# Patient Record
Sex: FEMALE | Race: BLACK OR AFRICAN AMERICAN | NOT HISPANIC OR LATINO | Employment: UNEMPLOYED | URBAN - METROPOLITAN AREA
[De-identification: names, ages, dates, MRNs, and addresses within clinical notes are randomized per-mention and may not be internally consistent; named-entity substitution may affect disease eponyms.]

---
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
## 2018-03-15 ENCOUNTER — HISTORICAL (OUTPATIENT)
Dept: ADMINISTRATIVE | Facility: HOSPITAL | Age: 18
End: 2018-03-15

## 2018-03-15 LAB
AMPHET UR QL SCN: NEGATIVE
BARBITURATE SCN PRESENT UR: NEGATIVE
BENZODIAZ UR QL SCN: NEGATIVE
CANNABINOIDS UR QL SCN: NEGATIVE
COCAINE UR QL SCN: NEGATIVE
OPIATES UR QL SCN: NEGATIVE
PCP UR QL: NEGATIVE
PH UR STRIP.AUTO: 7 [PH] (ref 5–8)
TEMPERATURE, URINE (OHS): 20.1 DEGC (ref 20–25)

## 2018-04-12 LAB — POC BETA-HCG (QUAL): NEGATIVE

## 2018-09-11 ENCOUNTER — HOSPITAL ENCOUNTER (OUTPATIENT)
Dept: SURGERY | Facility: HOSPITAL | Age: 18
End: 2018-09-12
Attending: OBSTETRICS & GYNECOLOGY | Admitting: OBSTETRICS & GYNECOLOGY

## 2018-09-11 LAB
ABS NEUT (OLG): 7.57 X10(3)/MCL (ref 2.1–9.2)
APPEARANCE, UA: CLEAR
B-HCG FREE SERPL-ACNC: 1177 MIU/ML
BACTERIA #/AREA URNS AUTO: ABNORMAL /[HPF]
BASOPHILS # BLD AUTO: 0.08 X10(3)/MCL
BASOPHILS NFR BLD AUTO: 1 %
BILIRUB UR QL STRIP: NEGATIVE
BUN SERPL-MCNC: 9 MG/DL (ref 7–18)
CALCIUM SERPL-MCNC: 8.6 MG/DL (ref 8.5–10.1)
CHLORIDE SERPL-SCNC: 108 MMOL/L (ref 98–107)
CO2 SERPL-SCNC: 25 MMOL/L (ref 21–32)
COLOR UR: NORMAL
CREAT SERPL-MCNC: 0.8 MG/DL (ref 0.6–1.3)
CREAT/UREA NIT SERPL: 11
EOSINOPHIL # BLD AUTO: 0.57 10*3/UL
EOSINOPHIL NFR BLD AUTO: 5 %
ERYTHROCYTE [DISTWIDTH] IN BLOOD BY AUTOMATED COUNT: 15.7 % (ref 11.5–14.5)
GLUCOSE (UA): NORMAL
GLUCOSE SERPL-MCNC: 101 MG/DL (ref 74–106)
HCT VFR BLD AUTO: 32.1 % (ref 35–46)
HGB BLD-MCNC: 9.7 GM/DL (ref 12–16)
HGB UR QL STRIP: NEGATIVE
HYALINE CASTS #/AREA URNS LPF: ABNORMAL /[LPF]
IMM GRANULOCYTES # BLD AUTO: 0.04 10*3/UL
IMM GRANULOCYTES NFR BLD AUTO: 0 %
KETONES UR QL STRIP: NEGATIVE
LEUKOCYTE ESTERASE UR QL STRIP: NEGATIVE
LYMPHOCYTES # BLD AUTO: 2 X10(3)/MCL
LYMPHOCYTES NFR BLD AUTO: 18 % (ref 13–40)
MCH RBC QN AUTO: 24.4 PG (ref 26–34)
MCHC RBC AUTO-ENTMCNC: 30.2 GM/DL (ref 31–37)
MCV RBC AUTO: 80.7 FL (ref 80–100)
MONOCYTES # BLD AUTO: 0.61 X10(3)/MCL
MONOCYTES NFR BLD AUTO: 6 % (ref 4–12)
NEUTROPHILS # BLD AUTO: 7.57 X10(3)/MCL
NEUTROPHILS NFR BLD AUTO: 70 X10(3)/MCL
NITRITE UR QL STRIP: NEGATIVE
PH UR STRIP: 6.5 [PH] (ref 4.5–8)
PLATELET # BLD AUTO: 319 X10(3)/MCL (ref 130–400)
PMV BLD AUTO: 11.1 FL (ref 7.4–10.4)
POTASSIUM SERPL-SCNC: 3.7 MMOL/L (ref 3.5–5.1)
PROT UR QL STRIP: NEGATIVE
RBC # BLD AUTO: 3.98 X10(6)/MCL (ref 4–5.2)
RBC #/AREA URNS AUTO: ABNORMAL /[HPF]
SODIUM SERPL-SCNC: 140 MMOL/L (ref 136–145)
SP GR UR STRIP: 1.02 (ref 1–1.03)
SQUAMOUS #/AREA URNS LPF: ABNORMAL /[LPF]
UROBILINOGEN UR STRIP-ACNC: NORMAL
WBC # SPEC AUTO: 10.9 X10(3)/MCL (ref 4.5–11)
WBC #/AREA URNS AUTO: ABNORMAL /HPF

## 2018-09-12 LAB
ABS NEUT (OLG): 8.1 X10(3)/MCL (ref 2.1–9.2)
BASOPHILS # BLD AUTO: 0.05 X10(3)/MCL
BASOPHILS NFR BLD AUTO: 0 %
CROSSMATCH INTERPRETATION: NORMAL
EOSINOPHIL # BLD AUTO: 0.19 X10(3)/MCL
EOSINOPHIL NFR BLD AUTO: 2 %
ERYTHROCYTE [DISTWIDTH] IN BLOOD BY AUTOMATED COUNT: 15.4 % (ref 11.5–14.5)
HCT VFR BLD AUTO: 31.2 % (ref 35–46)
HGB BLD-MCNC: 9.7 GM/DL (ref 12–16)
HIV 1+2 AB+HIV1 P24 AG SERPL QL IA: NONREACTIVE
IMM GRANULOCYTES # BLD AUTO: 0.02 10*3/UL
IMM GRANULOCYTES NFR BLD AUTO: 0 %
LYMPHOCYTES # BLD AUTO: 1.47 X10(3)/MCL
LYMPHOCYTES NFR BLD AUTO: 14 % (ref 13–40)
MCH RBC QN AUTO: 24.6 PG (ref 26–34)
MCHC RBC AUTO-ENTMCNC: 31.1 GM/DL (ref 31–37)
MCV RBC AUTO: 79.2 FL (ref 80–100)
MONOCYTES # BLD AUTO: 0.55 X10(3)/MCL
MONOCYTES NFR BLD AUTO: 5 % (ref 4–12)
NEUTROPHILS # BLD AUTO: 8.1 X10(3)/MCL
NEUTROPHILS NFR BLD AUTO: 78 X10(3)/MCL
PLATELET # BLD AUTO: 314 X10(3)/MCL (ref 130–400)
PMV BLD AUTO: 11.1 FL (ref 7.4–10.4)
PRODUCT READY: NORMAL
RBC # BLD AUTO: 3.94 X10(6)/MCL (ref 4–5.2)
WBC # SPEC AUTO: 10.4 X10(3)/MCL (ref 4.5–11)

## 2018-09-14 ENCOUNTER — HISTORICAL (OUTPATIENT)
Dept: ADMINISTRATIVE | Facility: HOSPITAL | Age: 18
End: 2018-09-14

## 2018-09-14 LAB — B-HCG FREE SERPL-ACNC: 187 MIU/ML

## 2018-09-28 LAB — POC BETA-HCG (QUAL): NEGATIVE

## 2020-09-24 ENCOUNTER — HISTORICAL (OUTPATIENT)
Dept: ADMINISTRATIVE | Facility: HOSPITAL | Age: 20
End: 2020-09-24

## 2020-09-24 LAB
ABS NEUT (OLG): 4.54 X10(3)/MCL (ref 2.1–9.2)
ALBUMIN SERPL-MCNC: 3.6 GM/DL (ref 3.4–5)
ALBUMIN/GLOB SERPL: 0.8 RATIO (ref 1.1–2)
ALP SERPL-CCNC: 56 UNIT/L (ref 45–117)
ALT SERPL-CCNC: 18 UNIT/L (ref 12–78)
AST SERPL-CCNC: 13 UNIT/L (ref 15–37)
BASOPHILS # BLD AUTO: 0.1 X10(3)/MCL (ref 0–0.2)
BASOPHILS NFR BLD AUTO: 1 %
BILIRUB SERPL-MCNC: 0.3 MG/DL (ref 0.2–1)
BILIRUBIN DIRECT+TOT PNL SERPL-MCNC: <0.1 MG/DL (ref 0–0.2)
BILIRUBIN DIRECT+TOT PNL SERPL-MCNC: ABNORMAL MG/DL
BUN SERPL-MCNC: 10 MG/DL (ref 7–18)
CALCIUM SERPL-MCNC: 9.4 MG/DL (ref 8.5–10.1)
CHLORIDE SERPL-SCNC: 109 MMOL/L (ref 98–107)
CHOLEST SERPL-MCNC: 140 MG/DL
CHOLEST/HDLC SERPL: 4.1 {RATIO} (ref 0–4.4)
CO2 SERPL-SCNC: 25 MMOL/L (ref 21–32)
CREAT SERPL-MCNC: 0.9 MG/DL (ref 0.6–1.3)
DEPRECATED CALCIDIOL+CALCIFEROL SERPL-MC: 20 NG/ML (ref 30–80)
EOSINOPHIL # BLD AUTO: 0.2 X10(3)/MCL (ref 0–0.9)
EOSINOPHIL NFR BLD AUTO: 3 %
ERYTHROCYTE [DISTWIDTH] IN BLOOD BY AUTOMATED COUNT: 15.9 % (ref 11.5–14.5)
EST. AVERAGE GLUCOSE BLD GHB EST-MCNC: 137 MG/DL
GLOBULIN SER-MCNC: 4.3 GM/ML (ref 2.3–3.5)
GLUCOSE SERPL-MCNC: 106 MG/DL (ref 74–106)
HAV IGM SERPL QL IA: NONREACTIVE
HBA1C MFR BLD: 6.4 % (ref 4.2–6.3)
HBV CORE IGM SERPL QL IA: NONREACTIVE
HBV SURFACE AG SERPL QL IA: NONREACTIVE
HCT VFR BLD AUTO: 37.4 % (ref 35–46)
HCV AB SERPL QL IA: NONREACTIVE
HDLC SERPL-MCNC: 34 MG/DL (ref 40–59)
HGB BLD-MCNC: 11.1 GM/DL (ref 12–16)
HIV 1+2 AB+HIV1 P24 AG SERPL QL IA: NONREACTIVE
IMM GRANULOCYTES # BLD AUTO: 0.02 10*3/UL
IMM GRANULOCYTES NFR BLD AUTO: 0 %
LDLC SERPL CALC-MCNC: 87 MG/DL
LYMPHOCYTES # BLD AUTO: 2.1 X10(3)/MCL (ref 0.6–4.6)
LYMPHOCYTES NFR BLD AUTO: 29 %
MCH RBC QN AUTO: 24.9 PG (ref 26–34)
MCHC RBC AUTO-ENTMCNC: 29.7 GM/DL (ref 31–37)
MCV RBC AUTO: 84 FL (ref 80–100)
MONOCYTES # BLD AUTO: 0.4 X10(3)/MCL (ref 0.1–1.3)
MONOCYTES NFR BLD AUTO: 5 %
NEUTROPHILS # BLD AUTO: 4.54 X10(3)/MCL (ref 2.1–9.2)
NEUTROPHILS NFR BLD AUTO: 62 %
PLATELET # BLD AUTO: 323 X10(3)/MCL (ref 130–400)
PMV BLD AUTO: 11.5 FL (ref 7.4–10.4)
POTASSIUM SERPL-SCNC: 4 MMOL/L (ref 3.5–5.1)
PROT SERPL-MCNC: 7.9 GM/DL (ref 6.4–8.2)
RBC # BLD AUTO: 4.45 X10(6)/MCL (ref 4–5.2)
SODIUM SERPL-SCNC: 142 MMOL/L (ref 136–145)
T PALLIDUM AB SER QL: NONREACTIVE
T4 FREE SERPL-MCNC: 0.92 NG/DL (ref 0.76–1.46)
TRIGL SERPL-MCNC: 96 MG/DL
TSH SERPL-ACNC: 1.55 MIU/L (ref 0.36–3.74)
VLDLC SERPL CALC-MCNC: 19 MG/DL
WBC # SPEC AUTO: 7.4 X10(3)/MCL (ref 4.5–11)

## 2021-02-01 ENCOUNTER — HISTORICAL (OUTPATIENT)
Dept: ADMINISTRATIVE | Facility: HOSPITAL | Age: 21
End: 2021-02-01

## 2021-02-01 LAB — SARS-COV-2 RNA RESP QL NAA+PROBE: NEGATIVE

## 2021-02-03 ENCOUNTER — HISTORICAL (OUTPATIENT)
Dept: RADIOLOGY | Facility: HOSPITAL | Age: 21
End: 2021-02-03

## 2021-02-03 LAB — B-HCG SERPL QL: NEGATIVE

## 2021-02-04 LAB — FINAL CULTURE: NORMAL

## 2021-04-29 ENCOUNTER — HISTORICAL (OUTPATIENT)
Dept: ADMINISTRATIVE | Facility: HOSPITAL | Age: 21
End: 2021-04-29

## 2021-04-29 ENCOUNTER — HISTORICAL (OUTPATIENT)
Dept: LAB | Facility: HOSPITAL | Age: 21
End: 2021-04-29

## 2021-04-29 LAB
AMPHET UR QL SCN: NEGATIVE
BARBITURATE SCN PRESENT UR: NEGATIVE
BENZODIAZ UR QL SCN: NEGATIVE
CANNABINOIDS UR QL SCN: NEGATIVE
COCAINE UR QL SCN: NEGATIVE
OPIATES UR QL SCN: NEGATIVE
PCP UR QL: NEGATIVE
PH UR STRIP.AUTO: 5.5 [PH] (ref 3–11)
POC BETA-HCG (QUAL): NEGATIVE

## 2021-05-27 ENCOUNTER — HISTORICAL (OUTPATIENT)
Dept: ADMINISTRATIVE | Facility: HOSPITAL | Age: 21
End: 2021-05-27

## 2021-05-27 ENCOUNTER — HISTORICAL (OUTPATIENT)
Dept: FAMILY MEDICINE | Facility: CLINIC | Age: 21
End: 2021-05-27

## 2021-05-27 LAB
ALBUMIN SERPL-MCNC: 3.7 GM/DL (ref 3.5–5)
ALBUMIN/GLOB SERPL: 1 RATIO (ref 1.1–2)
ALP SERPL-CCNC: 55 UNIT/L (ref 40–150)
ALT SERPL-CCNC: 16 UNIT/L (ref 0–55)
AMPHET UR QL SCN: NEGATIVE
AST SERPL-CCNC: 15 UNIT/L (ref 5–34)
BARBITURATE SCN PRESENT UR: NEGATIVE
BENZODIAZ UR QL SCN: NEGATIVE
BILIRUB SERPL-MCNC: 0.2 MG/DL
BILIRUBIN DIRECT+TOT PNL SERPL-MCNC: 0.1 MG/DL (ref 0–0.5)
BILIRUBIN DIRECT+TOT PNL SERPL-MCNC: 0.1 MG/DL (ref 0–0.8)
BUN SERPL-MCNC: 9.7 MG/DL (ref 7–18.7)
CALCIUM SERPL-MCNC: 9.4 MG/DL (ref 8.4–10.2)
CANNABINOIDS UR QL SCN: NEGATIVE
CHLORIDE SERPL-SCNC: 112 MMOL/L (ref 98–107)
CO2 SERPL-SCNC: 24 MMOL/L (ref 22–29)
COCAINE UR QL SCN: NEGATIVE
CREAT SERPL-MCNC: 0.8 MG/DL (ref 0.55–1.02)
EST. AVERAGE GLUCOSE BLD GHB EST-MCNC: 131.2 MG/DL
GLOBULIN SER-MCNC: 3.6 GM/DL (ref 2.4–3.5)
GLUCOSE SERPL-MCNC: 107 MG/DL (ref 74–100)
HBA1C MFR BLD: 6.2 %
OPIATES UR QL SCN: NEGATIVE
PCP UR QL: NEGATIVE
PH UR STRIP.AUTO: 6 [PH] (ref 3–11)
POTASSIUM SERPL-SCNC: 4.3 MMOL/L (ref 3.5–5.1)
PROT SERPL-MCNC: 7.3 GM/DL (ref 6.4–8.3)
SODIUM SERPL-SCNC: 143 MMOL/L (ref 136–145)
T4 FREE SERPL-MCNC: 0.82 NG/DL (ref 0.7–1.48)
TESTOST SERPL-MCNC: 38.13 NG/DL (ref 13.84–53.35)
TSH SERPL-ACNC: 2.13 UIU/ML (ref 0.35–4.94)

## 2021-06-24 ENCOUNTER — HISTORICAL (OUTPATIENT)
Dept: RADIOLOGY | Facility: HOSPITAL | Age: 21
End: 2021-06-24

## 2021-11-18 ENCOUNTER — HISTORICAL (OUTPATIENT)
Dept: ADMINISTRATIVE | Facility: HOSPITAL | Age: 21
End: 2021-11-18

## 2021-11-18 LAB
HAV IGM SERPL QL IA: NONREACTIVE
HBV CORE IGM SERPL QL IA: NONREACTIVE
HBV SURFACE AG SERPL QL IA: NONREACTIVE
HCV AB SERPL QL IA: NONREACTIVE
HIV 1+2 AB+HIV1 P24 AG SERPL QL IA: NONREACTIVE
T PALLIDUM AB SER QL: NONREACTIVE

## 2022-03-21 ENCOUNTER — HISTORICAL (OUTPATIENT)
Dept: ADMINISTRATIVE | Facility: HOSPITAL | Age: 22
End: 2022-03-21

## 2022-04-10 ENCOUNTER — HISTORICAL (OUTPATIENT)
Dept: ADMINISTRATIVE | Facility: HOSPITAL | Age: 22
End: 2022-04-10
Payer: MEDICAID

## 2022-04-12 ENCOUNTER — HISTORICAL (OUTPATIENT)
Dept: ADMINISTRATIVE | Facility: HOSPITAL | Age: 22
End: 2022-04-12

## 2022-04-12 LAB
HUMAN PAPILLOMAVIRUS (HPV): NORMAL
PAP RECOMMENDATION EXT: NORMAL
PAP SMEAR: NORMAL

## 2022-04-25 VITALS
WEIGHT: 288.13 LBS | DIASTOLIC BLOOD PRESSURE: 74 MMHG | HEIGHT: 71 IN | BODY MASS INDEX: 40.34 KG/M2 | OXYGEN SATURATION: 100 % | SYSTOLIC BLOOD PRESSURE: 110 MMHG

## 2022-04-30 NOTE — CONSULTS
Patient:   Valente Wilson            MRN: [id number]            FIN: [id number]               Age:   18 years     Sex:  Female     :  2000   Associated Diagnoses:   None   Author:   Nair MD, Pallavi      Patient evaluated in the ED    HPI: 17 yo  at 6w2d WGA by LMP 18 presents to the ED with complaint of right sided groin pain and suprapubic pain. She describes the pain as sharp and constant, and she works the night shift so she came in yesterday and again today. She says the pain worsened and so she returned. +UPT at home on Thursday, 18, and this is a desired pregnancy. She has an appointment in clinic on Thursday for a prenatal appointment. She also describes dysuria, and diarrhea. She denies this pain in the past. Denies pain with bowel movements or pain relieved by bowel movements. She does report nausea almost daily since her +UPT at home.     ROS:  Denies fevers/chills/chest pain/shortness of breath/dizziness    PMH: none  PSH: none  Medications: prenatal vitamins  Allergies: percocet (hives)  GYN history: Denies STIs,   OB history:  FTSVD 6/29/15 at 40wga, denies any pregnancy related complications    Ultrasound  1. 4.6 x 1.8 x 3.1 cm complex cystic lesion noted in the right adnexa, which is separately seen from the right ovary. No significant color Doppler flow is seen within this lesion. A moderate amount of free fluid is noted in the posterior cul-de-sac. The possibility of an ectopic gestatoin with a pseudogestational sac in the endometrial cavity cannot be excluded.   2. Recommend short term serial clinical laboratory and ultrasound follow up.       Physical Exam:   Most recent vitals: /72 Pulse 102 RR 12 Tmax 37.1   Gen: AAO x 3, NAD  HEENT: NCAT, EOMI, MMM  CV: RRR; S1/S2  Resp: CTA bilaterally, good air movement, nontender chest  Ext: no edema, DP/Radial 2+  Abd: soft, + BS, tender to deep palpation in right side, no rebound/guarding  : Normal  vulva and vagina on speculum exam, cervix visualized as very posterior with no lesions, os visualized as closed. Bimanual exam with suprapubic tenderness, right groin and right upper abdomen tenderness on deep palpation. No adnexal masses palpated.    UA: wnl  beta hC (18) ---> 1177 (18)  H/H: 10.3/34 (18) --> 9.7/32.1 (18)         A/P: 19 yo  at 6w3d WGA with possible early pregnancy vs. ectopic:   1. Early intrauterine pregnancy vs. ectopic pregnancy: Ultrasound with moderate free fluid in the pelvis, beta hcg trending up. Moderate free fluid raises concern for ectopic. Will admit for observation and follow H/H. NPO for possible surgery. Tylenol for pain. Discussed with patient and her mother risks, benefits of surgery and consented for surgical treatment. Her mother has had an ectopic pregnancy in the past and is aware of it. All questions answered.     Will discuss with Dr. Provost Pallavi Nair, MD  [facility]

## 2022-04-30 NOTE — ED PROVIDER NOTES
Patient:   Valente Wilson            MRN: [id number]            FIN: [id number]               Age:   18 years     Sex:  Female     :  2000   Associated Diagnoses:   RLQ abdominal pain; Currently pregnant   Author:   Willard Nance MD      Basic Information   Time seen: Date & time 2018 23:38:00.   History source: Patient.   Arrival mode: Private vehicle.   History limitation: None.   Additional information: Chief Complaint from Nursing Triage Note : Chief Complaint   2018 23:29 CDT      Chief Complaint           R groin pain.  Seen yesterday for same.  .      History of Present Illness   The patient presents with abdominal pain.  The onset was 4  days ago.  The course/duration of symptoms is fluctuating in intensity.  The character of symptoms is achy.  The degree at onset was moderate.  The Location of pain at onset was Suprapubic and Right Groin.  The degree at present is severe.  The Location of pain at present is Suprapubic and Right Groin.  Radiating pain: right flank. The exacerbating factor is none.  The relieving factor is none.  Risk factors consist of pregnancy.  Associated symptoms: nausea, vomiting, denies chest pain, denies diarrhea, denies shortness of breath, denies fever, denies chills and denies headache.  Additional history: Patient complaining of suprapubic/right groin pain radiating to right flank along with nausea and vomiting. Was evaluated in ER yesterday.        Review of Systems   Constitutional symptoms:  No fever, no chills.    Skin symptoms:  No rash,    Eye symptoms:  Vision unchanged.   ENMT symptoms:  No nasal congestion,    Respiratory symptoms:  No shortness of breath,    Cardiovascular symptoms:  No chest pain,    Gastrointestinal symptoms:  Abdominal pain, nausea, vomiting, no diarrhea, no constipation.    Genitourinary symptoms:  No dysuria, no hematuria, no vaginal bleeding, no vaginal discharge.    Musculoskeletal symptoms:  No back  pain,    Neurologic symptoms:  No headache,    Endocrine symptoms:  No polyuria,    Allergy/immunologic symptoms:  No recurrent infections,       Health Status   Allergies:    Allergic Reactions (Selected)  Severity Not Documented  Percocet 7.5/325- No reactions were documented..   Medications: Per nurse's notes.   Pregnancy history: Currently pregnant,  2, para 1.      Past Medical/ Family/ Social History   Medical history: Reviewed as documented in chart.   Surgical history:    none..   Social history: Reviewed as documented in chart.      Physical Examination               Vital Signs   Vital Signs   2018 23:29 CDT      Temperature Oral          37.1 DegC                             Temperature Oral (calculated)             98.78 DegF                             Peripheral Pulse Rate     99 bpm                             Respiratory Rate          16 br/min                             SpO2                      100 %                             Oxygen Therapy            Room air                             Systolic Blood Pressure   126 mmHg                             Diastolic Blood Pressure  86 mmHg  .   General:  Alert, no acute distress.    Skin:  Warm, dry, intact, no rash.    Head:  Normocephalic, atraumatic.    Neck:  Supple, trachea midline.    Eye:  Extraocular movements are intact.   Ears, nose, mouth and throat:  Oral mucosa moist.   Cardiovascular:  Regular rate and rhythm.   Respiratory:  Lungs are clear to auscultation, respirations are non-labored, breath sounds are equal, Symmetrical chest wall expansion.    Chest wall:  No tenderness.   Back:  No costovertebral angle tenderness,    Musculoskeletal:  Normal ROM, normal strength.    Gastrointestinal:  Soft, Non distended, No organomegaly, Tenderness: Mild, suprapubic, right lower quadrant, Guarding: Negative, Rebound: Negative, Bowel sounds: Normal, Trauma: Negative, Signs: McBurney's negative, Fernandez's negative.    Neurological:  Alert  and oriented to person, place, time, and situation, No focal neurological deficit observed, normal speech observed.    Psychiatric:  Cooperative, appropriate mood & affect.       Medical Decision Making   Documents reviewed:  Emergency department nurses' notes.   Orders  Launch Order Profile (Selected)   Inpatient Orders  Completed  IVF Normal Saline NS Bolus 1000ml: 1,000 mL, 1,000 mL, IV, 1,000 mL/hr, start date 09/11/18 23:49:00 CDT, stop date 09/11/18 23:49:00 CDT, STAT  Tylenol: 1,000 mg, form: Tab, Oral, Once, first dose 09/12/18 2:24:00 CDT, stop date 09/12/18 2:24:00 CDT, STAT.   Results review:  Lab results : Lab View   9/11/2018 23:50 CDT      Sodium Lvl                140 mmol/L                             Potassium Lvl             3.7 mmol/L                             Chloride                  108 mmol/L  HI                             CO2                       25 mmol/L                             Calcium Lvl               8.6 mg/dL                             Glucose Lvl               101 mg/dL                             BUN                       9 mg/dL                             Creatinine                0.80 mg/dL                             BUN/Creat Ratio           11  NA                             eGFR-AA                   >105 mL/min                             eGFR-MARIA G                  97 mL/min                             Beta hCG Qnt              1,177.0 mIU/mL  NA                             WBC                       10.9 x10(3)/mcL                             RBC                       3.98 x10(6)/mcL  LOW                             Hgb                       9.7 gm/dL  LOW                             Hct                       32.1 %  LOW                             Platelet                  319 x10(3)/mcL                             MCV                       80.7 fL                             MCH                       24.4 pg  LOW                             MCHC                       30.2 gm/dL  LOW                             RDW                       15.7 %  HI                             MPV                       11.1 fL  HI                             Abs Neut                  7.57 x10(3)/mcL                             Neutro Auto               70 x10(3)/mcL  NA                             Lymph Auto                18 %                             Mono Auto                 6 %                             Eos Auto                  5  NA                             Abs Eos                   0.57  NA                             Basophil Auto             1  NA                             Abs Neutro                7.57 x10(3)/mcL  NA                             Abs Lymph                 2.00 x10(3)/mcL  NA                             Abs Mono                  0.61 x10(3)/mcL  NA                             Abs Baso                  0.08 x10(3)/mcL  NA                             IG%                       0 %  NA                             IG#                       0.0400  NA    9/11/2018 23:45 CDT      UA Appear                 Clear                             UA Color                  LIGHT YELLOW                             UA Spec Grav              1.024                             UA Bili                   Negative                             UA pH                     6.5                             UA Urobilinogen           Normal                             UA Blood                  Negative                             UA Glucose                Normal                             UA Ketones                Negative                             UA Protein                Negative                             UA Nitrite                Negative                             UA Leuk Est               Negative                             UA WBC Interp             0-2 /HPF                             UA RBC Interp             0-2                             UA Bact Interp            None Seen                              UA Squam Epi Interp       2-20                             UA Hyal Cast Interp       0-2  .     US Transvaginal  Impression:  1. A 4.6 x 1.8 x 3.1 cm complex cystic lesion is noted in the right adnexa, which is separately seen from the right ovary. No significant color Doppler flow signal is seen within this lesion. A moderate amount of free fluid is noted in the posterior cul-de-sac. The possibility of an ectopic gestation with a pseudogestational sac in the endometrial cavity cannot be excluded.  2. Recommend short term serial clinical laboratory and ultrasound followup.    US OB Transabdominal  Impression:  1. There is a 2.7 x 2.1 x 2.5 cm thick-walled complex cystic lesion in the right adnexa, without significant peripheral Doppler flow signal.  2. The possibility of early intrauterine pregnancy versus an occult ectopic gestation in the right adnexa cannot be excluded at this time. Recommend serial interval clinical laboratory and ultrasound follow up.      Reexamination/ Reevaluation   Time: 9/12/2018 02:55:00 .   Vital signs   results included from flowsheet : Vital Signs   9/12/2018 2:32 CDT       Temperature Oral          36.8 DegC                             Heart Rate Monitored      98 bpm                             Respiratory Rate          22 br/min                             SpO2                      100 %                             Oxygen Therapy            Room air                             Systolic Blood Pressure   115 mmHg                             Diastolic Blood Pressure  68 mmHg                             Mean Arterial Pressure, Cuff              84 mmHg     Course: progressing as expected.   Pain status: decreased.   Notes: Patient resting comfortably in bed at this time. Educated of results of US and concern for ectopic pregnancy. Will consult GYN for further evaluation. Patient verbalized understanding.      Impression and Plan   Diagnosis   RLQ abdominal pain  (QJV19-QD R10.31)   Currently pregnant (WLS33-FJ Z34.90)      Calls-Consults   -  9/12/2018 03:06:00 , Anisha SMITH, Pallavi, GYN On-Call, consult, Will come evaluate patient in ER.    Plan   Disposition: Admit to OR.    Counseled: Patient, Regarding diagnosis, Regarding diagnostic results, Regarding treatment plan, Patient indicated understanding of instructions.

## 2022-04-30 NOTE — PROGRESS NOTES
Patient:   Valente Wilson            MRN: [id number]            FIN: [id number]               Age:   18 years     Sex:  Female     :  2000   Associated Diagnoses:   None   Author:   Marleni Augustin MD      Pre-Op Dx:  18 F with probable ectopic pregnancy        Plan:  Dx LSC with possible salpingectomy    Reviewed resident's H&P.  Agree with plan.  Proceed with case

## 2022-05-03 NOTE — HISTORICAL [facility] CERNER
This is a historical note converted from Latisha. Formatting and pictures may have been removed.  Please reference Latisha for original formatting and attached multimedia. Chief Complaint  PO follow up #1  History of Present Illness  17yo  presents 2 days s/p LSC R salpingectomy for ectopic pregnancy. Patient reports umbilical pain at her incision. Shes eating a regular diet and has had two episodes of nausea with small amount of emesis after taking narcotic pain medication. She has not yet had BM since surgery but she is passing flatus. Voiding and ambulating without difficulty. Denies fever or chills. No vaginal bleeding.  Review of Systems  Per HPI  Physical Exam  Vitals & Measurements  T:?36.8? ?C (Oral)? HR:?79(Peripheral)? RR:?28? BP:?107/70? SpO2:?100%?  HT:?171?cm? HT:?171?cm? WT:?133.6?kg? WT:?133.6?kg? BMI:?45.69?  AO x 3, NAD, well appearing  Obese  Abd soft, mildly tender at umbilicus, no rebound or guarding, no signs of acute abdomen, NABS  Port incisions x 4 clean dry and intact with no signs of infection, skin glue present  Assessment/Plan  Constipation  - Post op constipation, recommended reducing narcotics to prn, take scheduled ibuprofen, Miralax until regular BMs again  - CBC and beta hcg today, repeat beta hcg at next post op visit  ?  Ordered:  ondansetron, 4 mg = 1 tab(s), Oral, q8hr, PRN PRN as needed for nausea/vomiting, # 30 tab(s), 0 Refill(s)  Beta hCG Quantitative, Routine collect, 18 10:56:00 CDT, Blood, Stop date 18 10:56:00 CDT, Lab Collect, Postoperative pain  Constipation  Nausea, 18 10:56:00 CDT  CBC w/ Auto Diff, Routine collect, 18 10:34:00 CDT, Blood, Stop date 18 10:35:00 CDT, Lab Collect, Postoperative pain  Constipation  Nausea, 18 10:34:00 CDT  Clinic Follow up, *Est. 18 3:00:00 CDT, Order for future visit, Constipation  Nausea  Postoperative pain, [facility]  ?  Nausea  Ordered:  ondansetron, 4 mg = 1 tab(s), Oral,  q8hr, PRN PRN as needed for nausea/vomiting, # 30 tab(s), 0 Refill(s)  Beta hCG Quantitative, Routine collect, 09/14/18 10:56:00 CDT, Blood, Stop date 09/14/18 10:56:00 CDT, Lab Collect, Postoperative pain  Constipation  Nausea, 09/14/18 10:56:00 CDT  CBC w/ Auto Diff, Routine collect, 09/14/18 10:34:00 CDT, Blood, Stop date 09/14/18 10:35:00 CDT, Lab Collect, Postoperative pain  Constipation  Nausea, 09/14/18 10:34:00 CDT  Clinic Follow up, *Est. 09/28/18 3:00:00 CDT, Order for future visit, Constipation  Nausea  Postoperative pain, [facility]  ?  Postoperative pain  Ordered:  ondansetron, 4 mg = 1 tab(s), Oral, q8hr, PRN PRN as needed for nausea/vomiting, # 30 tab(s), 0 Refill(s)  Beta hCG Quantitative, Routine collect, 09/14/18 10:56:00 CDT, Blood, Stop date 09/14/18 10:56:00 CDT, Lab Collect, Postoperative pain  Constipation  Nausea, 09/14/18 10:56:00 CDT  CBC w/ Auto Diff, Routine collect, 09/14/18 10:34:00 CDT, Blood, Stop date 09/14/18 10:35:00 CDT, Lab Collect, Postoperative pain  Constipation  Nausea, 09/14/18 10:34:00 CDT  Clinic Follow up, *Est. 09/28/18 3:00:00 CDT, Order for future visit, Constipation  Nausea  Postoperative pain, [facility]  ?   Problem List/Past Medical History  Ongoing  Attention deficit disorder  Beat knee  Knee pain  Obesity(  Probable Diagnosis  )  Tobacco user  Trichomonosis  Historical  None  Procedure/Surgical History  Ectopic Preg Laparoscopic/Laparotomy (None) (09/12/2018)  vaginal delivery - 2nd degree periurethral laceration (06/29/2015)  vaginal delivery - 2nd degree periurethral laceration (06/29/2015)  DIRECT ADMISSION OF PATIENT FOR HOSPITAL OBSERVATION CARE (06/26/2015)  HOSPITAL OBSERVATION SERVICE, PER HOUR (06/26/2015)  DIRECT ADMISSION OF PATIENT FOR HOSPITAL OBSERVATION CARE (06/13/2015)  HOSPITAL OBSERVATION SERVICE, PER HOUR (06/13/2015)  none   Medications  Adderall 10 mg oral tablet, 10 mg= 1 tab(s), Oral, Daily,? ?Not  taking  diclofenac sodium 75 mg oral delayed release tablet, 75 mg= 1 tab(s), Oral, BID, PRN,? ?Not taking  ferrous sulfate 325 mg (65 mg elemental iron) oral tablet, 325 mg= 1 tab(s), Oral, TID,? ?Not taking  Flagyl 500 mg oral tablet, 500 mg= 1 tab(s), Oral, BID,? ?Not Taking, Completed Rx  ibuprofen 600 mg oral tablet, 600 mg= 1 tab(s), Oral, q6hr, PRN  Norco 5 mg-325 mg oral tablet, 1 tab(s), Oral, q4hr, PRN  Prenatal AD oral tablet, 1 tab(s), Oral, Daily, 1 refills,? ?Not Taking, Completed Rx  Zofran 4 mg oral tablet, 4 mg= 1 tab(s), Oral, q8hr, PRN  Allergies  Percocet 7.5/325  Social History  Alcohol - Denies Alcohol Use, 11/23/2014  Current, Beer, 1-2 times per year, 04/09/2017  Employment/School - Low Risk, 06/28/2015  Unemployed, 07/21/2017  Exercise  Exercise type: Walking., 07/21/2017  Home/Environment  Lives with Mother., 07/21/2017  Nutrition/Health  Regular, 07/21/2017  Sexual  Sexually active: Yes., 07/21/2017  Substance Abuse - Denies Substance Abuse, 11/23/2014  Never, 04/09/2017  Tobacco - Denies Tobacco Use, 11/23/2014  Current every day smoker Use:. Cigarettes Type:. 4 per day., 09/14/2018  Family History  Hypertension.: Mother.  Immunizations  Vaccine Date Status   hepatitis A pediatric vaccine 07/22/2016 Given   tetanus/diphtheria/pertussis, acel(Tdap) 06/30/2015 Given   Health Maintenance  Health Maintenance  ???Pending?(in the next year)  ??? ??Due?  ??? ? ? ?ADL Screening due??09/14/18??and every 1??year(s)  ??? ? ? ?Alcohol Misuse Screening due??09/14/18??and every 1??year(s)  ??? ? ? ?Diabetes Screening due??09/14/18??and every?  ??? ? ? ?Influenza Vaccine due??09/14/18??and every?  ???Satisfied?(in the past 1 year)  ??? ??Satisfied?  ??? ? ? ?Blood Pressure Screening on??09/14/18.??Satisfied by Enoch Cutler  ??? ? ? ?Body Mass Index Check on??09/14/18.??Satisfied by Enoch Cutler  ??? ? ? ?Depression Screening on??09/14/18.??Satisfied by Enoch Cutler  ??? ? ? ?Diabetes  Screening on??09/11/18.??Satisfied by [place]  ??? ? ? ?Hypertension Management-Blood Pressure on??09/14/18.??Satisfied by Enoch Cutler  ??? ? ? ?Obesity Screening on??09/14/18.??Satisfied by Enoch Cutler  ??? ? ? ?Smoking Cessation on??09/14/18.??Satisfied by Enoch Cutler  ?  ?      Reviewed the patients medical history, residents findings on physical exam, and the diagnosis with treatment plan. Care provided was reasonable and necessary.

## 2022-05-03 NOTE — HISTORICAL [facility] CERNER
This is a historical note converted from [employer]. Formatting and pictures may have been removed.  Please reference [employer] for original formatting and attached multimedia. Chief Complaint  adhd f/u  History of Present Illness  This is a 19 y/o female with a past medical history of ADD, obesity, anxiety, depression, attempted suicide (2017) and last a psychiatrist in 2017.? Has had 1 day of counseling in 2017.?  ?  Acute Illness- ADD  Pt has not fiordaliza on any type of ADD medications since 2018 when she was in high school.?? Reports worsening symptoms.? Requesting the medication b/c she cant focus at work, difficulties concentrating. For ex, I am told to do one thing and get distracted and end up doing another task.? Unable to complete tasks.? Disorganized at work and at home.? Poor time management.? Trouble multitasking.? Feels restless at night. Goes to bed at 3am -4 am and up at 6am.? Has Low frustration tolerance.? Wants to go back to school to obtain her GED.?  Requesting counseling.? Symptoms interfering in relationship with fiance- more arguments occurring.? Gets frustrated with her son at times.? Has been isolating herself? because she gets annoyed and angry and down with crying episodes.?  ?  Depression/anxiety:  Worsening after the ectopic pregnancy 9/2018.? Has crying episodes 3-4 times out of the week.? Mostly by herself. Not any medications.? +SI intermittently?, no plans due to my son , Denies any weapons in the home.? Denies HI.?? Has feelings of guilt, poor appetite -eats once a day, low energy at times.? Has increased in smoking to 1/2 pack a day to decrease anxiety with some relief x 3 yrs (1.5 pack years).  ?  GYN: LMP 4/27/21, irregular, 3-4 tampons per day, 3-5 days, has dysmenorrhea prior to cycle? x 1 week and takes?Tylenol prn pain  G1; VMI (2015), no complications with pregnancy  G2: Ectopic pregnancy (6 weeks)  ?  Review of Systems  Constitutional:?no weight gain,?no weight loss,?no fatigue,?no  fever,?no chills,?no weakness,?no trouble sleeping.  Cardiovascular:?no chest pain or discomfort,?no tightness,?no palpitations,?no SOB with activity,?no difficulty breathing while supine,?no swelling,?no sudden awakening from sleep with SOB.  Respiratory:??no cough,?no sputum,?no coughing up blood,?no SOB,?no wheezing,?no painful breathing.  Gastrointestinal:?no swallowing difficulty,?no heartburn,?no change in appetite,?no nausea,?  Urinary:?no frequency,?no urgency,?no burning or pain,?no blood in urine,?no incontinence,?  Skin:?no rashes,?no lumps,?no itching,?no dryness,?color normal for ethnicity,?no hair or nail changes.  Neurologic:?no dizziness,?no fainting,?no seizures,?no weakness,?no numbness,?no tingling,?no tremors.  Psychiatric:?nervousness,?stress,?at work??depression,?memory loss.  Endocrine:?no heat or cold intolerance,?no sweating,?no frequent urination,?no thirst,?no change in appetite.  Physical Exam  Vitals & Measurements  T:?36.8? ?C (Oral)? HR:?91(Peripheral)? RR:?18? BP:?116/77?  HT:?180.00?cm? WT:?134.700?kg? BMI:?41.57? LMP:?04/27/2021 00:00 CDT?  General: cooperative, no acute respiratory distress,  CV: RRR, no murmurs  Lungs:?CTA b/l, no wheezing  Abdomen: soft, NT, ND, + BS x 4, no organomegaly, no CVA tenderness  Extremities: no cyanosis, clubbing, or edema  SKIN: inspection and palpation of skin and soft tissue normal; no scars noted on upper/lower extremities  PSYCH: alert and oriented x 3?with?appropriate mood and affect  Assessment/Plan  1.?Attention deficit disorder?F90.0  Ordered Adderall 20 mg daily  Control substance?contract signed today  Ordered UPT-negative, UDS, and ordered Depot Provera 150mg now and then?every 3 months  Informed pt to let us know if she decides to get pregnant.? Discussed CI of pregnancy and Adderall.  2.?Depression?F32.9  Referral for counseling- Mrs. Parker  3.?Anxiety?F41.9  If symptoms worsen or not improved will consider adding SSRI  4.?Acanthosis  nigricans?L83  Will also consider possible PCOS due to Obesity, infertility, acanthosis nigricans and possible metabolic syndrome  Ordered A1C, CMP, lipid panel  HM:  Last Pap 11/18/2020 NIL  ?  RTC 1 month   Problem List/Past Medical History  Ongoing  Attention deficit disorder  Beat knee  Knee pain  Obesity(  Probable Diagnosis  )  Tobacco user  Trichomonosis  Procedure/Surgical History  Repair Upper Lip, External Approach (04/26/2020)  Simple repair of superficial wounds of face, ears, eyelids, nose, lips and/or mucous membranes; 2.5 cm or less (04/26/2020)  Ectopic Preg Laparoscopic/Laparotomy (None) (09/12/2018)  Laparoscopic treatment of ectopic pregnancy; with salpingectomy and/or oophorectomy (09/12/2018)  Resection of Products of Conception, Ectopic, Percutaneous Endoscopic Approach (09/12/2018)  Resection of Right Fallopian Tube, Percutaneous Endoscopic Approach (09/12/2018)  vaginal delivery - 2nd degree periurethral laceration (06/29/2015)  vaginal delivery - 2nd degree periurethral laceration (06/29/2015)  DIRECT ADMISSION OF PATIENT FOR HOSPITAL OBSERVATION CARE (06/26/2015)  HOSPITAL OBSERVATION SERVICE, PER HOUR (06/26/2015)  DIRECT ADMISSION OF PATIENT FOR HOSPITAL OBSERVATION CARE (06/13/2015)  HOSPITAL OBSERVATION SERVICE, PER HOUR (06/13/2015)  none   Medications  No active medications  Allergies  Percocet 7.5/325  Social History  Abuse/Neglect  No, No, Yes, 04/29/2021  No, No, Yes, 02/03/2021  Alcohol - Denies Alcohol Use, 11/23/2014  Current, 1-2 times per week, 01/04/2021  Employment/School - Low Risk, 06/28/2015  Unemployed, 01/04/2021  Exercise  Exercise type: Denies Any ., 01/04/2021  Home/Environment  Lives with Children, Siblings. Living situation: Home/Independent., 01/04/2021    Never in , 09/09/2020  Nutrition/Health  Regular, Good, 01/04/2021  Sexual  Sexually active: Yes. Number of current partners 1. Sexual orientation: Bisexual. Gender Identity Identifies as  female., 01/04/2021  Substance Use - Denies Substance Abuse, 11/23/2014  Past, Marijuana, 01/04/2021  Tobacco - Denies Tobacco Use, 11/23/2014  10 or more cigarettes (1/2 pack or more)/day in last 30 days, No, 04/29/2021  5-9 cigarettes (between 1/4 to 1/2 pack)/day in last 30 days, Cigarettes, No, 6 per day., 01/04/2021  Family History  Hypertension.: Mother.  Father: History is negative  Brother: History is negative  Sister: History is negative  Immunizations  Vaccine Date Status   hepatitis A pediatric vaccine 07/22/2016 Given   tetanus/diphtheria/pertussis, acel(Tdap) 06/30/2015 Given       DOS: 4/29/2021  Pt discussed and seen with resident at the time of visit. Long?discussion held regarding plan of care. ?Agree with assessment and plan.   Received? message that the Adderall was causing dry mouth and chest tightness.?Unable to reach the patient. ?Left VM to stop the medication.? I will try to call the patient again 5/15/2021.

## 2022-05-03 NOTE — HISTORICAL [facility] CERNER
This is a historical note converted from [employer]. Formatting and pictures may have been removed.  Please reference [employer] for original formatting and attached multimedia. Chief Complaint  Annual  History of Present Illness  The patient? here for annual exam. Her?LMP was 10/31/21. Period last 3 days and changes tampons every 4-6 hours. Admits to regular monthly cycles, however Sept/Oct cycle was irregular. Denies history of abnormal paps. Pap in ?was NIL and HPV neg, OHR positive.?Denies breast or urinary complaints.?Denies pelvic pain, abnormal bleeding or discharge. Pt reports?trichomonas and chlamydia?in the past and desires testing today.?Pt has hx of ectopic pregnancy with Rt salpingectomy in . Pt was seen by GYN for HSG in 2021, Lt tube was?patent. Still desires pregnancy, has not seen fertility specialist.?Denies fly hx of breast, ovarian, uterine or colon cancer. Request to have vaginal cyst removed, states has increased in size.  Review of Systems  CONSTITUTIONAL:??No weight loss, fever, chills or fatigue.  BREAST: No lumps or masses or pain, no nipple discharge or inversion  GI:??No nausea, vomiting or?abdominal pain.  :??No dysuria, frequency or urgency.?No hematuria.  GYN: No abnormal discharge, itching, bleeding, pelvic pain.  NEUROLOGIC:??Alert and oriented, No confusion.  Physical Exam  Vitals & Measurements  T:?36.9? ?C (Oral)? HR:?79(Peripheral)? RR:?18? BP:?110/74?  HT:?180.00?cm? WT:?130.700?kg? BMI:?40.34? LMP:?10/31/2021 00:00 CDT?  GENERAL: Alert and oriented x3.?No apparent distress.  BREAST: No mass, tenderness or discharge.?  ABDOMEN: Soft, nontender.??  PELVIC:?  Labia: No erythema indurations. Skin tag Lt lower groin, <?0.5 cm appearing inclusion cyst to inner Rt labia.  Vagina: pink vaginal mucosa,?no abnormal discharge.  Cervix: Pink, parous os, no cervical motion tenderness.  Uterus: Non-tender, limited exam d/t body habitus.  Adnexa: Non-tender, limited exam d/t body  habitus.  INTEGUMENTARY:?Warm and dry.  NEUROLOGIC: She is alert and oriented x3.?  PSYCHIATRIC:?Cooperative, appropriate mood and affect.  Assessment/Plan  1.?Encounter for routine pelvic examination?Z01.419  ?Pelvic today. Pap utd per ACOG.  RTC 1 year.  Ordered:  1160F- Medication reconciliation completed during visit, Encounter for routine pelvic examination  Routine screening for STI (sexually transmitted infection)  Vaginal lesion  Obesity(  Probable Diagnosis  ), 11/18/21 14:11:00 CST  Clinic Follow up, *Est. 11/18/22 3:00:00 CST, Order for future visit, Encounter for routine pelvic examination, [facility]  Preventative Health Care Est 18-39 years [address] PC, Encounter for routine pelvic examination, 11/18/21 14:12:00 CST  ?  2.?Routine screening for STI (sexually transmitted infection)?Z11.3  ?wet prep and g/c  hiv, hep and syphilis  Ordered:  1160F- Medication reconciliation completed during visit, Encounter for routine pelvic examination  Routine screening for STI (sexually transmitted infection)  Vaginal lesion  Obesity(  Probable Diagnosis  ), 11/18/21 14:11:00 CST  Chlamydia trach and N. gonorrhea PCR, Routine collect, Cervical, Order for future visit, 11/18/21 14:12:00 CST, Stop date 11/18/21 14:12:00 CST, Nurse collect, Routine screening for STI (sexually transmitted infection)  Hepatitis Panel-, Routine collect, 11/18/21 14:12:00 CST, Blood, Order for future visit, Stop date 11/18/21 14:12:00 CST, Lab Collect, Routine screening for STI (sexually transmitted infection), 11/18/21 14:12:00 CST  HIV 1 and 2, Routine collect, 11/18/21 14:12:00 CST, Blood, Order for future visit, Stop date 11/18/21 14:12:00 CST, Lab Collect, Routine screening for STI (sexually transmitted infection), 11/18/21 14:12:00 CST  Office/Outpatient Visit Level 2 Established [id number] PC, Routine screening for STI (sexually transmitted infection)  Vaginal lesion  Obesity(  Probable Diagnosis  ), 25, 11/18/21 14:12:00  CST  Syphilis Antibody (with Reflex RPR), Routine collect, 11/18/21 14:12:00 CST, Blood, Order for future visit, Stop date 11/18/21 14:12:00 CST, Lab Collect, Routine screening for STI (sexually transmitted infection), 11/18/21 14:12:00 CST  Wet Prep Smear, Routine collect, Vaginal, Order for future visit, 11/18/21 14:12:00 CST, Stop date 11/18/21 14:12:00 CST, Nurse collect, Routine screening for STI (sexually transmitted infection), 11/18/21 14:12:00 CST  ?  3.?Vaginal lesion?N89.8  ?tag Lt lower groin, <?0.5 cm appearing inclusion cyst to inner Rt labia  Pt request removal  Ordered:  1160F- Medication reconciliation completed during visit, Encounter for routine pelvic examination  Routine screening for STI (sexually transmitted infection)  Vaginal lesion  Obesity(  Probable Diagnosis  ), 11/18/21 14:11:00 CST  Office/Outpatient Visit Level 2 Established [id number] , Routine screening for STI (sexually transmitted infection)  Vaginal lesion  Obesity(  Probable Diagnosis  ), 25, 11/18/21 14:12:00 CST  ?  4.?Obesity(  Probable Diagnosis  )?E66.9  ?Discussed with the patient healthy lifestyle choices. Importance of maintaining a healthy BMI. Healthy diet including limiting fast foods, processed foods, foods containing high amounts of saturated fats or high sodium content. Recommend low carb, low fat diet rich in lean meat and fish, non starchy vegetables, fruits and good fat while maintaining portion control. Also discussed limiting sugar intake. Recommended plenty of water, avoiding carbonated beverages and high fructose corn syrup. Also discussed with the patient getting plenty of regular cardiovascular exercise, at least 150 minutes of cardiovascular exercise (at least 30 mins 5x/week).  Ordered:  1160F- Medication reconciliation completed during visit, Encounter for routine pelvic examination  Routine screening for STI (sexually transmitted infection)  Vaginal lesion  Obesity(  Probable Diagnosis  ),  11/18/21 14:11:00 CST  Office/Outpatient Visit Level 2 Established [id number] , Routine screening for STI (sexually transmitted infection)  Vaginal lesion  Obesity(  Probable Diagnosis  ), 25, 11/18/21 14:12:00 CST  ?  Referrals  Clinic Follow up, *Est. 11/18/22 3:00:00 CST, Order for future visit, Encounter for routine pelvic examination, [facility]   Problem List/Past Medical History  Ongoing  2019-nCoV  Attention deficit disorder  Beat knee  Bipolar II disorder, most recent episode major depressive  KRISTINA - Generalized anxiety disorder  Knee pain  Obesity(  Probable Diagnosis  )  PTSD - Post-traumatic stress disorder  Tobacco user  Trichomonosis  Historical  None  Pregnant  Pregnant  Procedure/Surgical History  Repair Upper Lip, External Approach (04/26/2020)  Simple repair of superficial wounds of face, ears, eyelids, nose, lips and/or mucous membranes; 2.5 cm or less (04/26/2020)  Ectopic Preg Laparoscopic/Laparotomy (None) (09/12/2018)  Laparoscopic treatment of ectopic pregnancy; with salpingectomy and/or oophorectomy (09/12/2018)  Resection of Products of Conception, Ectopic, Percutaneous Endoscopic Approach (09/12/2018)  Resection of Right Fallopian Tube, Percutaneous Endoscopic Approach (09/12/2018)  vaginal delivery - 2nd degree periurethral laceration (06/29/2015)  vaginal delivery - 2nd degree periurethral laceration (06/29/2015)  DIRECT ADMISSION OF PATIENT FOR HOSPITAL OBSERVATION CARE (06/26/2015)  HOSPITAL OBSERVATION SERVICE, PER HOUR (06/26/2015)  DIRECT ADMISSION OF PATIENT FOR HOSPITAL OBSERVATION CARE (06/13/2015)  HOSPITAL OBSERVATION SERVICE, PER HOUR (06/13/2015)  none   Medications  Adderall 20 mg oral tablet, 20 mg= 1 tab(s), Oral, qAM  escitalopram 10 mg oral tablet, 10 mg= 1 tab(s), Oral, Daily, 3 refills  metformin 500 mg oral tablet, See Instructions  olanzapine 5 mg oral tablet, 5 mg= 1 tab(s), Oral, Daily, 3 refills  Toradol 10 mg oral tablet, 10 mg= 1 tab(s), Oral,  q6hr  Vitamin D3 5000 intl units (125 mcg) oral tablet, 125 mcg= 1 tab(s), Oral, Daily  Allergies  Percocet 7.5/325  codeine?(Unknown)  Social History  Abuse/Neglect  No, 11/11/2021  No, No, Yes, 11/09/2021  Alcohol - Denies Alcohol Use, 11/23/2014  Current, 1-2 times per week, Alcohol use interferes with work or home: No. Others hurt by drinking: No. Household alcohol concerns: No., 05/27/2021  Employment/School - Low Risk, 06/28/2015  Unemployed, 01/04/2021  Exercise  Exercise duration: 10. Exercise frequency: Daily. Exercise type: Walking., 05/27/2021  Home/Environment  Lives with Children, Siblings. Living situation: Home/Independent., 01/04/2021    Never in , 09/09/2020  Nutrition/Health  Regular, Good, 01/04/2021  Sexual  Sexually active: Yes. Number of current partners 1. Sexual orientation: Bisexual. Gender Identity Identifies as female., 01/04/2021  Spiritual/Cultural  Mu-ism, 05/27/2021  Substance Use - Denies Substance Abuse, 11/23/2014  Past, Marijuana, 01/04/2021  Tobacco - Denies Tobacco Use, 11/23/2014  Never (less than 100 in lifetime), No, 11/11/2021  5-9 cigarettes (between 1/4 to 1/2 pack)/day in last 30 days, No, 11/09/2021  Family History  Hypertension.: Mother.  Father: History is negative  Brother: History is negative  Sister: History is negative  Immunizations  Vaccine Date Status   influenza virus vaccine, inactivated 11/09/2021 Given   hepatitis A pediatric vaccine 07/22/2016 Given   meningococcal conjugate vaccine 10/16/2015 Recorded   influenza virus vaccine, inactivated 10/16/2015 Recorded   human papillomavirus vaccine 10/16/2015 Recorded   tetanus/diphtheria/pertussis, acel(Tdap) 06/30/2015 Given   influenza virus vaccine, live, trivalent 01/27/2014 Recorded   human papillomavirus vaccine 01/27/2014 Recorded   tetanus/diphtheria/pertussis, acel(Tdap) 06/27/2011 Recorded   meningococcal conjugate vaccine 06/27/2011 Recorded   human papillomavirus vaccine 06/27/2011  Recorded   varicella virus vaccine 02/23/2007 Recorded   poliovirus vaccine, inactivated 08/22/2002 Recorded   measles/mumps/rubella virus vaccine 08/22/2002 Recorded   varicella virus vaccine 12/15/1999 Recorded   poliovirus vaccine, live, trivalent 08/05/1999 Recorded   measles/mumps/rubella virus vaccine 08/05/1999 Recorded   hepatitis B pediatric vaccine 07/01/1999 Recorded   poliovirus vaccine, live, trivalent 01/13/1999 Recorded   poliovirus vaccine, live, trivalent 1998 Recorded   hepatitis B pediatric vaccine 1998 Recorded   hepatitis B pediatric vaccine 1998 Recorded   Health Maintenance  Health Maintenance  ???Pending?(in the next year)  ??? ??Due?  ??? ? ? ?Coronary Artery Disease Maintenance-Antiplatelet Agent Prescribed due??11/18/21??Unknown Frequency  ??? ??Refused?  ??? ? ? ?Smoking Cessation due??01/01/21??and every 1??year(s)  ??? ??Due In Future?  ??? ? ? ?Obesity Screening not due until??01/01/22??and every 1??year(s)  ??? ? ? ?Alcohol Misuse Screening not due until??01/02/22??and every 1??year(s)  ??? ? ? ?ADL Screening not due until??01/04/22??and every 1??year(s)  ???Satisfied?(in the past 1 year)  ??? ??Satisfied?  ??? ? ? ?ADL Screening on??01/04/21.??Satisfied by Tereza Ceballos LPN  ??? ? ? ?Alcohol Misuse Screening on??08/16/21.??Satisfied by Ivana Nuñez  ??? ? ? ?Blood Pressure Screening on??11/18/21.??Satisfied by Denise Ureña  ??? ? ? ?Body Mass Index Check on??11/18/21.??Satisfied by Denise Ureña  ??? ? ? ?Depression Screening on??11/18/21.??Satisfied by Denise Ureña  ??? ? ? ?Diabetes Screening on??08/28/21.??Satisfied by Linsey Rodriguez  ??? ? ? ?Hypertension Management-Blood Pressure on??11/18/21.??Satisfied by Denise Ureña  ??? ? ? ?Influenza Vaccine on??11/09/21.??Satisfied by Ivana Nuñez  ??? ? ? ?Obesity Screening on??11/18/21.??Satisfied by Denise Ureña  ??? ??Refused?  ??? ? ? ?Smoking Cessation on??07/01/21.??Recorded by KEV Cox,  Erin??Reason: Patient Refuses  ?

## 2022-05-03 NOTE — HISTORICAL [facility] CERNER
This is a historical note converted from Latisha. Formatting and pictures may have been removed.  Please reference Latisha for original formatting and attached multimedia. Chief Complaint  trouble concentrating; ?need blood work  History of Present Illness  20 Years- old?Female?presents to [facility] for?routine follow up.  ?   Acute Issues:?none  ?  Chronic Issues:  ADHD  -Has not been on medications for approximately 2 to 3 years  -Not currently in school  -Not currently working  -Patient requesting refill?of Adderall because she states that she cannot pay attention  ?   Constipation  -Did not?fill prescription for?the Colace or MiraLAX  -States that has improved  ?  Review of Systems  Constitutional:?no fevers, chills or fatigue  Eye:?no change in vision  ENMT:?no sore throat or sinus problems  Respiratory:?no trouble breathing or shortness of breath  Cardiovascular:?no chest pain or tightness,?no shortness breath on activity,?no ankle swelling  Gastrointestinal:no constipation,?no diarrhea,?no nausea,?no vomiting  Musculoskeletal:?No muscle pain,?no joint pain  Integumentary: no rashes or breakdown  Neurologic: no dizziness, no fainting  Physical Exam  Vitals & Measurements  T:?37? ?C (Oral)? HR:?72(Peripheral)? RR:?18? BP:?111/74? SpO2:?99%?  HT:?180.00?cm? WT:?135.000?kg? BMI:?41.67? LMP:?09/21/2020 00:00 CDT?  General:?Alert and oriented,?no acute distress; talking on phone throughout the visit  Respiratory:?Lungs clear to auscultation bilaterally,?No increased work of breathing  Cardiovascular:?S1-S2, regular rate, regular rhythm,?2+ radial pulses,?No lower extremity edema  Gastrointestinal:?Obese, soft, nontender, nondistended  Musculoskeletal: Appropriate movement of extremities bilaterally  Integumentary: No rashes or breakdown noted  Neurologic: No focal motor defects noted  Psych: Calm, cooperative  Assessment/Plan  1.?Attention deficit disorder?F90.0  ?Defer to PCP for Adderall  No indication with patient not  working or not in school  Patient may start working soon although will need to?have a thorough evaluation by PCP and follow closely  Ordered:  Clinic Follow up, *Est. 10/24/20 3:00:00 CDT, with opal only, Order for future visit, Attention deficit disorder  Health care maintenance  Constipation, [facility]  ?  2.?Health care maintenance?Z00.00  ?Labs as below  Ordered:  CBC w/ Auto Diff, Routine collect, *Est. 09/24/20 3:00:00 CDT, Blood, Order for future visit, *Est. Stop date 09/24/20 3:00:00 CDT, Lab Collect, Health care maintenance, 09/24/20 13:20:00 CDT  Clinic Follow up, *Est. 10/24/20 3:00:00 CDT, with opal only, Order for future visit, Attention deficit disorder  Health care maintenance  Constipation, [facility]  Comprehensive Metabolic Panel, Routine collect, *Est. 09/24/20 3:00:00 CDT, Blood, Order for future visit, *Est. Stop date 09/24/20 3:00:00 CDT, Lab Collect, Health care maintenance, 09/24/20 13:20:00 CDT  Free T4, Routine collect, *Est. 09/24/20 3:00:00 CDT, Blood, Order for future visit, *Est. Stop date 09/24/20 3:00:00 CDT, Lab Collect, Health care maintenance, 09/24/20 13:20:00 CDT  Hemoglobin A1C [facility], Routine collect, *Est. 09/24/20 3:00:00 CDT, Blood, Order for future visit, *Est. Stop date 09/24/20 3:00:00 CDT, Lab Collect, Health care maintenance, 09/24/20 13:20:00 CDT  Hepatitis Panel [facility], Routine collect, *Est. 09/24/20 3:00:00 CDT, Blood, Order for future visit, *Est. Stop date 09/24/20 3:00:00 CDT, Lab Collect, Health care maintenance, 09/24/20 13:20:00 CDT  HIV 1 and 2, Routine collect, *Est. 09/24/20 3:00:00 CDT, Blood, Order for future visit, *Est. Stop date 09/24/20 3:00:00 CDT, Lab Collect, Health care maintenance, 09/24/20 13:20:00 CDT  Lipid Panel, Routine collect, *Est. 09/24/20 3:00:00 CDT, Blood, Order for future visit, *Est. Stop date 09/24/20 3:00:00 CDT, Lab Collect, Health care maintenance, 09/24/20 13:20:00 CDT  Syphilis Antibody  (with Reflex RPR), Routine collect, *Est. 09/24/20 3:00:00 CDT, Blood, Order for future visit, *Est. Stop date 09/24/20 3:00:00 CDT, Lab Collect, Health care maintenance, 09/24/20 13:20:00 CDT  Thyroid Stimulating Hormone, Routine collect, *Est. 09/24/20 3:00:00 CDT, Blood, Order for future visit, *Est. Stop date 09/24/20 3:00:00 CDT, Lab Collect, Health care maintenance, 09/24/20 13:20:00 CDT  Vitamin D, 25-Hydroxy Level, Routine collect, *Est. 09/24/20 3:00:00 CDT, Blood, Order for future visit, *Est. Stop date 09/24/20 3:00:00 CDT, Lab Collect, Health care maintenance, 09/24/20 13:20:00 CDT  ?  3.?Constipation?K59.00  ?MiraLAX as needed  Ordered:  Clinic Follow up, *Est. 10/24/20 3:00:00 CDT, with opal only, Order for future visit, Attention deficit disorder  Health care maintenance  Constipation, [facility]  ?  Referrals  Clinic Follow up, *Est. 10/24/20 3:00:00 CDT, with opal only, Order for future visit, Attention deficit disorder  Health care maintenance  Constipation, [facility]   Problem List/Past Medical History  Ongoing  Attention deficit disorder  Beat knee  Knee pain  Obesity(  Probable Diagnosis  )  Tobacco user  Trichomonosis  Historical  None  Pregnant  Pregnant  Procedure/Surgical History  Repair Upper Lip, External Approach (04/26/2020)  Simple repair of superficial wounds of face, ears, eyelids, nose, lips and/or mucous membranes; 2.5 cm or less (04/26/2020)  Ectopic Preg Laparoscopic/Laparotomy (None) (09/12/2018)  Laparoscopic treatment of ectopic pregnancy; with salpingectomy and/or oophorectomy (09/12/2018)  Resection of Products of Conception, Ectopic, Percutaneous Endoscopic Approach (09/12/2018)  Resection of Right Fallopian Tube, Percutaneous Endoscopic Approach (09/12/2018)  vaginal delivery - 2nd degree periurethral laceration (06/29/2015)  vaginal delivery - 2nd degree periurethral laceration (06/29/2015)  DIRECT ADMISSION OF PATIENT FOR HOSPITAL  OBSERVATION CARE (06/26/2015)  HOSPITAL OBSERVATION SERVICE, PER HOUR (06/26/2015)  DIRECT ADMISSION OF PATIENT FOR HOSPITAL OBSERVATION CARE (06/13/2015)  HOSPITAL OBSERVATION SERVICE, PER HOUR (06/13/2015)  none   Medications  Colace 100 mg oral capsule, 100 mg= 1 cap(s), Oral, Daily, 1 refills,? ?Unable to obtain  Allergies  Percocet 7.5/325  Social History  Abuse/Neglect  No, 09/24/2020  Alcohol - Denies Alcohol Use, 11/23/2014  Current, Beer, 1-2 times per year, 04/09/2017  Employment/School - Low Risk, 06/28/2015  Unemployed, 07/21/2017  Exercise  Exercise type: Walking., 07/21/2017  Home/Environment  Lives with Mother., 07/21/2017    Never in , 09/09/2020  Nutrition/Health  Regular, 07/21/2017  Sexual  Sexually active: Yes., 07/21/2017  Substance Use - Denies Substance Abuse, 11/23/2014  Never, 04/09/2017  Tobacco - Denies Tobacco Use, 11/23/2014  10 or more cigarettes (1/2 pack or more)/day in last 30 days, N/A, 09/24/2020  Family History  Hypertension.: Mother.  Father: History is negative  Brother: History is negative  Sister: History is negative  Immunizations  Vaccine Date Status   hepatitis A pediatric vaccine 07/22/2016 Given   tetanus/diphtheria/pertussis, acel(Tdap) 06/30/2015 Given   Health Maintenance  Health Maintenance  ???Pending?(in the next year)  ??? ??OverDue  ??? ? ? ?Cervical Cancer Screening due??01/12/18??and every 3??year(s)  ??? ??Due?  ??? ? ? ?Influenza Vaccine due??09/24/20??and every?  ??? ??Postponed?  ??? ? ? ?Smoking Cessation due??03/08/21??and every 1??year(s)  ??? ? ? ?Alcohol Misuse Screening due??09/04/21??and every 1??year(s)  ??? ??Due In Future?  ??? ? ? ?Obesity Screening not due until??01/01/21??and every 1??year(s)  ??? ? ? ?Depression Screening not due until??09/09/21??and every 1??year(s)  ??? ? ? ?ADL Screening not due until??09/09/21??and every 1??year(s)  ???Satisfied?(in the past 1 year)  ??? ??Satisfied?  ??? ? ? ?ADL Screening  on??09/09/20.??Satisfied by Makenna Abebe  ??? ? ? ?Alcohol Misuse Screening on??09/09/20.??Satisfied by Makenna Abebe  ??? ? ? ?Blood Pressure Screening on??09/24/20.??Satisfied by Makenna Abebe  ??? ? ? ?Body Mass Index Check on??09/24/20.??Satisfied by Makenna Abebe  ??? ? ? ?Depression Screening on??09/09/20.??Satisfied by Makenna Abebe  ??? ? ? ?Diabetes Screening on??11/28/19.??Satisfied by James Ford  ??? ? ? ?Hypertension Management-Blood Pressure on??09/24/20.??Satisfied by Makenna Abebe  ??? ? ? ?Obesity Screening on??09/24/20.??Satisfied by Makenna Abebe  ??? ??Postponed?  ??? ? ? ?Alcohol Misuse Screening on??09/09/20.??Recorded by Makenna Abebe  ??? ? ? ?Smoking Cessation on??09/09/20.??Recorded by Makenna Abebe  ?      The chart was reviewed. I agree with the assessment and plan. Care provided was reasonable and necessary.

## 2022-05-03 NOTE — HISTORICAL [facility] CERNER
This is a historical note converted from [employer]brandon. Formatting and pictures may have been removed.  Please reference Cerbrandon for original formatting and attached multimedia. Chief Complaint  R groin pain. Seen yesterday for same.  History of Present Illness  Patient is a 18  @ 6w2d by LMP who presents with 2 day h/o right sided abdominal and pelvic pain. US in ED with findings concerning for hemoperitoneum, right adnexal ectopic pregnancy and small pseudogestational sac. Inappropriate rise of beta HCG over 2 days. Reports nausea/vomiting since becoming pregnant and loose stools over the past week or so. No vaginal bleeding.  Review of Systems  General ROS:?no chills, fatigue, fever, dizziness, weakness, fatigue, weight loss  Psychological ROS: no anxiety, behavioral disorder, concentration difficulties, or depression  Endocrine ROS: no breast changes, hot flashes or?mood swings  Breast ROS: no nipple changes or?nipple discharge  Respiratory ROS: no shortness of breath, cough, hemoptysis or?orthopnea  Cardiovascular ROS: no edema, palpitations, chest pain, irregular heartbeat or?murmur  Gastrointestinal ROS: + abdominal pain, loose stools, nausea, vomiting; no?appetite loss, blood in stools, constipation  Genito-Urinary ROS: no?vaginal?discharge, genital ulcers,?pelvic pressure, abnormal uterine bleeding, dysmenorrhea, dyspareunia,?hematuria, frequency, urgency or incontinence  Musculoskeletal ROS: no muscular aches or?gait disturbance  Neurological ROS: no confusion or dizziness  Dermatological ROS: no acne, dry skin, eczema, changes in moles, lumps, bumps or rashes  Physical Exam  Vitals & Measurements  T:?36.7? ?C (Oral)? TMIN:?36.7? ?C (Oral)? TMAX:?37.1? ?C (Oral)? HR:?87(Monitored)? RR:?20? BP:?117/60? SpO2:?100%? WT:?129?kg? WT:?129?kg?  AO x 3, NAD  NCAT  Obese  RRR  CTAB  Abd soft, non-distended, non-acute, no r/g, + ttp in RUQ and RLQ and in midline, NABS  Ext genitalia normal in appearance, no lesions  masses, normal urethra and external anus  Spec: thick yellow white discharge, normal vaginal mucosa and cervix, no lesions or masses, no blood  Bimanual: 10 cm anteverted uterus, bladder tenderness, tenderness over right adnexa with no palpable masses  ?  UA: wnl  beta hC (9/10/18) ---> 1177 (18)  H/H: 10.3/34 (9/10/18) --> 9.7/32.1 (18)  ? [1]  Assessment/Plan  17yo  @ 6w2d with likely right adnexal ectopic pregnancy and hemoperitoneum  - Patient consented for diagnostic laparoscopy +/- right salpingectomy vs removal of ectopic pregnancy and evacuation of hemoperitoneum, all questions answered, likely d/c home after OR  - Repeat CBC pending  - To OR with Dr. Augustin   Problem List/Past Medical History  Ongoing  Attention deficit disorder  Beat knee  Knee pain  Obesity(  Probable Diagnosis  )  Tobacco user  Trichomonosis  Historical  None  Procedure/Surgical History  vaginal delivery - 2nd degree periurethral laceration (2015)  vaginal delivery - 2nd degree periurethral laceration (2015)  DIRECT ADMISSION OF PATIENT FOR HOSPITAL OBSERVATION CARE (2015)  HOSPITAL OBSERVATION SERVICE, PER HOUR (2015)  DIRECT ADMISSION OF PATIENT FOR HOSPITAL OBSERVATION CARE (2015)  HOSPITAL OBSERVATION SERVICE, PER HOUR (2015)  none   Medications  Inpatient  IVF Lactated Ringers LR Infusion 1,000 mL, 1000 mL, IV  Tylenol, 1000 mg= 2 tab(s), Oral, q6hr, PRN  Home  Adderall 10 mg oral tablet, 10 mg= 1 tab(s), Oral, Daily,? ?Not taking  diclofenac sodium 75 mg oral delayed release tablet, 75 mg= 1 tab(s), Oral, BID, PRN,? ?Not taking  ferrous sulfate 325 mg (65 mg elemental iron) oral tablet, 325 mg= 1 tab(s), Oral, TID,? ?Not taking  Prenatal AD oral tablet, 1 tab(s), Oral, Daily, 1 refills,? ?Not Taking, Completed Rx  Allergies  Percocet 7.5/325  Social History  Alcohol - Denies Alcohol Use, 2014  Current, Beer, 1-2 times per year, 2017  Employment/School - Low  Risk, 06/28/2015  Unemployed, 07/21/2017  Exercise  Exercise type: Walking., 07/21/2017  Home/Environment  Lives with Mother., 07/21/2017  Nutrition/Health  Regular, 07/21/2017  Sexual  Sexually active: Yes., 07/21/2017  Substance Abuse - Denies Substance Abuse, 11/23/2014  Never, 04/09/2017  Tobacco - Denies Tobacco Use, 11/23/2014  Current every day smoker Use:. Cigarettes Type:., 05/27/2018  Current some day smoker, Cigars, 04/09/2017  Family History  Hypertension.: Mother.  Immunizations  Vaccine Date Status   hepatitis A pediatric vaccine 07/22/2016 Given   tetanus/diphtheria/pertussis, acel(Tdap) 06/30/2015 Given      [1]?[facility] GYN Consult Note; Anisha SMITH, Pallavi 09/12/2018 04:01 CDT   18 F P1 with suspected right ectopic pregnancy, ruptured  I have met this patient and agree with the above history and physical.  She understands the procedure as described above with associated risks which includes possible loss of fallopian tube/  All questions have been answered and consents have been signed.  Procedure:? LSC excision of ectopic with possible salpingectomy

## 2022-05-03 NOTE — HISTORICAL [facility] CERNER
This is a historical note converted from [employer]ner. Formatting and pictures may have been removed.  Please reference Cerner for original formatting and attached multimedia. Operative Report - Laparoscopic right salpingectomy and evacuation of hemoperitoneum  ?   Indication for Surgery  17yo  presented to the ED complaining of 2 day history of abdominal pain associated with nausea, vomiting and loose stools. UPT positve. Beta HCG 1100. No IUP noted on ultrasound. Presence of pseudogestational sac noted in uterine fundus. Right adnexa with complex cystic structure suspicious for ectopic pregnancy detected on US. Moderate amount of free fluid as well as organizing hemoperitoneum also noted on US. H/H showed anemia concerning for acute blood loss. Patient seen and evaluated by gyn tea. After clinical assessment and discussion with patient, recommendation made to proceed for emergent diagnostic laparoscopy with possible right salpingectomy. Patient informed of risks, benefits and alternatives to procedure. Patient signed the consents. All of her questions were answered to her satisfaction and she was taken emergently to the OR.  ?   Preoperative Diagnosis  1. Abdominal pain  2. Anemia  3. Positive pregnancy test  4. Hemoperitoneum  5. Suspected ruptured ectopic pregnancy  ?   Postoperative Diagnosis  1. Abdominal pain  2. Anemia  3. Positive pregnancy test  4. Hemoperitoneum  5. Right tubal ectopic pregnancy  ?  Operation  Laparoscopic right salpingectomy and evacuation of hematoma?  ?  Surgeon(s)  1. Diego Oliver MD, HO4, Primary  2. Marleni Augustin MD, Attending  ?  Anesthesia  General endotracheal  ?  Estimated Blood Loss  5 mL intraoperative blood loss  200 mL hemoperitoneum evacuated intraoperatively  ?  Urine Output  620 mL  ?  IV Fluids  1000 mL  ?  Findings  EUA: Normal external female genitalia without lesions or masses. Normal vaginal mucosa and cervix without lesions or masses. No blood in the vault.  Vagina with minimal capacity, 9 cm anteverted uterus, no adnexal masses or fullness appreciates.  Intraoperative: 200 mL of hemoperitoneum within the pelvis and abdomen. Normal appearing uterus. Normal appearing left fallopian tube, fimbria and ovary. Right fallopian tube with 4 cm ectopic pregnancy extruding from fimbriated portion of tube. Right ovary normal in appearance.  ?  Specimen(s)  Right fallopian tube  ?  Complications  None  ?  Technique  Patient was taken to the operating suite with IV fluids running. She was placed on the OR table in the dorsal supine position. Induction of general anesthesia and endotracheal intubation occurred without difficulty. SCDs were placed to her bilateral lower extremities for DVT prophylaxis. She was then placed in the dorsal lithotomy position in Felton type stirrups and her abdomen and perineum were prepped and draped in the normal sterile fashion. A parsons catheter was inserted into the bladder for drainage of urine. A spongestick was placed into the vagina.  ?  Attention was then turned to the abdomen. Entry point at the left upper quadrant was identified 5-6 cm lateral to the umbilicus and skin was infiltrated with .25% Marcaine. A 5 mm incision was then made with the scalpel. The laparoscope was inserted into the abdomen using a 5 mm Visiport trocar. After confirming intraperitoneal placement, pneumoperitoneum was established with CO2 gas to a maximum pressure of 15 mmHg. Survey revealed the above findings. The patient was placed in Trendelenburg. 5 mm right lower and mid abdominal ports were placed using the same technique. A 12 mm incision was then made in the umbilicus. The 5 mm scope was exchanged for the 10 mm laparoscope and it was inserted through this incision with the appropriate trocar. The hemoperitoneum was evacuated with the suction . The right fimbria was grasped with the atraumatic graspers and lifted away from the right pelvic side wall. The  Ligasure device was then used to coagulate and transect the mesosalpinx, freeing the tube from the right ovary. The right fallopian tube was then entirely transected 1-2 cm distal from its attachment at the right uterine cornua. The scopes were once again exchanged and a 10 mm Endocatch bag was inserted into the umbilical port. Under visualization of the 5 mm scope, the Endocatch bag was opened, the specimen was placed into the bag, the closure device was deployed and the bag was removed via the umbilical incision completely intact. The pelvis and abdomen were then copiously irrigated and suctioned out free of fluid and blood. The patient was then taken out of Trendelenburg. The site of surgical dissection was once again inspected and excellent hemostasis was noted. All instruments and trocars were then removed from the abdomen, the spongestick was removed from the vagina, and the parsons catheter was removed from the bladder. The 4 port sites were closed with a single subcutaneous stitch of 4-0 vicryl suture and the skin was reapproximated with skin glue.  ?  All instrument and sponge counts were correct x 2. The patient tolerated the procedure well. She was awakened from anesthesia and extubated without difficulty and transferred to the recovery room in stable condition. Dr. Marleni Augustin was present and scrubbed in for the entire procedure.   I was present with the resident during all critical and key portions of the procedure and agree with the findings documented in the residents note.

## 2022-05-03 NOTE — HISTORICAL [facility] CERNER
This is a historical note converted from [place]. Formatting and pictures may have been removed.  Please reference Latisha for original formatting and attached multimedia. Chief Complaint  Follow up for med refills, c/o swelling in ankles x 3 weeks  History of Present Illness  This is a 21 y/o female with a past medical history of ADD, obesity, anxiety, depression, attempted suicide (2017) and last a psychiatrist in 2017.? Has had 1 day of counseling in 2017.?  ?   ADHD:  Pt telephoned reporting having chest tightness after 40 mg of Adderall was taken x 2weeks.? She stopped the Adderall and was taking the 20 mg once ?a day since 5/14/21.? ?She was dispensed 20 pills. ?She has been taking it only once a day? and ran out of her medication.? She did not take it? on the weekend nor everyday ,depending on her work schedule.? She is currently working 6 days a week.? She feels that she is better with multi-tasking and able to finish all of her tasks.? Still gets frustrated intermittently.? Fiance is present and discussed not trying to get pregnant at this time.? Pt states that she did her labs today.  ?  Pre Diabetes:  States has not been exercising and has been eating fried meals at [employer] where she works.  + fequency of urination  ?Reports difficutly loosing weight?  ?  Metabolic Syndrome  Positive Central obesity and acanthosis,and prediabetic, Hirusitism but denies acne on face or back, no imaging done to r/o PCOS  Physical Exam  Vitals & Measurements  T:?37.2? ?C (Oral)? HR:?78(Peripheral)? RR:?18? BP:?116/75? SpO2:?98%?  HT:?180.00?cm? WT:?133.400?kg? BMI:?41.17? LMP:?04/28/2021 00:00 CDT?  General: cooperative, no acute respiratory distress  HEENT: ATNC, PERRL, EOMI, conjunctiva clear, EAC clear, TM translucent, no external lesions of nares,??nasal mucosa non-inflamed, no lesions, oral mucous membranes moist and pink??without exudates or hypertrophy, neck supple non tender?without lesionsor thyromegaly  CV: RRR, no  murmurs  Lungs:?CTA b/l, no wheezing  Abdomen: soft, NT, ND, + BS x 4, no organomegaly, no CVA tenderness  Extremities: no cyanosis, clubbing, or edema  SKIN: Acanthosis to posterior neck noted. Centralized obesity noted  Assessment/Plan  1.?Attention deficit disorder?F90.0  Refilled Adderall 20 mg daily #30 NR due to varying work day schedule.? Pt?with improvement with ability to focus and able to multi-task.? Still has some difficulties with?anger but feels better on the medications. No SI/HI.?  UDS ordered today  Discussed not getting pregnant while on the Adderall and pt is on birth control currently.  Ordered:  Clinic Follow up, *Est. 06/24/21 3:00:00 CDT, Order for future visit, Attention deficit disorder  Prediabetes  Metabolic syndrome, [facility]  Drug Screen Urine [facility], Routine collect, Urine, 05/27/21 12:24:00 CDT, Stop date 05/27/21 12:24:00 CDT, Nurse collect, Attention deficit disorder, 05/27/21 12:24:00 CDT  ?  2.?Prediabetes?R73.03  ?Diet and exercise modifications discussed.? Encouraged exercising?5-7 days a week for a minimum of 30 minutes per day. current weight is 294.09 lb.? Goal weight loss is 30 lb in 3 months.? ? Discussed plate theory and no meals after 8 pm.?  ?Started on metformin 500 mg BID  Ordered:  Clinic Follow up, *Est. 06/24/21 3:00:00 CDT, Order for future visit, Attention deficit disorder  Prediabetes  Metabolic syndrome, [facility]  US Transvaginal Non-OB, Routine, *Est. 05/27/21 3:00:00 CDT, Other (please specify), r/o PCOS, None, Ambulatory, Rad Type, Order for future visit, Prediabetes  Metabolic syndrome, Schedule this test, [facility], *Est. 05/27/21 3:00:00 CDT  ?  3.?Metabolic syndrome?E88.81  Pt with centralized obesity, acanthosis, and prediabetes, hx of?infertility and hirsutism ?  Ordered metformin 500 mg BID (see above0  Ordered testosterone level, androstenedione, SHBG, and DHEA level  Suspect PCOS, ordered  TVUS  Ordered:  Clinic Follow up, *Est. 06/24/21 3:00:00 CDT, Order for future visit, Attention deficit disorder  Prediabetes  Metabolic syndrome, [facility]  US Transvaginal Non-OB, Routine, *Est. 05/27/21 3:00:00 CDT, Other (please specify), r/o PCOS, None, Ambulatory, Rad Type, Order for future visit, Prediabetes  Metabolic syndrome, Schedule this test, [facility], *Est. 05/27/21 3:00:00 CDT  ?  Orders:  amphetamine-dextroamphetamine, 20 mg = 1 tab(s), Oral, qAM, # 30 tab(s), 0 Refill(s), Pharmacy: [facility] #[id number], 180, cm, Height/Length Dosing, 05/27/21 11:02:00 CDT, 133.4, kg, Weight Dosing, 05/27/21 11:02:00 CDT  metFORMIN, 500 mg = 1 tab(s), Oral, BID, # 60 tab(s), 2 Refill(s), Pharmacy: [facility] #[id number], 180, cm, Height/Length Dosing, 05/27/21 11:02:00 CDT, 133.4, kg, Weight Dosing, 05/27/21 11:02:00 CDT  RTC 1 month  Referrals  Clinic Follow up, *Est. 06/24/21 3:00:00 CDT, Order for future visit, Attention deficit disorder  Prediabetes  Metabolic syndrome, [facility]   Problem List/Past Medical History  Ongoing  Attention deficit disorder  Beat knee  Knee pain  Obesity(  Probable Diagnosis  )  Tobacco user  Trichomonosis  Procedure/Surgical History  Repair Upper Lip, External Approach (04/26/2020)  Simple repair of superficial wounds of face, ears, eyelids, nose, lips and/or mucous membranes; 2.5 cm or less (04/26/2020)  Ectopic Preg Laparoscopic/Laparotomy (None) (09/12/2018)  Laparoscopic treatment of ectopic pregnancy; with salpingectomy and/or oophorectomy (09/12/2018)  Resection of Products of Conception, Ectopic, Percutaneous Endoscopic Approach (09/12/2018)  Resection of Right Fallopian Tube, Percutaneous Endoscopic Approach (09/12/2018)  vaginal delivery - 2nd degree periurethral laceration (06/29/2015)  vaginal delivery - 2nd degree periurethral laceration (06/29/2015)  DIRECT ADMISSION OF PATIENT FOR HOSPITAL  OBSERVATION CARE (06/26/2015)  HOSPITAL OBSERVATION SERVICE, PER HOUR (06/26/2015)  DIRECT ADMISSION OF PATIENT FOR HOSPITAL OBSERVATION CARE (06/13/2015)  HOSPITAL OBSERVATION SERVICE, PER HOUR (06/13/2015)  none   Medications  Adderall 20 mg oral tablet, 20 mg= 1 tab(s), Oral, qAM  metformin 500 mg oral tablet, 500 mg= 1 tab(s), Oral, BID, 2 refills  Allergies  Percocet 7.5/325  Social History  Abuse/Neglect  No, No, Yes, 05/27/2021  Alcohol - Denies Alcohol Use, 11/23/2014  Current, 1-2 times per week, Alcohol use interferes with work or home: No. Others hurt by drinking: No. Household alcohol concerns: No., 05/27/2021  Employment/School - Low Risk, 06/28/2015  Unemployed, 01/04/2021  Exercise  Exercise duration: 10. Exercise frequency: Daily. Exercise type: Walking., 05/27/2021  Home/Environment  Lives with Children, Siblings. Living situation: Home/Independent., 01/04/2021    Never in , 09/09/2020  Nutrition/Health  Regular, Good, 01/04/2021  Sexual  Sexually active: Yes. Number of current partners 1. Sexual orientation: Bisexual. Gender Identity Identifies as female., 01/04/2021  Spiritual/Cultural  Gnosticism, 05/27/2021  Substance Use - Denies Substance Abuse, 11/23/2014  Past, Marijuana, 01/04/2021  Tobacco - Denies Tobacco Use, 11/23/2014  5-9 cigarettes (between 1/4 to 1/2 pack)/day in last 30 days, No, 05/27/2021  Family History  Hypertension.: Mother.  Father: History is negative  Brother: History is negative  Sister: History is negative  Immunizations  Vaccine Date Status   hepatitis A pediatric vaccine 07/22/2016 Given   tetanus/diphtheria/pertussis, acel(Tdap) 06/30/2015 Given       [ x ] I discussed the case with the resident and agree with the findings and plan as documented in the residents note.   Ultrasound negative for PCOS. 7/2/21

## 2022-05-03 NOTE — HISTORICAL [facility] CERNER
This is a historical note converted from Latisha. Formatting and pictures may have been removed.  Please reference [place]isha for original formatting and attached multimedia. Gynecology Procedure Note  ?   Procedure:?Hysterosalpingogram  ?   Performed by:??  Marleni Augustin MD -Staff  Torsten Dixon MD HO-III  ?   Complications: ?None  ?  Findings:?The endometrial cavity is normal in contour without any filling defects. Contrast?was seen?spilling from the left fallopian tube. Contrast was not seen?spilling from the right fallopian tube.??  ?  Procedure:?  Informed consent was obtained. Consent forms were signed and witnessed. ?  The patient was placed in modified dorsal lithotomy position. ?A speculum was placed in the vagina and good visualization of the cervix was achieved.  The cervix was swabbed with Betadine x 3.??A catheter was placed at the os and inserted through which contrast was slowly injected?with simultaneous fluoroscopy. Findings as noted above.  Patient tolerated the procedure well. No EBL noted.  ?  The patient was advised to call for any fever or for prolonged or severe pain or bleeding. She was advised to use OTC NSAIDs as needed for mild to moderate cramping  ?  Dr. Augustin was present for the entire procedure.  ?  Torsten Dixon MD PGY3  Obstetrics & Gynecology?   I was present with the resident during all critical and key portions of the procedure and agree with the findings documented in the residents note.

## 2022-05-11 DIAGNOSIS — Z36.89 ENCOUNTER FOR FETAL ANATOMIC SURVEY: Primary | ICD-10-CM

## 2022-05-12 ENCOUNTER — OFFICE VISIT (OUTPATIENT)
Dept: FAMILY MEDICINE | Facility: CLINIC | Age: 22
End: 2022-05-12
Payer: MEDICAID

## 2022-05-12 VITALS
TEMPERATURE: 98 F | OXYGEN SATURATION: 99 % | BODY MASS INDEX: 41.44 KG/M2 | SYSTOLIC BLOOD PRESSURE: 105 MMHG | DIASTOLIC BLOOD PRESSURE: 71 MMHG | HEART RATE: 95 BPM | RESPIRATION RATE: 18 BRPM | HEIGHT: 70 IN | WEIGHT: 289.44 LBS

## 2022-05-12 DIAGNOSIS — Z3A.15 15 WEEKS GESTATION OF PREGNANCY: Primary | ICD-10-CM

## 2022-05-12 DIAGNOSIS — K21.9 GASTROESOPHAGEAL REFLUX DISEASE WITHOUT ESOPHAGITIS: ICD-10-CM

## 2022-05-12 DIAGNOSIS — F31.81 BIPOLAR II DISORDER, MOST RECENT EPISODE MAJOR DEPRESSIVE: ICD-10-CM

## 2022-05-12 DIAGNOSIS — Z72.0 TOBACCO USE: ICD-10-CM

## 2022-05-12 DIAGNOSIS — E11.9 TYPE 2 DIABETES MELLITUS WITHOUT COMPLICATION, WITHOUT LONG-TERM CURRENT USE OF INSULIN: ICD-10-CM

## 2022-05-12 DIAGNOSIS — F41.1 GENERALIZED ANXIETY DISORDER: ICD-10-CM

## 2022-05-12 PROBLEM — F90.9 ATTENTION DEFICIT HYPERACTIVITY DISORDER (ADHD): Status: ACTIVE | Noted: 2022-05-12

## 2022-05-12 PROBLEM — E66.9 OBESITY: Status: ACTIVE | Noted: 2022-05-12

## 2022-05-12 PROBLEM — Z91.89 AT RISK FOR KNOWLEDGE DEFICIT: Status: ACTIVE | Noted: 2022-05-12

## 2022-05-12 LAB
BILIRUB SERPL-MCNC: NEGATIVE MG/DL
BLOOD URINE, POC: NORMAL
CLARITY, POC UA: CLEAR
COLOR, POC UA: YELLOW
EST. AVERAGE GLUCOSE BLD GHB EST-MCNC: 111.2 MG/DL
GLUCOSE UR QL STRIP: NEGATIVE
HBA1C MFR BLD: 5.5 %
KETONES UR QL STRIP: NEGATIVE
LEUKOCYTE ESTERASE URINE, POC: NEGATIVE
NITRITE, POC UA: NEGATIVE
PH, POC UA: 6
PROTEIN, POC: NEGATIVE
SPECIFIC GRAVITY, POC UA: >1.03
UROBILINOGEN, POC UA: 0.2

## 2022-05-12 PROCEDURE — 36415 COLL VENOUS BLD VENIPUNCTURE: CPT

## 2022-05-12 PROCEDURE — 83036 HEMOGLOBIN GLYCOSYLATED A1C: CPT

## 2022-05-12 PROCEDURE — 99214 OFFICE O/P EST MOD 30 MIN: CPT | Mod: PBBFAC

## 2022-05-12 PROCEDURE — 81002 URINALYSIS NONAUTO W/O SCOPE: CPT | Mod: PBBFAC

## 2022-05-12 RX ORDER — FAMOTIDINE 20 MG/1
20 TABLET, FILM COATED ORAL
Status: DISCONTINUED | OUTPATIENT
Start: 2022-05-12 | End: 2022-05-12

## 2022-05-12 RX ORDER — LANSOPRAZOLE 15 MG/1
15 TABLET, ORALLY DISINTEGRATING, DELAYED RELEASE ORAL DAILY
Qty: 30 TABLET | Refills: 11 | OUTPATIENT
Start: 2022-05-12 | End: 2022-08-08

## 2022-05-12 RX ORDER — BLOOD SUGAR DIAGNOSTIC
3 STRIP MISCELLANEOUS DAILY
Status: ON HOLD | COMMUNITY
Start: 2022-04-12 | End: 2022-10-20 | Stop reason: HOSPADM

## 2022-05-12 RX ORDER — B-COMPLEX WITH VITAMIN C
1 TABLET ORAL DAILY
Qty: 30 TABLET | Refills: 6 | Status: SHIPPED | OUTPATIENT
Start: 2022-05-12 | End: 2023-02-13

## 2022-05-12 RX ORDER — METFORMIN HYDROCHLORIDE 500 MG/1
500 TABLET ORAL 2 TIMES DAILY
COMMUNITY
Start: 2022-02-04 | End: 2022-05-12 | Stop reason: SDUPTHER

## 2022-05-12 RX ORDER — FAMOTIDINE 20 MG/1
20 TABLET, FILM COATED ORAL 2 TIMES DAILY
Qty: 60 TABLET | Refills: 11 | Status: SHIPPED | OUTPATIENT
Start: 2022-05-12 | End: 2022-05-12 | Stop reason: SDUPTHER

## 2022-05-12 RX ORDER — NAPROXEN SODIUM 220 MG/1
81 TABLET, FILM COATED ORAL DAILY
Qty: 30 TABLET | Refills: 5 | Status: ON HOLD | OUTPATIENT
Start: 2022-05-12 | End: 2022-10-20 | Stop reason: HOSPADM

## 2022-05-12 RX ORDER — B-COMPLEX WITH VITAMIN C
1 TABLET ORAL DAILY
COMMUNITY
Start: 2022-03-23 | End: 2022-05-12 | Stop reason: SDUPTHER

## 2022-05-12 RX ORDER — METFORMIN HYDROCHLORIDE 500 MG/1
500 TABLET ORAL 2 TIMES DAILY
Qty: 60 TABLET | Refills: 3 | Status: SHIPPED | OUTPATIENT
Start: 2022-05-12 | End: 2022-08-11 | Stop reason: SDUPTHER

## 2022-05-12 RX ORDER — LANCETS 30 GAUGE
1 EACH MISCELLANEOUS DAILY
Status: ON HOLD | COMMUNITY
Start: 2022-04-27 | End: 2022-10-20 | Stop reason: HOSPADM

## 2022-05-12 RX ORDER — LURASIDONE HYDROCHLORIDE 20 MG/1
20 TABLET, FILM COATED ORAL DAILY
COMMUNITY
Start: 2022-04-27 | End: 2022-06-23 | Stop reason: DRUGHIGH

## 2022-05-12 NOTE — PROGRESS NOTES
"Subjective:       Patient ID: Valente Wilson is a 21 y.o. female.    Chief Complaint: Routine Prenatal Visit (HROB. 15wk1d. C/O: migraines, nausea/vomiting, light-headed/dizziness.)     at 15^1 WGA; JULIA 22 by LMP 22    Acute issues:   Patient continues to feel depressed. Denies hallucination, and SI. Pt reports past occasional HI when she was working. She is no longer having these thoughts since quitting her job.   Pt is taking metformin BID. Pt reports taking Latuda 3-4 times per week. She missed her appt with Ms. Parker, has an appt scheduled. Pt has been without PNV for last few weeks.   Headaches reported as migraines that are similar to headaches prior to pregnancy. Not taking Tylenol or any medications.   Pt also reports occasional Nausea   Chronic Issues:   Diabetes Mellitus   Bipolar II with depressive episodes  Migraine headache reported by pt       Gestational history:   G1: full term, vaginal   G2: SAB 1st trimester   G3: current    OB Review of Systems:  Fetal Movements: Yes  Vaginal bleeding: None  Leakage of Fluid: None   Vaginal Discharge: None  Headaches: None  Lower Extremity Edema: None  Vision Changes: None    Review of Systems   Constitutional: Negative for appetite change, fatigue and fever.   Respiratory: Negative for chest tightness and shortness of breath.    Cardiovascular: Negative for chest pain and palpitations.   Gastrointestinal: Negative for abdominal pain, nausea and vomiting.   Genitourinary: Negative for dysuria and frequency.   Neurological: Negative for dizziness and headaches.   Psychiatric/Behavioral: Negative for dysphoric mood, sleep disturbance and suicidal ideas.           Objective:     Blood pressure 105/71, pulse 95, temperature 97.9 °F (36.6 °C), temperature source Oral, resp. rate 18, height 5' 10" (1.778 m), weight 131.3 kg (289 lb 7.4 oz), last menstrual period 2022, SpO2 99 %.    Physical Exam  Constitutional:       Appearance: Normal " appearance. She is not ill-appearing.   Cardiovascular:      Rate and Rhythm: Normal rate and regular rhythm.      Heart sounds: No murmur heard.  Pulmonary:      Effort: Pulmonary effort is normal.      Breath sounds: Normal breath sounds. No wheezing.   Abdominal:      Comments: Gravid uterus  FHT: 160 bpm   Neurological:      Mental Status: She is alert.   Psychiatric:         Mood and Affect: Mood normal.         Behavior: Behavior normal.         Thought Content: Thought content normal.             Initial OB Labs: ordered 04/12/22  - Blood Type and Rh: A POS  - Antibody Screen: Negative  - CBC H/H: 11/35.8  - HIV: Negative  - RPR: Negative  - GC: neg  - CT: Neg  - HBsAg: Nonreactive  - HCVAb: nonreactive  - Rubella: Equivocal  - Varicella: non-immune  - Sickle Cell Screen: Negative  - PAP: NIL HPV Negative  - Influenza vaccine date: 11/9/21    15-20 Weeks Lab: ordered 5/12/22  - Quad Screen: _    28 Week Lab  - 1H GTT: _  - Rhogam: _  - Date of Tdap: _  - CBC H/H: _  - RPR: _    36 Week Lab  - CBC H/H: _  - RPR: _  - GBS Culture: _  - HIV: _  - Urine: GC: _    Ultrasound  -Initial US: single IUP with FHT AUA 11 weeks 0 days JULIA 11/1/22  -20 wk anatomy US: scheduled 5/26/22    Assessment:       Problem List Items Addressed This Visit        Psychiatric    Bipolar II disorder, most recent episode major depressive    Generalized anxiety disorder       Endocrine    Type 2 diabetes mellitus without complication, without long-term current use of insulin    Relevant Medications    metFORMIN (GLUCOPHAGE) 500 MG tablet    Other Relevant Orders    Hemoglobin A1C (Completed)       GI    Gastroesophageal reflux disease without esophagitis       Obstetric    15 weeks gestation of pregnancy - Primary    Relevant Orders    POCT URINE DIPSTICK WITHOUT MICROSCOPE (Completed)    Quad Screen Maternal, Serum       Other    Tobacco use          Plan:       Pregnant    - Urine dip reviewed  - continue prenatal vitamins  - Quad  screen ordered  - Contraception: discussed different contraceptive options. Undecided  - Desires breast feeding and bottle feeding  - Labor precautions discussed    Bipolar disorder, current episode depressed, mild or moderate severity, unspecified F31.30   - Latuda 20 mg daily  - Encouraged to keep behavioral health appointments    Diabetes mellitus   - Continue Metformin 500 mg BID  - Keep glucose log    Tobacco Use  - Continues to decrease cigarette use  - Encouraged further cessation and discussed risks of smoking in pregnancy    GERD  - Prevacid Daily      RTC in 4 weeks      Dorie Lees M.D.  [facility]

## 2022-05-16 ENCOUNTER — TELEPHONE (OUTPATIENT)
Dept: FAMILY MEDICINE | Facility: CLINIC | Age: 22
End: 2022-05-16
Payer: MEDICAID

## 2022-05-26 ENCOUNTER — OFFICE VISIT (OUTPATIENT)
Dept: MATERNAL FETAL MEDICINE | Facility: CLINIC | Age: 22
End: 2022-05-26
Payer: MEDICAID

## 2022-05-26 ENCOUNTER — PROCEDURE VISIT (OUTPATIENT)
Dept: MATERNAL FETAL MEDICINE | Facility: CLINIC | Age: 22
End: 2022-05-26
Payer: MEDICAID

## 2022-05-26 VITALS
DIASTOLIC BLOOD PRESSURE: 58 MMHG | SYSTOLIC BLOOD PRESSURE: 126 MMHG | HEART RATE: 86 BPM | BODY MASS INDEX: 41.87 KG/M2 | HEIGHT: 70 IN | WEIGHT: 292.44 LBS

## 2022-05-26 DIAGNOSIS — O99.342 MENTAL DISORDER AFFECTING PREGNANCY IN SECOND TRIMESTER: ICD-10-CM

## 2022-05-26 DIAGNOSIS — O24.112 PRE-EXISTING TYPE 2 DIABETES MELLITUS DURING PREGNANCY IN SECOND TRIMESTER: Primary | ICD-10-CM

## 2022-05-26 DIAGNOSIS — O99.332 TOBACCO SMOKING AFFECTING PREGNANCY IN SECOND TRIMESTER: ICD-10-CM

## 2022-05-26 DIAGNOSIS — Z36.89 ENCOUNTER FOR FETAL ANATOMIC SURVEY: ICD-10-CM

## 2022-05-26 PROCEDURE — 76816 US MFM PROCEDURE (VIEWPOINT): ICD-10-PCS | Mod: S$GLB,,, | Performed by: OBSTETRICS & GYNECOLOGY

## 2022-05-26 PROCEDURE — 1159F MED LIST DOCD IN RCRD: CPT | Mod: CPTII,S$GLB,, | Performed by: OBSTETRICS & GYNECOLOGY

## 2022-05-26 PROCEDURE — 99204 OFFICE O/P NEW MOD 45 MIN: CPT | Mod: 25,TH,S$GLB, | Performed by: OBSTETRICS & GYNECOLOGY

## 2022-05-26 PROCEDURE — 3074F SYST BP LT 130 MM HG: CPT | Mod: CPTII,S$GLB,, | Performed by: OBSTETRICS & GYNECOLOGY

## 2022-05-26 PROCEDURE — 99204 PR OFFICE/OUTPT VISIT, NEW, LEVL IV, 45-59 MIN: ICD-10-PCS | Mod: 25,TH,S$GLB, | Performed by: OBSTETRICS & GYNECOLOGY

## 2022-05-26 PROCEDURE — 3008F BODY MASS INDEX DOCD: CPT | Mod: CPTII,S$GLB,, | Performed by: OBSTETRICS & GYNECOLOGY

## 2022-05-26 PROCEDURE — 3078F PR MOST RECENT DIASTOLIC BLOOD PRESSURE < 80 MM HG: ICD-10-PCS | Mod: CPTII,S$GLB,, | Performed by: OBSTETRICS & GYNECOLOGY

## 2022-05-26 PROCEDURE — 3078F DIAST BP <80 MM HG: CPT | Mod: CPTII,S$GLB,, | Performed by: OBSTETRICS & GYNECOLOGY

## 2022-05-26 PROCEDURE — 1159F PR MEDICATION LIST DOCUMENTED IN MEDICAL RECORD: ICD-10-PCS | Mod: CPTII,S$GLB,, | Performed by: OBSTETRICS & GYNECOLOGY

## 2022-05-26 PROCEDURE — 3008F PR BODY MASS INDEX (BMI) DOCUMENTED: ICD-10-PCS | Mod: CPTII,S$GLB,, | Performed by: OBSTETRICS & GYNECOLOGY

## 2022-05-26 PROCEDURE — 76816 OB US FOLLOW-UP PER FETUS: CPT | Mod: S$GLB,,, | Performed by: OBSTETRICS & GYNECOLOGY

## 2022-05-26 PROCEDURE — 3074F PR MOST RECENT SYSTOLIC BLOOD PRESSURE < 130 MM HG: ICD-10-PCS | Mod: CPTII,S$GLB,, | Performed by: OBSTETRICS & GYNECOLOGY

## 2022-05-26 RX ORDER — FAMOTIDINE 20 MG/1
TABLET, FILM COATED ORAL
Status: ON HOLD | COMMUNITY
Start: 2022-05-12 | End: 2022-10-20 | Stop reason: HOSPADM

## 2022-05-26 NOTE — ASSESSMENT & PLAN NOTE
Pregestational Diabetes:  The patient was counseled regarding the risks of diabetes in pregnancy. These risks include but are not limited to an increased risk of , preeclampsia/gestational hypertension, fetal structural anomalies, macrosomia, prematurity, shoulder dystocia/birth injury,  hyperbilirubinemia and electrolyte issues, pulmonary immaturity and sudden stillbirth especially in those patients with poor glucose control. I also discussed with the patient the need for increased fetal surveillance with serial fetal growth assessments and third trimester fetal testing. I also reviewed the possibility of need for early delivery in those with poor glucose control or evidence of fetal growth abnormalities.    She reports checking her sugars three daily but did not have a log with her at the time of the appointment. We have encouraged her to check her sugars four times daily and submit a log to our office weekly via portal for management. She is currently taking Metformin 500mg BID.       Recommendations (Please refer to [facility] guidelines):  Baseline evaluation (to be ordered by primary OB provider):   24 hour urine protein or urine P/C ratio, CBC, CMP (serum cr 0.77) We have taken the liberty of providing her with an order for a PCR and CMP.   Maternal EKG; echocardiogram if BMI > 30 or EKG is abnormal   Maternal ophthalmic exam (if hasn't been performed in last year) and podiatry exam   Hemoglobin A1C every trimester (1st trimester: 5.5)   Diabetic education referral and counseling re: goal blood sugars (< 95 mg/dl for fasting, < 120 mg/dl for 2 hour postprandial)  Medications:    Start low dose aspirin 81 mg daily at 12-16 weeks for preeclampsia risk reduction   1 mg folic acid daily   Metformin 500mg BID  Ultrasounds with [facility]:   Targeted anatomy survey at 20 weeks (scheduled)   Serial growth ultrasounds q 4-6 weeks at 26-28 weeks (scheduled)    A fetal echocardiogram is recommended  at 22-24 weeks with pediatric cardiology (We will schedule)    Initiate  surveillance at 32 weeks:    Weekly BPP or NST + AFV if good control.    If control is poor, twice weekly  fetal surveillance is recommended with BPP alternating with NST.   Delivery timing:   38 0/7 to 38 and 6/7 weeks if under good control without comorbidities   37 0/7 to 37 and 67 weeks if longstanding diabetes or poorly controlled, polyhydramnios, EFW>90th percentile, or BMI >= 40   36 0/7 to 37 and 6/7 weeks if vascular complications, prior stillbirth or other complicating conditions.  Recommend consideration of earlier delivery if IUGR, HTN, or other complications  Recommend offering  for delivery is EFW is 4500g or more near the time of delivery    Insulin management during labor and delivery  -Give usual dose of intermediate acting (NPH) or long-acting insulin at bedtime  -Hold morning dose of insulin or reduce to ½ dose   -Patients who require insulin should be scheduled as the first available (7 am or 7:30 am)  given the need for NPO status  -Check glucose every 2-4 hours in latent labor; hourly in active labor  -If blood glucose is less than 70 mg/dl, change IVF to D5 ½ NS at rate of 100-150 cc/hr and monitor blood glucose hourly   -IV infusion of regular insulin is recommended if glucose levels exceed 120 mg/dl  -Type 1 diabetics require an infusion of glucose (D5 ½ NS) and insulin to avoid ketosis and hypoglycemia; the insulin infusion can be temporarily paused if hypoglycemia is noted and additional dextrose (D10 or ½ amp of D50) can be administered  -Patients who are well-controlled with an insulin pump can continue during labor and delivery. Recommend primary OB ensure pump site is out of operative field and confirm pump is compatible with and able to be left on during surgery.    Postpartum management  -Insulin should be reduced by 1/2 of the pre-delivery dose within the first 6-24 hours  Cervical strain following delivery.  -Patients who were well-controlled on oral regimens can often return to these following delivery.  -Patients who are breastfeeding should be advised to have small snacks before breastfeeding to reduce the risk of hypoglycemia.  -Patients should be referred to primary MD or Endocrinology for postpartum management.      The patient was advised to call for blood sugars less than 70 or greater than 200 prior to her next appointment. She was also advised that if she notes nausea/vomiting, inability to tolerate PO, or concerns about being able to take her insulin that she should contact her provider or present to the OB ED.

## 2022-05-26 NOTE — PROGRESS NOTES
MATERNAL-FETAL MEDICINE   CONSULT NOTE    Provider requesting consultation: Dr Boyer    SUBJECTIVE:     Ms. Valente Wilson is a 21 y.o.  female with IUP at 17w1d who is seen in consultation by MFM for evaluation and management of:  Problem   Pre-Existing Type 2 Diabetes Mellitus During Pregnancy in Second Trimester            Medication List with Changes/Refills   Current Medications    ASPIRIN 81 MG CHEW    Take 1 tablet (81 mg total) by mouth once daily.    FAMOTIDINE (PEPCID) 20 MG TABLET        IPRATROPIUM-ALBUTEROL (COMBIVENT)  MCG/ACTUATION INHALER    Inhale 1 puff into the lungs daily as needed.    LANSOPRAZOLE (PREVACID SOLUTAB) 15 MG DISINTEGRATING TABLET    Take 1 tablet (15 mg total) by mouth once daily.    LATUDA 20 MG TAB TABLET    Take 20 mg by mouth once daily.    METFORMIN (GLUCOPHAGE) 500 MG TABLET    Take 1 tablet (500 mg total) by mouth 2 (two) times daily.    ONETOUCH ULTRA TEST STRP    Place 3 each onto the skin once daily.    ONETOUCH ULTRA2 METER MISC    Place 1 each onto the skin once daily.    PRENATAL VITAMIN 27 MG IRON- 0.8 MG TAB    Take 1 tablet by mouth once daily.       Review of patient's allergies indicates:   Allergen Reactions    Codeine     Oxycodone-acetaminophen      pt broke out in rash         PMH:  Past Medical History:   Diagnosis Date    ADD (attention deficit disorder)     Anemia     Bipolar disorder, unspecified     PCOS (polycystic ovarian syndrome)     PTSD (post-traumatic stress disorder)     Tobacco use 2022    Type 2 diabetes mellitus without complications        PObHx:  OB History    Para Term  AB Living   3 1 1 0 1 1   SAB IAB Ectopic Multiple Live Births   0 0 1 0 1      # Outcome Date GA Lbr Maykel/2nd Weight Sex Delivery Anes PTL Lv   3 Current            2 Ectopic 18 7w0d    ECTOPIC   FD   1 Term 06/29/15 40w0d  4.111 kg (9 lb 1 oz) M Vag-Spont EPI N FLACO       PSH:  Past Surgical History:   Procedure Laterality  "Date    SALPINGECTOMY         Family history:family history includes Bipolar disorder in her father; Diabetes in her maternal grandmother; Hypertension in her mother; Mental illness in her father.    Social history: reports that she has quit smoking. She has never used smokeless tobacco. She reports previous alcohol use. She reports that she does not use drugs.    Genetic history: The patient denies any inherited genetic diseases or birth defects in herself or her partner's personal history or family.    Objective:   BP (!) 126/58 (BP Location: Left arm)   Pulse 86   Ht 5' 10" (1.778 m)   Wt 132.6 kg (292 lb 7 oz)   LMP 2022   BMI 41.96 kg/m²     Ultrasound performed. See viewpoint for full ultrasound report.  A oliva living IUP is identified.   JULIA is assigned based on the stated JULIA.    Limited fetal anatomic views appear normal.  The cervical length is normal on TA ultrasound.   A placenta previa is noted.   The maternal adnexae are normal in appearance.     ASSESSMENT/PLAN:     21 y.o.  female with IUP at 17w1d     Mental disorder affecting pregnancy in second trimester  She reports a history of PTSD, anxiety, bipolar, and ADD. She was taking Olanzepine, Adderall, and Lexapro prior to pregnancy. She discontinued at +UPT. She is now taking Latuda daily. She is being managed by Ms. Bustos, mental health provider.     Vigilance for peripartum/postpartum mood disorders.     Pre-existing type 2 diabetes mellitus during pregnancy in second trimester  Pregestational Diabetes:  The patient was counseled regarding the risks of diabetes in pregnancy. These risks include but are not limited to an increased risk of , preeclampsia/gestational hypertension, fetal structural anomalies, macrosomia, prematurity, shoulder dystocia/birth injury,  hyperbilirubinemia and electrolyte issues, pulmonary immaturity and sudden stillbirth especially in those patients with poor glucose control. I also " discussed with the patient the need for increased fetal surveillance with serial fetal growth assessments and third trimester fetal testing. I also reviewed the possibility of need for early delivery in those with poor glucose control or evidence of fetal growth abnormalities.    She reports checking her sugars three daily but did not have a log with her at the time of the appointment. We have encouraged her to check her sugars four times daily and submit a log to our office weekly via portal for management. She is currently taking Metformin 500mg BID.       Recommendations (Please refer to [facility] guidelines):  Baseline evaluation (to be ordered by primary OB provider):   24 hour urine protein or urine P/C ratio, CBC, CMP (serum cr 0.77) We have taken the liberty of providing her with an order for a PCR and CMP.   Maternal EKG; echocardiogram if BMI > 30 or EKG is abnormal   Maternal ophthalmic exam (if hasn't been performed in last year) and podiatry exam   Hemoglobin A1C every trimester (1st trimester: 5.5)   Diabetic education referral and counseling re: goal blood sugars (< 95 mg/dl for fasting, < 120 mg/dl for 2 hour postprandial)  Medications:    Start low dose aspirin 81 mg daily at 12-16 weeks for preeclampsia risk reduction   1 mg folic acid daily   Metformin 500mg BID  Ultrasounds with [facility]:   Targeted anatomy survey at 20 weeks (scheduled)   Serial growth ultrasounds q 4-6 weeks at 26-28 weeks (scheduled)    A fetal echocardiogram is recommended at 22-24 weeks with pediatric cardiology (We will schedule)    Initiate  surveillance at 32 weeks:    Weekly BPP or NST + AFV if good control.    If control is poor, twice weekly  fetal surveillance is recommended with BPP alternating with NST.   Delivery timing:   38 0/7 to 38 and 6/7 weeks if under good control without comorbidities   37 0/7 to 37 and 67 weeks if longstanding diabetes or poorly controlled, polyhydramnios,  EFW>90th percentile, or BMI >= 40   36 0/7 to 37 and 6/7 weeks if vascular complications, prior stillbirth or other complicating conditions.  Recommend consideration of earlier delivery if IUGR, HTN, or other complications  Recommend offering  for delivery is EFW is 4500g or more near the time of delivery    Insulin management during labor and delivery  -Give usual dose of intermediate acting (NPH) or long-acting insulin at bedtime  -Hold morning dose of insulin or reduce to ½ dose   -Patients who require insulin should be scheduled as the first available (7 am or 7:30 am)  given the need for NPO status  -Check glucose every 2-4 hours in latent labor; hourly in active labor  -If blood glucose is less than 70 mg/dl, change IVF to D5 ½ NS at rate of 100-150 cc/hr and monitor blood glucose hourly   -IV infusion of regular insulin is recommended if glucose levels exceed 120 mg/dl  -Type 1 diabetics require an infusion of glucose (D5 ½ NS) and insulin to avoid ketosis and hypoglycemia; the insulin infusion can be temporarily paused if hypoglycemia is noted and additional dextrose (D10 or ½ amp of D50) can be administered  -Patients who are well-controlled with an insulin pump can continue during labor and delivery. Recommend primary OB ensure pump site is out of operative field and confirm pump is compatible with and able to be left on during surgery.    Postpartum management  -Insulin should be reduced by 1/2 of the pre-delivery dose within the first 6-24 hours following delivery.  -Patients who were well-controlled on oral regimens can often return to these following delivery.  -Patients who are breastfeeding should be advised to have small snacks before breastfeeding to reduce the risk of hypoglycemia.  -Patients should be referred to primary MD or Endocrinology for postpartum management.      The patient was advised to call for blood sugars less than 70 or greater than 200 prior to her next  appointment. She was also advised that if she notes nausea/vomiting, inability to tolerate PO, or concerns about being able to take her insulin that she should contact her provider or present to the OB ED.      Tobacco smoking affecting pregnancy in second trimester  Tobacco abuse  Patient counseled on cessation of tobacco use and avoidance of second hand smoke inhalation for duration of the pregnancy and postpartum period secondary to the associated fetal/ risks that include the following: spontaneous , placental abruption, low birth weight, oligohydramnios, non-reassuring fetal status, and sudden infant death syndrome (SIDS).          FOLLOW UP:   F/u in 4 weeks for US/MFM visit  Reassess placental location at future visits.  Patient to report any bleeding.        45 minutes of total time spent on the encounter, which includes face to face time and non-face to face time preparing to see the patient (eg, review of tests), obtaining and/or reviewing separately obtained history, documenting clinical information in the electronic or other health record, independently interpreting results (not separately reported) and communicating results to the patient/family/caregiver, or care coordination (not separately reported).      Lizbeth Bell  Maternal-Fetal Medicine    Electronically Signed by Lizbeth Bell May 26, 2022

## 2022-05-26 NOTE — ASSESSMENT & PLAN NOTE
Tobacco abuse  Patient counseled on cessation of tobacco use and avoidance of second hand smoke inhalation for duration of the pregnancy and postpartum period secondary to the associated fetal/ risks that include the following: spontaneous , placental abruption, low birth weight, oligohydramnios, non-reassuring fetal status, and sudden infant death syndrome (SIDS).

## 2022-05-26 NOTE — ASSESSMENT & PLAN NOTE
She reports a history of PTSD, anxiety, bipolar, and ADD. She was taking Olanzepine, Adderall, and Lexapro prior to pregnancy. She discontinued at +UPT. She is now taking Latuda daily. She is being managed by Ms. Bustos, mental health provider.     Vigilance for peripartum/postpartum mood disorders.

## 2022-05-31 PROBLEM — O44.00 PLACENTA PREVIA ANTEPARTUM: Status: ACTIVE | Noted: 2022-05-31

## 2022-06-01 ENCOUNTER — OFFICE VISIT (OUTPATIENT)
Dept: FAMILY MEDICINE | Facility: CLINIC | Age: 22
End: 2022-06-01
Payer: MEDICAID

## 2022-06-01 DIAGNOSIS — F41.1 GENERALIZED ANXIETY DISORDER: ICD-10-CM

## 2022-06-01 DIAGNOSIS — F31.81 BIPOLAR II DISORDER, MOST RECENT EPISODE MAJOR DEPRESSIVE: Primary | ICD-10-CM

## 2022-06-01 PROCEDURE — 99417 PR PROLONGED SVC, OUTPT, W/WO DIRECT PT CONTACT,  EA ADDTL 15 MIN: ICD-10-PCS | Mod: 95,,, | Performed by: NURSE PRACTITIONER

## 2022-06-01 PROCEDURE — 1159F MED LIST DOCD IN RCRD: CPT | Mod: CPTII,95,, | Performed by: NURSE PRACTITIONER

## 2022-06-01 PROCEDURE — 99417 PROLNG OP E/M EACH 15 MIN: CPT | Mod: 95,,, | Performed by: NURSE PRACTITIONER

## 2022-06-01 PROCEDURE — 1159F PR MEDICATION LIST DOCUMENTED IN MEDICAL RECORD: ICD-10-PCS | Mod: CPTII,95,, | Performed by: NURSE PRACTITIONER

## 2022-06-01 PROCEDURE — 99215 PR OFFICE/OUTPT VISIT, EST, LEVL V, 40-54 MIN: ICD-10-PCS | Mod: 95,,, | Performed by: NURSE PRACTITIONER

## 2022-06-01 PROCEDURE — 99215 OFFICE O/P EST HI 40 MIN: CPT | Mod: 95,,, | Performed by: NURSE PRACTITIONER
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
## 2022-06-02 NOTE — PATIENT INSTRUCTIONS
Meds as directed  Keep all healthcare appointments  Follow up with [facility] to enroll self and son  Utilize self-interventions from patient education materials  Nearest ER if overwhelmed  No further  Clinic appointments

## 2022-06-07 DIAGNOSIS — O99.332 TOBACCO SMOKING AFFECTING PREGNANCY IN SECOND TRIMESTER: ICD-10-CM

## 2022-06-07 DIAGNOSIS — O24.112 PRE-EXISTING TYPE 2 DIABETES MELLITUS DURING PREGNANCY IN SECOND TRIMESTER: Primary | ICD-10-CM

## 2022-06-08 ENCOUNTER — TELEPHONE (OUTPATIENT)
Dept: FAMILY MEDICINE | Facility: CLINIC | Age: 22
End: 2022-06-08
Payer: MEDICAID

## 2022-06-08 NOTE — TELEPHONE ENCOUNTER
Pt has upcoming appt 6/9/22 in OB clinic. Spoke with nurse for OB clinic; [facility] will be available in clinic to help facilitate further care at time of in person appt.    Dorie Lees M.D.    ----- Message from RESHMA Burnett sent at 6/2/2022  2:21 PM CDT -----  Regarding: Follow up  :    I made two unsuccessful attempts to call this patient today. Left a message that we had spoken, and you had some ideas of possible other options for her.  When I checked with [facility] to see if she had called to begin enrollment process for her and her 5 y/o son, they said no.  Her next [facility] appointment is at 9:15 on Monday, June 6.  Can't tell who provider is.    Dr. Harris is copied on this message since I discovered hers is the name seen most frequently in Ms. Wilson's EMR.  At this time, I have not indicated any further appointments with me since we're looking at [facility] as well as the [facility] ([facility] program for possible services for this pregnant woman who has BH needs.    I will be in [facility] tomorrow (6/3/2022), and again next Tuesday and Wednesday.  Please call me with any questions.  Thank you.    Moriah Abbasi  [phone]

## 2022-06-17 NOTE — PROGRESS NOTES
"Maternal Fetal Medicine follow up consult    SUBJECTIVE:     Valente Wilson is a 21 y.o.  female with IUP at 20w1d who is seen in follow up consultation by MFM.  Pregnancy complications include:   Problem   Pre-Existing Type 2 Diabetes Mellitus During Pregnancy in Second Trimester   Placenta Previa Antepartum (Resolved)       Previous notes reviewed.   No changes to medical, surgical, family, social, or obstetric history.    Interval history since last MFM visit: no changes    Medications:  Current Outpatient Medications   Medication Instructions    aspirin 81 mg, Oral, Daily    famotidine (PEPCID) 20 MG tablet No dose, route, or frequency recorded.    ipratropium-albuteroL (COMBIVENT)  mcg/actuation inhaler 1 puff, Inhalation, Daily PRN, Currently not taking    lansoprazole (PREVACID SOLUTAB) 15 mg, Oral, Daily    LATUDA 20 mg, Oral, Daily    metFORMIN (GLUCOPHAGE) 500 mg, Oral, 2 times daily    ONETOUCH ULTRA TEST Strp 3 each, Transdermal, Daily    ONETOUCH ULTRA2 METER Misc 1 each, Transdermal, Daily    PRENATAL VITAMIN 27 mg iron- 0.8 mg Tab 1 tablet, Oral, Daily       Care team members:  Dr Boyer - Primary OB       OBJECTIVE:   BP (!) 124/59 (BP Location: Left arm)   Pulse 80   Ht 5' 10" (1.778 m)   Wt 134.7 kg (296 lb 15.4 oz)   LMP 2022   BMI 42.61 kg/m²     Ultrasound performed. See viewpoint for full ultrasound report.  1. A detailed fetal anatomic ultrasound examination was performed for the following high risk indication: pre-gestational diabetes   2. No fetal structural malformations are identified; however, fetal imaging is incomplete today with limited spine, lower extremities and genitals. A follow-up study will be scheduled to complete the fetal anatomic survey.   3. Fetal size today is consistent with established gestational age.  4. Cervical length is normal.  5. Placental location is anterior without evidence of previa.  6. Amniotic fluid volume appears " normal.     ASSESSMENT/PLAN:     21 y.o.  female with IUP at 20w1d    Pre-existing type 2 diabetes mellitus during pregnancy in second trimester  Pregestational Diabetes:  We have reviewed the risks of diabetes in pregnancy. These risks include but are not limited to an increased risk of , preeclampsia/gestational hypertension, fetal structural anomalies, macrosomia, prematurity, shoulder dystocia/birth injury,  hyperbilirubinemia and electrolyte issues, pulmonary immaturity and sudden stillbirth especially in those patients with poor glucose control.     She is non-compliant. She cannot recall the last time she's checked her sugar value. She does not keep a sugar log. She is currently taking Metformin. The prescription was for 500mg BID, however, she states she takes both dosages in the AM. When she takes 1000mg in the AM, she experiences G.I. side effects which contribute to her skipping dosages occasionally. We have instructed her on the correct regimen. She has not submitted a sugar log to our office since establishing care here. We have reiterated the importance of glycemic control and the correlation to pregnancy outcome. We have encouraged her to check sugars four times daily and submit a log to our office weekly via portal for review and management.     Recommendations (Please refer to [facility] guidelines):  Baseline evaluation (to be ordered by primary OB provider):   24 hour urine protein or urine P/C ratio, CBC, CMP (serum cr 0.77) We previously provided an order for a PCR and CMP, however she did not complete.   Maternal EKG; echocardiogram if BMI > 30 or EKG is abnormal   Maternal ophthalmic exam (if hasn't been performed in last year) and podiatry exam   Hemoglobin A1C every trimester (1st trimester: 5.5)   Diabetic education referral and counseling re: goal blood sugars (< 95 mg/dl for fasting, < 120 mg/dl for 2 hour postprandial)  Medications:    Start low dose aspirin  81 mg daily at 12-16 weeks for preeclampsia risk reduction   1 mg folic acid daily   Metformin 500mg BID  Ultrasounds with MFM:   Targeted anatomy survey at 20 weeks (completed today, some views limited-will obtain at next visit)   Serial growth ultrasounds q 4-6 weeks at 26-28 weeks (scheduled)    A fetal echocardiogram is recommended at 22-24 weeks with pediatric cardiology (Scheduled 22)    Initiate  surveillance at 32 weeks:    Weekly BPP or NST + AFV if good control.    If control is poor, twice weekly  fetal surveillance is recommended with BPP alternating with NST.   Delivery timing:   38 0/7 to 38 and 6/7 weeks if under good control without comorbidities   37 0/7 to 37 and 67 weeks if longstanding diabetes or poorly controlled, polyhydramnios, EFW>90th percentile, or BMI >= 40   36 0/7 to 37 and 6/7 weeks if vascular complications, prior stillbirth or other complicating conditions.  Recommend consideration of earlier delivery if IUGR, HTN, or other complications  Recommend offering  for delivery is EFW is 4500g or more near the time of delivery    Insulin management during labor and delivery  -Give usual dose of intermediate acting (NPH) or long-acting insulin at bedtime  -Hold morning dose of insulin or reduce to ½ dose   -Patients who require insulin should be scheduled as the first available (7 am or 7:30 am)  given the need for NPO status  -Check glucose every 2-4 hours in latent labor; hourly in active labor  -If blood glucose is less than 70 mg/dl, change IVF to D5 ½ NS at rate of 100-150 cc/hr and monitor blood glucose hourly   -IV infusion of regular insulin is recommended if glucose levels exceed 120 mg/dl    Postpartum management  -Insulin should be reduced by 1/2 of the pre-delivery dose within the first 6-24 hours following delivery.  -Patients who were well-controlled on oral regimens can often return to these following delivery.  -Patients who  are breastfeeding should be advised to have small snacks before breastfeeding to reduce the risk of hypoglycemia.  -Patients should be referred to primary MD or Endocrinology for postpartum management.    The patient was advised to call for blood sugars less than 70 or greater than 200 prior to her next appointment. She was also advised that if she notes nausea/vomiting, inability to tolerate PO, or concerns about being able to take her insulin that she should contact her provider or present to the OB ED.    Tobacco smoking affecting pregnancy in second trimester  Tobacco abuse  Patient counseled on cessation of tobacco use and avoidance of second hand smoke inhalation for duration of the pregnancy and postpartum period secondary to the associated fetal/ risks that include the following: spontaneous , placental abruption, low birth weight, oligohydramnios, non-reassuring fetal status, and sudden infant death syndrome (SIDS).      Mental disorder affecting pregnancy in second trimester  She reports a history of PTSD, anxiety, bipolar, and ADD. She was taking Olanzepine, Adderall, and Lexapro prior to pregnancy. She discontinued at +UPT. She is now taking Latuda daily. She is being managed by Ms. Bustos, mental health provider. Ms Bustos recently referred her to [facility] for evaluation. Ms Bustos recommends either inpatient admission for medication management or intensive outpatient therapy three times/week. Valente states she is calling [facility] today to set up the evaluation/treatment next week. She states her son is currently in summer school and she does not have  arrangements for him this week. However, next week his summer school program will be complete and he will be staying with his grandmother for the rest of the summer. She denies SI/HI and states she feels her mood is stable currently and is comfortable with waiting until next week. Strict precautions  provided. We have emphasized the importance of receiving appropriate mental health services.    Vigilance for peripartum/postpartum mood disorders.       F/u in 4 weeks for MFM visit/ultrasound      TYESHA Valadez MD/MPH  Maternal Fetal Medicine

## 2022-06-17 NOTE — ASSESSMENT & PLAN NOTE
Pregestational Diabetes:  We have reviewed the risks of diabetes in pregnancy. These risks include but are not limited to an increased risk of , preeclampsia/gestational hypertension, fetal structural anomalies, macrosomia, prematurity, shoulder dystocia/birth injury,  hyperbilirubinemia and electrolyte issues, pulmonary immaturity and sudden stillbirth especially in those patients with poor glucose control.     She is non-compliant. She cannot recall the last time she's checked her sugar value. She does not keep a sugar log. She is currently taking Metformin. The prescription was for 500mg BID, however, she states she takes both dosages in the AM. When she takes 1000mg in the AM, she experiences G.I. side effects which contribute to her skipping dosages occasionally. We have instructed her on the correct regimen. She has not submitted a sugar log to our office since establishing care here. We have reiterated the importance of glycemic control and the correlation to pregnancy outcome. We have encouraged her to check sugars four times daily and submit a log to our office weekly via portal for review and management.     Recommendations (Please refer to [facility] guidelines):  Baseline evaluation (to be ordered by primary OB provider):   24 hour urine protein or urine P/C ratio, CBC, CMP (serum cr 0.77) We previously provided an order for a PCR and CMP, however she did not complete.   Maternal EKG; echocardiogram if BMI > 30 or EKG is abnormal   Maternal ophthalmic exam (if hasn't been performed in last year) and podiatry exam   Hemoglobin A1C every trimester (1st trimester: 5.5)   Diabetic education referral and counseling re: goal blood sugars (< 95 mg/dl for fasting, < 120 mg/dl for 2 hour postprandial)  Medications:    Start low dose aspirin 81 mg daily at 12-16 weeks for preeclampsia risk reduction   1 mg folic acid daily   Metformin 500mg BID  Ultrasounds with [facility]:   Targeted anatomy  survey at 20 weeks (completed today, some views limited-will obtain at next visit)   Serial growth ultrasounds q 4-6 weeks at 26-28 weeks (scheduled)    A fetal echocardiogram is recommended at 22-24 weeks with pediatric cardiology (Scheduled 22)    Initiate  surveillance at 32 weeks:    Weekly BPP or NST + AFV if good control.    If control is poor, twice weekly  fetal surveillance is recommended with BPP alternating with NST.   Delivery timing:   38 0/7 to 38 and 6/7 weeks if under good control without comorbidities   37 0/7 to 37 and 67 weeks if longstanding diabetes or poorly controlled, polyhydramnios, EFW>90th percentile, or BMI >= 40   36 0/7 to 37 and 6/7 weeks if vascular complications, prior stillbirth or other complicating conditions.  Recommend consideration of earlier delivery if IUGR, HTN, or other complications  Recommend offering  for delivery is EFW is 4500g or more near the time of delivery    Insulin management during labor and delivery  -Give usual dose of intermediate acting (NPH) or long-acting insulin at bedtime  -Hold morning dose of insulin or reduce to ½ dose   -Patients who require insulin should be scheduled as the first available (7 am or 7:30 am)  given the need for NPO status  -Check glucose every 2-4 hours in latent labor; hourly in active labor  -If blood glucose is less than 70 mg/dl, change IVF to D5 ½ NS at rate of 100-150 cc/hr and monitor blood glucose hourly   -IV infusion of regular insulin is recommended if glucose levels exceed 120 mg/dl    Postpartum management  -Insulin should be reduced by 1/2 of the pre-delivery dose within the first 6-24 hours following delivery.  -Patients who were well-controlled on oral regimens can often return to these following delivery.  -Patients who are breastfeeding should be advised to have small snacks before breastfeeding to reduce the risk of hypoglycemia.  -Patients should be referred to  primary MD or Endocrinology for postpartum management.    The patient was advised to call for blood sugars less than 70 or greater than 200 prior to her next appointment. She was also advised that if she notes nausea/vomiting, inability to tolerate PO, or concerns about being able to take her insulin that she should contact her provider or present to the OB ED.

## 2022-06-17 NOTE — ASSESSMENT & PLAN NOTE
She reports a history of PTSD, anxiety, bipolar, and ADD. She was taking Olanzepine, Adderall, and Lexapro prior to pregnancy. She discontinued at +UPT. She is now taking Latuda daily. She is being managed by Ms. Bustos, mental health provider. Ms Bustos recently referred her to [facility] for evaluation. Ms Bustos recommends either inpatient admission for medication management or intensive outpatient therapy three times/week. Izzyjamesonwinston states she is calling [facility] today to set up the evaluation/treatment next week. She states her son is currently in summer school and she does not have  arrangements for him this week. However, next week his summer school program will be complete and he will be staying with his grandmother for the rest of the summer. She denies SI/HI and states she feels her mood is stable currently and is comfortable with waiting until next week. Strict precautions provided. We have emphasized the importance of receiving appropriate mental health services.    Vigilance for peripartum/postpartum mood disorders.

## 2022-06-20 ENCOUNTER — OFFICE VISIT (OUTPATIENT)
Dept: MATERNAL FETAL MEDICINE | Facility: CLINIC | Age: 22
End: 2022-06-20
Payer: MEDICAID

## 2022-06-20 ENCOUNTER — PROCEDURE VISIT (OUTPATIENT)
Dept: MATERNAL FETAL MEDICINE | Facility: CLINIC | Age: 22
End: 2022-06-20
Payer: MEDICAID

## 2022-06-20 VITALS
SYSTOLIC BLOOD PRESSURE: 124 MMHG | HEART RATE: 80 BPM | BODY MASS INDEX: 41.95 KG/M2 | HEIGHT: 70 IN | DIASTOLIC BLOOD PRESSURE: 59 MMHG | WEIGHT: 293 LBS

## 2022-06-20 DIAGNOSIS — O24.112 PRE-EXISTING TYPE 2 DIABETES MELLITUS DURING PREGNANCY IN SECOND TRIMESTER: ICD-10-CM

## 2022-06-20 DIAGNOSIS — O99.332 TOBACCO SMOKING AFFECTING PREGNANCY IN SECOND TRIMESTER: ICD-10-CM

## 2022-06-20 DIAGNOSIS — O99.342 MENTAL DISORDER AFFECTING PREGNANCY IN SECOND TRIMESTER: ICD-10-CM

## 2022-06-20 PROBLEM — O44.00 PLACENTA PREVIA ANTEPARTUM: Status: RESOLVED | Noted: 2022-05-31 | Resolved: 2022-06-20

## 2022-06-20 PROCEDURE — 3074F SYST BP LT 130 MM HG: CPT | Mod: CPTII,S$GLB,, | Performed by: OBSTETRICS & GYNECOLOGY

## 2022-06-20 PROCEDURE — 76811 US MFM PROCEDURE (VIEWPOINT): ICD-10-PCS | Mod: S$GLB,,, | Performed by: OBSTETRICS & GYNECOLOGY

## 2022-06-20 PROCEDURE — 99214 PR OFFICE/OUTPT VISIT, EST, LEVL IV, 30-39 MIN: ICD-10-PCS | Mod: 25,TH,S$GLB, | Performed by: OBSTETRICS & GYNECOLOGY

## 2022-06-20 PROCEDURE — 3074F PR MOST RECENT SYSTOLIC BLOOD PRESSURE < 130 MM HG: ICD-10-PCS | Mod: CPTII,S$GLB,, | Performed by: OBSTETRICS & GYNECOLOGY

## 2022-06-20 PROCEDURE — 76811 OB US DETAILED SNGL FETUS: CPT | Mod: S$GLB,,, | Performed by: OBSTETRICS & GYNECOLOGY

## 2022-06-20 PROCEDURE — 1159F MED LIST DOCD IN RCRD: CPT | Mod: CPTII,S$GLB,, | Performed by: OBSTETRICS & GYNECOLOGY

## 2022-06-20 PROCEDURE — 1159F PR MEDICATION LIST DOCUMENTED IN MEDICAL RECORD: ICD-10-PCS | Mod: CPTII,S$GLB,, | Performed by: OBSTETRICS & GYNECOLOGY

## 2022-06-20 PROCEDURE — 99214 OFFICE O/P EST MOD 30 MIN: CPT | Mod: 25,TH,S$GLB, | Performed by: OBSTETRICS & GYNECOLOGY

## 2022-06-20 PROCEDURE — 3008F PR BODY MASS INDEX (BMI) DOCUMENTED: ICD-10-PCS | Mod: CPTII,S$GLB,, | Performed by: OBSTETRICS & GYNECOLOGY

## 2022-06-20 PROCEDURE — 3078F DIAST BP <80 MM HG: CPT | Mod: CPTII,S$GLB,, | Performed by: OBSTETRICS & GYNECOLOGY

## 2022-06-20 PROCEDURE — 3008F BODY MASS INDEX DOCD: CPT | Mod: CPTII,S$GLB,, | Performed by: OBSTETRICS & GYNECOLOGY

## 2022-06-20 PROCEDURE — 3078F PR MOST RECENT DIASTOLIC BLOOD PRESSURE < 80 MM HG: ICD-10-PCS | Mod: CPTII,S$GLB,, | Performed by: OBSTETRICS & GYNECOLOGY

## 2022-06-23 ENCOUNTER — OFFICE VISIT (OUTPATIENT)
Dept: FAMILY MEDICINE | Facility: CLINIC | Age: 22
End: 2022-06-23
Payer: MEDICAID

## 2022-06-23 VITALS
DIASTOLIC BLOOD PRESSURE: 77 MMHG | WEIGHT: 293 LBS | TEMPERATURE: 98 F | HEART RATE: 97 BPM | HEIGHT: 70 IN | BODY MASS INDEX: 41.95 KG/M2 | RESPIRATION RATE: 19 BRPM | SYSTOLIC BLOOD PRESSURE: 119 MMHG | OXYGEN SATURATION: 98 %

## 2022-06-23 DIAGNOSIS — E11.9 TYPE 2 DIABETES MELLITUS WITHOUT COMPLICATION, WITHOUT LONG-TERM CURRENT USE OF INSULIN: ICD-10-CM

## 2022-06-23 DIAGNOSIS — N64.52 BREAST DISCHARGE: ICD-10-CM

## 2022-06-23 DIAGNOSIS — F31.81 BIPOLAR II DISORDER, MOST RECENT EPISODE MAJOR DEPRESSIVE: ICD-10-CM

## 2022-06-23 DIAGNOSIS — Z3A.21 21 WEEKS GESTATION OF PREGNANCY: Primary | ICD-10-CM

## 2022-06-23 LAB
ALBUMIN SERPL-MCNC: 3 GM/DL (ref 3.5–5)
ALBUMIN/GLOB SERPL: 0.8 RATIO (ref 1.1–2)
ALP SERPL-CCNC: 48 UNIT/L (ref 40–150)
ALT SERPL-CCNC: 8 UNIT/L (ref 0–55)
AST SERPL-CCNC: 12 UNIT/L (ref 5–34)
BILIRUB SERPL-MCNC: NORMAL MG/DL
BILIRUBIN DIRECT+TOT PNL SERPL-MCNC: 0.1 MG/DL
BLOOD URINE, POC: NORMAL
BUN SERPL-MCNC: 3.5 MG/DL (ref 7–18.7)
CALCIUM SERPL-MCNC: 9.5 MG/DL (ref 8.4–10.2)
CHLORIDE SERPL-SCNC: 107 MMOL/L (ref 98–107)
CLARITY, POC UA: NORMAL
CO2 SERPL-SCNC: 25 MMOL/L (ref 22–29)
COLOR, POC UA: YELLOW
CREAT SERPL-MCNC: 0.61 MG/DL (ref 0.55–1.02)
GLOBULIN SER-MCNC: 4 GM/DL (ref 2.4–3.5)
GLUCOSE SERPL-MCNC: 77 MG/DL (ref 74–100)
GLUCOSE UR QL STRIP: NORMAL
KETONES UR QL STRIP: NORMAL
LEUKOCYTE ESTERASE URINE, POC: NORMAL
NITRITE, POC UA: NORMAL
PH, POC UA: 6
POTASSIUM SERPL-SCNC: 3.9 MMOL/L (ref 3.5–5.1)
PROT SERPL-MCNC: 7 GM/DL (ref 6.4–8.3)
PROTEIN, POC: NORMAL
SODIUM SERPL-SCNC: 138 MMOL/L (ref 136–145)
SPECIFIC GRAVITY, POC UA: 1.02
UROBILINOGEN, POC UA: 0.2

## 2022-06-23 PROCEDURE — 81002 URINALYSIS NONAUTO W/O SCOPE: CPT | Mod: PBBFAC

## 2022-06-23 PROCEDURE — 80053 COMPREHEN METABOLIC PANEL: CPT

## 2022-06-23 PROCEDURE — 36415 COLL VENOUS BLD VENIPUNCTURE: CPT

## 2022-06-23 PROCEDURE — 99214 OFFICE O/P EST MOD 30 MIN: CPT | Mod: PBBFAC

## 2022-06-23 PROCEDURE — 82608 B-12 BINDING CAPACITY: CPT

## 2022-06-23 PROCEDURE — 30000890 CELL-FREE DNA PRENATAL SCREEN

## 2022-06-23 RX ORDER — LURASIDONE HYDROCHLORIDE 40 MG/1
40 TABLET, FILM COATED ORAL DAILY
Qty: 30 TABLET | Refills: 2 | Status: SHIPPED | OUTPATIENT
Start: 2022-06-23 | End: 2022-09-14

## 2022-06-23 NOTE — PROGRESS NOTES
Subjective:       Patient ID: Valente Wilson is a 21 y.o. female.    Chief Complaint: High Risk OB visit 21 wks 1 day    HPI   22 yo  Female with IUP at 21.1 WGA [facility],    Interval history: MFM visit on 22, however patient has not submitted a sugar log. She is currently taking Metformin 500 mg BID.  They recommended to continue ASA 81 mg daily and 1 mg folic acid daily    No recent manic episode. But she feels likes Latuda 40 mg is not enough to help with her depressive symptoms.    PMH: PTSD, anxiety, bipolar, and ADD     Gestational history:   G1: Full term, vaginal   G2: SAB 1st trimester   G3: current     Antepartum specific   - Fetal movements: Yes  - Vaginal bleeding: no  - Vaginal discharge: no  - Loss of fluid: no  - Contractions: no  - Headaches: no  - Vision changes: no  - Edema: no    Review of Systems    See above  Objective:       Vitals:    22 0750   BP: 119/77   Pulse: 97   Resp: 19   Temp: 98 °F (36.7 °C)     Physical Exam    Gen appearance NAD  CV: S1/S2, no murmurs, rubs or gallops  Resp: Clear breath sounds bilaterally  Abdomen: soft non-tender  FHR: 140 bpm  Fundal height 21 cm  Ext no edema    Initial OB Labs: ordered 22  - Blood Type and Rh: A POS  - Antibody Screen: Negative  - CBC H/H: 35.8  - HIV: Negative  - RPR: Negative  - GC: Neg  - CT: Neg  - HBsAg: Nonreactive  - HCVAb: nonreactive  - Rubella: Equivocal  - Varicella: non-immune  - Sickle Cell Screen: Negative  - PAP: NIL HPV Negative  - Influenza vaccine date: 21    15-20 Weeks Lab: ordered 22  - Quad Screen: Missed  - Ordered Cell free DNA testing on 22    28 Week Lab  - 1H GTT: _  - Rhogam: _  - Date of Tdap: _  - CBC H/H: _  - RPR: _    36 Week Lab  - CBC H/H: _  - RPR: _  - GBS Culture: _  - HIV: _  - Urine: GC: _     Ultrasound  -Initial US: single IUP with FHT AUA 11 weeks 0 days JULIA 22  -20 wk anatomy US: scheduled 22    Lab Results    Component Value Date    COLORU Yellow 06/23/2022    SPECGRAV 1.020 06/23/2022    PHUR 6.0 06/23/2022    WBCUR trace 06/23/2022    NITRITE neg 06/23/2022    PROTEINPOC neg 06/23/2022    GLUCOSEUR neg 06/23/2022    KETONESU neg 06/23/2022    UROBILINOGEN 0.2 06/23/2022    BILIRUBINPOC neg 06/23/2022    RBCUR neg 06/23/2022     Assessment and Plan:       1. 21 weeks gestation of pregnancy  - Continue routine OB care  - Urine dip reviewed  - Continue ASA 81 mg daily and prenatal vitamins  - Labor precautions provided     2. Type 2 diabetes mellitus without complication, without long-term current use of insulin  - Continue Metformin 500 mg BID.  - Advised patient to bring blood glucose log in 2 weeks. Record sugars 4x daily (including fasting, and 2 hours post-prandial). Advised low carb diet.       3. Bipolar II disorder, most recent episode major depressive  - Increasing Latuda to 40 mg daily   - Referred to [facility]. Patient also sees Ms. Becker.      4. Breast discharge  - Likely secondary to pregnancy hormones or Latuda  - Stop hand expressing breasts    - Continue to monitor signs and symptoms            RTC 2 weeks check glucose log

## 2022-07-01 ENCOUNTER — HOSPITAL ENCOUNTER (EMERGENCY)
Facility: HOSPITAL | Age: 22
Discharge: HOME | End: 2022-07-01
Attending: OBSTETRICS & GYNECOLOGY
Payer: MEDICAID

## 2022-07-01 VITALS
OXYGEN SATURATION: 100 % | SYSTOLIC BLOOD PRESSURE: 94 MMHG | WEIGHT: 293 LBS | TEMPERATURE: 98 F | BODY MASS INDEX: 41.02 KG/M2 | HEIGHT: 71 IN | RESPIRATION RATE: 20 BRPM | HEART RATE: 78 BPM | DIASTOLIC BLOOD PRESSURE: 55 MMHG

## 2022-07-01 PROBLEM — A08.4 VIRAL GASTROENTERITIS: Status: ACTIVE | Noted: 2022-07-01

## 2022-07-01 PROCEDURE — 63600175 PHARM REV CODE 636 W HCPCS: Performed by: OBSTETRICS & GYNECOLOGY

## 2022-07-01 PROCEDURE — 96374 THER/PROPH/DIAG INJ IV PUSH: CPT

## 2022-07-01 PROCEDURE — 99284 EMERGENCY DEPT VISIT MOD MDM: CPT | Mod: 25

## 2022-07-01 RX ORDER — ONDANSETRON 2 MG/ML
4 INJECTION INTRAMUSCULAR; INTRAVENOUS ONCE
Status: COMPLETED | OUTPATIENT
Start: 2022-07-01 | End: 2022-07-01

## 2022-07-01 RX ADMIN — SODIUM CHLORIDE, POTASSIUM CHLORIDE, SODIUM LACTATE AND CALCIUM CHLORIDE 1000 ML: 600; 310; 30; 20 INJECTION, SOLUTION INTRAVENOUS at 02:07

## 2022-07-01 RX ADMIN — ONDANSETRON 4 MG: 2 INJECTION INTRAMUSCULAR; INTRAVENOUS at 02:07

## 2022-07-01 NOTE — ED NOTES
RN at bedside providing oral and written discharge instructions. Reviewed reasons to call HCP or return to hospital. [place] diet instructions provided. Patient verbalized full understanding of all. Off unit ambulatory

## 2022-07-01 NOTE — ED PROVIDER NOTES
"       [facility] NOTE  [facility]     Admit Date: 2022  [facility] Physician: Lolita Weaver  Primary OBGYN:  [facility]    Admit Diagnosis/Chief Complaint: Nausea and Vomiting  Discharge Diagnosis: viral gastroenteritis    Chief Complaint   Patient presents with    Nausea    Vomiting       Hospital Course:  Valente Wilson is a 21 y.o.  at 22w2d presents complaining of acid reflux and vomiting after eating fried chicken. Arrived to [facility] via EMS and says her only current complaint is some nausea.  This IUP is complicated by obesity, bipolar d/o, ADHD, anxiety, tobacco use, GERD.    Patient denies vaginal bleeding, leakage of fluid, contractions, headache, vision changes, RUQ pain, dysuria and fever.  Fetal Movement: N/A    BP (!) 94/55 (BP Location: Left arm, Patient Position: Lying)   Pulse 93   Resp 20   Ht 5' 11" (1.803 m)   Wt 136.1 kg (300 lb)   LMP 2022   SpO2 100%   BMI 41.84 kg/m²   Pulse:  [93] 93  Resp:  [20] 20  SpO2:  [100 %] 100 %  BP: (94)/(55) 94/55    General: in no apparent distress well developed and well nourished non-toxic in no respiratory distress and acyanotic alert oriented times 3  Cardiovascular: regular rate and rhythm no murmurs  Respiratory: unlabored  Abdominal: soft, nontender, nondistended, no abnormal masses, no epigastric pain obese FHT present  Back: lumbar tenderness absent CVA tenderness none suprapubic tenderness absent  Extremeties no redness or tenderness in the calves or thighs no edema              EFM: 150s  TOCO: none      Medical Decision Making:      LABS:   No results found for this or any previous visit (from the past 24 hour(s)).    Imaging Results    None          ASSESMENT: Valente Wilson is a 21 y.o.   at 22w2d with     Discharge Diagnosis:   Patient Active Problem List   Diagnosis    At risk for knowledge deficit    Attention deficit hyperactivity disorder (ADHD)    Bipolar II " disorder, most recent episode major depressive    Obesity    Generalized anxiety disorder    Tobacco use    15 weeks gestation of pregnancy    Gastroesophageal reflux disease without esophagitis    Type 2 diabetes mellitus without complication, without long-term current use of insulin    Mental disorder affecting pregnancy in second trimester    Pre-existing type 2 diabetes mellitus during pregnancy in second trimester    Tobacco smoking affecting pregnancy in second trimester    Viral gastroenteritis       Status:Improved    Disposition:  discharged to home    Medications:   IV fluids, IV zofran    Patient Instructions:   - Pt was given routine pregnancy instructions including to return to triage if she had any vaginal bleeding (other than spotting for the next 48hrs), any loss of fluid like her water broke, decreased fetal movement, or contractions Q 5min lasting for 2 or more hours. Pt was also instructed to drink copious water. Patient voiced understanding of all these instructions and was subsequently discharged home. Tylenol use and maternity belt use discussed. All questions answered. Pt left [facility] with good understanding of plan.   Preeclampsia/ROM/labor/fever/decreased FM with [facility] precautions discussed, voiced understanding   - Discussed BRAT diet and immodium use and importance of hydration with GI virus  She will follow up with her primary OB as scheduled      Lolita Weaver MD  OB-GYN Hospitalist

## 2022-07-03 ENCOUNTER — TELEPHONE (OUTPATIENT)
Dept: FAMILY MEDICINE | Facility: CLINIC | Age: 22
End: 2022-07-03
Payer: MEDICAID

## 2022-07-03 NOTE — TELEPHONE ENCOUNTER
----- Message from RESHMA Burnett sent at 6/2/2022  2:21 PM CDT -----  Regarding: Follow up  :    I made two unsuccessful attempts to call this patient today. Left a message that we had spoken, and you had some ideas of possible other options for her.  When I checked with [facility] to see if she had called to begin enrollment process for her and her 7 y/o son, they said no.  Her next [facility] appointment is at 9:15 on Monday, June 6.  Can't tell who provider is.    Dr. Harris is copied on this message since I discovered hers is the name seen most frequently in Ms. Wilson's EMR.  At this time, I have not indicated any further appointments with me since we're looking at [facility] as well as the [facility]AMP (sp?) program for possible services for this pregnant woman who has BH needs.    I will be in [facility] tomorrow (6/3/2022), and again next Tuesday and Wednesday.  Please call me with any questions.  Thank you.    Moriah Abbasi  [phone]      ----------------  Msg forwarded to PCP Dr. Corey

## 2022-07-06 DIAGNOSIS — O99.342 MENTAL DISORDER AFFECTING PREGNANCY IN SECOND TRIMESTER: ICD-10-CM

## 2022-07-06 DIAGNOSIS — O24.112 PRE-EXISTING TYPE 2 DIABETES MELLITUS DURING PREGNANCY IN SECOND TRIMESTER: Primary | ICD-10-CM

## 2022-07-06 DIAGNOSIS — O99.332 TOBACCO SMOKING AFFECTING PREGNANCY IN SECOND TRIMESTER: ICD-10-CM

## 2022-07-12 ENCOUNTER — CLINICAL SUPPORT (OUTPATIENT)
Dept: PEDIATRIC CARDIOLOGY | Facility: CLINIC | Age: 22
End: 2022-07-12
Payer: MEDICAID

## 2022-07-12 ENCOUNTER — OFFICE VISIT (OUTPATIENT)
Dept: PEDIATRIC CARDIOLOGY | Facility: CLINIC | Age: 22
End: 2022-07-12
Payer: MEDICAID

## 2022-07-12 VITALS — BODY MASS INDEX: 41.02 KG/M2 | HEART RATE: 91 BPM | HEIGHT: 71 IN | WEIGHT: 293 LBS

## 2022-07-12 DIAGNOSIS — O24.112 PRE-EXISTING TYPE 2 DIABETES MELLITUS DURING PREGNANCY IN SECOND TRIMESTER: Primary | ICD-10-CM

## 2022-07-12 DIAGNOSIS — O24.112 PRE-EXISTING TYPE 2 DIABETES MELLITUS DURING PREGNANCY IN SECOND TRIMESTER: ICD-10-CM

## 2022-07-12 PROCEDURE — 1159F PR MEDICATION LIST DOCUMENTED IN MEDICAL RECORD: ICD-10-PCS | Mod: CPTII,S$GLB,, | Performed by: PEDIATRICS

## 2022-07-12 PROCEDURE — 76827 PR  SO2 FETAL HEART DOPPLER: ICD-10-PCS | Mod: S$GLB,,, | Performed by: PEDIATRICS

## 2022-07-12 PROCEDURE — 93325 DOPPLER ECHO COLOR FLOW MAPG: CPT | Mod: S$GLB,,, | Performed by: PEDIATRICS

## 2022-07-12 PROCEDURE — 76825 ECHO EXAM OF FETAL HEART: CPT | Mod: S$GLB,,, | Performed by: PEDIATRICS

## 2022-07-12 PROCEDURE — 99203 OFFICE O/P NEW LOW 30 MIN: CPT | Mod: 25,S$GLB,, | Performed by: PEDIATRICS

## 2022-07-12 PROCEDURE — 1160F RVW MEDS BY RX/DR IN RCRD: CPT | Mod: CPTII,S$GLB,, | Performed by: PEDIATRICS

## 2022-07-12 PROCEDURE — 1160F PR REVIEW ALL MEDS BY PRESCRIBER/CLIN PHARMACIST DOCUMENTED: ICD-10-PCS | Mod: CPTII,S$GLB,, | Performed by: PEDIATRICS

## 2022-07-12 PROCEDURE — 76825 PR  SO2 FETAL HEART: ICD-10-PCS | Mod: S$GLB,,, | Performed by: PEDIATRICS

## 2022-07-12 PROCEDURE — 76827 ECHO EXAM OF FETAL HEART: CPT | Mod: S$GLB,,, | Performed by: PEDIATRICS

## 2022-07-12 PROCEDURE — 3008F BODY MASS INDEX DOCD: CPT | Mod: CPTII,S$GLB,, | Performed by: PEDIATRICS

## 2022-07-12 PROCEDURE — 93325 PR DOPPLER COLOR FLOW VELOCITY MAP: ICD-10-PCS | Mod: S$GLB,,, | Performed by: PEDIATRICS

## 2022-07-12 PROCEDURE — 3008F PR BODY MASS INDEX (BMI) DOCUMENTED: ICD-10-PCS | Mod: CPTII,S$GLB,, | Performed by: PEDIATRICS

## 2022-07-12 PROCEDURE — 99203 PR OFFICE/OUTPT VISIT, NEW, LEVL III, 30-44 MIN: ICD-10-PCS | Mod: 25,S$GLB,, | Performed by: PEDIATRICS

## 2022-07-12 PROCEDURE — 1159F MED LIST DOCD IN RCRD: CPT | Mod: CPTII,S$GLB,, | Performed by: PEDIATRICS

## 2022-07-12 NOTE — PROGRESS NOTES
[facility] - Pediatric Cardiology Fetal Cardiology [facility]    Today, I had the pleasure of evaluating Valente Wilson who is now 21 y.o. and carrying her third pregnancy at 23 6/7 weeks gestation with an JULIA of 22. She was referred for evaluation of the fetal heart due maternal pre-existing diabetes mellitus and poor views of the fetal heart on OB ultrasound.      She is carrying a female fetus, to be named Yesica.  Pregnancy thus far has been otherwise complicated by maternal history of bipolar disorder and anxiety for which she in on Latuda.     Obstetric History:    .  Her OB history is otherwise unremarkable.     Past Medical History:   Diagnosis Date    ADD (attention deficit disorder)     Anemia     Bipolar disorder, unspecified     PCOS (polycystic ovarian syndrome)     PTSD (post-traumatic stress disorder)     Tobacco use 2022    Type 2 diabetes mellitus without complications          Current Outpatient Medications:     aspirin 81 MG Chew, Take 1 tablet (81 mg total) by mouth once daily., Disp: 30 tablet, Rfl: 5    famotidine (PEPCID) 20 MG tablet, , Disp: , Rfl:     ipratropium-albuteroL (COMBIVENT)  mcg/actuation inhaler, Inhale 1 puff into the lungs daily as needed. Currently not taking, Disp: , Rfl:     lansoprazole (PREVACID SOLUTAB) 15 MG disintegrating tablet, Take 1 tablet (15 mg total) by mouth once daily., Disp: 30 tablet, Rfl: 11    lurasidone (LATUDA) 40 mg Tab tablet, Take 1 tablet (40 mg total) by mouth once daily., Disp: 30 tablet, Rfl: 2    metFORMIN (GLUCOPHAGE) 500 MG tablet, Take 1 tablet (500 mg total) by mouth 2 (two) times daily., Disp: 60 tablet, Rfl: 3    ONETOUCH ULTRA TEST Strp, Place 3 each onto the skin once daily., Disp: , Rfl:     ONETOUCH ULTRA2 METER Misc, Place 1 each onto the skin once daily., Disp: , Rfl:     PRENATAL VITAMIN 27 mg iron- 0.8 mg Tab, Take 1 tablet by mouth once daily., Disp: 30 tablet, Rfl: 6     Review of patient's  allergies indicates:   Allergen Reactions    Codeine     Oxycodone-acetaminophen      pt broke out in rash       Family History: Negative for congenital heart disease, early coronary artery disease, sudden unexplained death, connective tissues disorders, genetic syndromes, multiple miscarriages or other congenital anomalies.    Social History: Ms. Valente Wilson is in a relationship with the father of the baby.  The father of the baby is involved.     FETAL ECHOCARDIOGRAM (summary):  Fetal echocardiogram at 23 6/7wga. JULIA 11/2/22   Technicaly difficult study with no fetal heart disease identified.  The fetal pulmonary and systemic veins were not well seen.   Normal four chamber view Normal biventricular size and systolic function.   No VSD demonstrated.   No significant valvar dysfunction.   Normal proximal outflow tracts.   Normal fetal patent ductus arteriosus In images supplemented with color Doppler, the aortic arch appears patent.   There is no pericardial effusion.  (Full report in electronic medical record)    Impression:  Single active female fetus at 23 6/7 wga. Technically difficult echo with no cardiac disease identified.     I discussed with her that fetal echocardiography is insufficiently sensitive to rule out all septal defects, anomalies of pulmonary and systemic veins, arch anomalies, and some valvar abnormalities, nor can it ensure that the ductus arteriosus and foramen ovale will spontaneously close.     Recommendations:  I discussed the continued importance of tight control of glucose levels throughout the remainder of the pregnancy given the concern of developing ventricular septal hypertrophy with poor glucose control, particularly in the third trimester.    Location, timing, and mode of delivery will be determined by the obstetrical team.  She does not require further follow-up in the fetal echocardiography clinic, but I would be happy to see her again if additional questions or  concerns arise.    Should there be any concerns about the baby's heart after birth, a post- echocardiogram and cardiology consultation are recommended. Low threshold for post-dione echo given difficulty of fetal images.       Parul Nathan MD, MSCI  Pediatric Cardiology  Pediatric Echocardiography, Fetal Echocardiography, Cardiac MRI  [facility] [address] [place]  [address]  Phone [phone], Fax [phone]      The above information was discussed in detail including the use of diagrams, with 30 minutes of total face to face time, with greater than 50% with counseling and coordination of care.  The discussion of the diagnosis and treatment options is as described above.

## 2022-07-13 LAB — BEAKER SEE SCANNED REPORT: NORMAL

## 2022-07-21 ENCOUNTER — OFFICE VISIT (OUTPATIENT)
Dept: MATERNAL FETAL MEDICINE | Facility: CLINIC | Age: 22
End: 2022-07-21
Payer: MEDICAID

## 2022-07-21 ENCOUNTER — PROCEDURE VISIT (OUTPATIENT)
Dept: MATERNAL FETAL MEDICINE | Facility: CLINIC | Age: 22
End: 2022-07-21
Payer: MEDICAID

## 2022-07-21 VITALS
BODY MASS INDEX: 41.02 KG/M2 | DIASTOLIC BLOOD PRESSURE: 62 MMHG | HEIGHT: 71 IN | WEIGHT: 293 LBS | HEART RATE: 85 BPM | SYSTOLIC BLOOD PRESSURE: 126 MMHG

## 2022-07-21 DIAGNOSIS — O24.112 PRE-EXISTING TYPE 2 DIABETES MELLITUS DURING PREGNANCY IN SECOND TRIMESTER: ICD-10-CM

## 2022-07-21 DIAGNOSIS — O99.342 MENTAL DISORDER AFFECTING PREGNANCY IN SECOND TRIMESTER: ICD-10-CM

## 2022-07-21 DIAGNOSIS — O99.332 TOBACCO SMOKING AFFECTING PREGNANCY IN SECOND TRIMESTER: ICD-10-CM

## 2022-07-21 PROCEDURE — 3078F DIAST BP <80 MM HG: CPT | Mod: CPTII,S$GLB,, | Performed by: OBSTETRICS & GYNECOLOGY

## 2022-07-21 PROCEDURE — 3078F PR MOST RECENT DIASTOLIC BLOOD PRESSURE < 80 MM HG: ICD-10-PCS | Mod: CPTII,S$GLB,, | Performed by: OBSTETRICS & GYNECOLOGY

## 2022-07-21 PROCEDURE — 3074F SYST BP LT 130 MM HG: CPT | Mod: CPTII,S$GLB,, | Performed by: OBSTETRICS & GYNECOLOGY

## 2022-07-21 PROCEDURE — 76816 US MFM PROCEDURE (VIEWPOINT): ICD-10-PCS | Mod: S$GLB,,, | Performed by: OBSTETRICS & GYNECOLOGY

## 2022-07-21 PROCEDURE — 76816 OB US FOLLOW-UP PER FETUS: CPT | Mod: S$GLB,,, | Performed by: OBSTETRICS & GYNECOLOGY

## 2022-07-21 PROCEDURE — 3008F PR BODY MASS INDEX (BMI) DOCUMENTED: ICD-10-PCS | Mod: CPTII,S$GLB,, | Performed by: OBSTETRICS & GYNECOLOGY

## 2022-07-21 PROCEDURE — 1159F MED LIST DOCD IN RCRD: CPT | Mod: CPTII,S$GLB,, | Performed by: OBSTETRICS & GYNECOLOGY

## 2022-07-21 PROCEDURE — 99214 PR OFFICE/OUTPT VISIT, EST, LEVL IV, 30-39 MIN: ICD-10-PCS | Mod: 25,TH,S$GLB, | Performed by: OBSTETRICS & GYNECOLOGY

## 2022-07-21 PROCEDURE — 1159F PR MEDICATION LIST DOCUMENTED IN MEDICAL RECORD: ICD-10-PCS | Mod: CPTII,S$GLB,, | Performed by: OBSTETRICS & GYNECOLOGY

## 2022-07-21 PROCEDURE — 99214 OFFICE O/P EST MOD 30 MIN: CPT | Mod: 25,TH,S$GLB, | Performed by: OBSTETRICS & GYNECOLOGY

## 2022-07-21 PROCEDURE — 3008F BODY MASS INDEX DOCD: CPT | Mod: CPTII,S$GLB,, | Performed by: OBSTETRICS & GYNECOLOGY

## 2022-07-21 PROCEDURE — 3074F PR MOST RECENT SYSTOLIC BLOOD PRESSURE < 130 MM HG: ICD-10-PCS | Mod: CPTII,S$GLB,, | Performed by: OBSTETRICS & GYNECOLOGY

## 2022-07-21 NOTE — ASSESSMENT & PLAN NOTE
Pregestational Diabetes:  We have reviewed the risks of diabetes in pregnancy. These risks include but are not limited to an increased risk of , preeclampsia/gestational hypertension, fetal structural anomalies, macrosomia, prematurity, shoulder dystocia/birth injury,  hyperbilirubinemia and electrolyte issues, pulmonary immaturity and sudden stillbirth especially in those patients with poor glucose control.     She is non-compliant. She inconsistently checks her sugar values. She does not keep a sugar log. She is NOT currently taking prescribed Metformin. She has not submitted a sugar log to our office since establishing care here. We have emphasized the importance of glycemic control and the correlation to pregnancy outcome. We have encouraged her to check sugars four times daily and submit a log to our office weekly via portal for review and management.     Recommendations (Please refer to [facility] guidelines):  Baseline evaluation (to be ordered by primary OB provider):   24 hour urine protein or urine P/C ratio, CBC, CMP (serum cr 0.77) We previously provided an order for a PCR and CMP, however she did not complete.   Maternal EKG; echocardiogram if BMI > 30 or EKG is abnormal   Maternal ophthalmic exam (if hasn't been performed in last year) and podiatry exam   Hemoglobin A1C every trimester (1st trimester: 5.5)   Diabetic education referral and counseling re: goal blood sugars (< 95 mg/dl for fasting, < 120 mg/dl for 2 hour postprandial)  Medications:    Start low dose aspirin 81 mg daily at 12-16 weeks for preeclampsia risk reduction   1 mg folic acid daily   Metformin 500mg BID  Ultrasounds with [facility]:   Targeted anatomy survey at 20 weeks (completed today)   Serial growth ultrasounds q 4-6 weeks at 26-28 weeks (scheduled)    A fetal echocardiogram is recommended at 22-24 weeks with pediatric cardiology (completed and normal)    Initiate  surveillance at 32 weeks:     Weekly BPP or NST + AFV if good control.    If control is poor, twice weekly  fetal surveillance is recommended with BPP alternating with NST.   Delivery timing:   38 0/7 to 38 and 6/7 weeks if under good control without comorbidities   37 0/7 to 37 and 67 weeks if longstanding diabetes or poorly controlled, polyhydramnios, EFW>90th percentile, or BMI >= 40   36 0/7 to 37 and 6/7 weeks if vascular complications, prior stillbirth or other complicating conditions.  Recommend consideration of earlier delivery if IUGR, HTN, or other complications  Recommend offering  for delivery is EFW is 4500g or more near the time of delivery    Insulin management during labor and delivery  -Give usual dose of intermediate acting (NPH) or long-acting insulin at bedtime  -Hold morning dose of insulin or reduce to ½ dose   -Patients who require insulin should be scheduled as the first available (7 am or 7:30 am)  given the need for NPO status  -Check glucose every 2-4 hours in latent labor; hourly in active labor  -If blood glucose is less than 70 mg/dl, change IVF to D5 ½ NS at rate of 100-150 cc/hr and monitor blood glucose hourly   -IV infusion of regular insulin is recommended if glucose levels exceed 120 mg/dl    Postpartum management  -Insulin should be reduced by 1/2 of the pre-delivery dose within the first 6-24 hours following delivery.  -Patients who were well-controlled on oral regimens can often return to these following delivery.  -Patients who are breastfeeding should be advised to have small snacks before breastfeeding to reduce the risk of hypoglycemia.  -Patients should be referred to primary MD or Endocrinology for postpartum management.    The patient was advised to call for blood sugars less than 70 or greater than 200 prior to her next appointment. She was also advised that if she notes nausea/vomiting, inability to tolerate PO, or concerns about being able to take her insulin that  she should contact her provider or present to the OB ED.

## 2022-07-21 NOTE — ASSESSMENT & PLAN NOTE
Hx PTSD, anxiety, bipolar, and ADD  Prepregnancy regimen:  - Olanzepine  - Adderall  - Lexapro   Current regimen:  - Latuda    Pt reports she recently stopped the Latuda because according to the bottle she cannot take it in the third trimester. Latuda is safe and appropriate in all trimesters of pregnancy and in the postpartum period for patients who need it, regardless of what a bottle might say. Abrupt cessation can lead to manic episodes, and pregnancy is a risk factor for suicide. Valente states she has an appt at [facility] on August 1st. She denies SI/HI and states she feels her mood is stable currently and is comfortable with waiting until next week. Strict precautions provided. We have emphasized the importance of receiving appropriate mental health services.    Recommendations:  1. Care with [facility]  2. Please continue regimen as written. With pregnancy safety concerns, please contact [facility].  3. Vigilance for peripartum/postpartum mood disorders.

## 2022-07-21 NOTE — PROGRESS NOTES
"Maternal Fetal Medicine follow up consult    SUBJECTIVE:     Valente Wilson is a 21 y.o.  female with IUP at 25w1d who is seen in follow up consultation by MFM.  Pregnancy complications include:   Problem   Mental Disorder Affecting Pregnancy in Second Trimester   Pre-Existing Type 2 Diabetes Mellitus During Pregnancy in Second Trimester   Tobacco Smoking Affecting Pregnancy in Second Trimester       Previous notes reviewed.   No changes to medical, surgical, family, social, or obstetric history.    Interval history since last MFM visit: no changes    Medications:  Current Outpatient Medications   Medication Instructions    aspirin 81 mg, Oral, Daily    famotidine (PEPCID) 20 MG tablet No dose, route, or frequency recorded.    ipratropium-albuteroL (COMBIVENT)  mcg/actuation inhaler 1 puff, Inhalation, Daily PRN, Currently not taking    lansoprazole (PREVACID SOLUTAB) 15 mg, Oral, Daily    lurasidone (LATUDA) 40 mg, Oral, Daily    metFORMIN (GLUCOPHAGE) 500 mg, Oral, 2 times daily    ONETOUCH ULTRA TEST Strp 3 each, Transdermal, Daily    ONETOUCH ULTRA2 METER Misc 1 each, Transdermal, Daily    PRENATAL VITAMIN 27 mg iron- 0.8 mg Tab 1 tablet, Oral, Daily       Care team members:  Dr Boyer- Primary OB       OBJECTIVE:   /62 (BP Location: Left arm)   Pulse 85   Ht 5' 11" (1.803 m)   Wt (!) 137.5 kg (303 lb 2.8 oz)   LMP 2022   BMI 42.28 kg/m²     Ultrasound performed. See viewpoint for full ultrasound report.  1. The fetal anatomic survey was completed today, and no fetal structural abnormalities were identified of the structures imaged today.   2. Fetal size is AGA with the EFW at the 30%.  3. AFV is normal.   4. Placental location is anterior.     Results: Normal fetal echocardiogram    ASSESSMENT/PLAN:     21 y.o.  female with IUP at 25w1d    Pre-existing type 2 diabetes mellitus during pregnancy in second trimester  Pregestational Diabetes:  We have reviewed the " risks of diabetes in pregnancy. These risks include but are not limited to an increased risk of , preeclampsia/gestational hypertension, fetal structural anomalies, macrosomia, prematurity, shoulder dystocia/birth injury,  hyperbilirubinemia and electrolyte issues, pulmonary immaturity and sudden stillbirth especially in those patients with poor glucose control.     She is non-compliant. She inconsistently checks her sugar values. She does not keep a sugar log. She is NOT currently taking prescribed Metformin. She has not submitted a sugar log to our office since establishing care here. We have emphasized the importance of glycemic control and the correlation to pregnancy outcome. We have encouraged her to check sugars four times daily and submit a log to our office weekly via portal for review and management.     Recommendations (Please refer to [facility] guidelines):  Baseline evaluation (to be ordered by primary OB provider):   24 hour urine protein or urine P/C ratio, CBC, CMP (serum cr 0.77) We previously provided an order for a PCR and CMP, however she did not complete.   Maternal EKG; echocardiogram if BMI > 30 or EKG is abnormal   Maternal ophthalmic exam (if hasn't been performed in last year) and podiatry exam   Hemoglobin A1C every trimester (1st trimester: 5.5)   Diabetic education referral and counseling re: goal blood sugars (< 95 mg/dl for fasting, < 120 mg/dl for 2 hour postprandial)  Medications:    Start low dose aspirin 81 mg daily at 12-16 weeks for preeclampsia risk reduction   1 mg folic acid daily   Metformin 500mg BID  Ultrasounds with [facility]:   Targeted anatomy survey at 20 weeks (completed today)   Serial growth ultrasounds q 4-6 weeks at 26-28 weeks (scheduled)    A fetal echocardiogram is recommended at 22-24 weeks with pediatric cardiology (completed)    Initiate  surveillance at 32 weeks:    Weekly BPP or NST + AFV if good control.    If control is  poor, twice weekly  fetal surveillance is recommended with BPP alternating with NST.   Delivery timing:   38 0/7 to 38 and 6/7 weeks if under good control without comorbidities   37 0/7 to 37 and 67 weeks if longstanding diabetes or poorly controlled, polyhydramnios, EFW>90th percentile, or BMI >= 40   36 0/7 to 37 and 6/7 weeks if vascular complications, prior stillbirth or other complicating conditions.  Recommend consideration of earlier delivery if IUGR, HTN, or other complications  Recommend offering  for delivery is EFW is 4500g or more near the time of delivery    Insulin management during labor and delivery  -Give usual dose of intermediate acting (NPH) or long-acting insulin at bedtime  -Hold morning dose of insulin or reduce to ½ dose   -Patients who require insulin should be scheduled as the first available (7 am or 7:30 am)  given the need for NPO status  -Check glucose every 2-4 hours in latent labor; hourly in active labor  -If blood glucose is less than 70 mg/dl, change IVF to D5 ½ NS at rate of 100-150 cc/hr and monitor blood glucose hourly   -IV infusion of regular insulin is recommended if glucose levels exceed 120 mg/dl    Postpartum management  -Insulin should be reduced by 1/2 of the pre-delivery dose within the first 6-24 hours following delivery.  -Patients who were well-controlled on oral regimens can often return to these following delivery.  -Patients who are breastfeeding should be advised to have small snacks before breastfeeding to reduce the risk of hypoglycemia.  -Patients should be referred to primary MD or Endocrinology for postpartum management.    The patient was advised to call for blood sugars less than 70 or greater than 200 prior to her next appointment. She was also advised that if she notes nausea/vomiting, inability to tolerate PO, or concerns about being able to take her insulin that she should contact her provider or present to the OB  ED.    Tobacco smoking affecting pregnancy in second trimester  Tobacco abuse  Patient counseled on cessation of tobacco use and avoidance of second hand smoke inhalation for duration of the pregnancy and postpartum period secondary to the associated fetal/ risks that include the following: spontaneous , placental abruption, low birth weight, oligohydramnios, non-reassuring fetal status, and sudden infant death syndrome (SIDS).      Mental disorder affecting pregnancy in second trimester  Hx PTSD, anxiety, bipolar, and ADD  Prepregnancy regimen:  - Olanzepine  - Adderall  - Lexapro   Current regimen:  - Latuda    Pt reports she recently stopped the Latuda because according to the bottle she cannot take it in the third trimester. Latuda is safe and appropriate in all trimesters of pregnancy and in the postpartum period for patients who need it, regardless of what a bottle might say. Abrupt cessation can lead to manic episodes, and pregnancy is a risk factor for suicide. Valente states she has an appt at [facility] on . She denies SI/HI and states she feels her mood is stable currently and is comfortable with waiting until next week. Strict precautions provided. We have emphasized the importance of receiving appropriate mental health services.    Recommendations:  1. Care with [facility]  2. Please continue regimen as written. With pregnancy safety concerns, please contact [facility].  3. Vigilance for peripartum/postpartum mood disorders.       F/u in 4 weeks for [facility] visit/ultrasound      TYESHA Valadez MD/MPH  Maternal Fetal Medicine

## 2022-07-26 DIAGNOSIS — O99.342 MENTAL DISORDER AFFECTING PREGNANCY IN SECOND TRIMESTER: ICD-10-CM

## 2022-07-26 DIAGNOSIS — O99.332 TOBACCO SMOKING AFFECTING PREGNANCY IN SECOND TRIMESTER: ICD-10-CM

## 2022-07-26 DIAGNOSIS — O24.112 PRE-EXISTING TYPE 2 DIABETES MELLITUS DURING PREGNANCY IN SECOND TRIMESTER: Primary | ICD-10-CM

## 2022-08-08 ENCOUNTER — HOSPITAL ENCOUNTER (EMERGENCY)
Facility: HOSPITAL | Age: 22
Discharge: HOME | End: 2022-08-08
Payer: MEDICAID

## 2022-08-08 VITALS
SYSTOLIC BLOOD PRESSURE: 129 MMHG | HEIGHT: 71 IN | WEIGHT: 293 LBS | BODY MASS INDEX: 41.02 KG/M2 | TEMPERATURE: 98 F | OXYGEN SATURATION: 100 % | HEART RATE: 87 BPM | RESPIRATION RATE: 19 BRPM | DIASTOLIC BLOOD PRESSURE: 60 MMHG

## 2022-08-08 PROCEDURE — 25000003 PHARM REV CODE 250: Performed by: OBSTETRICS & GYNECOLOGY

## 2022-08-08 PROCEDURE — 99284 EMERGENCY DEPT VISIT MOD MDM: CPT

## 2022-08-08 RX ORDER — PANTOPRAZOLE SODIUM 40 MG/1
40 TABLET, DELAYED RELEASE ORAL
Status: COMPLETED | OUTPATIENT
Start: 2022-08-08 | End: 2022-08-08

## 2022-08-08 RX ORDER — CALCIUM CARBONATE 200(500)MG
1000 TABLET,CHEWABLE ORAL
Status: COMPLETED | OUTPATIENT
Start: 2022-08-08 | End: 2022-08-08

## 2022-08-08 RX ORDER — ONDANSETRON 4 MG/1
4 TABLET, ORALLY DISINTEGRATING ORAL
Status: COMPLETED | OUTPATIENT
Start: 2022-08-08 | End: 2022-08-08

## 2022-08-08 RX ORDER — LANSOPRAZOLE 30 MG/1
30 TABLET, ORALLY DISINTEGRATING, DELAYED RELEASE ORAL DAILY
Qty: 30 TABLET | Refills: 11 | Status: SHIPPED | OUTPATIENT
Start: 2022-08-08 | End: 2022-09-08

## 2022-08-08 RX ADMIN — CALCIUM CARBONATE (ANTACID) CHEW TAB 500 MG 1000 MG: 500 CHEW TAB at 07:08

## 2022-08-08 RX ADMIN — PANTOPRAZOLE SODIUM 40 MG: 40 TABLET, DELAYED RELEASE ORAL at 07:08

## 2022-08-08 RX ADMIN — ONDANSETRON 4 MG: 4 TABLET, ORALLY DISINTEGRATING ORAL at 08:08

## 2022-08-08 NOTE — DISCHARGE INSTRUCTIONS
Come back to labor and delivery if you having decreased fetal movement, contractions, leaking fluid or vaginal bleeding.

## 2022-08-08 NOTE — ED TRIAGE NOTES
Patient is 27.3 arrived via ambulance complaining of back pain and upper abd pain that is cramping and going to her side - Patient states it is constant and it started around 0530 and it woke her up from her sleep - Patient states she is having +FM - Denies vaginal bleeding, leaking fluid - Unsure if she is having contractions

## 2022-08-08 NOTE — Clinical Note
"Valente Michelle" Katie was seen and treated in our emergency department on 8/8/2022.  She may return to work on 08/09/2022.       If you have any questions or concerns, please don't hesitate to call.       RN    "

## 2022-08-08 NOTE — ED PROVIDER NOTES
"ALEJANDRO NOTE     Admit Date: 2022  ALEJANDRO Physician: Jan Boyer  Primary OBGYN: [facility]    Admit Diagnosis/Chief Complaint: Abdominal Pain      Chief Complaint   Patient presents with    Back Pain    Abdominal Pain       Hospital Course:  Valente Wilson is a 22 y.o.  at 27w5d presents complaining of epigastric tenderness and back pain  This IUP is complicated by type 2 diabetes and reflux currently on Pepcid inconsistently.    Patient denies vaginal bleeding, leakage of fluid and contractions.  Fetal Movement: normal.    BP (!) 142/66 (BP Location: Left arm, Patient Position: Sitting)   Pulse 97   Temp 98.1 °F (36.7 °C) (Oral)   Resp 19   Ht 5' 11" (1.803 m)   Wt 135.2 kg (298 lb)   LMP 2022   SpO2 100%   Breastfeeding No   BMI 41.56 kg/m²   Temp:  [98.1 °F (36.7 °C)] 98.1 °F (36.7 °C)  Pulse:  [97] 97  Resp:  [19] 19  SpO2:  [100 %] 100 %  BP: (142)/(66) 142/66    General: in no apparent distress  Abdominal: soft, nontender, nondistended, no abnormal masses, no epigastric pain FHT present  Back: lumbar tenderness absent   CVA tenderness none  Extremeties no redness or tenderness in the calves or thighs no edema    SSE:   SVE:  deferred no contractions    FHT:  Appropriate for gestational age  TOCO: Contractions none      LABS:   No results found for this or any previous visit (from the past 24 hour(s)).  [unfilled]     Imaging Results    None          ASSESMENT: Valente Wilson is a 22 y.o.   at 27w5d with worsening GERD  Observation in ALEJANDRO  Prevacid prescribed  P.o. Protonix and Tums administered  Labor precautions reviewed with patient  ER precautions reviewed with patient  Patient was given an opportunity to ask questions  Patient is to follow-up with her primary care physician  Patient currently stable      Discharge Diagnosis:  GERD in pregnancy    Status:Stable    Patient Instructions:       - Pt was given routine pregnancy instructions including to return to " triage if she had any vaginal bleeding (other than spotting for the next 48hrs), any loss of fluid like her water broke, decreased fetal movement, or contractions Q 5min lasting for 2 or more hours. Pt was also instructed to drink copious water. Patient voiced understanding of all these instructions and was subsequently discharged home.    She will follow up with her primary OB.      This note was created with the assistance of [employer] voice recognition software. There may be transcription errors as a result of using this technology however minimal. Effort has been made to assure accuracy of transcription but any obvious errors or omissions should be clarified with the author of the document.

## 2022-08-11 ENCOUNTER — OFFICE VISIT (OUTPATIENT)
Dept: FAMILY MEDICINE | Facility: CLINIC | Age: 22
End: 2022-08-11
Payer: MEDICAID

## 2022-08-11 VITALS
SYSTOLIC BLOOD PRESSURE: 135 MMHG | WEIGHT: 293 LBS | RESPIRATION RATE: 18 BRPM | DIASTOLIC BLOOD PRESSURE: 77 MMHG | BODY MASS INDEX: 41.82 KG/M2 | HEART RATE: 91 BPM | OXYGEN SATURATION: 98 %

## 2022-08-11 DIAGNOSIS — F31.81 BIPOLAR II DISORDER, MOST RECENT EPISODE MAJOR DEPRESSIVE: ICD-10-CM

## 2022-08-11 DIAGNOSIS — R73.01 IMPAIRED FASTING GLUCOSE: ICD-10-CM

## 2022-08-11 DIAGNOSIS — Z3A.28 28 WEEKS GESTATION OF PREGNANCY: Primary | ICD-10-CM

## 2022-08-11 DIAGNOSIS — G43.109 MIGRAINE WITH AURA AND WITHOUT STATUS MIGRAINOSUS, NOT INTRACTABLE: ICD-10-CM

## 2022-08-11 LAB
BASOPHILS # BLD AUTO: 0.03 X10(3)/MCL (ref 0–0.2)
BASOPHILS NFR BLD AUTO: 0.3 %
BILIRUB SERPL-MCNC: NEGATIVE MG/DL
BLOOD URINE, POC: NEGATIVE
CLARITY, POC UA: CLEAR
COLOR, POC UA: NORMAL
EOSINOPHIL # BLD AUTO: 0.23 X10(3)/MCL (ref 0–0.9)
EOSINOPHIL NFR BLD AUTO: 2.4 %
ERYTHROCYTE [DISTWIDTH] IN BLOOD BY AUTOMATED COUNT: 14 % (ref 11.5–17)
GLUCOSE UR QL STRIP: NEGATIVE
HCT VFR BLD AUTO: 32.4 % (ref 37–47)
HGB BLD-MCNC: 9.9 GM/DL (ref 12–16)
IMM GRANULOCYTES # BLD AUTO: 0.16 X10(3)/MCL (ref 0–0.04)
IMM GRANULOCYTES NFR BLD AUTO: 1.7 %
KETONES UR QL STRIP: NEGATIVE
LEUKOCYTE ESTERASE URINE, POC: 6
LYMPHOCYTES # BLD AUTO: 1.73 X10(3)/MCL (ref 0.6–4.6)
LYMPHOCYTES NFR BLD AUTO: 18.2 %
MCH RBC QN AUTO: 25.6 PG (ref 27–31)
MCHC RBC AUTO-ENTMCNC: 30.6 MG/DL (ref 33–36)
MCV RBC AUTO: 83.9 FL (ref 80–94)
MONOCYTES # BLD AUTO: 0.59 X10(3)/MCL (ref 0.1–1.3)
MONOCYTES NFR BLD AUTO: 6.2 %
NEUTROPHILS # BLD AUTO: 6.8 X10(3)/MCL (ref 2.1–9.2)
NEUTROPHILS NFR BLD AUTO: 71.2 %
NITRITE, POC UA: NEGATIVE
NRBC BLD AUTO-RTO: 0 %
PH, POC UA: NORMAL
PLATELET # BLD AUTO: 281 X10(3)/MCL (ref 130–400)
PMV BLD AUTO: 12 FL (ref 7.4–10.4)
PROTEIN, POC: NEGATIVE
RBC # BLD AUTO: 3.86 X10(6)/MCL (ref 4.2–5.4)
SPECIFIC GRAVITY, POC UA: 1.02
T PALLIDUM AB SER QL: NONREACTIVE
UROBILINOGEN, POC UA: NORMAL
WBC # SPEC AUTO: 9.5 X10(3)/MCL (ref 4.5–11.5)

## 2022-08-11 PROCEDURE — 86780 TREPONEMA PALLIDUM: CPT

## 2022-08-11 PROCEDURE — 99213 OFFICE O/P EST LOW 20 MIN: CPT | Mod: PBBFAC

## 2022-08-11 PROCEDURE — 85025 COMPLETE CBC W/AUTO DIFF WBC: CPT

## 2022-08-11 PROCEDURE — 81002 URINALYSIS NONAUTO W/O SCOPE: CPT | Mod: PBBFAC

## 2022-08-11 PROCEDURE — 36415 COLL VENOUS BLD VENIPUNCTURE: CPT

## 2022-08-11 RX ORDER — METFORMIN HYDROCHLORIDE 500 MG/1
500 TABLET ORAL SEE ADMIN INSTRUCTIONS
Qty: 90 TABLET | Refills: 3 | Status: SHIPPED | OUTPATIENT
Start: 2022-08-11 | End: 2023-02-13 | Stop reason: SDUPTHER

## 2022-08-11 NOTE — PROGRESS NOTES
Subjective:       Patient ID: Valente Wilson is a 22 y.o. female.    Chief Complaint: Routine Prenatal Visit (28w 1d)    HPI   20 yo  Female with IUP at 28.1 WGA presents to [facility] for routine visit,    Interval history:   Seen in OB ED 2022 for abdominal pain, found to have worsening GERD given po Protonix and Tums, discharged with Rx for prevacid provided. Stable since then  Migraines daily, with auras resolved with sleep/dark room, no N/V, hesitant to take tylenol  Covid 19 exposure end of July, mild symptoms didn't require inpatient management    Compliant with ASA 81mg, Latuda 40mg, folic acid, Metformin 500mg bid.  BG logs not up to date last logs in 2022. Fasting within goal, 2hr postprandials 120-180s  No hypoglycemic episodes. Denies polyuria, polydipsia  Not compliant with an ADA diet  No manic episodes, feels Latuda 40mg is working well for her. Denies SI/HI  [facility]. Has scheduled appt    PMH: Impaired fasting glucose, PTSD, anxiety, bipolar, and ADD     Gestational history:   G1: Full term, vaginal   G2: SAB 1st trimester   G3: current     Antepartum specific   - Fetal movements: Yes  - Vaginal bleeding: no  - Vaginal discharge: no  - Loss of fluid: no  - Contractions: no  - Headaches: no  - Vision changes: no  - Edema: no    Review of Systems    See above  Objective:       Vitals:    22 1109   BP: 135/77   Pulse: 91   Resp: 18     Physical Exam    Gen appearance NAD  Skin: acanthosis nigricans on posterior neck  CV: S1/S2, no murmurs, rubs or gallops  Resp: Clear breath sounds bilaterally  Abdomen: soft non-tender  FHR: 147-152 bpm  Fundal height 28 cm  Ext no edema    Initial OB Labs: ordered 22  - Blood Type and Rh: A POS  - Antibody Screen: Negative  - CBC H/H: 35.8  - HIV: Negative  - RPR: Negative  - GC: Neg  - CT: Neg  - HBsAg: Nonreactive  - HCVAb: nonreactive  - Rubella: Equivocal  - Varicella:  non-immune  - Sickle Cell Screen: Negative  - PAP: NIL HPV Negative  - Influenza vaccine date: 11/9/21    15-20 Weeks Lab: ordered 5/12/22  - Quad Screen: Missed  - Ordered Cell free DNA testing on 06/23/22 low risk    28 Week Lab Collected 8/11/2022  - Rhogam: N/A  - Date of Tdap: unable to be administered, batch compromise  - CBC H/H: _  - RPR: _    36 Week Lab  - CBC H/H: _  - RPR: _  - GBS Culture: _  - HIV: _  - Urine: GC: _     Ultrasound  -Initial US: single IUP with FHT AUA 11 weeks 0 days JULIA 11/1/22  -20 wk anatomy US: scheduled 5/26/22 A oliva living IUP is identified.   JULIA is assigned based on the stated JULIA.   Limited fetal anatomic views appear normal.   The cervical length is normal on TA ultrasound.   A placenta previa is noted.   The maternal adnexae are normal in appearance.     6/20/2022   1. A detailed fetal anatomic ultrasound examination was performed for the following high risk indication: pre-gestational diabetes   2. No fetal structural malformations are identified; however, fetal imaging is incomplete today with limited spine, lower extremities and genitals. A follow-up study will be   scheduled to complete the fetal anatomic survey.   3. Fetal size today is consistent with established gestational age.   4. Cervical length is normal.   5. Placental location is anterior without evidence of previa.   6. Amniotic fluid volume appears normal.     7/21/2022  1. The fetal anatomic survey was completed today, and no fetal structural abnormalities were identified of the structures imaged today.   2. Fetal size is AGA with the EFW at the 30%.   3. AFV is normal.   4. Placental location is anterior.       Lab Results   Component Value Date    COLORU Dark Yellow 08/11/2022    SPECGRAV 1.025 08/11/2022    PHUR 6.0 06/23/2022    WBCUR 6.0 08/11/2022    NITRITE Negative 08/11/2022    PROTEINPOC Negative 08/11/2022    GLUCOSEUR Negative 08/11/2022    KETONESU Negative 08/11/2022    UROBILINOGEN 0.2  E.U./dL 08/11/2022    BILIRUBINPOC Negative 08/11/2022    RBCUR Negative 08/11/2022     Assessment and Plan:         28 weeks gestation of pregnancy    -OB Protocol  -PNVs, ASA 81mg  -Urine dip reviewed  -Routine labs reviewed. 28 week labs ordered  -Postpartum contraception: Undecided  -Labor and ED precautions discussed in depth. Strict ED Precautions: Fever, vaginal bleeding or leaking fluid, belly cramping or pain, shortness of breath-chest pain, swelling of the face-hands, ankles and feet or legs. Decreased fetal movement. Changes in vision. Severe headache that do not resolve with rest or Tylenol    -     CBC Auto Differential; Future; Expected date: 08/11/2022  -     SYPHILIS ANTIBODY (WITH REFLEX RPR); Future; Expected date: 08/11/2022  -     POCT urine dipstick without microscope    Migraine with aura and without status migrainosus, not intractable  OTC tylenol up to 650mg q6h prn  Precautions provided    Bipolar II disorder, most recent episode major depressive  Continue Latuda 40mg daily  [facility]    Impaired fasting glucose  A1c 6.4-->6.2-->5.5 5/12/2022   Increasing Metformin to 1000mg qam, continue 500 qpm  Advised patient to bring blood glucose log in 2 weeks. Record sugars 4x daily (including fasting, and 2 hours post-prandial). Advised ADA diet    -     metFORMIN (GLUCOPHAGE) 500 MG tablet; Take 1 tablet (500 mg total) by mouth As instructed (prediabetes). Take 2 tablets (1000mg) in the morning and 1 tablet (500mg) at night  Dispense: 90 tablet; Refill: 3               RTC 4 weeks, check glucose log        Ligia Hou MD  [employer] [facility] PGY-2

## 2022-08-16 ENCOUNTER — PROCEDURE VISIT (OUTPATIENT)
Dept: MATERNAL FETAL MEDICINE | Facility: CLINIC | Age: 22
End: 2022-08-16
Payer: MEDICAID

## 2022-08-16 ENCOUNTER — OFFICE VISIT (OUTPATIENT)
Dept: MATERNAL FETAL MEDICINE | Facility: CLINIC | Age: 22
End: 2022-08-16
Payer: MEDICAID

## 2022-08-16 VITALS
BODY MASS INDEX: 41.02 KG/M2 | WEIGHT: 293 LBS | DIASTOLIC BLOOD PRESSURE: 70 MMHG | SYSTOLIC BLOOD PRESSURE: 132 MMHG | HEART RATE: 104 BPM | HEIGHT: 71 IN

## 2022-08-16 DIAGNOSIS — O99.342 MENTAL DISORDER AFFECTING PREGNANCY IN SECOND TRIMESTER: ICD-10-CM

## 2022-08-16 DIAGNOSIS — O99.332 TOBACCO SMOKING AFFECTING PREGNANCY IN SECOND TRIMESTER: ICD-10-CM

## 2022-08-16 DIAGNOSIS — O99.343 MENTAL DISORDER AFFECTING PREGNANCY IN THIRD TRIMESTER: ICD-10-CM

## 2022-08-16 DIAGNOSIS — O99.333 TOBACCO SMOKING AFFECTING PREGNANCY IN THIRD TRIMESTER: ICD-10-CM

## 2022-08-16 DIAGNOSIS — O24.112 PRE-EXISTING TYPE 2 DIABETES MELLITUS DURING PREGNANCY IN SECOND TRIMESTER: ICD-10-CM

## 2022-08-16 DIAGNOSIS — O24.113 PRE-EXISTING TYPE 2 DIABETES MELLITUS DURING PREGNANCY IN THIRD TRIMESTER: ICD-10-CM

## 2022-08-16 PROCEDURE — 3075F SYST BP GE 130 - 139MM HG: CPT | Mod: CPTII,S$GLB,, | Performed by: OBSTETRICS & GYNECOLOGY

## 2022-08-16 PROCEDURE — 76816 OB US FOLLOW-UP PER FETUS: CPT | Mod: S$GLB,,, | Performed by: OBSTETRICS & GYNECOLOGY

## 2022-08-16 PROCEDURE — 3078F PR MOST RECENT DIASTOLIC BLOOD PRESSURE < 80 MM HG: ICD-10-PCS | Mod: CPTII,S$GLB,, | Performed by: OBSTETRICS & GYNECOLOGY

## 2022-08-16 PROCEDURE — 1159F MED LIST DOCD IN RCRD: CPT | Mod: CPTII,S$GLB,, | Performed by: OBSTETRICS & GYNECOLOGY

## 2022-08-16 PROCEDURE — 3008F PR BODY MASS INDEX (BMI) DOCUMENTED: ICD-10-PCS | Mod: CPTII,S$GLB,, | Performed by: OBSTETRICS & GYNECOLOGY

## 2022-08-16 PROCEDURE — 99214 PR OFFICE/OUTPT VISIT, EST, LEVL IV, 30-39 MIN: ICD-10-PCS | Mod: 25,TH,S$GLB, | Performed by: OBSTETRICS & GYNECOLOGY

## 2022-08-16 PROCEDURE — 76816 PR  US,PREGNANT UTERUS,F/U,TRANSABD APP: ICD-10-PCS | Mod: S$GLB,,, | Performed by: OBSTETRICS & GYNECOLOGY

## 2022-08-16 PROCEDURE — 99214 OFFICE O/P EST MOD 30 MIN: CPT | Mod: 25,TH,S$GLB, | Performed by: OBSTETRICS & GYNECOLOGY

## 2022-08-16 PROCEDURE — 3078F DIAST BP <80 MM HG: CPT | Mod: CPTII,S$GLB,, | Performed by: OBSTETRICS & GYNECOLOGY

## 2022-08-16 PROCEDURE — 1159F PR MEDICATION LIST DOCUMENTED IN MEDICAL RECORD: ICD-10-PCS | Mod: CPTII,S$GLB,, | Performed by: OBSTETRICS & GYNECOLOGY

## 2022-08-16 PROCEDURE — 3008F BODY MASS INDEX DOCD: CPT | Mod: CPTII,S$GLB,, | Performed by: OBSTETRICS & GYNECOLOGY

## 2022-08-16 PROCEDURE — 3075F PR MOST RECENT SYSTOLIC BLOOD PRESS GE 130-139MM HG: ICD-10-PCS | Mod: CPTII,S$GLB,, | Performed by: OBSTETRICS & GYNECOLOGY

## 2022-08-16 NOTE — ASSESSMENT & PLAN NOTE
"We have reviewed the risks of diabetes in pregnancy previously and discussed again today.      She is non-compliant with sending her logs. She reports taking Metformin 1000mg QAM and Metformin 500mg QHS. She inconsistently checks her sugar values. She does NOT follow a diabetic diet and reports eating "sweets" at night. She has not submitted a sugar log to our office since establishing care here. We have emphasized the importance of glycemic control and the correlation to pregnancy outcome. We have encouraged her to check sugars four times daily and submit a log to our office weekly via portal for review and management.     Recommendations :  Baseline evaluation (to be ordered by primary OB provider):   24 hour urine protein or urine P/C ratio, CBC, CMP (serum cr 0.77) We previously provided an order for a PCR and CMP, however she did not complete.   Maternal EKG; echocardiogram if BMI > 30 or EKG is abnormal   Maternal ophthalmic exam (if hasn't been performed in last year) and podiatry exam   Hemoglobin A1C every trimester (1st trimester: 5.5)--Given her noncompliance recommend checking a hemoglobin A1C at her next visit at [facility]   Diabetic education referral and counseling re: goal blood sugars (< 95 mg/dl for fasting, < 120 mg/dl for 2 hour postprandial)  Medications:    Start low dose aspirin 81 mg daily at 12-16 weeks for preeclampsia risk reduction   1 mg folic acid daily   Metformin 1000mg QAM and 500mg QHS  Ultrasounds with M:   Targeted anatomy survey at 20 weeks (completed)   Serial growth ultrasounds q 4-6 weeks at 26-28 weeks (scheduled)    A fetal echocardiogram is recommended at 22-24 weeks with pediatric cardiology (completed and normal)    Initiate  surveillance at 32 weeks:    Weekly BPP or NST + AFV if good control.    If control is poor, twice weekly  fetal surveillance is recommended with BPP alternating with NST starting at 32weeks.   Delivery timing:   38 0/ to " 38 and 6/7 weeks if under good control without comorbidities   37 0/7 to 37 and 67 weeks if longstanding diabetes or poorly controlled, polyhydramnios, EFW>90th percentile, or BMI >= 40   36 0/7 to 37 and 6/7 weeks if vascular complications, prior stillbirth or other complicating conditions.  Recommend consideration of earlier delivery if IUGR, HTN, or other complications  Recommend offering  for delivery is EFW is 4500g or more near the time of delivery    The patient was advised to call for blood sugars less than 70 or greater than 200 prior to her next appointment. She was also advised that if she notes nausea/vomiting, inability to tolerate PO, or concerns about being able to take her insulin that she should contact her provider or present to the OB ED.

## 2022-08-16 NOTE — PROGRESS NOTES
"Maternal Fetal Medicine follow up consult    SUBJECTIVE:     Valente Wilson is a 22 y.o.  female with IUP at 28w6d who is seen in follow up consultation by MFM.  Pregnancy complications include:   Problem   Mental Disorder Affecting Pregnancy in Third Trimester   Pre-Existing Type 2 Diabetes Mellitus During Pregnancy in Third Trimester   Tobacco Smoking Affecting Pregnancy in Third Trimester       Previous notes reviewed.   No changes to medical, surgical, family, social, or obstetric history.    Interval history since last MFM visit: no changes. Reports good FM.    Medications:  Current Outpatient Medications   Medication Instructions    aspirin 81 mg, Oral, Daily    famotidine (PEPCID) 20 MG tablet No dose, route, or frequency recorded.    ipratropium-albuteroL (COMBIVENT)  mcg/actuation inhaler 1 puff, Inhalation, Daily PRN, Currently not taking    lansoprazole (PREVACID SOLUTAB) 30 mg, Oral, Daily    lurasidone (LATUDA) 40 mg, Oral, Daily    metFORMIN (GLUCOPHAGE) 500 mg, Oral, See admin instructions, Take 2 tablets (1000mg) in the morning and 1 tablet (500mg) at night    ONETOUCH ULTRA TEST Strp 3 each, Transdermal, Daily    ONETOUCH ULTRA2 METER Misc 1 each, Transdermal, Daily    PRENATAL VITAMIN 27 mg iron- 0.8 mg Tab 1 tablet, Oral, Daily       Care team members:  Dr Boyer- Primary OB       OBJECTIVE:   /70 (BP Location: Left arm)   Pulse 104   Ht 5' 11" (1.803 m)   Wt (!) 136.1 kg (300 lb 2.5 oz)   LMP 2022   BMI 41.86 kg/m²     Ultrasound performed. See viewpoint for full ultrasound report.      ASSESSMENT/PLAN:     22 y.o.  female with IUP at 28w6d    Pre-existing type 2 diabetes mellitus during pregnancy in third trimester  We have reviewed the risks of diabetes in pregnancy previously and discussed again today.      She is non-compliant with sending her logs. She reports taking Metformin 1000mg QAM and Metformin 500mg QHS. She inconsistently checks " "her sugar values. She does NOT follow a diabetic diet and reports eating "sweets" at night. She has not submitted a sugar log to our office since establishing care here. We have emphasized the importance of glycemic control and the correlation to pregnancy outcome. We have encouraged her to check sugars four times daily and submit a log to our office weekly via portal for review and management.     Recommendations :  Baseline evaluation (to be ordered by primary OB provider):   24 hour urine protein or urine P/C ratio, CBC, CMP (serum cr 0.77) We previously provided an order for a PCR and CMP, however she did not complete.   Maternal EKG; echocardiogram if BMI > 30 or EKG is abnormal   Maternal ophthalmic exam (if hasn't been performed in last year) and podiatry exam   Hemoglobin A1C every trimester (1st trimester: 5.5)--Given her noncompliance recommend checking a hemoglobin A1C at [facility] next visit at [facility]   Diabetic education referral and counseling re: goal blood sugars (< 95 mg/dl for fasting, < 120 mg/dl for 2 hour postprandial)  Medications:    Start low dose aspirin 81 mg daily at 12-16 weeks for preeclampsia risk reduction   1 mg folic acid daily   Metformin 1000mg QAM and 500mg QHS  Ultrasounds with MFM:   Targeted anatomy survey at 20 weeks (completed)   Serial growth ultrasounds q 4-6 weeks at 26-28 weeks (scheduled)    A fetal echocardiogram is recommended at 22-24 weeks with pediatric cardiology (completed and normal)    Initiate  surveillance at 32 weeks:    Weekly BPP or NST + AFV if good control.    If control is poor, twice weekly  fetal surveillance is recommended with BPP alternating with NST starting at 32weeks.   Delivery timing:   38 0/7 to 38 and 6/7 weeks if under good control without comorbidities   37 0/7 to 37 and 67 weeks if longstanding diabetes or poorly controlled, polyhydramnios, EFW>90th percentile, or BMI >= 40   36 0/7 to 37 and 6/7 weeks if vascular " complications, prior stillbirth or other complicating conditions.  Recommend consideration of earlier delivery if IUGR, HTN, or other complications  Recommend offering  for delivery is EFW is 4500g or more near the time of delivery    The patient was advised to call for blood sugars less than 70 or greater than 200 prior to her next appointment. She was also advised that if she notes nausea/vomiting, inability to tolerate PO, or concerns about being able to take her insulin that she should contact her provider or present to the OB ED.    Tobacco smoking affecting pregnancy in third trimester  Patient counseled on cessation of tobacco use and avoidance of second hand smoke inhalation for duration of the pregnancy and postpartum period secondary to the associated fetal/ risks that include the following: spontaneous , placental abruption, low birth weight, oligohydramnios, non-reassuring fetal status, and sudden infant death syndrome (SIDS).      Mental disorder affecting pregnancy in third trimester  Hx PTSD, anxiety, bipolar, and ADD  Prepregnancy regimen:  - Olanzepine  - Adderall  - Lexapro   Current regimen:  - Latuda    Latuda is safe and appropriate in all trimesters of pregnancy and in the postpartum period for patients who need it. Abrupt cessation can lead to manic episodes, and pregnancy is a risk factor for suicide. Valente states she follows with [facility]. She denies SI/HI and states she feels her mood is stable currently. Strict precautions provided. We have emphasized the importance of receiving appropriate mental health services.    Recommendations:  1. Care with [facility]  2. Please continue regimen as written. With pregnancy safety concerns, please contact [facility].  3. Vigilance for peripartum/postpartum mood disorders.       F/u in 4 weeks for [facility] visit/ultrasound    Today I have spent 25 minutes in the care of the patient. This includes face to face time and  non-face to face time preparing to see the patient (eg, review of tests), obtaining and/or reviewing separately obtained history, documenting clinical information in the electronic or other health record, independently interpreting results and communicating results to the patient/family/caregiver, or care coordination.

## 2022-08-16 NOTE — ASSESSMENT & PLAN NOTE
Patient counseled on cessation of tobacco use and avoidance of second hand smoke inhalation for duration of the pregnancy and postpartum period secondary to the associated fetal/ risks that include the following: spontaneous , placental abruption, low birth weight, oligohydramnios, non-reassuring fetal status, and sudden infant death syndrome (SIDS).

## 2022-08-16 NOTE — ASSESSMENT & PLAN NOTE
Hx PTSD, anxiety, bipolar, and ADD  Prepregnancy regimen:  - Olanzepine  - Adderall  - Lexapro   Current regimen:  - Latuda    Latuda is safe and appropriate in all trimesters of pregnancy and in the postpartum period for patients who need it. Abrupt cessation can lead to manic episodes, and pregnancy is a risk factor for suicide. Valente states she follows with [facility]. She denies SI/HI and states she feels her mood is stable currently. Strict precautions provided. We have emphasized the importance of receiving appropriate mental health services.    Recommendations:  1. Care with [facility]  2. Please continue regimen as written. With pregnancy safety concerns, please contact [facility].  3. Vigilance for peripartum/postpartum mood disorders.

## 2022-08-22 ENCOUNTER — HOSPITAL ENCOUNTER (EMERGENCY)
Facility: HOSPITAL | Age: 22
Discharge: HOME | End: 2022-08-22
Attending: OBSTETRICS & GYNECOLOGY
Payer: MEDICAID

## 2022-08-22 VITALS
BODY MASS INDEX: 41.02 KG/M2 | HEART RATE: 87 BPM | DIASTOLIC BLOOD PRESSURE: 64 MMHG | SYSTOLIC BLOOD PRESSURE: 126 MMHG | RESPIRATION RATE: 18 BRPM | TEMPERATURE: 99 F | WEIGHT: 293 LBS | OXYGEN SATURATION: 98 % | HEIGHT: 71 IN

## 2022-08-22 PROCEDURE — 99284 EMERGENCY DEPT VISIT MOD MDM: CPT | Mod: 25

## 2022-08-22 NOTE — ED PROVIDER NOTES
"       ALEJANDRO NOTE  [facility]     Admit Date: 2022  ALEJANDRO Physician: Antolin Sandy  Primary OBGYN:     Admit Diagnosis/Chief Complaint: Vaginal Bleeding  Discharge Diagnosis: Cervicitis    Chief Complaint   Patient presents with    Vaginal Bleeding     C/o vaginal bleeding after intercourse around 0000. Pt reports positive fetal movement and denies leaking of fluid.        Hospital Course:  Valente Wilson is a 22 y.o.  at 29w5d presents complaining of postcoital vaginal bleeding.  She was sexually active earlier tonight and is having no pain at all but noticed some bright red blood.  The bleeding decreased but and got darker but she has noticed a few little small clots of blood in got scared so came in to be checked.  She denies any rupture membranes, contractions, gushes of fluid, fever chills nausea vomiting or any sign or symptom of infection.  She is having good fetal movement.  Her pregnancy has been complicated by class B 1 diabetes for which she has been under good control on metformin and followed by Dr. BG Paniagua of Maternal-Fetal Medicine.  She also has a bipolar disorder on Latuda.  Her 1st pregnancy was a vaginal delivery of a boy and this is a girl.  Earlier in the pregnancy ultrasound revealed up placenta previa but ultrasound at 28 weeks names now reveals that his moved up in its anterior and no longer a previa.  She has not had any bleeding at all in the earlier part of the pregnancy.  No vaginal discharge itching burning or any sinus symptoms of infection.  This IUP is complicated by vaginal bleeding.    Patient denies leakage of fluid, contractions, headache, vision changes, RUQ pain, dysuria, fever and nausea/vomiting.  Fetal Movement: normal.    /64 (BP Location: Left arm, Patient Position: Lying)   Pulse 87   Temp 98.5 °F (36.9 °C) (Oral)   Resp 18   Ht 5' 11" (1.803 m)   Wt 135.6 kg (299 lb)   LMP 2022   SpO2 98%   " Breastfeeding No   BMI 41.70 kg/m²   Temp:  [98.5 °F (36.9 °C)] 98.5 °F (36.9 °C)  Pulse:  [87] 87  Resp:  [18] 18  SpO2:  [98 %] 98 %  BP: (126)/(64) 126/64    General: in no apparent distress well developed and well nourished non-toxic in no respiratory distress and acyanotic alert oriented times 3 afebrile normal vitals playful cooperative  Cardiovascular: regular rate and rhythm no murmurs  Respiratory: clear to auscultation, no wheezes, rales or rhonchi, symmetric air entry  Abdominal: FHT present, her uterus is consistent with 30 weeks and nontender  Back: lumbar tenderness absent CVA tenderness none  Extremeties no redness or tenderness in the calves or thighs no edema    SVE (PeriWATCH)  Dilation (cm): 0  Effacement (%): 50  Station: -5  Examined by:: Sebastián  Simplified Clemens Score: 1   Very minimal blood noted on exam and just a smear on her pad    FHT:  140s with good long-term variability, positive accelerations, no decelerations, overall reassuring.  Category 1  TOCO:  No contractions    Medical Decision Making:  Vaginal bleeding    LABS:   No results found for this or any previous visit (from the past 24 hour(s)).    Imaging Results    None          ASSESMENT: Valente Wilson is a 22 y.o.   at 29w5d with vaginal bleeding    Discharge Diagnosis:   1. IUP at 29 and 5 7th week  2. Postcoital vaginal bleeding likely secondary to cervicitis-bleeding has essentially stopped now.    3. Class B diabetes on metformin under good control per patient   4. Bipolar disorder on Latuda     Status:Stable    Disposition:  discharged to home    Medications:   No new medications    Patient Instructions:   - Pt was given routine pregnancy instructions including to return to triage if she had any vaginal bleeding (other than spotting for the next 48hrs), any loss of fluid like her water broke, decreased fetal movement, or contractions Q 5min lasting for 2 or more hours. Pt was also instructed to drink  copious water. Patient voiced understanding of all these instructions and was subsequently discharged home. Tylenol use and maternity belt use discussed. All questions answered. Pt left ALEJANDRO with good understanding of plan.     She will follow up with her primary OB as scheduled      Gamaliel Sandy M.D.  OB-GYN Hospitalist

## 2022-08-22 NOTE — DISCHARGE INSTRUCTIONS
Keep scheduled appointment with primary OB/GYN. Report to OB ED if you experience any gush of fluid, blood, decreased fetal movement, or contractions less than 5 minutes apart for great than 1 hour lasting longer than 1 minute (5-1-1).

## 2022-08-24 PROBLEM — Z3A.30 30 WEEKS GESTATION OF PREGNANCY: Status: ACTIVE | Noted: 2022-08-24

## 2022-08-31 DIAGNOSIS — O99.333 TOBACCO SMOKING AFFECTING PREGNANCY IN THIRD TRIMESTER: ICD-10-CM

## 2022-08-31 DIAGNOSIS — O24.113 PRE-EXISTING TYPE 2 DIABETES MELLITUS DURING PREGNANCY IN THIRD TRIMESTER: Primary | ICD-10-CM

## 2022-08-31 DIAGNOSIS — O99.343 MENTAL DISORDER AFFECTING PREGNANCY IN THIRD TRIMESTER: ICD-10-CM

## 2022-09-08 ENCOUNTER — OFFICE VISIT (OUTPATIENT)
Dept: FAMILY MEDICINE | Facility: CLINIC | Age: 22
End: 2022-09-08
Payer: MEDICAID

## 2022-09-08 VITALS
HEIGHT: 71 IN | TEMPERATURE: 98 F | SYSTOLIC BLOOD PRESSURE: 133 MMHG | WEIGHT: 293 LBS | BODY MASS INDEX: 41.02 KG/M2 | DIASTOLIC BLOOD PRESSURE: 77 MMHG | OXYGEN SATURATION: 98 % | HEART RATE: 89 BPM | RESPIRATION RATE: 20 BRPM

## 2022-09-08 DIAGNOSIS — Z3A.32 32 WEEKS GESTATION OF PREGNANCY: Primary | ICD-10-CM

## 2022-09-08 DIAGNOSIS — K21.9 GASTROESOPHAGEAL REFLUX DISEASE, UNSPECIFIED WHETHER ESOPHAGITIS PRESENT: ICD-10-CM

## 2022-09-08 DIAGNOSIS — O24.113 PRE-EXISTING TYPE 2 DIABETES MELLITUS DURING PREGNANCY IN THIRD TRIMESTER: ICD-10-CM

## 2022-09-08 LAB
BILIRUB SERPL-MCNC: NEGATIVE MG/DL
BLOOD URINE, POC: NORMAL
CLARITY, POC UA: CLEAR
COLOR, POC UA: YELLOW
GLUCOSE UR QL STRIP: NEGATIVE
KETONES UR QL STRIP: NEGATIVE
LEUKOCYTE ESTERASE URINE, POC: NEGATIVE
NITRITE, POC UA: NEGATIVE
PH, POC UA: 6
PROTEIN, POC: NORMAL
SPECIFIC GRAVITY, POC UA: >=1.03
UROBILINOGEN, POC UA: NORMAL

## 2022-09-08 PROCEDURE — 99213 OFFICE O/P EST LOW 20 MIN: CPT | Mod: PBBFAC

## 2022-09-08 PROCEDURE — 81002 URINALYSIS NONAUTO W/O SCOPE: CPT | Mod: PBBFAC

## 2022-09-08 RX ORDER — LANSOPRAZOLE 15 MG/1
30 TABLET, ORALLY DISINTEGRATING, DELAYED RELEASE ORAL DAILY
Qty: 60 TABLET | Refills: 11 | Status: ON HOLD | OUTPATIENT
Start: 2022-09-08 | End: 2022-10-20 | Stop reason: HOSPADM

## 2022-09-08 NOTE — PROGRESS NOTES
Subjective:       Patient ID: Valente Wilson is a 22 y.o. female.     Chief Complaint: Routine Prenatal Visit (32w 1d)     HPI   20 yo  Female with IUP at 32^1 WGA by LMP consistent with 1st trimester U/S presents to [facility] for [facility] visit.    Acute Issues:   She is complaining of heartburn. Reports that she had an episode of vomiting 2 days ago due to GERD. She was seen in [facility] on 22 and prescribed Prevacid for this issue, in addition to her usual Protonix medication. Says that there was an issue with the pharmacy and has not been able to fill this prescription. She is also inquiring about exercises she can do while pregnant.     Chronic Issues:  Migraines, but currently stable  T2DM: Impaired fasting glucose. Not compliant with an ADA diet. Not logging her BG. Last logs were in 2022. Fasting within goal, 2hr postprandials 120-180s. When asked, she says she does not want to stick herself so often. Taking Metformin 1000mg qam, and 500 qpm. Denies polyuria, polydipsia, hypoglycemic episodes.  Psych issues (PTSD, anxiety, bipolar, and ADD): No manic episodes recently, feels Latuda 40mg is working well for her. Denies SI/HI      Gestational history:   G1: Full term, vaginal   G2: SAB 1st trimester   G3: current     Antepartum specific   - Fetal movements: Yes  - Vaginal bleeding: no  - Vaginal discharge: no  - Loss of fluid: no  - Contractions: no  - Headaches: no  - Vision changes: no  - Edema: no     Review of Systems    See above    Objective:   Physical Exam    Vitals:    22 0954   BP: 133/77   Pulse: 89   Resp: 20   Temp: 98.1 °F (36.7 °C)   Gen: Alert, cooperative, in NAD  Skin: Warm and dry  CV: RRR, S1/S2, no murmurs, rubs or gallops  Resp: Clear breath sounds bilaterally  Abdomen: soft, non-tender, gravid  FHR: 140 bpm by NST  Fundal height 32 cm  Ext: Symmetrical, no edema     Initial OB Labs: ordered 22  - Blood Type and Rh: A POS  - Antibody Screen: Negative  - CBC  H/H: 11/35.8  - HIV: Negative  - RPR: Negative  - GC: Neg  - CT: Neg  - HBsAg: Nonreactive  - HCVAb: nonreactive  - Rubella: Equivocal  - Varicella: non-immune  - Sickle Cell Screen: Negative  - PAP: NIL HPV Negative  - Influenza vaccine date: 11/9/21    15-20 Weeks Lab: ordered 5/12/22  - Quad Screen: Missed  - Ordered Cell free DNA testing on 06/23/22 low risk    28 Week Lab Collected 8/11/2022  - Rhogam: N/A  - Date of Tdap: unable to be administered, batch compromise  - CBC H/H: 9.9/32.4  - RPR: Nonreactive    36 Week Lab  - CBC H/H: _  - RPR: _  - GBS Culture: _  - HIV: _  - Urine: GC: _     Ultrasound  -Initial US: single IUP with FHT AUA 11 weeks 0 days JULIA 11/1/22  -20 wk anatomy US: scheduled 5/26/22 A oliva living IUP is identified.   JULIA is assigned based on the stated JULIA.   Limited fetal anatomic views appear normal.   The cervical length is normal on TA ultrasound.   A placenta previa is noted.   The maternal adnexae are normal in appearance.      6/20/2022   1. A detailed fetal anatomic ultrasound examination was performed for the following high risk indication: pre-gestational diabetes   2. No fetal structural malformations are identified; however, fetal imaging is incomplete today with limited spine, lower extremities and genitals. A follow-up study will be   scheduled to complete the fetal anatomic survey.   3. Fetal size today is consistent with established gestational age.   4. Cervical length is normal.   5. Placental location is anterior without evidence of previa.   6. Amniotic fluid volume appears normal.      7/21/2022  1. The fetal anatomic survey was completed today, and no fetal structural abnormalities were identified of the structures imaged today.   2. Fetal size is AGA with the EFW at the 30%.   3. AFV is normal.   4. Placental location is anterior.     8/16/2022  Cardiac activity present.  bpm. Fetal movements: visualized. Presentation: cephalic   Fetal size is AGA with  the EFW at the 51% and the AC at the 69%.   Placental site: anterior   A limited repeat fetal anatomic survey shows no abnormalities of the structures that were adequately imaged. One prominent loop of bowel is noted but the caliber is within   normal limits for GA.   AFV is normal.       Urine:  Component 10:00    Color, UA Yellow    pH, UA 6.0    WBC, UA Negative    Nitrite, UA Negative    Protein, POC 30 mg/dL    Glucose, UA Negative    Ketones, UA Negative    Urobilinogen, UA 1.0 E.U./dL    Bilirubin, POC Negative    Blood, UA Trace-intact    Clarity, UA Clear    Spec Grav UA >=1.030         Assessment and Plan:       32 weeks gestation of pregnancy  -OB Protocol  -PNVs, ASA 81mg  -Urine dip reviewed  -Routine labs reviewed  -Postpartum contraception: Thinking about the Patch  -Labor and ED precautions discussed in depth. Strict ED Precautions: Fever, vaginal bleeding or leaking fluid, belly cramping or pain, shortness of breath-chest pain, swelling of the face-hands, ankles and feet or legs. Decreased fetal movement. Changes in vision. Severe headache that do not resolve with rest or Tylenol   -Education packet given for exercises during pregnancy  -Twice weekly  fetal surveillance    Pre-existing type 2 diabetes mellitus during pregnancy in third trimester   A1c 6.4-->6.2-->5.5 2022  -CBG log. Record sugars 4x daily (including fasting, and 2 hours post-prandial). Advised ADA diet   -Metformin 1000mg qam, 500 qpm    GERD  -Represcribed Prevacid with correct diagnosis code for pharmacy. Also advised patient that Prevacid can be obtained OTC        Bipolar II disorder, most recent episode major depressive  -Continue Latuda 40mg daily  -Care with [facility]       RTC 2 weeks + NST      Monisha Howard MD  [facility] HO-1

## 2022-09-08 NOTE — PROGRESS NOTES
FETAL ASSESSMENT REPORT    RE: Valente Wilson  MRN:  [id number]  :  2000  AGE:  22 y.o.    Date:  2022    REFERRAL PHYSICIAN: [facility]    Allergies: Oxycodone-acetaminophen    Valente is a 22 y.o.  at 32w1d gestational age here today for a NST.    2022, by Last Menstrual Period    MEDICATIONS AT TIME OF TEST:    Current Outpatient Medications   Medication Sig Dispense Refill    aspirin 81 MG Chew Take 1 tablet (81 mg total) by mouth once daily. 30 tablet 5    famotidine (PEPCID) 20 MG tablet       ipratropium-albuteroL (COMBIVENT)  mcg/actuation inhaler Inhale 1 puff into the lungs daily as needed. Currently not taking      lansoprazole (PREVACID SOLUTAB) 15 MG disintegrating tablet Take 2 tablets (30 mg total) by mouth once daily. 60 tablet 11    lurasidone (LATUDA) 40 mg Tab tablet Take 1 tablet (40 mg total) by mouth once daily. 30 tablet 2    metFORMIN (GLUCOPHAGE) 500 MG tablet Take 1 tablet (500 mg total) by mouth As instructed (prediabetes). Take 2 tablets (1000mg) in the morning and 1 tablet (500mg) at night 90 tablet 3    ONETOUCH ULTRA TEST Strp Place 3 each onto the skin once daily.      ONETOUCH ULTRA2 METER Misc Place 1 each onto the skin once daily.      PRENATAL VITAMIN 27 mg iron- 0.8 mg Tab Take 1 tablet by mouth once daily. 30 tablet 6     No current facility-administered medications for this visit.       Indication: diabetes mellitus    Interpretation:  140 BPM baseline    Variability:  Good {> 6 bpm)    Accelerations:  Reactive    Decelerations:  none    Assessment: reactive    Plan:  NST scheduled, NST reactive      Jan Boyer MD  2022; 10:32 AM

## 2022-09-11 ENCOUNTER — HOSPITAL ENCOUNTER (EMERGENCY)
Facility: HOSPITAL | Age: 22
Discharge: HOME | End: 2022-09-11
Attending: OBSTETRICS & GYNECOLOGY
Payer: MEDICAID

## 2022-09-11 VITALS
RESPIRATION RATE: 16 BRPM | TEMPERATURE: 99 F | OXYGEN SATURATION: 100 % | HEART RATE: 86 BPM | SYSTOLIC BLOOD PRESSURE: 122 MMHG | DIASTOLIC BLOOD PRESSURE: 70 MMHG

## 2022-09-11 DIAGNOSIS — R10.9 ABDOMINAL PAIN, UNSPECIFIED ABDOMINAL LOCATION: Primary | ICD-10-CM

## 2022-09-11 PROCEDURE — 99283 EMERGENCY DEPT VISIT LOW MDM: CPT | Mod: 25

## 2022-09-11 NOTE — ED PROVIDER NOTES
Encounter Date: 2022       History     Chief Complaint   Patient presents with    Abdominal Pain     IUP 32.4w c/o ctx 15-20 min apart since 11:00     HPI  Review of patient's allergies indicates:   Allergen Reactions    Oxycodone-acetaminophen Hives     pt broke out in rash  Other reaction(s): RASH     Past Medical History:   Diagnosis Date    ADD (attention deficit disorder)     Anemia     Bipolar disorder, unspecified     PCOS (polycystic ovarian syndrome)     PTSD (post-traumatic stress disorder)     Tobacco use 2022    Type 2 diabetes mellitus without complications      Past Surgical History:   Procedure Laterality Date    SALPINGECTOMY       Family History   Problem Relation Age of Onset    Hypertension Mother     Diabetes Maternal Grandmother     Bipolar disorder Father     Mental illness Father      Social History     Tobacco Use    Smoking status: Light Smoker     Types: Cigarettes     Passive exposure: Never    Smokeless tobacco: Never    Tobacco comments:     3 cigarettes per day   Substance Use Topics    Alcohol use: Not Currently    Drug use: Never     Review of Systems    Physical Exam     Initial Vitals [22 1538]   BP Pulse Resp Temp SpO2   122/70 86 16 99.1 °F (37.3 °C) 100 %      MAP       --         Physical Exam    ED Course   Procedures  Labs Reviewed - No data to display       Imaging Results    None          Medications - No data to display                       Clinical Impression:   Final diagnoses:  [R10.9] Abdominal pain, unspecified abdominal location (Primary)  This  @ 32 4/7 weeks gestation presents with complaints of pressure. She states that the pressure and pain has subsided since she arrived and currently rates her pain 0/10. Pregnancy is complicated by GDMA2, on metformin    Tracing: reactive  VE: NA    A/P: Labor and contractions ruled out  -discharge home  -pain, bleeding, movement precautions given  -follow up at [facility]             Ottoniel Haynes,  MD  09/11/22 9423

## 2022-09-11 NOTE — DISCHARGE INSTRUCTIONS
Keep regularly scheduled appointments. Return to hospital for vaginal bleeding, leaking fluid, regular contraction, decreased fetal movement.

## 2022-09-14 ENCOUNTER — PROCEDURE VISIT (OUTPATIENT)
Dept: MATERNAL FETAL MEDICINE | Facility: CLINIC | Age: 22
End: 2022-09-14
Payer: MEDICAID

## 2022-09-14 ENCOUNTER — OFFICE VISIT (OUTPATIENT)
Dept: MATERNAL FETAL MEDICINE | Facility: CLINIC | Age: 22
End: 2022-09-14
Payer: MEDICAID

## 2022-09-14 VITALS
BODY MASS INDEX: 41.02 KG/M2 | DIASTOLIC BLOOD PRESSURE: 63 MMHG | WEIGHT: 293 LBS | SYSTOLIC BLOOD PRESSURE: 128 MMHG | HEIGHT: 71 IN | HEART RATE: 97 BPM

## 2022-09-14 DIAGNOSIS — O99.333 TOBACCO SMOKING AFFECTING PREGNANCY IN THIRD TRIMESTER: ICD-10-CM

## 2022-09-14 DIAGNOSIS — O24.113 PRE-EXISTING TYPE 2 DIABETES MELLITUS DURING PREGNANCY IN THIRD TRIMESTER: ICD-10-CM

## 2022-09-14 DIAGNOSIS — O99.343 MENTAL DISORDER AFFECTING PREGNANCY IN THIRD TRIMESTER: ICD-10-CM

## 2022-09-14 PROCEDURE — 3078F PR MOST RECENT DIASTOLIC BLOOD PRESSURE < 80 MM HG: ICD-10-PCS | Mod: CPTII,S$GLB,, | Performed by: OBSTETRICS & GYNECOLOGY

## 2022-09-14 PROCEDURE — 1160F PR REVIEW ALL MEDS BY PRESCRIBER/CLIN PHARMACIST DOCUMENTED: ICD-10-PCS | Mod: CPTII,S$GLB,, | Performed by: OBSTETRICS & GYNECOLOGY

## 2022-09-14 PROCEDURE — 76819 US MFM PROCEDURE (VIEWPOINT): ICD-10-PCS | Mod: S$GLB,,, | Performed by: OBSTETRICS & GYNECOLOGY

## 2022-09-14 PROCEDURE — 3008F BODY MASS INDEX DOCD: CPT | Mod: CPTII,S$GLB,, | Performed by: OBSTETRICS & GYNECOLOGY

## 2022-09-14 PROCEDURE — 76816 US MFM PROCEDURE (VIEWPOINT): ICD-10-PCS | Mod: S$GLB,,, | Performed by: OBSTETRICS & GYNECOLOGY

## 2022-09-14 PROCEDURE — 99214 OFFICE O/P EST MOD 30 MIN: CPT | Mod: 25,TH,S$GLB, | Performed by: OBSTETRICS & GYNECOLOGY

## 2022-09-14 PROCEDURE — 1159F MED LIST DOCD IN RCRD: CPT | Mod: CPTII,S$GLB,, | Performed by: OBSTETRICS & GYNECOLOGY

## 2022-09-14 PROCEDURE — 3074F PR MOST RECENT SYSTOLIC BLOOD PRESSURE < 130 MM HG: ICD-10-PCS | Mod: CPTII,S$GLB,, | Performed by: OBSTETRICS & GYNECOLOGY

## 2022-09-14 PROCEDURE — 76819 FETAL BIOPHYS PROFIL W/O NST: CPT | Mod: S$GLB,,, | Performed by: OBSTETRICS & GYNECOLOGY

## 2022-09-14 PROCEDURE — 99214 PR OFFICE/OUTPT VISIT, EST, LEVL IV, 30-39 MIN: ICD-10-PCS | Mod: 25,TH,S$GLB, | Performed by: OBSTETRICS & GYNECOLOGY

## 2022-09-14 PROCEDURE — 1159F PR MEDICATION LIST DOCUMENTED IN MEDICAL RECORD: ICD-10-PCS | Mod: CPTII,S$GLB,, | Performed by: OBSTETRICS & GYNECOLOGY

## 2022-09-14 PROCEDURE — 3008F PR BODY MASS INDEX (BMI) DOCUMENTED: ICD-10-PCS | Mod: CPTII,S$GLB,, | Performed by: OBSTETRICS & GYNECOLOGY

## 2022-09-14 PROCEDURE — 76816 OB US FOLLOW-UP PER FETUS: CPT | Mod: S$GLB,,, | Performed by: OBSTETRICS & GYNECOLOGY

## 2022-09-14 PROCEDURE — 3078F DIAST BP <80 MM HG: CPT | Mod: CPTII,S$GLB,, | Performed by: OBSTETRICS & GYNECOLOGY

## 2022-09-14 PROCEDURE — 1160F RVW MEDS BY RX/DR IN RCRD: CPT | Mod: CPTII,S$GLB,, | Performed by: OBSTETRICS & GYNECOLOGY

## 2022-09-14 PROCEDURE — 3074F SYST BP LT 130 MM HG: CPT | Mod: CPTII,S$GLB,, | Performed by: OBSTETRICS & GYNECOLOGY

## 2022-09-14 NOTE — ASSESSMENT & PLAN NOTE
Hx PTSD, anxiety, bipolar, and ADD  Prepregnancy regimen:  - Olanzepine  - Adderall  - Lexapro   Current regimen:  - She has discontinued Latuda        Recommendations:  1. Care with [facility]  2. Please continue regimen as written. With pregnancy safety concerns, please contact [facility].  3. Vigilance for peripartum/postpartum mood disorders.

## 2022-09-14 NOTE — ASSESSMENT & PLAN NOTE
"We have reviewed the risks of diabetes in pregnancy previously and discussed again today.      She is non-compliant with sending her logs. She reports taking Metformin 1000mg QAM and Metformin 500mg QHS. She inconsistently checks her sugar values. She does not follow a diabetic diet and reports eating "sweets" at night. We have emphasized the importance of glycemic control and the correlation to pregnancy outcome. We have encouraged her to check sugars four times daily and submit a log to our office weekly via portal for review and management.     She presents with a sugar log which is the first time she's submitted a log since establishing care at our office. She has some fasting values out of range. She has not checked any postprandial breakfast values. She inconsistently checks her values after lunch.     Recommendations :  Baseline evaluation (to be ordered by primary OB provider):   24 hour urine protein or urine P/C ratio, CBC, CMP (serum cr 0.77) We previously provided an order for a PCR and CMP, however she did not complete.   Maternal EKG; echocardiogram if BMI > 30 or EKG is abnormal   Maternal ophthalmic exam (if hasn't been performed in last year) and podiatry exam   Hemoglobin A1C every trimester (May 2022: 5.5)--Given her noncompliance recommend checking a hemoglobin A1C at her next visit at [facility].    Diabetic education referral and counseling re: goal blood sugars (< 95 mg/dl for fasting, < 120 mg/dl for 2 hour postprandial)  Medications:    Start low dose aspirin 81 mg daily at 12-16 weeks for preeclampsia risk reduction   1 mg folic acid daily   Metformin 1000mg QAM and 500mg QHS  Ul[facility] with [facility]:   Targeted anatomy survey at 20 weeks (completed)   Serial growth ultrasounds q 4-6 weeks at 26-28 weeks (scheduled)    A fetal echocardiogram is recommended at 22-24 weeks with pediatric cardiology (completed and normal)    Initiate  surveillance at 32 weeks:    Weekly BPP or NST + " AFV if good control.    If control is poor, twice weekly  fetal surveillance is recommended with BPP alternating with NST starting at 32weeks.   Delivery timing:   38 0/7 to 38 and 6/7 weeks if under good control without comorbidities   37 0/7 to 37 and 67 weeks if longstanding diabetes or poorly controlled, polyhydramnios, EFW>90th percentile, or BMI >= 40   36 0/7 to 37 and 6/7 weeks if vascular complications, prior stillbirth or other complicating conditions.  Recommend consideration of earlier delivery if IUGR, HTN, or other complications  Recommend offering  for delivery is EFW is 4500g or more near the time of delivery    The patient was advised to call for blood sugars less than 70 or greater than 200 prior to her next appointment. She was also advised that if she notes nausea/vomiting, inability to tolerate PO, or concerns about being able to take her insulin that she should contact her provider or present to the OB ED.

## 2022-09-14 NOTE — PROGRESS NOTES
"Maternal Fetal Medicine follow up consult    SUBJECTIVE:     Valente Wilson is a 22 y.o.  female with IUP at 33w0d who is seen in follow up consultation by MFM.  Pregnancy complications include:   Problem   Mental Disorder Affecting Pregnancy in Third Trimester   Pre-Existing Type 2 Diabetes Mellitus During Pregnancy in Third Trimester   Tobacco Smoking Affecting Pregnancy in Third Trimester     She is feeling well and has no current complaints.  No LOF, VB, ctx. +FM.    Previous notes reviewed.   No changes to medical, surgical, family, social, or obstetric history.    Interval history since last MFM visit: no changes    Medications:  Current Outpatient Medications   Medication Instructions    aspirin 81 mg, Oral, Daily    famotidine (PEPCID) 20 MG tablet No dose, route, or frequency recorded.    lansoprazole (PREVACID SOLUTAB) 30 mg, Oral, Daily    lurasidone (LATUDA) 40 mg, Oral, Daily    metFORMIN (GLUCOPHAGE) 500 mg, Oral, See admin instructions, Take 2 tablets (1000mg) in the morning and 1 tablet (500mg) at night    ONETOUCH ULTRA TEST Strp 3 each, Transdermal, Daily    ONETOUCH ULTRA2 METER Misc 1 each, Transdermal, Daily    PRENATAL VITAMIN 27 mg iron- 0.8 mg Tab 1 tablet, Oral, Daily       Care team members:  Dr Boyer- Primary OB       OBJECTIVE:   /63 (BP Location: Left arm)   Pulse 97   Ht 5' 11" (1.803 m)   Wt (!) 136.5 kg (301 lb 0.6 oz)   LMP 2022   BMI 41.99 kg/m²     Ultrasound performed. See viewpoint for full ultrasound report.  A viable oliva pregnancy is visualized in the cephalic presentation.  Estimated fetal weight is at the 22nd percentile consistent with gestational age.  No fetal abnormalities are noted today.  Amniotic fluid volume is normal.   The placenta is anterior.  Biophysical profile is 8/8.      ASSESSMENT/PLAN:     22 y.o.  female with IUP at 33w0d    Pre-existing type 2 diabetes mellitus during pregnancy in third trimester  We have " "reviewed the risks of diabetes in pregnancy previously and discussed again today.      She is non-compliant with sending her logs. She reports taking Metformin 1000mg QAM and Metformin 500mg QHS. She inconsistently checks her sugar values. She does not follow a diabetic diet and reports eating "sweets" at night. We have emphasized the importance of glycemic control and the correlation to pregnancy outcome. We have encouraged her to check sugars four times daily and submit a log to our office weekly via portal for review and management.     She presents with a sugar log which is the first time she's submitted a log since establishing care at our office. She has some fasting values out of range. She has not checked any postprandial breakfast values. She inconsistently checks her values after lunch.     Recommendations :  Baseline evaluation (to be ordered by primary OB provider):  24 hour urine protein or urine P/C ratio, CBC, CMP (serum cr 0.77) We previously provided an order for a PCR and CMP, however she did not complete.  Maternal EKG; echocardiogram if BMI > 30 or EKG is abnormal  Maternal ophthalmic exam (if hasn't been performed in last year) and podiatry exam  Hemoglobin A1C every trimester (May 2022: 5.5)--Given her noncompliance recommend checking a hemoglobin A1C at her next visit at [facility].   Diabetic education referral and counseling re: goal blood sugars (< 95 mg/dl for fasting, < 120 mg/dl for 2 hour postprandial)  Medications:   Start low dose aspirin 81 mg daily at 12-16 weeks for preeclampsia risk reduction  1 mg folic acid daily  Metformin 1000mg QAM and 500mg QHS  Ultrasounds with M:  Targeted anatomy survey at 20 weeks (completed)  Serial growth ultrasounds q 4-6 weeks at 26-28 weeks (scheduled)    A fetal echocardiogram is recommended at 22-24 weeks with pediatric cardiology (completed and normal)    Initiate  surveillance at 32 weeks:   Weekly BPP or NST + AFV if good control.   If " control is poor, twice weekly  fetal surveillance is recommended with BPP alternating with NST starting at 32weeks.   Delivery timin 0/7 to 38 and 6/7 weeks if under good control without comorbidities  37 0/7 to 37 and 67 weeks if longstanding diabetes or poorly controlled, polyhydramnios, EFW>90th percentile, or BMI >= 40  36 0/7 to 37 and 6/7 weeks if vascular complications, prior stillbirth or other complicating conditions.  Recommend consideration of earlier delivery if IUGR, HTN, or other complications  Recommend offering  for delivery is EFW is 4500g or more near the time of delivery    The patient was advised to call for blood sugars less than 70 or greater than 200 prior to her next appointment. She was also advised that if she notes nausea/vomiting, inability to tolerate PO, or concerns about being able to take her insulin that she should contact her provider or present to the OB ED.    Mental disorder affecting pregnancy in third trimester  Hx PTSD, anxiety, bipolar, and ADD  Prepregnancy regimen:  - Olanzepine  - Adderall  - Lexapro   Current regimen:  - She has discontinued Latuda        Recommendations:  1. Care with [facility]  2. Please continue regimen as written. With pregnancy safety concerns, please contact M.  3. Vigilance for peripartum/postpartum mood disorders.     Tobacco smoking affecting pregnancy in third trimester  Patient counseled on cessation of tobacco use and avoidance of second hand smoke inhalation for duration of the pregnancy and postpartum period secondary to the associated fetal/ risks that include the following: spontaneous , placental abruption, low birth weight, oligohydramnios, non-reassuring fetal status, and sudden infant death syndrome (SIDS).        F/u every Monday for BPP until delivery at ~37 weeks  F/U in 3 weeks for growth ultrasound/MFM visit    Juanis Kimbrough MD

## 2022-09-15 ENCOUNTER — CLINICAL SUPPORT (OUTPATIENT)
Dept: FAMILY MEDICINE | Facility: CLINIC | Age: 22
End: 2022-09-15
Payer: MEDICAID

## 2022-09-15 VITALS
HEIGHT: 71 IN | DIASTOLIC BLOOD PRESSURE: 76 MMHG | TEMPERATURE: 99 F | OXYGEN SATURATION: 99 % | SYSTOLIC BLOOD PRESSURE: 116 MMHG | WEIGHT: 293 LBS | HEART RATE: 76 BPM | RESPIRATION RATE: 19 BRPM | BODY MASS INDEX: 41.02 KG/M2

## 2022-09-15 DIAGNOSIS — O24.113 PRE-EXISTING TYPE 2 DIABETES MELLITUS DURING PREGNANCY IN THIRD TRIMESTER: Primary | ICD-10-CM

## 2022-09-15 DIAGNOSIS — Z34.90 PREGNANCY, UNSPECIFIED GESTATIONAL AGE: Primary | ICD-10-CM

## 2022-09-15 DIAGNOSIS — E28.2 PCOS (POLYCYSTIC OVARIAN SYNDROME): ICD-10-CM

## 2022-09-15 PROCEDURE — 99213 OFFICE O/P EST LOW 20 MIN: CPT | Mod: PBBFAC

## 2022-09-15 NOTE — PROGRESS NOTES
FETAL ASSESSMENT REPORT    RE: Valente Wilson  MRN:  [id number]  :  2000  AGE:  22 y.o.    Date:  9/15/2022    REFERRAL PHYSICIAN: [facility]    Allergies: Oxycodone-acetaminophen    Valente is a 22 y.o.  at 33w1d gestational age here today for a NST.    2022, by Last Menstrual Period    MEDICATIONS AT TIME OF TEST:    Current Outpatient Medications   Medication Sig Dispense Refill    aspirin 81 MG Chew Take 1 tablet (81 mg total) by mouth once daily. 30 tablet 5    famotidine (PEPCID) 20 MG tablet       lansoprazole (PREVACID SOLUTAB) 15 MG disintegrating tablet Take 2 tablets (30 mg total) by mouth once daily. 60 tablet 11    metFORMIN (GLUCOPHAGE) 500 MG tablet Take 1 tablet (500 mg total) by mouth As instructed (prediabetes). Take 2 tablets (1000mg) in the morning and 1 tablet (500mg) at night 90 tablet 3    ONETOUCH ULTRA TEST Strp Place 3 each onto the skin once daily.      ONETOUCH ULTRA2 METER Misc Place 1 each onto the skin once daily.      PRENATAL VITAMIN 27 mg iron- 0.8 mg Tab Take 1 tablet by mouth once daily. (Patient not taking: Reported on 2022) 30 tablet 6     No current facility-administered medications for this visit.       Indication: diabetes mellitus    Interpretation:  110 BPM baseline    Variability:  Good {> 6 bpm)    Accelerations:  Reactive    Decelerations:  none    Assessment: reactive    Plan:  NST scheduled, NST reactive      Jan Boyer MD  9/15/2022; 11:13 AM

## 2022-09-19 ENCOUNTER — PROCEDURE VISIT (OUTPATIENT)
Dept: MATERNAL FETAL MEDICINE | Facility: CLINIC | Age: 22
End: 2022-09-19
Payer: MEDICAID

## 2022-09-19 ENCOUNTER — HISTORICAL (OUTPATIENT)
Dept: ADMINISTRATIVE | Facility: HOSPITAL | Age: 22
End: 2022-09-19
Payer: MEDICAID

## 2022-09-19 ENCOUNTER — HOSPITAL ENCOUNTER (EMERGENCY)
Facility: HOSPITAL | Age: 22
Discharge: HOME | End: 2022-09-19
Payer: MEDICAID

## 2022-09-19 VITALS — DIASTOLIC BLOOD PRESSURE: 65 MMHG | HEART RATE: 97 BPM | SYSTOLIC BLOOD PRESSURE: 125 MMHG

## 2022-09-19 VITALS — TEMPERATURE: 96 F

## 2022-09-19 DIAGNOSIS — O24.113 PRE-EXISTING TYPE 2 DIABETES MELLITUS DURING PREGNANCY IN THIRD TRIMESTER: Primary | ICD-10-CM

## 2022-09-19 DIAGNOSIS — O24.113 PRE-EXISTING TYPE 2 DIABETES MELLITUS DURING PREGNANCY IN THIRD TRIMESTER: ICD-10-CM

## 2022-09-19 PROBLEM — N93.0 POSTCOITAL BLEEDING: Status: ACTIVE | Noted: 2022-09-19

## 2022-09-19 PROCEDURE — 76819 FETAL BIOPHYS PROFIL W/O NST: CPT | Mod: 26,S$GLB,, | Performed by: OBSTETRICS & GYNECOLOGY

## 2022-09-19 PROCEDURE — 76819 US MFM PROCEDURE (VIEWPOINT): ICD-10-PCS | Mod: 26,S$GLB,, | Performed by: OBSTETRICS & GYNECOLOGY

## 2022-09-19 PROCEDURE — 99284 EMERGENCY DEPT VISIT MOD MDM: CPT | Mod: 25

## 2022-09-19 NOTE — ED PROVIDER NOTES
ALEJANDRO NOTE  [facility]     Admit Date: 2022  ALEJANDRO Physician: Lolita Weaver  Primary OBGYN:  [facility]    Admit Diagnosis/Chief Complaint: Vaginal Bleeding  Discharge Diagnosis:  postcoital bleeding    Chief Complaint   Patient presents with    Vaginal Bleeding     33.5 week iup  with c/o vaginal bleeding after intercourse this afternoon       Hospital Course:  Valente Wilson is a 22 y.o.  at 33w5d presents complaining of postcoital bleeding, second episode this pregnancy, significant amount of blood after intercourse this am.   This IUP is complicated by resolved placenta previa-- now anterior placenta, BMI 41, pregestational type 2 DM on metformin, tobacco use, hx PTSD, anxiety/bipolar.   Patient denies leakage of fluid, contractions, headache, vision changes, RUQ pain, dysuria, fever, and nausea/vomiting.  Fetal Movement: normal.    Temp 96.3 °F (35.7 °C)   LMP 2022   Breastfeeding No   Temp:  [96.3 °F (35.7 °C)] 96.3 °F (35.7 °C)  Pulse:  [97] 97  BP: (125)/(65) 125/65    General: in no apparent distress well developed and well nourished non-toxic in no respiratory distress and acyanotic alert oriented times 3 afebrile  Cardiovascular: regular rate and rhythm no murmurs  Respiratory: clear to auscultation, no wheezes, rales or rhonchi, symmetric air entry unlabored  Abdominal: soft, nontender, nondistended, no abnormal masses, no epigastric pain obese FHT present  Back: lumbar tenderness absent CVA tenderness none suprapubic tenderness absent  Extremeties no redness or tenderness in the calves or thighs no edema      SSE old blood in vagina 10cc clot, no active bleeding, os visually closed and thick        EFM: Cat 1, 145 modBTV, +accel, no decel (reassuring, reactive)  TOCO: none      Medical Decision Making:      LABS:   No results found for this or any previous visit (from the past 24 hour(s)).    Imaging Results              US OB  14+ Weeks TransAbd, w/Biophysical Profile, w/o NST, Single Gestation (xpd) (In process)                      ASSESMENT: Valente Wilson is a 22 y.o.   at 33w5d with resolved postcoital bleeding    Discharge Diagnosis:   Patient Active Problem List   Diagnosis    At risk for knowledge deficit    Attention deficit hyperactivity disorder (ADHD)    Bipolar II disorder, most recent episode major depressive    Obesity    Generalized anxiety disorder    Tobacco use    15 weeks gestation of pregnancy    Gastroesophageal reflux disease without esophagitis    Type 2 diabetes mellitus without complication, without long-term current use of insulin    Mental disorder affecting pregnancy in third trimester    Pre-existing type 2 diabetes mellitus during pregnancy in third trimester    Tobacco smoking affecting pregnancy in third trimester    Viral gastroenteritis    Migraine with aura    Impaired fasting glucose    30 weeks gestation of pregnancy    Postcoital bleeding       Status:Improved    Disposition:  discharged to home    BPP 8/10 (-2 breathing, however tech was only in room for total 20mins per RN)  Overall reassuring fetal status and pt is known diabetic  Recommend good glucose control (drinking apple juice in ALEJANDRO) rec water  Has appt this week for NST  Precautions for FKC and continued bleeding  Recommend pelvic rest    Patient Instructions:   - Pt was given routine pregnancy instructions including to return to triage if she had any vaginal bleeding (other than spotting for the next 48hrs), any loss of fluid like her water broke, decreased fetal movement, or contractions Q 5min lasting for 2 or more hours. Pt was also instructed to drink copious water. Patient voiced understanding of all these instructions and was subsequently discharged home. Tylenol use and maternity belt use discussed. All questions answered. Pt left ALEJANDRO with good understanding of plan.   Preeclampsia/ROM/labor/fever/decreased FM with  FKC precautions discussed, voiced understanding     She will follow up with her primary OB as scheduled this Thursday has NST appt    Lolita Weaver MD  OB/GYN Hospitalist  5:30 PM 09/19/2022

## 2022-09-20 DIAGNOSIS — O99.333 TOBACCO SMOKING AFFECTING PREGNANCY IN THIRD TRIMESTER: ICD-10-CM

## 2022-09-20 DIAGNOSIS — O99.343 MENTAL DISORDER AFFECTING PREGNANCY IN THIRD TRIMESTER: ICD-10-CM

## 2022-09-20 DIAGNOSIS — O24.113 PRE-EXISTING TYPE 2 DIABETES MELLITUS DURING PREGNANCY IN THIRD TRIMESTER: Primary | ICD-10-CM

## 2022-09-20 DIAGNOSIS — E28.2 PCOS (POLYCYSTIC OVARIAN SYNDROME): ICD-10-CM

## 2022-09-21 ENCOUNTER — HISTORICAL (OUTPATIENT)
Dept: ADMINISTRATIVE | Facility: HOSPITAL | Age: 22
End: 2022-09-21
Payer: MEDICAID

## 2022-09-22 ENCOUNTER — CLINICAL SUPPORT (OUTPATIENT)
Dept: FAMILY MEDICINE | Facility: CLINIC | Age: 22
End: 2022-09-22
Payer: MEDICAID

## 2022-09-22 VITALS
HEART RATE: 108 BPM | TEMPERATURE: 98 F | RESPIRATION RATE: 20 BRPM | SYSTOLIC BLOOD PRESSURE: 117 MMHG | BODY MASS INDEX: 41.98 KG/M2 | HEIGHT: 71 IN | DIASTOLIC BLOOD PRESSURE: 73 MMHG | OXYGEN SATURATION: 99 %

## 2022-09-22 DIAGNOSIS — Z34.90 PREGNANCY, UNSPECIFIED GESTATIONAL AGE: ICD-10-CM

## 2022-09-22 PROCEDURE — 99213 OFFICE O/P EST LOW 20 MIN: CPT | Mod: PBBFAC

## 2022-09-26 ENCOUNTER — PROCEDURE VISIT (OUTPATIENT)
Dept: MATERNAL FETAL MEDICINE | Facility: CLINIC | Age: 22
End: 2022-09-26
Payer: MEDICAID

## 2022-09-26 VITALS — HEART RATE: 91 BPM | DIASTOLIC BLOOD PRESSURE: 65 MMHG | SYSTOLIC BLOOD PRESSURE: 123 MMHG

## 2022-09-26 DIAGNOSIS — E28.2 PCOS (POLYCYSTIC OVARIAN SYNDROME): ICD-10-CM

## 2022-09-26 DIAGNOSIS — O24.113 PRE-EXISTING TYPE 2 DIABETES MELLITUS DURING PREGNANCY IN THIRD TRIMESTER: ICD-10-CM

## 2022-09-26 PROCEDURE — 76819 US MFM PROCEDURE (VIEWPOINT): ICD-10-PCS | Mod: S$GLB,,, | Performed by: OBSTETRICS & GYNECOLOGY

## 2022-09-26 PROCEDURE — 76819 FETAL BIOPHYS PROFIL W/O NST: CPT | Mod: S$GLB,,, | Performed by: OBSTETRICS & GYNECOLOGY

## 2022-09-27 NOTE — PROGRESS NOTES
FETAL ASSESSMENT REPORT    RE: Valente Wilson  MRN:  [id number]  :  2000  AGE:  22 y.o.    Date:  2022    REFERRAL PHYSICIAN: [facility]    Allergies: Oxycodone-acetaminophen    Valente is a 22 y.o.  at 34w6d gestational age here today for a NST.    2022, by Last Menstrual Period    MEDICATIONS AT TIME OF TEST:    Current Outpatient Medications   Medication Sig Dispense Refill    aspirin 81 MG Chew Take 1 tablet (81 mg total) by mouth once daily. 30 tablet 5    famotidine (PEPCID) 20 MG tablet       lansoprazole (PREVACID SOLUTAB) 15 MG disintegrating tablet Take 2 tablets (30 mg total) by mouth once daily. 60 tablet 11    metFORMIN (GLUCOPHAGE) 500 MG tablet Take 1 tablet (500 mg total) by mouth As instructed (prediabetes). Take 2 tablets (1000mg) in the morning and 1 tablet (500mg) at night 90 tablet 3    ONETOUCH ULTRA TEST Strp Place 3 each onto the skin once daily.      ONETOUCH ULTRA2 METER Misc Place 1 each onto the skin once daily.      PRENATAL VITAMIN 27 mg iron- 0.8 mg Tab Take 1 tablet by mouth once daily. (Patient not taking: Reported on 2022) 30 tablet 6     No current facility-administered medications for this visit.       Indication: diabetes mellitus    Interpretation:  135 BPM baseline    Variability:  Good {> 6 bpm)    Accelerations:  Reactive    Decelerations:  none    Assessment: reactive    Plan:  NST scheduled, NST reactive      Jan Boyer MD  2022; 10:38 AM     Late note entry, I was available and involved at the time of encounter.

## 2022-09-29 ENCOUNTER — OFFICE VISIT (OUTPATIENT)
Dept: FAMILY MEDICINE | Facility: CLINIC | Age: 22
End: 2022-09-29
Payer: MEDICAID

## 2022-09-29 VITALS
RESPIRATION RATE: 19 BRPM | SYSTOLIC BLOOD PRESSURE: 123 MMHG | BODY MASS INDEX: 41.02 KG/M2 | WEIGHT: 293 LBS | DIASTOLIC BLOOD PRESSURE: 80 MMHG | TEMPERATURE: 99 F | OXYGEN SATURATION: 98 % | HEART RATE: 107 BPM | HEIGHT: 71 IN

## 2022-09-29 DIAGNOSIS — Z3A.35 35 WEEKS GESTATION OF PREGNANCY: Primary | ICD-10-CM

## 2022-09-29 DIAGNOSIS — K21.9 GASTROESOPHAGEAL REFLUX DISEASE, UNSPECIFIED WHETHER ESOPHAGITIS PRESENT: ICD-10-CM

## 2022-09-29 DIAGNOSIS — E11.9 TYPE 2 DIABETES MELLITUS WITHOUT COMPLICATION, WITHOUT LONG-TERM CURRENT USE OF INSULIN: ICD-10-CM

## 2022-09-29 DIAGNOSIS — Z34.90 PREGNANCY, UNSPECIFIED GESTATIONAL AGE: ICD-10-CM

## 2022-09-29 DIAGNOSIS — F31.81 BIPOLAR II DISORDER, MOST RECENT EPISODE MAJOR DEPRESSIVE: ICD-10-CM

## 2022-09-29 LAB
BASOPHILS # BLD AUTO: 0.03 X10(3)/MCL (ref 0–0.2)
BASOPHILS NFR BLD AUTO: 0.3 %
BILIRUB SERPL-MCNC: NEGATIVE MG/DL
BLOOD URINE, POC: NORMAL
C TRACH DNA SPEC QL NAA+PROBE: NOT DETECTED
C TRACH RRNA SPEC QL PROBE: NEGATIVE
CLARITY, POC UA: CLEAR
COLOR, POC UA: YELLOW
EOSINOPHIL # BLD AUTO: 0.21 X10(3)/MCL (ref 0–0.9)
EOSINOPHIL NFR BLD AUTO: 2.3 %
ERYTHROCYTE [DISTWIDTH] IN BLOOD BY AUTOMATED COUNT: 14.2 % (ref 11.5–17)
EST. AVERAGE GLUCOSE BLD GHB EST-MCNC: 114 MG/DL
GLUCOSE UR QL STRIP: NEGATIVE
HBA1C MFR BLD: 5.6 %
HBV SURFACE AG SERPL QL IA: NEGATIVE
HBV SURFACE AG SERPL QL IA: NEGATIVE
HCT VFR BLD AUTO: 33.2 % (ref 37–47)
HCV AB SERPL QL IA: NEGATIVE
HGB BLD-MCNC: 10.1 GM/DL (ref 12–16)
HIV 1+2 AB+HIV1 P24 AG SERPL QL IA: NEGATIVE
HIV 1+2 AB+HIV1 P24 AG SERPL QL IA: NONREACTIVE
IMM GRANULOCYTES # BLD AUTO: 0.12 X10(3)/MCL (ref 0–0.04)
IMM GRANULOCYTES NFR BLD AUTO: 1.3 %
KETONES UR QL STRIP: NEGATIVE
LEUKOCYTE ESTERASE URINE, POC: NORMAL
LYMPHOCYTES # BLD AUTO: 1.75 X10(3)/MCL (ref 0.6–4.6)
LYMPHOCYTES NFR BLD AUTO: 19 %
MCH RBC QN AUTO: 24.7 PG (ref 27–31)
MCHC RBC AUTO-ENTMCNC: 30.4 MG/DL (ref 33–36)
MCV RBC AUTO: 81.2 FL (ref 80–94)
MONOCYTES # BLD AUTO: 0.61 X10(3)/MCL (ref 0.1–1.3)
MONOCYTES NFR BLD AUTO: 6.6 %
N GONORRHOEA DNA SPEC QL NAA+PROBE: NOT DETECTED
N GONORRHOEAE, AMPLIFIED DNA: NEGATIVE
NEUTROPHILS # BLD AUTO: 6.5 X10(3)/MCL (ref 2.1–9.2)
NEUTROPHILS NFR BLD AUTO: 70.5 %
NITRITE, POC UA: NEGATIVE
NRBC BLD AUTO-RTO: 0 %
PH, POC UA: 6.5
PLATELET # BLD AUTO: 287 X10(3)/MCL (ref 130–400)
PMV BLD AUTO: 12 FL (ref 7.4–10.4)
PRENATAL STREP B CULTURE: NEGATIVE
PROTEIN, POC: NEGATIVE
RBC # BLD AUTO: 4.09 X10(6)/MCL (ref 4.2–5.4)
SPECIFIC GRAVITY, POC UA: 1.02
STREP B PCR (OHS): NEGATIVE
T PALLIDUM AB SER QL: NONREACTIVE
T PALLIDUM AB SER QL: NONREACTIVE
UROBILINOGEN, POC UA: NORMAL
WBC # SPEC AUTO: 9.2 X10(3)/MCL (ref 4.5–11.5)

## 2022-09-29 PROCEDURE — 83036 HEMOGLOBIN GLYCOSYLATED A1C: CPT

## 2022-09-29 PROCEDURE — 85025 COMPLETE CBC W/AUTO DIFF WBC: CPT

## 2022-09-29 PROCEDURE — 86780 TREPONEMA PALLIDUM: CPT

## 2022-09-29 PROCEDURE — 87491 CHLMYD TRACH DNA AMP PROBE: CPT

## 2022-09-29 PROCEDURE — 36415 COLL VENOUS BLD VENIPUNCTURE: CPT

## 2022-09-29 PROCEDURE — 87389 HIV-1 AG W/HIV-1&-2 AB AG IA: CPT

## 2022-09-29 PROCEDURE — 99214 OFFICE O/P EST MOD 30 MIN: CPT | Mod: PBBFAC

## 2022-09-29 PROCEDURE — 81002 URINALYSIS NONAUTO W/O SCOPE: CPT | Mod: PBBFAC

## 2022-09-29 PROCEDURE — 87591 N.GONORRHOEAE DNA AMP PROB: CPT

## 2022-09-29 PROCEDURE — 87081 CULTURE SCREEN ONLY: CPT

## 2022-09-29 NOTE — PROGRESS NOTES
[facility]    Subjective:       Patient ID: Valente Wilson is a 22 y.o. female.    Chief Complaint: High Risk OB visit 35 wks 1 day    20 yo  Female with IUP at 35^1 WGA by LMP consistent with 1st trimester U/S presents to [facility] for routine visit.     Acute Issues:   Pt taking metformin and ASA. Not currently taking Latuda or PNV. No glucose log today in clinic. Last checking glucose earlier this month.      Chronic Issues:  Migraines: stable. No medication.  T2DM: Taking Metformin 1000mg qam, and 500 qpm. No recent logs.  Psych issues (PTSD, anxiety, bipolar, and ADD): No manic episodes. Latuda 40 mg. Denies SI/HI      Gestational history:   G1: Full term, vaginal   G2: SAB 1st trimester   G3: current    OB Review of Systems:  Fetal Movements: yes  Vaginal bleeding: None  Leakage of Fluid: None   Vaginal Discharge: None  Headaches: None  Lower Extremity Edema: None  Vision Changes: None  Contractions: None    Constitutional: no fever, no chills, no weight loss  CV: no swelling, no edema, no chest pain  : denies dysuria  GI:  no constipation, no diarrhea no vomiting  RESP: no SOB, no wheezing, no difficulty breathing  Psych: no depression, no anxiety        Objective:      Vitals:    22 1003   BP: 123/80   Pulse: 107   Resp: 19   Temp: 98.9 °F (37.2 °C)     General: in no acute distress  RESP: clear to auscultation bilaterally, non labored  CV: regular rate and rhythm, no murmurs, no edema  ABD: gravid, nontender, BS+  FHTs: 145 bpm by monitor  Fundal height: 36 cm      Initial OB Labs: ordered 22  - Blood Type and Rh: A POS  - Antibody Screen: Negative  - CBC H/H: .8  - HIV: Negative  - RPR: Negative  - GC: Neg  - CT: Neg  - HBsAg: Nonreactive  - HCVAb: nonreactive  - Rubella: Equivocal  - Varicella: non-immune  - Sickle Cell Screen: Negative  - PAP: NIL HPV Negative  - Influenza vaccine date: 21    15-20 Weeks Lab: ordered 22  - Quad Screen: Missed  -  Ordered Cell free DNA testing on 06/23/22 low risk    28 Week Lab Collected 8/11/2022  - Rhogam: N/A  - Date of Tdap: unable to be administered, batch compromise  - CBC H/H: 9.9/32.4  - RPR: Nonreactive    36 Week Lab: ordered 9/29/22  - CBC H/H: _  - RPR: _  - GBS Culture: _  - HIV: _  - Urine: GC: _     Ultrasound  -Initial US: IMPRESSION: Single, live intrauterine gestation measuring 11 weeks 1 day for an estimated due date of 11/1/2022 6/20/2022 : No fetal structural malformations are identified. Fetal size today is consistent with established gestational age. Cervical length is normal. Placental location is anterior without evidence of previa. Amniotic fluid volume appears normal.      7/21/2022  1. The fetal anatomic survey was completed today, and no fetal structural abnormalities were identified of the structures imaged today.   2. Fetal size is AGA with the EFW at the 30%.   3. AFV is normal.   4. Placental location is anterior.         Lab Results   Component Value Date    COLORU Yellow 09/29/2022    SPECGRAV 1.020 09/29/2022    PHUR 6.5 09/29/2022    WBCUR Trace 09/29/2022    NITRITE Negative 09/29/2022    PROTEINPOC Negative 09/29/2022    GLUCOSEUR Negative 09/29/2022    KETONESU Negative 09/29/2022    UROBILINOGEN 0.2 E.U./dL 09/29/2022    BILIRUBINPOC Negative 09/29/2022    RBCUR Trace-intact 09/29/2022       Assessment:       Problem List Items Addressed This Visit    None  Visit Diagnoses       35 weeks gestation of pregnancy    -  Primary    Relevant Orders    POCT urine dipstick without microscope (Completed)    Hemoglobin A1C    CBC Auto Differential    Chlamydia/GC, PCR    Strep Only Culture    Fetal non-stress test    HIV 1/2 Ag/Ab (4th Gen)    SYPHILIS ANTIBODY (WITH REFLEX RPR)    Pregnancy, unspecified gestational age        Relevant Orders    Fetal non-stress test              Plan:       35 weeks gestation of pregnancy  -OB Protocol  -PNVs, ASA 81mg  -Urine dip reviewed  -Routine labs  ordered  -Postpartum contraception: Considering Patch  -Labor and ED precautions discussed in depth. Strict ED Precautions: Fever, vaginal bleeding or leaking fluid, belly cramping or pain, shortness of breath-chest pain, swelling of the face-hands, ankles and feet or legs. Decreased fetal movement. Changes in vision. Severe headache that do not resolve with rest or Tylenol   -Education packet given for exercises during pregnancy  --------Twice weekly  fetal surveillance until delivery at 37 weeks per M     Pre-existing type 2 diabetes mellitus during pregnancy in third trimester   - Encouraged to take fasting and post-prandial glucose and bring log to clinic  - A1c ordered today  -Metformin 1000mg qam, 500 qhs     GERD  -Continue prevacid     Bipolar II disorder, most recent episode major depressive  -Continue Latuda 40mg daily  -Encouraged to schedule with [facility]          RTC in 1 week for f/u and NST

## 2022-09-29 NOTE — PROGRESS NOTES
FETAL ASSESSMENT REPORT    RE: Valente Wilson  MRN:  [id number]  :  2000  AGE:  22 y.o.    Date:  2022    REFERRAL PHYSICIAN: [facility]    Allergies: Oxycodone-acetaminophen    Valente is a 22 y.o.  at 35w1d gestational age here today for a NST.    2022, by Last Menstrual Period    MEDICATIONS AT TIME OF TEST:    Current Outpatient Medications   Medication Sig Dispense Refill    aspirin 81 MG Chew Take 1 tablet (81 mg total) by mouth once daily. 30 tablet 5    famotidine (PEPCID) 20 MG tablet       lansoprazole (PREVACID SOLUTAB) 15 MG disintegrating tablet Take 2 tablets (30 mg total) by mouth once daily. 60 tablet 11    metFORMIN (GLUCOPHAGE) 500 MG tablet Take 1 tablet (500 mg total) by mouth As instructed (prediabetes). Take 2 tablets (1000mg) in the morning and 1 tablet (500mg) at night 90 tablet 3    ONETOUCH ULTRA TEST Strp Place 3 each onto the skin once daily.      ONETOUCH ULTRA2 METER Misc Place 1 each onto the skin once daily.      PRENATAL VITAMIN 27 mg iron- 0.8 mg Tab Take 1 tablet by mouth once daily. 30 tablet 6     No current facility-administered medications for this visit.       Indication: diabetes mellitus    Interpretation:  140 BPM baseline    Variability:  Good {> 6 bpm)    Accelerations:  Reactive    Decelerations:  none    Assessment: reactive    Plan:  NST scheduled, NST reactive      Jan Boyer MD  2022; 10:24 AM

## 2022-10-01 LAB
BACTERIA SPEC CULT: NORMAL
HIV 1+2 AB+HIV1 P24 AG SERPL QL IA: NON REACTIVE

## 2022-10-01 NOTE — PROGRESS NOTES
Discussed continuing Latuda     I have personally reviewed the review of systems (ROS) and past, family and social histories (PFSH) documented above by the resident.  I have reviewed the care furnished by the resident during the encounter, including a review of the patient's medical history, the resident's findings on physical examination, diagnosis, and the treatment plan.  I participated in the management of the patient and was immediately available throughout the encounter.   I was physically present during all key portions of the service(s) provided with the resident.  Services were furnished in a primary care center located in the outpatient department of a [facility].

## 2022-10-03 ENCOUNTER — PROCEDURE VISIT (OUTPATIENT)
Dept: MATERNAL FETAL MEDICINE | Facility: CLINIC | Age: 22
End: 2022-10-03
Payer: MEDICAID

## 2022-10-03 VITALS
HEART RATE: 95 BPM | WEIGHT: 293 LBS | HEIGHT: 71 IN | DIASTOLIC BLOOD PRESSURE: 18 MMHG | OXYGEN SATURATION: 99 % | SYSTOLIC BLOOD PRESSURE: 158 MMHG | BODY MASS INDEX: 41.02 KG/M2

## 2022-10-03 DIAGNOSIS — O24.113 PRE-EXISTING TYPE 2 DIABETES MELLITUS DURING PREGNANCY IN THIRD TRIMESTER: ICD-10-CM

## 2022-10-03 PROCEDURE — 76819 FETAL BIOPHYS PROFIL W/O NST: CPT | Mod: S$GLB,,, | Performed by: OBSTETRICS & GYNECOLOGY

## 2022-10-03 PROCEDURE — 76819 US MFM PROCEDURE (VIEWPOINT): ICD-10-PCS | Mod: S$GLB,,, | Performed by: OBSTETRICS & GYNECOLOGY

## 2022-10-06 ENCOUNTER — HOSPITAL ENCOUNTER (EMERGENCY)
Facility: HOSPITAL | Age: 22
Discharge: HOME | End: 2022-10-06
Attending: SPECIALIST
Payer: MEDICAID

## 2022-10-06 VITALS
HEART RATE: 77 BPM | SYSTOLIC BLOOD PRESSURE: 124 MMHG | TEMPERATURE: 97 F | HEIGHT: 71 IN | BODY MASS INDEX: 42.62 KG/M2 | RESPIRATION RATE: 18 BRPM | DIASTOLIC BLOOD PRESSURE: 68 MMHG | OXYGEN SATURATION: 98 %

## 2022-10-06 PROBLEM — O03.9 SPONTANEOUS ABORTION IN SECOND TRIMESTER: Status: ACTIVE | Noted: 2022-04-12

## 2022-10-06 PROBLEM — D64.9 ANEMIA: Status: ACTIVE | Noted: 2022-04-12

## 2022-10-06 PROCEDURE — 99284 EMERGENCY DEPT VISIT MOD MDM: CPT | Mod: 25

## 2022-10-06 NOTE — ED PROVIDER NOTES
ALEJANDRO NOTE     Admit Date: 10/6/2022  ALEJANDRO Physician: Juan Mosley  Primary OBGYN: /[facility]    Admit Diagnosis/Chief Complaint: Vaginal Bleeding    No chief complaint on file.      Hospital Course:  Valente Wilson is a 22 y.o.  at 36w1d presents complaining of spotting and trace of blood when she wiped herself this morning.  Patient went to her regular [facility] and was transferred here for evaluation of spotting.  She denies any active bleeding or previous problems from.  She denies sexual intercourse or pelvic exam within the last several days.    This IUP is complicated by type 2 diabetes..    Patient denies leakage of fluid, contractions, headache, vision changes, RUQ pain, dysuria, fever, and nausea/vomiting.  Fetal Movement: normal.      LMP 2022        General: in no apparent distress alert oriented times 3  Cardiovascular: regular rate and rhythm no murmurs  Respiratory: clear to auscultation, no wheezes, rales or rhonchi, symmetric air entry  Abdominal: fundus soft, nontender 36 weeks size FHT present  Back: lumbar tenderness absent CVA tenderness none  Extremeties no redness or tenderness in the calves or thighs no edema    SSE:   SVE:  Cervix is long thick and closed.  Presenting part is ballotable.  A trace of dark blood is noted on examination.  No evidence of active bleeding.      FHT: Category 1  TOCO:  No uterine contractions are observed    Medical Decision Making:  Patient will be placed on observation in the OB ED and evaluated for any signs of contractions or active vaginal bleeding.    LABS:   No results found for this or any previous visit (from the past 24 hour(s)).  [unfilled]     Imaging Results    None          ASSESMENT: Valente Wilson is a 22 y.o.   at 36w1d with spotting during pregnancy.  Type 2 diabetes.  No evidence of any additional bleeding during her observation.  No contractions.  Fetal heart rate  is reactive and reassuring.  Patient was given precautions as to signs and symptoms of labor or bleeding of significance.  Pelvic rest is encouraged.    Discharge Diagnosis:  Pregnancy 36 weeks.  Type 2 diabetes with medication control.  Spotting during pregnancy.    Status:Improved/stable    Disposition:  discharged to home    Medications:   Continue present medications.    Patient Instructions:   Current Discharge Medication List        CONTINUE these medications which have NOT CHANGED    Details   aspirin 81 MG Chew Take 1 tablet (81 mg total) by mouth once daily.  Qty: 30 tablet, Refills: 5      blood-glucose meter kit   Accu check, See Instructions, Glucometer machine use with test strips DM II ICD 11.9, # 1 units, 0 Refill(s), Pharmacy: [id number] [id number], 180, cm, Height/Length Dosing, 04/12/22 10:48:00 CDT, 132.4, kg, Weight Dosing, 04/12/22 10:48:00 CDT      famotidine (PEPCID) 20 MG tablet       lansoprazole (PREVACID SOLUTAB) 15 MG disintegrating tablet Take 2 tablets (30 mg total) by mouth once daily.  Qty: 60 tablet, Refills: 11    Associated Diagnoses: Gastroesophageal reflux disease, unspecified whether esophagitis present      metFORMIN (GLUCOPHAGE) 500 MG tablet Take 1 tablet (500 mg total) by mouth As instructed (prediabetes). Take 2 tablets (1000mg) in the morning and 1 tablet (500mg) at night  Qty: 90 tablet, Refills: 3    Associated Diagnoses: Impaired fasting glucose      ONETOUCH ULTRA TEST Strp Place 3 each onto the skin once daily.      ONETOUCH ULTRA2 METER Misc Place 1 each onto the skin once daily.      PRENATAL VITAMIN 27 mg iron- 0.8 mg Tab Take 1 tablet by mouth once daily.  Qty: 30 tablet, Refills: 6             - Pt was given routine pregnancy instructions including to return to triage if she had any vaginal bleeding (other than spotting for the next 48hrs), any loss of fluid like her water broke, decreased fetal movement, or contractions Q 5min lasting for 2 or more hours. Pt was  also instructed to drink copious water. Patient voiced understanding of all these instructions and was subsequently discharged home.    She will follow up with her primary OB.    No discharge procedures on file.    Juan Mosley MD  OB-GYN Hospitalist       Juan Mosley MD  10/06/22 101

## 2022-10-07 ENCOUNTER — TELEPHONE (OUTPATIENT)
Dept: OBGYN | Facility: CLINIC | Age: 22
End: 2022-10-07
Payer: MEDICAID

## 2022-10-10 ENCOUNTER — PROCEDURE VISIT (OUTPATIENT)
Dept: MATERNAL FETAL MEDICINE | Facility: CLINIC | Age: 22
End: 2022-10-10
Payer: MEDICAID

## 2022-10-10 ENCOUNTER — OFFICE VISIT (OUTPATIENT)
Dept: MATERNAL FETAL MEDICINE | Facility: CLINIC | Age: 22
End: 2022-10-10
Payer: MEDICAID

## 2022-10-10 VITALS
OXYGEN SATURATION: 98 % | WEIGHT: 293 LBS | BODY MASS INDEX: 41.02 KG/M2 | HEART RATE: 95 BPM | SYSTOLIC BLOOD PRESSURE: 130 MMHG | HEIGHT: 71 IN | DIASTOLIC BLOOD PRESSURE: 59 MMHG

## 2022-10-10 DIAGNOSIS — O99.343 MENTAL DISORDER AFFECTING PREGNANCY IN THIRD TRIMESTER: ICD-10-CM

## 2022-10-10 DIAGNOSIS — O99.333 TOBACCO SMOKING AFFECTING PREGNANCY IN THIRD TRIMESTER: ICD-10-CM

## 2022-10-10 DIAGNOSIS — E28.2 PCOS (POLYCYSTIC OVARIAN SYNDROME): ICD-10-CM

## 2022-10-10 DIAGNOSIS — O24.113 PRE-EXISTING TYPE 2 DIABETES MELLITUS DURING PREGNANCY IN THIRD TRIMESTER: ICD-10-CM

## 2022-10-10 PROCEDURE — 1160F PR REVIEW ALL MEDS BY PRESCRIBER/CLIN PHARMACIST DOCUMENTED: ICD-10-PCS | Mod: CPTII,S$GLB,, | Performed by: OBSTETRICS & GYNECOLOGY

## 2022-10-10 PROCEDURE — 1160F RVW MEDS BY RX/DR IN RCRD: CPT | Mod: CPTII,S$GLB,, | Performed by: OBSTETRICS & GYNECOLOGY

## 2022-10-10 PROCEDURE — 1159F PR MEDICATION LIST DOCUMENTED IN MEDICAL RECORD: ICD-10-PCS | Mod: CPTII,S$GLB,, | Performed by: OBSTETRICS & GYNECOLOGY

## 2022-10-10 PROCEDURE — 3008F PR BODY MASS INDEX (BMI) DOCUMENTED: ICD-10-PCS | Mod: CPTII,S$GLB,, | Performed by: OBSTETRICS & GYNECOLOGY

## 2022-10-10 PROCEDURE — 99213 OFFICE O/P EST LOW 20 MIN: CPT | Mod: 25,TH,S$GLB, | Performed by: OBSTETRICS & GYNECOLOGY

## 2022-10-10 PROCEDURE — 3008F BODY MASS INDEX DOCD: CPT | Mod: CPTII,S$GLB,, | Performed by: OBSTETRICS & GYNECOLOGY

## 2022-10-10 PROCEDURE — 3078F PR MOST RECENT DIASTOLIC BLOOD PRESSURE < 80 MM HG: ICD-10-PCS | Mod: CPTII,S$GLB,, | Performed by: OBSTETRICS & GYNECOLOGY

## 2022-10-10 PROCEDURE — 76816 US MFM PROCEDURE (VIEWPOINT): ICD-10-PCS | Mod: S$GLB,,, | Performed by: OBSTETRICS & GYNECOLOGY

## 2022-10-10 PROCEDURE — 3078F DIAST BP <80 MM HG: CPT | Mod: CPTII,S$GLB,, | Performed by: OBSTETRICS & GYNECOLOGY

## 2022-10-10 PROCEDURE — 76819 FETAL BIOPHYS PROFIL W/O NST: CPT | Mod: S$GLB,,, | Performed by: OBSTETRICS & GYNECOLOGY

## 2022-10-10 PROCEDURE — 76819 US MFM PROCEDURE (VIEWPOINT): ICD-10-PCS | Mod: S$GLB,,, | Performed by: OBSTETRICS & GYNECOLOGY

## 2022-10-10 PROCEDURE — 99213 PR OFFICE/OUTPT VISIT, EST, LEVL III, 20-29 MIN: ICD-10-PCS | Mod: 25,TH,S$GLB, | Performed by: OBSTETRICS & GYNECOLOGY

## 2022-10-10 PROCEDURE — 3075F SYST BP GE 130 - 139MM HG: CPT | Mod: CPTII,S$GLB,, | Performed by: OBSTETRICS & GYNECOLOGY

## 2022-10-10 PROCEDURE — 3075F PR MOST RECENT SYSTOLIC BLOOD PRESS GE 130-139MM HG: ICD-10-PCS | Mod: CPTII,S$GLB,, | Performed by: OBSTETRICS & GYNECOLOGY

## 2022-10-10 PROCEDURE — 1159F MED LIST DOCD IN RCRD: CPT | Mod: CPTII,S$GLB,, | Performed by: OBSTETRICS & GYNECOLOGY

## 2022-10-10 PROCEDURE — 76816 OB US FOLLOW-UP PER FETUS: CPT | Mod: S$GLB,,, | Performed by: OBSTETRICS & GYNECOLOGY

## 2022-10-10 NOTE — ASSESSMENT & PLAN NOTE
"We have reviewed the risks of diabetes in pregnancy previously and discussed again today.      She is non-compliant with sending her logs. She reports taking Metformin 1000mg QAM and Metformin 500mg QHS. She inconsistently checks her sugar values. She does not follow a diabetic diet and reports eating "sweets" at night. We have emphasized the importance of glycemic control and the correlation to pregnancy outcome. We have encouraged her to check sugars four times daily and submit a log to our office weekly via portal for review and management.     Recommendations :  Baseline evaluation (to be ordered by primary OB provider):   24 hour urine protein or urine P/C ratio, CBC, CMP (serum cr 0.77) We previously provided an order for a PCR and CMP, however she did not complete.   Maternal EKG; echocardiogram if BMI > 30 or EKG is abnormal   Maternal ophthalmic exam (if hasn't been performed in last year) and podiatry exam   Hemoglobin A1C every trimester (May 2022: 5.5)-   Diabetic education referral and counseling re: goal blood sugars (< 95 mg/dl for fasting, < 120 mg/dl for 2 hour postprandial)  Medications:    Start low dose aspirin 81 mg daily at 12-16 weeks for preeclampsia risk reduction   1 mg folic acid daily   Metformin 1000mg QAM and 500mg QHS  Ultrasounds with M:   Targeted anatomy survey at 20 weeks (completed)   Serial growth ultrasounds q 4-6 weeks at 26-28 weeks (scheduled)    A fetal echocardiogram is recommended at 22-24 weeks with pediatric cardiology (completed and normal)    Initiate  surveillance at 32 weeks:    Weekly BPP or NST + AFV if good control.    If control is poor, twice weekly  fetal surveillance is recommended with BPP alternating with NST starting at 32weeks.   Delivery timing:   38 0/7 to 38 and 6/7 weeks if under good control without comorbidities   37 0/7 to 37 and 67 weeks if longstanding diabetes or poorly controlled, polyhydramnios, EFW>90th " percentile, or BMI >= 40   36 0/7 to 37 and 6/7 weeks if vascular complications, prior stillbirth or other complicating conditions.  Recommend consideration of earlier delivery if IUGR, HTN, or other complications  Recommend offering  for delivery is EFW is 4500g or more near the time of delivery    The patient was advised to call for blood sugars less than 70 or greater than 200 prior to her next appointment. She was also advised that if she notes nausea/vomiting, inability to tolerate PO, or concerns about being able to take her insulin that she should contact her provider or present to the OB ED.

## 2022-10-10 NOTE — PROGRESS NOTES
"Maternal Fetal Medicine follow up consult    SUBJECTIVE:     Valente Wilson is a 22 y.o.  female with IUP at 36w5d who is seen in follow up consultation by MFM.  Pregnancy complications include:   Problem   Mental Disorder Affecting Pregnancy in Third Trimester   Pre-Existing Type 2 Diabetes Mellitus During Pregnancy in Third Trimester   Tobacco Smoking Affecting Pregnancy in Third Trimester     She is feeling well and has no current complaints.No LOF, VB, ctx. +FM. She is scheduled for IOL on Friday.     Previous notes reviewed.   No changes to medical, surgical, family, social, or obstetric history.    Interval history since last MFM visit: No changes    Medications:  Current Outpatient Medications   Medication Instructions    aspirin 81 mg, Oral, Daily    blood-glucose meter kit   Accu check, See Instructions, Glucometer machine use with test strips DM II ICD 11.9, # 1 units, 0 Refill(s), Pharmacy: [facility] #[id number], 180, cm, Height/Length Dosing, 22 10:48:00 CDT, 132.4, kg, Weight Dosing, 22 10:48:00 CDT    famotidine (PEPCID) 20 MG tablet No dose, route, or frequency recorded.    lansoprazole (PREVACID SOLUTAB) 30 mg, Oral, Daily    metFORMIN (GLUCOPHAGE) 500 mg, Oral, See admin instructions, Take 2 tablets (1000mg) in the morning and 1 tablet (500mg) at night    ONETOUCH ULTRA TEST Strp 3 each, Transdermal, Daily    ONETOUCH ULTRA2 METER Misc 1 each, Transdermal, Daily    PRENATAL VITAMIN 27 mg iron- 0.8 mg Tab 1 tablet, Oral, Daily       Care team members:  Dr Boyer- Primary OB       OBJECTIVE:   BP (!) 130/59 (BP Location: Right arm)   Pulse 95   Ht 5' 11" (1.803 m)   Wt (!) 141.7 kg (312 lb 6.3 oz)   LMP 2022   SpO2 98%   BMI 43.57 kg/m²     Ultrasound performed. See viewpoint for full ultrasound report.    A viable oliva pregnancy is visualized in cephalic presentation.  Estimated fetal weight is at the 49th percentile with an abdominal circumference at the " 98th percentile.  Head circumference is small, however non-microcephalic (not >3 SD below the mean).  No fetal abnormalities are noted and previous anatomic survey was normal. Amniotic fluid volume is normal.  Placenta is anterior.  Biophysical profile is 8/8.    ASSESSMENT/PLAN:     22 y.o.  female with IUP at 36w5d    Pre-existing type 2 diabetes mellitus during pregnancy in third trimester  We have reviewed the risks of diabetes in pregnancy previously and discussed again today.      She is non-compliant with sending her logs. She reports taking Metformin 1000mg QAM and Metformin 500mg QHS. She inconsistently checks her sugar values. She does not follow a diabetic diet and reports eating high sugar foods at night. We have emphasized the importance of glycemic control and the correlation to pregnancy outcome. We have encouraged her to check sugars four times daily and submit a log to our office weekly via portal for review and management.     Recommendations :  Baseline evaluation (to be ordered by primary OB provider):  24 hour urine protein or urine P/C ratio, CBC, CMP (serum cr 0.77) We previously provided an order for a PCR and CMP, however she did not complete.  Maternal EKG; echocardiogram if BMI > 30 or EKG is abnormal  Maternal ophthalmic exam (if hasn't been performed in last year) and podiatry exam  Hemoglobin A1C every trimester (May 2022: 5.5)-  Diabetic education referral and counseling re: goal blood sugars (< 95 mg/dl for fasting, < 120 mg/dl for 2 hour postprandial)  Medications:   Start low dose aspirin 81 mg daily at 12-16 weeks for preeclampsia risk reduction  1 mg folic acid daily  Metformin 1000mg QAM and 500mg QHS  Ultrasounds with M:  Targeted anatomy survey at 20 weeks (completed)  Serial growth ultrasounds q 4-6 weeks at 26-28 weeks (scheduled)    A fetal echocardiogram is recommended at 22-24 weeks with pediatric cardiology (completed and normal)    Initiate   surveillance at 32 weeks:   Weekly BPP or NST + AFV if good control.   If control is poor, twice weekly  fetal surveillance is recommended with BPP alternating with NST starting at 32weeks.   Delivery timin 0/7 to 38 and 6/7 weeks if under good control without comorbidities  37 0/7 to 37 and 67 weeks if longstanding diabetes or poorly controlled, polyhydramnios, EFW>90th percentile, or BMI >= 40  36 0/7 to 37 and 6/7 weeks if vascular complications, prior stillbirth or other complicating conditions.  Recommend consideration of earlier delivery if IUGR, HTN, or other complications  Recommend offering  for delivery is EFW is 4500g or more near the time of delivery    Mental disorder affecting pregnancy in third trimester  Hx PTSD, anxiety, bipolar, and ADD  Prepregnancy regimen:  - Olanzepine  - Adderall  - Lexapro   Current regimen:  - She has discontinued Latuda    Recommendations:  1. Care with [facility]  2. Please continue regimen as written. With pregnancy safety concerns, please contact [facility].  3. Vigilance for peripartum/postpartum mood disorders.     Tobacco smoking affecting pregnancy in third trimester  Patient counseled on cessation of tobacco use and avoidance of second hand smoke inhalation for duration of the pregnancy and postpartum period secondary to the associated fetal/ risks that include the following: spontaneous , placental abruption, low birth weight, oligohydramnios, non-reassuring fetal status, and sudden infant death syndrome (SIDS).        With her upcoming delivery later this week, no further appointments have been made.     Juanis Kimbrough MD

## 2022-10-10 NOTE — ASSESSMENT & PLAN NOTE
Hx PTSD, anxiety, bipolar, and ADD  Prepregnancy regimen:  - Olanzepine  - Adderall  - Lexapro   Current regimen:  - She has discontinued Latuda        Recommendations:  1. Care with [facility]  2. Please continue regimen as written. With pregnancy safety concerns, please contact [facility].  3. Vigilance for peripartum/postpartum mood disorders.    None

## 2022-10-13 ENCOUNTER — OFFICE VISIT (OUTPATIENT)
Dept: FAMILY MEDICINE | Facility: CLINIC | Age: 22
End: 2022-10-13
Payer: MEDICAID

## 2022-10-13 VITALS
OXYGEN SATURATION: 96 % | HEART RATE: 93 BPM | DIASTOLIC BLOOD PRESSURE: 63 MMHG | HEIGHT: 71 IN | WEIGHT: 293 LBS | SYSTOLIC BLOOD PRESSURE: 120 MMHG | BODY MASS INDEX: 41.02 KG/M2 | TEMPERATURE: 99 F | RESPIRATION RATE: 19 BRPM

## 2022-10-13 DIAGNOSIS — F31.81 BIPOLAR II DISORDER, MOST RECENT EPISODE MAJOR DEPRESSIVE: ICD-10-CM

## 2022-10-13 DIAGNOSIS — Z3A.37 37 WEEKS GESTATION OF PREGNANCY: Primary | ICD-10-CM

## 2022-10-13 DIAGNOSIS — K21.9 GASTROESOPHAGEAL REFLUX DISEASE WITHOUT ESOPHAGITIS: ICD-10-CM

## 2022-10-13 DIAGNOSIS — O24.113 PRE-EXISTING TYPE 2 DIABETES MELLITUS DURING PREGNANCY IN THIRD TRIMESTER: ICD-10-CM

## 2022-10-13 DIAGNOSIS — O99.333 TOBACCO SMOKING AFFECTING PREGNANCY IN THIRD TRIMESTER: ICD-10-CM

## 2022-10-13 LAB
BILIRUB SERPL-MCNC: NEGATIVE MG/DL
BLOOD URINE, POC: NORMAL
CLARITY, POC UA: CLEAR
COLOR, POC UA: YELLOW
GLUCOSE UR QL STRIP: NEGATIVE
KETONES UR QL STRIP: NEGATIVE
LEUKOCYTE ESTERASE URINE, POC: NORMAL
NITRITE, POC UA: NEGATIVE
PH, POC UA: 6.5
PROTEIN, POC: NEGATIVE
SPECIFIC GRAVITY, POC UA: 1.01
UROBILINOGEN, POC UA: 0.2

## 2022-10-13 PROCEDURE — 99214 OFFICE O/P EST MOD 30 MIN: CPT | Mod: PBBFAC

## 2022-10-13 PROCEDURE — 81002 URINALYSIS NONAUTO W/O SCOPE: CPT | Mod: PBBFAC

## 2022-10-13 NOTE — PROGRESS NOTES
FETAL ASSESSMENT REPORT    RE: Valente Wilson  MRN:  [id number]  :  2000  AGE:  22 y.o.    Date:  10/13/2022    REFERRAL PHYSICIAN: [facility]    Allergies: Oxycodone-acetaminophen    Valente is a 22 y.o.  at 37w1d gestational age here today for a NST.    2022, by Last Menstrual Period    MEDICATIONS AT TIME OF TEST:    Current Outpatient Medications   Medication Sig Dispense Refill    blood-glucose meter kit   Accu check, See Instructions, Glucometer machine use with test strips DM II ICD 11.9, # 1 units, 0 Refill(s), Pharmacy: [facility] #[id number], 180, cm, Height/Length Dosing, 22 10:48:00 CDT, 132.4, kg, Weight Dosing, 22 10:48:00 CDT      metFORMIN (GLUCOPHAGE) 500 MG tablet Take 1 tablet (500 mg total) by mouth As instructed (prediabetes). Take 2 tablets (1000mg) in the morning and 1 tablet (500mg) at night 90 tablet 3    ONETOUCH ULTRA TEST Strp Place 3 each onto the skin once daily.      ONETOUCH ULTRA2 METER Misc Place 1 each onto the skin once daily.      aspirin 81 MG Chew Take 1 tablet (81 mg total) by mouth once daily. (Patient not taking: Reported on 10/13/2022) 30 tablet 5    famotidine (PEPCID) 20 MG tablet       lansoprazole (PREVACID SOLUTAB) 15 MG disintegrating tablet Take 2 tablets (30 mg total) by mouth once daily. (Patient not taking: Reported on 10/13/2022) 60 tablet 11    PRENATAL VITAMIN 27 mg iron- 0.8 mg Tab Take 1 tablet by mouth once daily. (Patient not taking: Reported on 10/13/2022) 30 tablet 6     No current facility-administered medications for this visit.       Indication: diabetes mellitus    Interpretation:  130 BPM baseline    Variability:  Good {> 6 bpm)    Accelerations:  Reactive    Decelerations:  none    Assessment: reactive    Plan:  NST reactive      Jan Boyer MD  10/13/2022; 10:45 AM

## 2022-10-13 NOTE — PROGRESS NOTES
[facility]    Subjective:       Patient ID: Valente Wilson is a 22 y.o. female.    Chief Complaint: High Risk OB visit 37 wks 1 day  and c/o pelvic pressure, lower back pain    22 yo  Female with IUP at 37^1 WGA by LMP consistent with 1st trimester U/S presents to [facility] for routine visit.     Acute Issues:   Pt taking metformin. Not currently taking Latuda, PNV, or ASA. No glucose log today in clinic. Reports checking AM glucose with readings mid 90's     Chronic Issues:  Migraines: stable. No medication.  T2DM: Taking Metformin 1000mg qam, and 500 qpm. No recent logs.  Psych issues (PTSD, anxiety, bipolar, and ADD): No manic episodes. Latuda 40 mg. Denies SI/HI      Gestational history:   G1: Full term, vaginal   G2: SAB 1st trimester   G3: current     OB Review of Systems:  Fetal Movements: yes  Vaginal bleeding: None  Leakage of Fluid: None   Vaginal Discharge: None  Headaches: None  Lower Extremity Edema: None  Vision Changes: None  Contractions: None    Constitutional: no fever, no chills, no weight loss  CV: no swelling, no edema, no chest pain  : denies dysuria  GI:  no constipation, no diarrhea no vomiting, +nausea  RESP: no SOB, no wheezing, no difficulty breathing  Psych: no depression, no anxiety         Objective:      Vitals:    10/13/22 1001   BP: 120/63   Pulse: 93   Resp: 19   Temp: 98.7 °F (37.1 °C)     Physical Exam:  General: in no acute distress  RESP: clear to auscultation bilaterally, non labored  CV: regular rate and rhythm, no murmurs, no edema  ABD: gravid, nontender, BS+  FHTs: 150 bpm by monitor  Fundal height: 37 cm  Cervix: soft/high/1.5 cm        Initial OB Labs: ordered 22  - Blood Type and Rh: A POS  - Antibody Screen: Negative  - CBC H/H: 35.8  - HIV: Negative  - RPR: Negative  - GC: Neg  - CT: Neg  - HBsAg: Nonreactive  - HCVAb: nonreactive  - Rubella: Equivocal  - Varicella: non-immune  - Sickle Cell Screen: Negative  - PAP: NIL HPV  Negative  - Influenza vaccine date: 11/9/21    15-20 Weeks Lab: ordered 5/12/22  - Quad Screen: Missed  - Ordered Cell free DNA testing on 06/23/22 low risk    28 Week Lab Collected 8/11/2022  - Rhogam: N/A  - Date of Tdap: unable to be administered, batch compromise  - CBC H/H: 9.9/32.4  - RPR: Nonreactive    36 Week Lab: ordered 9/29/22  - CBC H/H: 10.1/33.2  - RPR: NR  - GBS Culture: No growth  - HIV: NR  - GC: Neg     Ultrasound  -Initial US: IMPRESSION: Single, live intrauterine gestation measuring 11 weeks 1 day for an estimated due date of 11/1/2022 6/20/2022 : No fetal structural malformations are identified. Fetal size today is consistent with established gestational age. Cervical length is normal. Placental location is anterior without evidence of previa. Amniotic fluid volume appears normal.      7/21/2022  1. The fetal anatomic survey was completed today, and no fetal structural abnormalities were identified of the structures imaged today.   2. Fetal size is AGA with the EFW at the 30%.   3. AFV is normal.   4. Placental location is anterior.      Assessment:       Problem List Items Addressed This Visit          Psychiatric    Bipolar II disorder, most recent episode major depressive       Endocrine    Pre-existing type 2 diabetes mellitus during pregnancy in third trimester       GI    Gastroesophageal reflux disease without esophagitis       Other    Tobacco smoking affecting pregnancy in third trimester     Other Visit Diagnoses       37 weeks gestation of pregnancy    -  Primary    Relevant Orders    POCT urine dipstick without microscope            Plan:       37 weeks gestation of pregnancy  -OB Protocol  -PNVs, ASA 81mg  -Urine dip reviewed  -Routine labs ordered  -Postpartum contraception: Considering Patch  -Labor and ED precautions discussed in depth. Strict ED Precautions: Fever, vaginal bleeding or leaking fluid, belly cramping or pain, shortness of breath-chest pain, swelling of the  face-hands, ankles and feet or legs. Decreased fetal movement. Changes in vision. Severe headache that do not resolve with rest or Tylenol   -Education packet given for exercises during pregnancy       Pre-existing type 2 diabetes mellitus during pregnancy in third trimester   - Encouraged to take fasting and post-prandial glucose.  - Metformin 1000 mg qam, 500 qhs  - NST in office today     GERD  -Continue prevacid. Pt not currently taking     Bipolar II disorder, most recent episode major depressive  -Continue Latuda 40mg daily. Pt d/c'd  -Encouraged to schedule with [facility]           Previously scheduled for induction tomorrow @ 5 AM. Re-scheduled for 10/17/22 @ 8 PM      Dorie Lees M.D.  \[facility] [place] [facility]\"" Family Medicine HO-2

## 2022-10-17 ENCOUNTER — HOSPITAL ENCOUNTER (INPATIENT)
Facility: HOSPITAL | Age: 22
LOS: 3 days | Discharge: HOME | End: 2022-10-20
Attending: OBSTETRICS & GYNECOLOGY | Admitting: OBSTETRICS & GYNECOLOGY
Payer: MEDICAID

## 2022-10-17 DIAGNOSIS — Z34.90 ENCOUNTER FOR ELECTIVE INDUCTION OF LABOR: ICD-10-CM

## 2022-10-17 PROBLEM — Z3A.15 15 WEEKS GESTATION OF PREGNANCY: Status: RESOLVED | Noted: 2022-05-12 | Resolved: 2022-10-17

## 2022-10-17 PROBLEM — O03.9 SPONTANEOUS ABORTION IN SECOND TRIMESTER: Status: RESOLVED | Noted: 2022-04-12 | Resolved: 2022-10-17

## 2022-10-17 PROBLEM — Z3A.30 30 WEEKS GESTATION OF PREGNANCY: Status: RESOLVED | Noted: 2022-08-24 | Resolved: 2022-10-17

## 2022-10-17 PROBLEM — N93.0 POSTCOITAL BLEEDING: Status: RESOLVED | Noted: 2022-09-19 | Resolved: 2022-10-17

## 2022-10-17 LAB
ALBUMIN SERPL-MCNC: 3 GM/DL (ref 3.5–5)
ALBUMIN/GLOB SERPL: 0.9 RATIO (ref 1.1–2)
ALP SERPL-CCNC: 123 UNIT/L (ref 40–150)
ALT SERPL-CCNC: 18 UNIT/L (ref 0–55)
AMPHET UR QL SCN: NEGATIVE
APPEARANCE UR: CLEAR
AST SERPL-CCNC: 27 UNIT/L (ref 5–34)
BACTERIA #/AREA URNS AUTO: NORMAL /HPF
BARBITURATE SCN PRESENT UR: NEGATIVE
BASOPHILS # BLD AUTO: 0.04 X10(3)/MCL (ref 0–0.2)
BASOPHILS NFR BLD AUTO: 0.4 %
BENZODIAZ UR QL SCN: NEGATIVE
BILIRUB UR QL STRIP.AUTO: NEGATIVE MG/DL
BILIRUBIN DIRECT+TOT PNL SERPL-MCNC: 0.2 MG/DL
BUN SERPL-MCNC: 6.9 MG/DL (ref 7–18.7)
CALCIUM SERPL-MCNC: 9.2 MG/DL (ref 8.4–10.2)
CANNABINOIDS UR QL SCN: NEGATIVE
CHLORIDE SERPL-SCNC: 109 MMOL/L (ref 98–107)
CO2 SERPL-SCNC: 19 MMOL/L (ref 22–29)
COCAINE UR QL SCN: NEGATIVE
COLOR UR AUTO: YELLOW
CREAT SERPL-MCNC: 0.67 MG/DL (ref 0.55–1.02)
EOSINOPHIL # BLD AUTO: 0.14 X10(3)/MCL (ref 0–0.9)
EOSINOPHIL NFR BLD AUTO: 1.3 %
ERYTHROCYTE [DISTWIDTH] IN BLOOD BY AUTOMATED COUNT: 14.9 % (ref 11.5–17)
FENTANYL UR QL SCN: NEGATIVE
GFR SERPLBLD CREATININE-BSD FMLA CKD-EPI: >60 MLS/MIN/1.73/M2
GLOBULIN SER-MCNC: 3.4 GM/DL (ref 2.4–3.5)
GLUCOSE SERPL-MCNC: 100 MG/DL (ref 74–100)
GLUCOSE UR QL STRIP.AUTO: NEGATIVE MG/DL
GROUP & RH: NORMAL
HCT VFR BLD AUTO: 33.3 % (ref 37–47)
HGB BLD-MCNC: 10.2 GM/DL (ref 12–16)
HIV 1+2 AB+HIV1 P24 AG SERPL QL IA: NONREACTIVE
IMM GRANULOCYTES # BLD AUTO: 0.12 X10(3)/MCL (ref 0–0.04)
IMM GRANULOCYTES NFR BLD AUTO: 1.1 %
INDIRECT COOMBS GEL: NORMAL
KETONES UR QL STRIP.AUTO: NEGATIVE MG/DL
LEUKOCYTE ESTERASE UR QL STRIP.AUTO: ABNORMAL UNIT/L
LYMPHOCYTES # BLD AUTO: 1.71 X10(3)/MCL (ref 0.6–4.6)
LYMPHOCYTES NFR BLD AUTO: 15.7 %
MCH RBC QN AUTO: 24.8 PG (ref 27–31)
MCHC RBC AUTO-ENTMCNC: 30.6 MG/DL (ref 33–36)
MCV RBC AUTO: 81 FL (ref 80–94)
MDMA UR QL SCN: NEGATIVE
MONOCYTES # BLD AUTO: 0.65 X10(3)/MCL (ref 0.1–1.3)
MONOCYTES NFR BLD AUTO: 6 %
NEUTROPHILS # BLD AUTO: 8.3 X10(3)/MCL (ref 2.1–9.2)
NEUTROPHILS NFR BLD AUTO: 75.5 %
NITRITE UR QL STRIP.AUTO: NEGATIVE
NRBC BLD AUTO-RTO: 0 %
OPIATES UR QL SCN: NEGATIVE
PCP UR QL: NEGATIVE
PH UR STRIP.AUTO: 6.5 [PH]
PH UR: 6.5 [PH] (ref 3–11)
PLATELET # BLD AUTO: 330 X10(3)/MCL (ref 130–400)
PMV BLD AUTO: 11.7 FL (ref 7.4–10.4)
POCT GLUCOSE: 87 MG/DL (ref 70–110)
POTASSIUM SERPL-SCNC: 3.8 MMOL/L (ref 3.5–5.1)
PROT SERPL-MCNC: 6.4 GM/DL (ref 6.4–8.3)
PROT UR QL STRIP.AUTO: NEGATIVE MG/DL
RBC # BLD AUTO: 4.11 X10(6)/MCL (ref 4.2–5.4)
RBC #/AREA URNS AUTO: <5 /HPF
RBC UR QL AUTO: NEGATIVE UNIT/L
RPR: NON REACTIVE
SODIUM SERPL-SCNC: 137 MMOL/L (ref 136–145)
SP GR UR STRIP.AUTO: 1.02 (ref 1–1.03)
SPECIFIC GRAVITY, URINE AUTO (.000) (OHS): 1.02 (ref 1–1.03)
SQUAMOUS #/AREA URNS AUTO: <5 /HPF
T PALLIDUM AB SER QL: NONREACTIVE
UROBILINOGEN UR STRIP-ACNC: 0.2 MG/DL
WBC # SPEC AUTO: 10.9 X10(3)/MCL (ref 4.5–11.5)
WBC #/AREA URNS AUTO: <5 /HPF

## 2022-10-17 PROCEDURE — 85025 COMPLETE CBC W/AUTO DIFF WBC: CPT | Performed by: OBSTETRICS & GYNECOLOGY

## 2022-10-17 PROCEDURE — 86762 RUBELLA ANTIBODY: CPT | Performed by: OBSTETRICS & GYNECOLOGY

## 2022-10-17 PROCEDURE — 81001 URINALYSIS AUTO W/SCOPE: CPT | Performed by: OBSTETRICS & GYNECOLOGY

## 2022-10-17 PROCEDURE — 63600175 PHARM REV CODE 636 W HCPCS: Performed by: OBSTETRICS & GYNECOLOGY

## 2022-10-17 PROCEDURE — 86901 BLOOD TYPING SEROLOGIC RH(D): CPT | Performed by: OBSTETRICS & GYNECOLOGY

## 2022-10-17 PROCEDURE — 11000001 HC ACUTE MED/SURG PRIVATE ROOM

## 2022-10-17 PROCEDURE — 80053 COMPREHEN METABOLIC PANEL: CPT | Performed by: OBSTETRICS & GYNECOLOGY

## 2022-10-17 PROCEDURE — 87389 HIV-1 AG W/HIV-1&-2 AB AG IA: CPT | Performed by: OBSTETRICS & GYNECOLOGY

## 2022-10-17 PROCEDURE — 86780 TREPONEMA PALLIDUM: CPT | Performed by: OBSTETRICS & GYNECOLOGY

## 2022-10-17 PROCEDURE — 80307 DRUG TEST PRSMV CHEM ANLYZR: CPT | Performed by: OBSTETRICS & GYNECOLOGY

## 2022-10-17 PROCEDURE — 86923 COMPATIBILITY TEST ELECTRIC: CPT

## 2022-10-17 PROCEDURE — 36415 COLL VENOUS BLD VENIPUNCTURE: CPT | Performed by: OBSTETRICS & GYNECOLOGY

## 2022-10-17 PROCEDURE — 87340 HEPATITIS B SURFACE AG IA: CPT | Performed by: OBSTETRICS & GYNECOLOGY

## 2022-10-17 RX ORDER — CARBOPROST TROMETHAMINE 250 UG/ML
250 INJECTION, SOLUTION INTRAMUSCULAR
Status: CANCELLED | OUTPATIENT
Start: 2022-10-17

## 2022-10-17 RX ORDER — ONDANSETRON 4 MG/1
8 TABLET, ORALLY DISINTEGRATING ORAL EVERY 8 HOURS PRN
Status: DISCONTINUED | OUTPATIENT
Start: 2022-10-17 | End: 2022-10-20 | Stop reason: HOSPADM

## 2022-10-17 RX ORDER — PROCHLORPERAZINE EDISYLATE 5 MG/ML
5 INJECTION INTRAMUSCULAR; INTRAVENOUS EVERY 6 HOURS PRN
Status: DISCONTINUED | OUTPATIENT
Start: 2022-10-17 | End: 2022-10-18

## 2022-10-17 RX ORDER — ONDANSETRON 4 MG/1
8 TABLET, ORALLY DISINTEGRATING ORAL EVERY 8 HOURS PRN
Status: CANCELLED | OUTPATIENT
Start: 2022-10-17

## 2022-10-17 RX ORDER — PROCHLORPERAZINE EDISYLATE 5 MG/ML
5 INJECTION INTRAMUSCULAR; INTRAVENOUS EVERY 6 HOURS PRN
Status: CANCELLED | OUTPATIENT
Start: 2022-10-17

## 2022-10-17 RX ORDER — OXYTOCIN/RINGER'S LACTATE 30/500 ML
95 PLASTIC BAG, INJECTION (ML) INTRAVENOUS ONCE
Status: CANCELLED | OUTPATIENT
Start: 2022-10-17 | End: 2022-10-17

## 2022-10-17 RX ORDER — MUPIROCIN 20 MG/G
OINTMENT TOPICAL
Status: CANCELLED | OUTPATIENT
Start: 2022-10-17

## 2022-10-17 RX ORDER — LIDOCAINE HYDROCHLORIDE 10 MG/ML
10 INJECTION INFILTRATION; PERINEURAL ONCE AS NEEDED
Status: DISCONTINUED | OUTPATIENT
Start: 2022-10-17 | End: 2022-10-18

## 2022-10-17 RX ORDER — MISOPROSTOL 100 UG/1
800 TABLET ORAL
Status: CANCELLED | OUTPATIENT
Start: 2022-10-17

## 2022-10-17 RX ORDER — DIPHENOXYLATE HYDROCHLORIDE AND ATROPINE SULFATE 2.5; .025 MG/1; MG/1
1 TABLET ORAL 4 TIMES DAILY PRN
Status: CANCELLED | OUTPATIENT
Start: 2022-10-17

## 2022-10-17 RX ORDER — OXYTOCIN/RINGER'S LACTATE 30/500 ML
95 PLASTIC BAG, INJECTION (ML) INTRAVENOUS ONCE
Status: COMPLETED | OUTPATIENT
Start: 2022-10-17 | End: 2022-10-18

## 2022-10-17 RX ORDER — CALCIUM CARBONATE 200(500)MG
500 TABLET,CHEWABLE ORAL 3 TIMES DAILY PRN
Status: CANCELLED | OUTPATIENT
Start: 2022-10-17

## 2022-10-17 RX ORDER — SODIUM CHLORIDE, SODIUM LACTATE, POTASSIUM CHLORIDE, CALCIUM CHLORIDE 600; 310; 30; 20 MG/100ML; MG/100ML; MG/100ML; MG/100ML
INJECTION, SOLUTION INTRAVENOUS CONTINUOUS
Status: DISCONTINUED | OUTPATIENT
Start: 2022-10-17 | End: 2022-10-18

## 2022-10-17 RX ORDER — CALCIUM CARBONATE 200(500)MG
500 TABLET,CHEWABLE ORAL 3 TIMES DAILY PRN
Status: DISCONTINUED | OUTPATIENT
Start: 2022-10-17 | End: 2022-10-18

## 2022-10-17 RX ORDER — OXYTOCIN/RINGER'S LACTATE 30/500 ML
334 PLASTIC BAG, INJECTION (ML) INTRAVENOUS ONCE
Status: CANCELLED | OUTPATIENT
Start: 2022-10-17 | End: 2022-10-17

## 2022-10-17 RX ORDER — SIMETHICONE 80 MG
1 TABLET,CHEWABLE ORAL 4 TIMES DAILY PRN
Status: CANCELLED | OUTPATIENT
Start: 2022-10-17

## 2022-10-17 RX ORDER — LIDOCAINE HYDROCHLORIDE 10 MG/ML
10 INJECTION INFILTRATION; PERINEURAL ONCE AS NEEDED
Status: CANCELLED | OUTPATIENT
Start: 2022-10-17 | End: 2034-03-15

## 2022-10-17 RX ORDER — METHYLERGONOVINE MALEATE 0.2 MG/ML
200 INJECTION INTRAVENOUS
Status: CANCELLED | OUTPATIENT
Start: 2022-10-17

## 2022-10-17 RX ORDER — SODIUM CHLORIDE, SODIUM LACTATE, POTASSIUM CHLORIDE, CALCIUM CHLORIDE 600; 310; 30; 20 MG/100ML; MG/100ML; MG/100ML; MG/100ML
INJECTION, SOLUTION INTRAVENOUS CONTINUOUS
Status: CANCELLED | OUTPATIENT
Start: 2022-10-17

## 2022-10-17 RX ORDER — SIMETHICONE 80 MG
1 TABLET,CHEWABLE ORAL 4 TIMES DAILY PRN
Status: DISCONTINUED | OUTPATIENT
Start: 2022-10-17 | End: 2022-10-18

## 2022-10-17 RX ORDER — OXYTOCIN/RINGER'S LACTATE 30/500 ML
334 PLASTIC BAG, INJECTION (ML) INTRAVENOUS ONCE
Status: DISCONTINUED | OUTPATIENT
Start: 2022-10-17 | End: 2022-10-18

## 2022-10-17 RX ORDER — OXYTOCIN/RINGER'S LACTATE 30/500 ML
0-30 PLASTIC BAG, INJECTION (ML) INTRAVENOUS CONTINUOUS
Status: DISCONTINUED | OUTPATIENT
Start: 2022-10-17 | End: 2022-10-18

## 2022-10-17 RX ADMIN — Medication 2 MILLI-UNITS/MIN: at 10:10

## 2022-10-18 ENCOUNTER — ANESTHESIA (OUTPATIENT)
Dept: OBSTETRICS AND GYNECOLOGY | Facility: HOSPITAL | Age: 22
End: 2022-10-18
Payer: MEDICAID

## 2022-10-18 ENCOUNTER — ANESTHESIA EVENT (OUTPATIENT)
Dept: OBSTETRICS AND GYNECOLOGY | Facility: HOSPITAL | Age: 22
End: 2022-10-18
Payer: MEDICAID

## 2022-10-18 VITALS — RESPIRATION RATE: 16 BRPM

## 2022-10-18 PROBLEM — Z91.89 AT RISK FOR KNOWLEDGE DEFICIT: Status: RESOLVED | Noted: 2022-05-12 | Resolved: 2022-10-18

## 2022-10-18 PROBLEM — A08.4 VIRAL GASTROENTERITIS: Status: RESOLVED | Noted: 2022-07-01 | Resolved: 2022-10-18

## 2022-10-18 LAB
BASOPHILS # BLD AUTO: 0.04 X10(3)/MCL (ref 0–0.2)
BASOPHILS NFR BLD AUTO: 0.3 %
EOSINOPHIL # BLD AUTO: 0.11 X10(3)/MCL (ref 0–0.9)
EOSINOPHIL NFR BLD AUTO: 0.7 %
ERYTHROCYTE [DISTWIDTH] IN BLOOD BY AUTOMATED COUNT: 14.7 % (ref 11.5–17)
HBV SURFACE AG SERPL QL IA: NONREACTIVE
HCT VFR BLD AUTO: 28.7 % (ref 37–47)
HGB BLD-MCNC: 8.9 GM/DL (ref 12–16)
IMM GRANULOCYTES # BLD AUTO: 0.14 X10(3)/MCL (ref 0–0.04)
IMM GRANULOCYTES NFR BLD AUTO: 0.9 %
LYMPHOCYTES # BLD AUTO: 1.31 X10(3)/MCL (ref 0.6–4.6)
LYMPHOCYTES NFR BLD AUTO: 8.8 %
MCH RBC QN AUTO: 24.9 PG (ref 27–31)
MCHC RBC AUTO-ENTMCNC: 31 MG/DL (ref 33–36)
MCV RBC AUTO: 80.4 FL (ref 80–94)
MONOCYTES # BLD AUTO: 0.82 X10(3)/MCL (ref 0.1–1.3)
MONOCYTES NFR BLD AUTO: 5.5 %
NEUTROPHILS # BLD AUTO: 12.4 X10(3)/MCL (ref 2.1–9.2)
NEUTROPHILS NFR BLD AUTO: 83.8 %
NRBC BLD AUTO-RTO: 0 %
PLATELET # BLD AUTO: 261 X10(3)/MCL (ref 130–400)
PMV BLD AUTO: 10.8 FL (ref 7.4–10.4)
POCT GLUCOSE: 129 MG/DL (ref 70–110)
POCT GLUCOSE: 75 MG/DL (ref 70–110)
POCT GLUCOSE: 81 MG/DL (ref 70–110)
POCT GLUCOSE: 83 MG/DL (ref 70–110)
RBC # BLD AUTO: 3.57 X10(6)/MCL (ref 4.2–5.4)
WBC # SPEC AUTO: 14.9 X10(3)/MCL (ref 4.5–11.5)

## 2022-10-18 PROCEDURE — 37000008 HC ANESTHESIA 1ST 15 MINUTES

## 2022-10-18 PROCEDURE — 72100002 HC LABOR CARE, 1ST 8 HOURS

## 2022-10-18 PROCEDURE — 63600175 PHARM REV CODE 636 W HCPCS: Performed by: ANESTHESIOLOGY

## 2022-10-18 PROCEDURE — 25000003 PHARM REV CODE 250: Performed by: ANESTHESIOLOGY

## 2022-10-18 PROCEDURE — 72200006 HC VAGINAL DELIVERY LEVEL III

## 2022-10-18 PROCEDURE — 72100003 HC LABOR CARE, EA. ADDL. 8 HRS

## 2022-10-18 PROCEDURE — 85025 COMPLETE CBC W/AUTO DIFF WBC: CPT | Performed by: STUDENT IN AN ORGANIZED HEALTH CARE EDUCATION/TRAINING PROGRAM

## 2022-10-18 PROCEDURE — 25000003 PHARM REV CODE 250: Performed by: STUDENT IN AN ORGANIZED HEALTH CARE EDUCATION/TRAINING PROGRAM

## 2022-10-18 PROCEDURE — 51702 INSERT TEMP BLADDER CATH: CPT

## 2022-10-18 PROCEDURE — 63600175 PHARM REV CODE 636 W HCPCS: Performed by: OBSTETRICS & GYNECOLOGY

## 2022-10-18 PROCEDURE — 37000009 HC ANESTHESIA EA ADD 15 MINS

## 2022-10-18 PROCEDURE — 36415 COLL VENOUS BLD VENIPUNCTURE: CPT | Performed by: STUDENT IN AN ORGANIZED HEALTH CARE EDUCATION/TRAINING PROGRAM

## 2022-10-18 PROCEDURE — 11000001 HC ACUTE MED/SURG PRIVATE ROOM

## 2022-10-18 PROCEDURE — 62326 NJX INTERLAMINAR LMBR/SAC: CPT | Performed by: REGISTERED NURSE

## 2022-10-18 PROCEDURE — 25000003 PHARM REV CODE 250: Performed by: OBSTETRICS & GYNECOLOGY

## 2022-10-18 RX ORDER — HYDROCODONE BITARTRATE AND ACETAMINOPHEN 5; 325 MG/1; MG/1
1 TABLET ORAL EVERY 4 HOURS PRN
Status: DISCONTINUED | OUTPATIENT
Start: 2022-10-18 | End: 2022-10-20 | Stop reason: HOSPADM

## 2022-10-18 RX ORDER — FENTANYL/BUPIVACAINE/NS/PF 2-1250MCG
PLASTIC BAG, INJECTION (ML) INJECTION CONTINUOUS
Status: DISCONTINUED | OUTPATIENT
Start: 2022-10-18 | End: 2022-10-18

## 2022-10-18 RX ORDER — PRENATAL WITH FERROUS FUM AND FOLIC ACID 3080; 920; 120; 400; 22; 1.84; 3; 20; 10; 1; 12; 200; 27; 25; 2 [IU]/1; [IU]/1; MG/1; [IU]/1; MG/1; MG/1; MG/1; MG/1; MG/1; MG/1; UG/1; MG/1; MG/1; MG/1; MG/1
1 TABLET ORAL DAILY
Status: DISCONTINUED | OUTPATIENT
Start: 2022-10-18 | End: 2022-10-18

## 2022-10-18 RX ORDER — IBUPROFEN 600 MG/1
600 TABLET ORAL EVERY 6 HOURS
Status: DISCONTINUED | OUTPATIENT
Start: 2022-10-18 | End: 2022-10-20 | Stop reason: HOSPADM

## 2022-10-18 RX ORDER — MISOPROSTOL 100 UG/1
800 TABLET ORAL
Status: DISCONTINUED | OUTPATIENT
Start: 2022-10-18 | End: 2022-10-18

## 2022-10-18 RX ORDER — SODIUM CHLORIDE 0.9 % (FLUSH) 0.9 %
10 SYRINGE (ML) INJECTION
Status: DISCONTINUED | OUTPATIENT
Start: 2022-10-18 | End: 2022-10-20 | Stop reason: HOSPADM

## 2022-10-18 RX ORDER — PRENATAL WITH FERROUS FUM AND FOLIC ACID 3080; 920; 120; 400; 22; 1.84; 3; 20; 10; 1; 12; 200; 27; 25; 2 [IU]/1; [IU]/1; MG/1; [IU]/1; MG/1; MG/1; MG/1; MG/1; MG/1; MG/1; UG/1; MG/1; MG/1; MG/1; MG/1
1 TABLET ORAL DAILY
Status: DISCONTINUED | OUTPATIENT
Start: 2022-10-19 | End: 2022-10-20 | Stop reason: HOSPADM

## 2022-10-18 RX ORDER — HYDROCODONE BITARTRATE AND ACETAMINOPHEN 7.5; 325 MG/1; MG/1
1 TABLET ORAL EVERY 4 HOURS PRN
Status: DISCONTINUED | OUTPATIENT
Start: 2022-10-18 | End: 2022-10-20 | Stop reason: HOSPADM

## 2022-10-18 RX ORDER — DIPHENOXYLATE HYDROCHLORIDE AND ATROPINE SULFATE 2.5; .025 MG/1; MG/1
1 TABLET ORAL 4 TIMES DAILY PRN
Status: DISCONTINUED | OUTPATIENT
Start: 2022-10-18 | End: 2022-10-18

## 2022-10-18 RX ORDER — ACETAMINOPHEN 325 MG/1
650 TABLET ORAL EVERY 6 HOURS PRN
Status: DISCONTINUED | OUTPATIENT
Start: 2022-10-18 | End: 2022-10-20 | Stop reason: HOSPADM

## 2022-10-18 RX ORDER — CARBOPROST TROMETHAMINE 250 UG/ML
INJECTION, SOLUTION INTRAMUSCULAR
Status: DISCONTINUED
Start: 2022-10-18 | End: 2022-10-18 | Stop reason: WASHOUT

## 2022-10-18 RX ORDER — BUPIVACAINE HYDROCHLORIDE 2.5 MG/ML
INJECTION, SOLUTION INFILTRATION; PERINEURAL
Status: DISCONTINUED | OUTPATIENT
Start: 2022-10-18 | End: 2022-10-18

## 2022-10-18 RX ORDER — DIPHENHYDRAMINE HYDROCHLORIDE 50 MG/ML
12.5 INJECTION INTRAMUSCULAR; INTRAVENOUS EVERY 4 HOURS PRN
Status: DISCONTINUED | OUTPATIENT
Start: 2022-10-18 | End: 2022-10-18

## 2022-10-18 RX ORDER — CARBOPROST TROMETHAMINE 250 UG/ML
250 INJECTION, SOLUTION INTRAMUSCULAR
Status: DISCONTINUED | OUTPATIENT
Start: 2022-10-18 | End: 2022-10-18

## 2022-10-18 RX ORDER — EPHEDRINE SULFATE 50 MG/ML
10 INJECTION, SOLUTION INTRAVENOUS EVERY 10 MIN PRN
Status: DISCONTINUED | OUTPATIENT
Start: 2022-10-18 | End: 2022-10-18

## 2022-10-18 RX ORDER — HYDROCORTISONE 25 MG/G
CREAM TOPICAL 3 TIMES DAILY PRN
Status: DISCONTINUED | OUTPATIENT
Start: 2022-10-18 | End: 2022-10-20 | Stop reason: HOSPADM

## 2022-10-18 RX ORDER — DIPHENHYDRAMINE HCL 25 MG
25 CAPSULE ORAL EVERY 4 HOURS PRN
Status: DISCONTINUED | OUTPATIENT
Start: 2022-10-18 | End: 2022-10-20 | Stop reason: HOSPADM

## 2022-10-18 RX ORDER — OXYTOCIN/RINGER'S LACTATE 30/500 ML
95 PLASTIC BAG, INJECTION (ML) INTRAVENOUS ONCE
Status: DISCONTINUED | OUTPATIENT
Start: 2022-10-18 | End: 2022-10-20 | Stop reason: HOSPADM

## 2022-10-18 RX ORDER — ONDANSETRON 4 MG/1
8 TABLET, ORALLY DISINTEGRATING ORAL EVERY 8 HOURS PRN
Status: DISCONTINUED | OUTPATIENT
Start: 2022-10-18 | End: 2022-10-20 | Stop reason: HOSPADM

## 2022-10-18 RX ORDER — SODIUM CITRATE AND CITRIC ACID MONOHYDRATE 334; 500 MG/5ML; MG/5ML
30 SOLUTION ORAL ONCE
Status: DISCONTINUED | OUTPATIENT
Start: 2022-10-18 | End: 2022-10-18

## 2022-10-18 RX ORDER — METHYLERGONOVINE MALEATE 0.2 MG/ML
200 INJECTION INTRAVENOUS
Status: DISCONTINUED | OUTPATIENT
Start: 2022-10-18 | End: 2022-10-18

## 2022-10-18 RX ORDER — DOCUSATE SODIUM 100 MG/1
200 CAPSULE, LIQUID FILLED ORAL 2 TIMES DAILY PRN
Status: DISCONTINUED | OUTPATIENT
Start: 2022-10-18 | End: 2022-10-20 | Stop reason: HOSPADM

## 2022-10-18 RX ORDER — SIMETHICONE 80 MG
1 TABLET,CHEWABLE ORAL EVERY 6 HOURS PRN
Status: DISCONTINUED | OUTPATIENT
Start: 2022-10-18 | End: 2022-10-20 | Stop reason: HOSPADM

## 2022-10-18 RX ORDER — METHYLERGONOVINE MALEATE 0.2 MG/ML
INJECTION INTRAVENOUS
Status: DISPENSED
Start: 2022-10-18 | End: 2022-10-18

## 2022-10-18 RX ADMIN — Medication 12 ML/HR: at 11:10

## 2022-10-18 RX ADMIN — IBUPROFEN 600 MG: 600 TABLET, FILM COATED ORAL at 11:10

## 2022-10-18 RX ADMIN — Medication 12 ML/HR: at 04:10

## 2022-10-18 RX ADMIN — METHYLERGONOVINE MALEATE 200 MCG: 0.2 INJECTION, SOLUTION INTRAMUSCULAR; INTRAVENOUS at 02:10

## 2022-10-18 RX ADMIN — DIPHENHYDRAMINE HYDROCHLORIDE 25 MG: 25 CAPSULE ORAL at 11:10

## 2022-10-18 RX ADMIN — BUPIVACAINE HYDROCHLORIDE 10 ML: 2.5 INJECTION, SOLUTION INFILTRATION; PERINEURAL at 11:10

## 2022-10-18 RX ADMIN — ONDANSETRON 8 MG: 4 TABLET, ORALLY DISINTEGRATING ORAL at 03:10

## 2022-10-18 RX ADMIN — DIPHENHYDRAMINE HYDROCHLORIDE 12.5 MG: 50 INJECTION INTRAMUSCULAR; INTRAVENOUS at 07:10

## 2022-10-18 RX ADMIN — Medication 95 MILLI-UNITS/MIN: at 02:10

## 2022-10-18 RX ADMIN — DIPHENHYDRAMINE HYDROCHLORIDE 25 MG: 25 CAPSULE ORAL at 03:10

## 2022-10-18 RX ADMIN — MISOPROSTOL 400 MCG: 100 TABLET ORAL at 02:10

## 2022-10-18 RX ADMIN — BENZOCAINE AND LEVOMENTHOL: 200; 5 SPRAY TOPICAL at 03:10

## 2022-10-18 RX ADMIN — IBUPROFEN 600 MG: 600 TABLET, FILM COATED ORAL at 05:10

## 2022-10-18 NOTE — ANESTHESIA PREPROCEDURE EVALUATION
10/18/2022  Valente Wilson is a 22 y.o., female.      Pre-op Assessment    I have reviewed the Patient Summary Reports.     I have reviewed the Nursing Notes. I have reviewed the NPO Status.   I have reviewed the Medications.     Review of Systems  Anesthesia Hx:  No problems with previous Anesthesia    Hematology/Oncology:  Hematology Normal   Oncology Normal     EENT/Dental:EENT/Dental Normal   Cardiovascular:  Cardiovascular Normal Exercise tolerance: good     Pulmonary:  Pulmonary Normal    Renal/:  Renal/ Normal     Hepatic/GI:   GERD    Musculoskeletal:  Musculoskeletal Normal    Neurological:   Headaches    Endocrine:   Diabetes, poorly controlled    Dermatological:  Skin Normal    Psych:   Psychiatric History          Physical Exam  General: Well nourished, Cooperative, Alert and Oriented    Airway:  Mallampati: II   Mouth Opening: Normal  TM Distance: Normal  Tongue: Normal  Neck ROM: Normal ROM    Dental:  Intact    Chest/Lungs:  Clear to auscultation, Normal Respiratory Rate    Heart:  Rate: Normal  Rhythm: Regular Rhythm  Sounds: Normal    Abdomen:  Normal, Soft, Nontender        Anesthesia Plan  Type of Anesthesia, risks & benefits discussed:    Anesthesia Type: Epidural  Intra-op Monitoring Plan: Standard ASA Monitors  Post Op Pain Control Plan: epidural analgesia  Informed Consent: Informed consent signed with the Patient and all parties understand the risks and agree with anesthesia plan.  All questions answered. Patient consented to blood products? Yes  ASA Score: 2  Day of Surgery Review of History & Physical: H&P Update referred to the surgeon/provider.I have interviewed and examined the patient. I have reviewed the patient's H&P dated: There are no significant changes.     Ready For Surgery From Anesthesia Perspective.     .

## 2022-10-18 NOTE — ANESTHESIA PROCEDURE NOTES
Epidural    Patient location during procedure: OB   Reason for block: primary anesthetic   Reason for block: labor analgesia requested by patient and obstetrician  Diagnosis: Labor Pain   Start time: 10/18/2022 4:15 AM  Timeout: 10/18/2022 4:15 AM  End time: 10/18/2022 4:30 AM  Surgery related to: Active Labor    Staffing  Performing Provider: Baron Lauren CRNA  Authorizing Provider: Baron Lauren CRNA        Preanesthetic Checklist  Completed: patient identified, IV checked, site marked, risks and benefits discussed, surgical consent, monitors and equipment checked, pre-op evaluation, timeout performed, anesthesia consent given, hand hygiene performed and patient being monitored  Preparation  Patient position: sitting  Prep: ChloraPrep  Patient monitoring: Pulse Ox and Blood Pressure  Reason for block: primary anesthetic   Epidural  Skin Anesthetic: lidocaine 1%  Skin Wheal: 3 mL  Administration type: continuous  Approach: midline  Interspace: L3-4    Injection technique: JOSE ANTONIO saline  Needle and Epidural Catheter  Needle type: Tuohy   Needle gauge: 17  Needle length: 7.0 inches  Needle insertion depth: 5 cm  Catheter type: multi-orifice  Catheter size: 18 G  Catheter at skin depth: 12 cm  Insertion Attempts: 1  Test dose: 3 mL of lidocaine 1.5% with Epi 1-to-200,000  Additional Documentation: incremental injection, no paresthesia on injection, no significant pain on injection, negative aspiration for heme and CSF and no significant complaints from patient  Needle localization: anatomical landmarks  Assessment  Upper dermatomal levels - Left: T10  Right: T10   Dermatomal levels determined by alcohol wipe  Ease of block: easy  Patient's tolerance of the procedure: comfortable throughout block and no complaints No inadvertent dural puncture with Tuohy.  Dural puncture not performed with spinal needle

## 2022-10-18 NOTE — PROGRESS NOTES
Labor Progress Note    Subjective:  Patient currently doing well. Received epidural at 0430. Complains of itching to back after epidural somewhat relieved by Benadryl.     Objective:     Temp:  [98.1 °F (36.7 °C)-98.4 °F (36.9 °C)] 98.1 °F (36.7 °C)  Pulse:  [] 87  SpO2:  [99 %-100 %] 100 %  BP: (104-146)/(55-80) 135/79  There is no height or weight on file to calculate BMI.     General: no acute distress  Electronic Fetal Monitoring:  FHT: Baseline 120 bpm, moderate variability, accelerations present, decelerations absent   Category: 1                 TOCO: Contractions: irregular, every 2-5 minutes   Cervix: 4/50%/-3 cm     Assessment:     1. 23 yo  at 37^6 WGA here for induction of labor 2/2 pre-existing type II DM with concern for poor control/non compliance on metformin  2. A+  3. GBS negative  3. Category 1 FHT     Plan:     1. Continue active management of labor; pitocin and AROM  2. CBG q4 hr then q1 hr once in active labor with goal   3. Reassuring FHT    Sharmila Polanco MD  [employer]  Resident, [facility]

## 2022-10-18 NOTE — L&D DELIVERY NOTE
"[facility] - Labor and Delivery  Vaginal Delivery   Operative Note    SUMMARY     Normal spontaneous vaginal delivery of live infant, was placed on mothers abdomen for skin to skin and bulb suctioning performed.  Infant delivered position OA over intact perineum.  Nuchal cord: yes, tight nuchal reduced over right shoulder at delivery    Spontaneous delivery of placenta and IV pitocin given noting uterine atony with bleeding. 200 mcg IM Methergine and 400 mcg misoprostol buccal then given with resolution of uterine atony and bleeding. No lacerations noted. EBL 1000 mL.   Patient tolerated delivery well. Sponge needle and lap counted correctly x2.    Indications: Pre-existing type 2 diabetes mellitus during pregnancy in third trimester  Pregnancy complicated by:   Patient Active Problem List   Diagnosis    Attention deficit hyperactivity disorder (ADHD)    Bipolar II disorder, most recent episode major depressive    Obesity    Generalized anxiety disorder    Tobacco use    Gastroesophageal reflux disease without esophagitis    Type 2 diabetes mellitus without complication, without long-term current use of insulin    Mental disorder affecting pregnancy in third trimester    Pre-existing type 2 diabetes mellitus during pregnancy in third trimester    Tobacco smoking affecting pregnancy in third trimester    Migraine with aura    Impaired fasting glucose    Anemia     Admitting GA: 37w6d    Delivery Information for Anurag Wilson    Birth information:  YOB: 2024   Time of birth: 2:14 PM   Sex: female   Head Delivery Date/Time: 10/18/2022  2:14 PM   Delivery type: Vaginal, Spontaneous   Gestational Age: 37w6d    Delivery Providers    Delivering clinician:   MD Sharmila Gutierrez MD Logan Pittman, MD         Measurements    Weight: 3050 g  Weight (lbs): 6 lb 11.6 oz  Length: 48 cm  Length (in): 18.9"  Head circumference: 30.5 cm         Apgars    Living status: " Living  Apgars:  1 min.:  5 min.:  10 min.:  15 min.:  20 min.:    Skin color:  0  1       Heart rate:  2  2       Reflex irritability:  2  2       Muscle tone:  2  2       Respiratory effort:  2  2       Total:  8  9       Apgars assigned by: BISI CHUNG RN       Operative Delivery    Forceps attempted?: No  Vacuum extractor attempted?: No       Shoulder Dystocia    Shoulder dystocia present?: No       Presentation    Presentation: Vertex  Position: Middle Occiput Anterior           Interventions/Resuscitation    Method: Bulb Suctioning, Deep Suctioning, Tactile Stimulation       Cord    No data filed       Placenta    Placenta delivery date/time:   Placenta removal:            Labor Events:       labor: No     Labor Onset Date/Time: 10/18/2022 22:55     Dilation Complete Date/Time: 10/18/2022 13:58     Start Pushing Date/Time: 10/18/2022 14:08     Rupture Date/Time: 10/18/22  0929         Rupture type: ARM (Artificial Rupture)           Fluid Amount:         Fluid Color: Bloody         steroids: None     Antibiotics given for GBS: No     Induction: amniotomy;oxytocin     Indications for induction:  Gestational Diabetic     Augmentation:       Indications for augmentation:       Labor complications: None     Additional complications:          Cervical ripening:                     Delivery:      Episiotomy: None     Indication for Episiotomy:       Perineal Lacerations: None Repaired:      Periurethral Laceration:   Repaired:     Labial Laceration:   Repaired:     Sulcus Laceration:   Repaired:     Vaginal Laceration:   Repaired:     Cervical Laceration:   Repaired:     Repair suture: None     Repair # of packets:       Last Value - EBL - Nursing (mL):       Sum - EBL - Nursing (mL): 0     Last Value - EBL - Anesthesia (mL):        Calculated QBL (mL):         Vaginal Sweep Performed: No     Surgicount Correct: Yes       Other providers:       Anesthesia    Method: Epidural         Sharmila Polanco  MD [facility]  Resident, [facility]

## 2022-10-18 NOTE — H&P
HISTORY AND PHYSICAL                                                OBSTETRICS          Subjective:      Valente Wilson is a 22 y.o.  female with IUP at 37w5d weeks gestation who presents to L&D for IOL secondary to noncompliant with her pregest type 2 DM on metformin, has not taken in weeks. Pertinent medical history for this pregnancy includes BMI 43. And hx PTSD/anxityu/bipolar and tobacco use.  On 10/10 at 36 weeks, Estimated fetal weight 3069g is at the 49th percentile with an abdominal circumference at the 98th percentile. Patient reports  no complaints .  She denies contractions, vaginal bleeding, leakage of fluid, and decreased fetal movement.  Denies HA, blurry vision or RUQ pain.   Care this pregnancy has been with Dr. Boyer at [facility] clinic.  Sees Dr. [facility] who recommends delivery in 37th week.     PMHx:   Past Medical History:   Diagnosis Date    ADD (attention deficit disorder)     Anemia     Bipolar disorder, unspecified     PCOS (polycystic ovarian syndrome)     PTSD (post-traumatic stress disorder)     Tobacco use 2022    Type 2 diabetes mellitus without complications        PSHx:   Past Surgical History:   Procedure Laterality Date    SALPINGECTOMY      WISDOM TOOTH EXTRACTION         All:   Review of patient's allergies indicates:   Allergen Reactions    Oxycodone-acetaminophen Hives     pt broke out in rash  Other reaction(s): RASH       Meds:   Medications Prior to Admission   Medication Sig Dispense Refill Last Dose    aspirin 81 MG Chew Take 1 tablet (81 mg total) by mouth once daily. 30 tablet 5 Past Week    famotidine (PEPCID) 20 MG tablet    10/16/2022    metFORMIN (GLUCOPHAGE) 500 MG tablet Take 1 tablet (500 mg total) by mouth As instructed (prediabetes). Take 2 tablets (1000mg) in the morning and 1 tablet (500mg) at night 90 tablet 3 Past Week    blood-glucose meter kit   Accu check, See Instructions, Glucometer machine use with test strips DM II ICD 11.9, # 1  units, 0 Refill(s), Pharmacy: [id number], 180, cm, Height/Length Dosing, 22 10:48:00 CDT, 132.4, kg, Weight Dosing, 22 10:48:00 CDT       lansoprazole (PREVACID SOLUTAB) 15 MG disintegrating tablet Take 2 tablets (30 mg total) by mouth once daily. (Patient not taking: Reported on 10/13/2022) 60 tablet 11     ONETOUCH ULTRA TEST Strp Place 3 each onto the skin once daily.       ONETOUCH ULTRA2 METER Misc Place 1 each onto the skin once daily.       PRENATAL VITAMIN 27 mg iron- 0.8 mg Tab Take 1 tablet by mouth once daily. (Patient not taking: Reported on 10/13/2022) 30 tablet 6        SH:   Social History     Socioeconomic History    Marital status: Single   Tobacco Use    Smoking status: Light Smoker     Types: Cigarettes     Passive exposure: Never    Smokeless tobacco: Never    Tobacco comments:     3 cigarettes per day   Substance and Sexual Activity    Alcohol use: Not Currently    Drug use: Never    Sexual activity: Yes       FH:   Family History   Problem Relation Age of Onset    Hypertension Mother     Diabetes Maternal Grandmother     Bipolar disorder Father     Mental illness Father        OBHx:   OB History    Para Term  AB Living   3 1 1 0 1 1   SAB IAB Ectopic Multiple Live Births   0 0 1 0 1      # Outcome Date GA Lbr Maykel/2nd Weight Sex Delivery Anes PTL Lv   3             2 Ectopic 18 7w0d    ECTOPIC   FD   1 Term 06/29/15 40w0d  4.111 kg (9 lb 1 oz) M Vag-Spont EPI N FLACO       Objective:      LMP 2022   Breastfeeding Unknown   There is no height or weight on file to calculate BMI.    General:   alert and cooperative   HEENT:  normocephalic, atraumatic   Lungs:   clear to auscultation bilaterally   Heart:   regular rate and rhythm, S1, S2 normal   Abdomen:  gravid, non-tender, obese   Extremities non-tender, 2+ edema, no clonus, 2+DTRs   Derm: no rashes or lesions   Psych: appropriate mood and affect   Pelvis:  Adequate, proven pelvis       EFM:  Cat 1, 145 modBTV, +accel, no decel (reassuring, reactive)  TOCO: none      2cm/70/-3 per RN    Lab Review  Blood Type A POS  GBBS: negative   Rubella: No results found for: RUBELLAIGGAN   RPR: NR  HIV: neg  HepB: No results found for: HEPBSAG     Lab Results   Component Value Date    WBC 9.2 2022    HGB 10.1 (L) 2022    HCT 33.2 (L) 2022    MCV 81.2 2022     2022           Assessment:     22 y.o.  at 37w5d weeks gestation.      Active Hospital Problems    Diagnosis  POA    *Pre-existing type 2 diabetes mellitus during pregnancy in third trimester [O24.113]  Yes    Type 2 diabetes mellitus without complication, without long-term current use of insulin [E11.9]  Yes    Bipolar II disorder, most recent episode major depressive [F31.81]  Yes    Attention deficit hyperactivity disorder (ADHD) [F90.9]  Yes    Tobacco use [Z72.0]  Yes    Anemia [D64.9]  Yes     Problem added by Discern Expert        Resolved Hospital Problems   No resolved problems to display.          Plan:     1. Risks, benefits, alternatives and possible complications have been discussed in detail with the patient. All questions have been answered, and Ms. Wilson has voiced understanding and agrees to the treatment plan.  2. Consents signed and in chart  3. Admit to Labor and Delivery unit  4. Accuchecks q4hr then q1 with goal   5. IOL begin pitocin augmentation, GBS is negative  6. Epidural PRN  7. Anticipate , discussed RBA including risk shoulder dystocia increased with her poorly controlled DM/obesity as risk factors, although unpredictable, EFW not >4500g to offer C/S  SCD while in BED     Lolita Weaver MD  OB/GYN Hospitalist  9:33 PM 10/17/2022

## 2022-10-19 PROBLEM — O99.343 MENTAL DISORDER AFFECTING PREGNANCY IN THIRD TRIMESTER: Status: RESOLVED | Noted: 2022-05-26 | Resolved: 2022-10-19

## 2022-10-19 PROBLEM — O99.333 TOBACCO SMOKING AFFECTING PREGNANCY IN THIRD TRIMESTER: Status: RESOLVED | Noted: 2022-05-26 | Resolved: 2022-10-19

## 2022-10-19 PROBLEM — O24.113 PRE-EXISTING TYPE 2 DIABETES MELLITUS DURING PREGNANCY IN THIRD TRIMESTER: Status: RESOLVED | Noted: 2022-05-26 | Resolved: 2022-10-19

## 2022-10-19 PROBLEM — R73.01 IMPAIRED FASTING GLUCOSE: Status: RESOLVED | Noted: 2022-08-11 | Resolved: 2022-10-19

## 2022-10-19 LAB
ABO + RH BLD: NORMAL
BASOPHILS # BLD AUTO: 0.05 X10(3)/MCL (ref 0–0.2)
BASOPHILS NFR BLD AUTO: 0.4 %
BLD PROD TYP BPU: NORMAL
BLOOD UNIT EXPIRATION DATE: NORMAL
BLOOD UNIT TYPE CODE: 6200
CROSSMATCH INTERPRETATION: NORMAL
DISPENSE STATUS: NORMAL
EOSINOPHIL # BLD AUTO: 0.24 X10(3)/MCL (ref 0–0.9)
EOSINOPHIL NFR BLD AUTO: 1.9 %
ERYTHROCYTE [DISTWIDTH] IN BLOOD BY AUTOMATED COUNT: 14.8 % (ref 11.5–17)
HCT VFR BLD AUTO: 22.6 % (ref 37–47)
HGB BLD-MCNC: 7 GM/DL (ref 12–16)
IMM GRANULOCYTES # BLD AUTO: 0.1 X10(3)/MCL (ref 0–0.04)
IMM GRANULOCYTES NFR BLD AUTO: 0.8 %
LYMPHOCYTES # BLD AUTO: 2.05 X10(3)/MCL (ref 0.6–4.6)
LYMPHOCYTES NFR BLD AUTO: 16.5 %
MCH RBC QN AUTO: 24.7 PG (ref 27–31)
MCHC RBC AUTO-ENTMCNC: 31 MG/DL (ref 33–36)
MCV RBC AUTO: 79.9 FL (ref 80–94)
MONOCYTES # BLD AUTO: 0.8 X10(3)/MCL (ref 0.1–1.3)
MONOCYTES NFR BLD AUTO: 6.4 %
NEUTROPHILS # BLD AUTO: 9.2 X10(3)/MCL (ref 2.1–9.2)
NEUTROPHILS NFR BLD AUTO: 74 %
NRBC BLD AUTO-RTO: 0 %
PLATELET # BLD AUTO: 227 X10(3)/MCL (ref 130–400)
PMV BLD AUTO: 11.6 FL (ref 7.4–10.4)
POCT GLUCOSE: 67 MG/DL (ref 70–110)
RBC # BLD AUTO: 2.83 X10(6)/MCL (ref 4.2–5.4)
RUBV IGG SERPL IA-ACNC: 0.7
RUBV IGG SERPL QL IA: NEGATIVE
UNIT NUMBER: NORMAL
WBC # SPEC AUTO: 12.5 X10(3)/MCL (ref 4.5–11.5)

## 2022-10-19 PROCEDURE — 11000001 HC ACUTE MED/SURG PRIVATE ROOM

## 2022-10-19 PROCEDURE — 25000003 PHARM REV CODE 250: Performed by: STUDENT IN AN ORGANIZED HEALTH CARE EDUCATION/TRAINING PROGRAM

## 2022-10-19 PROCEDURE — 85025 COMPLETE CBC W/AUTO DIFF WBC: CPT | Performed by: STUDENT IN AN ORGANIZED HEALTH CARE EDUCATION/TRAINING PROGRAM

## 2022-10-19 PROCEDURE — 36430 TRANSFUSION BLD/BLD COMPNT: CPT

## 2022-10-19 PROCEDURE — 36415 COLL VENOUS BLD VENIPUNCTURE: CPT | Performed by: STUDENT IN AN ORGANIZED HEALTH CARE EDUCATION/TRAINING PROGRAM

## 2022-10-19 PROCEDURE — P9016 RBC LEUKOCYTES REDUCED: HCPCS

## 2022-10-19 RX ORDER — HYDROCODONE BITARTRATE AND ACETAMINOPHEN 500; 5 MG/1; MG/1
TABLET ORAL
Status: DISCONTINUED | OUTPATIENT
Start: 2022-10-19 | End: 2022-10-20 | Stop reason: HOSPADM

## 2022-10-19 RX ADMIN — IBUPROFEN 600 MG: 600 TABLET, FILM COATED ORAL at 05:10

## 2022-10-19 RX ADMIN — PRENATAL VITAMINS-IRON FUMARATE 27 MG IRON-FOLIC ACID 0.8 MG TABLET 1 TABLET: at 08:10

## 2022-10-19 RX ADMIN — HYDROCODONE BITARTRATE AND ACETAMINOPHEN 1 TABLET: 7.5; 325 TABLET ORAL at 05:10

## 2022-10-19 RX ADMIN — IBUPROFEN 600 MG: 600 TABLET, FILM COATED ORAL at 11:10

## 2022-10-19 RX ADMIN — HYDROCODONE BITARTRATE AND ACETAMINOPHEN 1 TABLET: 7.5; 325 TABLET ORAL at 08:10

## 2022-10-19 RX ADMIN — IBUPROFEN 600 MG: 600 TABLET, FILM COATED ORAL at 08:10

## 2022-10-19 RX ADMIN — HYDROCODONE BITARTRATE AND ACETAMINOPHEN 1 TABLET: 7.5; 325 TABLET ORAL at 11:10

## 2022-10-19 NOTE — PROGRESS NOTES
Postpartum Progress Note    Valente Wilson is a 22 y.o.  s/p  currently Post-Partum day 1.      Nurse reports no acute events overnight. Patient reports mild abd pain, and dizziness upon ambulating. Ambulating, voiding, and tolerating regular diet. Passing flatus. Lochia is stable. No subjective fever or chills. Pain well controlled with Norco and ibuprofen.  No HA, vision changes, dizziness, N/V, CP, SOB, breast pain or lower extremity pain.  Currently bottle and breast feeding.  Does not desire any contraception at this time. Baby in room.      Physical Exam:   Vitals:    10/18/22 2015 10/19/22 0027 10/19/22 0537 10/19/22 0800   BP: 118/71 113/70  114/76   Pulse: 96 86  82   Resp:   18 16   Temp: 98.7 °F (37.1 °C) 98.2 °F (36.8 °C)  98.3 °F (36.8 °C)   TempSrc: Oral Oral  Oral   SpO2:    100%       Gen: AAO x 3, NAD, resting in bed comfortably  CV: RRR, S1, S2, no murmurs  Resp: Non labored, lungs CTA bilaterally, good air movement  Abd: abdomen soft and NT, + BS     Ext: no edema, calves non tender and equal in size,   Psych: cooperative, normal affect    Labs:  H/H: 8.9/28.7 to 7.0/22.6      Assessment/Plan  22 y.o. female  Status Post Vaginal delivery PPD#1.       Patient Active Problem List   Diagnosis    Attention deficit hyperactivity disorder (ADHD)    Bipolar II disorder, most recent episode major depressive    Obesity    Generalized anxiety disorder    Tobacco use    Gastroesophageal reflux disease without esophagitis    Type 2 diabetes mellitus without complication, without long-term current use of insulin    Migraine with aura    Anemia    Spontaneous vaginal delivery       Continue routine post-partum/operative care, continue to monitor  Patient does plan to Breast and Bottle feed  Continue PNV and Iron.  H/H stable for blood loss experienced during delivery however pt admitted to weakness and dizziness upon ambulating. Discussed with pt about treatment with iron pill vs blood  transfusion. Pt expressed desire to receive a transfusion today. Will transfuse 1 U and reassess pts symptoms.   4. Rubella Immune, Rh Positive, Varicella Immune  Up ad cele; Pelvic Rest for 6 weeks.  DVT PPx - Ambulation   Contraception: Does not wish to use any at this time; would like another baby soon  Dispo: Likely stable for discharge home on Postpartum day #1    Patient and Plan discussed with Dr. Gamaliel Hernandez MD  [facility]

## 2022-10-19 NOTE — ANESTHESIA POSTPROCEDURE EVALUATION
Anesthesia Post Evaluation    Patient: Valente Wilson    Procedure(s) Performed: * No procedures listed *    Final Anesthesia Type: epidural      Patient location during evaluation: labor & delivery  Patient participation: Yes- Able to Participate  Level of consciousness: awake and oriented  Post-procedure vital signs: reviewed and stable  Pain management: adequate  Airway patency: patent    PONV status at discharge: No PONV  Anesthetic complications: no      Cardiovascular status: stable and hemodynamically stable  Respiratory status: unassisted and spontaneous ventilation  Hydration status: euvolemic  Follow-up not needed.      NEURO: Neuraxial block resolved    Vitals Value Taken Time   /76 10/19/22 0800   Temp 36.8 °C (98.3 °F) 10/19/22 0800   Pulse 82 10/19/22 0800   Resp 20 10/19/22 1147   SpO2 100 % 10/19/22 0800         No case tracking events are documented in the log.      Pain/Amauri Score: Pain Rating Prior to Med Admin: 9 (10/19/2022 11:47 AM)  Pain Rating Post Med Admin: 3 (10/18/2022 11:44 PM)

## 2022-10-19 NOTE — PLAN OF CARE
Problem: Breastfeeding  Goal: Effective Breastfeeding  Outcome: Ongoing, Progressing  Intervention: Promote Breast Care and Comfort  Flowsheets (Taken 10/18/2022 2331)  Breast Care: Breastfeeding: open to air  Intervention: Promote Effective Breastfeeding  Flowsheets (Taken 10/18/2022 2331)  Parent/Child Attachment Promotion:   rooming-in promoted   skin-to-skin contact encouraged   strengths emphasized   interaction encouraged   cue recognition promoted   caring behavior modeled  Intervention: Support Exclusive Breastfeeding Success  Flowsheets (Taken 10/18/2022 1945)  Supportive Measures:   active listening utilized   counseling provided  Breastfeeding Support:   lactation counseling provided   maternal hydration promoted   maternal nutrition promoted   maternal rest encouraged

## 2022-10-19 NOTE — PLAN OF CARE
Mother states breastfeeding is going well so far. States the baby's latch is comfortable most of the time. Basics reviewed. Educated on a deep vs shallow latch. Infant latched in a cross cradle position at this time and feeding well. Breastfeeding booklet given. Mother does not remember how long she breast fed her first baby.

## 2022-10-20 VITALS
OXYGEN SATURATION: 100 % | TEMPERATURE: 98 F | SYSTOLIC BLOOD PRESSURE: 112 MMHG | HEART RATE: 81 BPM | RESPIRATION RATE: 20 BRPM | DIASTOLIC BLOOD PRESSURE: 73 MMHG

## 2022-10-20 LAB
BASOPHILS # BLD AUTO: 0.07 X10(3)/MCL (ref 0–0.2)
BASOPHILS NFR BLD AUTO: 0.6 %
EOSINOPHIL # BLD AUTO: 0.27 X10(3)/MCL (ref 0–0.9)
EOSINOPHIL NFR BLD AUTO: 2.4 %
ERYTHROCYTE [DISTWIDTH] IN BLOOD BY AUTOMATED COUNT: 15.2 % (ref 11.5–17)
HCT VFR BLD AUTO: 30.3 % (ref 37–47)
HGB BLD-MCNC: 9.2 GM/DL (ref 12–16)
IMM GRANULOCYTES # BLD AUTO: 0.2 X10(3)/MCL (ref 0–0.04)
IMM GRANULOCYTES NFR BLD AUTO: 1.8 %
LYMPHOCYTES # BLD AUTO: 2.91 X10(3)/MCL (ref 0.6–4.6)
LYMPHOCYTES NFR BLD AUTO: 25.5 %
MCH RBC QN AUTO: 24.9 PG (ref 27–31)
MCHC RBC AUTO-ENTMCNC: 30.4 MG/DL (ref 33–36)
MCV RBC AUTO: 82.1 FL (ref 80–94)
MONOCYTES # BLD AUTO: 0.61 X10(3)/MCL (ref 0.1–1.3)
MONOCYTES NFR BLD AUTO: 5.3 %
NEUTROPHILS # BLD AUTO: 7.4 X10(3)/MCL (ref 2.1–9.2)
NEUTROPHILS NFR BLD AUTO: 64.4 %
NRBC BLD AUTO-RTO: 0 %
PLATELET # BLD AUTO: 294 X10(3)/MCL (ref 130–400)
PMV BLD AUTO: 11.2 FL (ref 7.4–10.4)
POCT GLUCOSE: 72 MG/DL (ref 70–110)
RBC # BLD AUTO: 3.69 X10(6)/MCL (ref 4.2–5.4)
WBC # SPEC AUTO: 11.4 X10(3)/MCL (ref 4.5–11.5)

## 2022-10-20 PROCEDURE — 25000003 PHARM REV CODE 250: Performed by: STUDENT IN AN ORGANIZED HEALTH CARE EDUCATION/TRAINING PROGRAM

## 2022-10-20 PROCEDURE — 85025 COMPLETE CBC W/AUTO DIFF WBC: CPT

## 2022-10-20 PROCEDURE — 36415 COLL VENOUS BLD VENIPUNCTURE: CPT

## 2022-10-20 RX ORDER — HYDROCODONE BITARTRATE AND ACETAMINOPHEN 5; 325 MG/1; MG/1
1 TABLET ORAL EVERY 4 HOURS PRN
Qty: 5 TABLET | Refills: 0 | Status: SHIPPED | OUTPATIENT
Start: 2022-10-20 | End: 2023-02-13

## 2022-10-20 RX ORDER — IBUPROFEN 600 MG/1
600 TABLET ORAL EVERY 6 HOURS
Status: CANCELLED | OUTPATIENT
Start: 2022-10-20

## 2022-10-20 RX ORDER — IBUPROFEN 600 MG/1
600 TABLET ORAL EVERY 6 HOURS PRN
Qty: 28 TABLET | Refills: 0 | Status: SHIPPED | OUTPATIENT
Start: 2022-10-20 | End: 2022-11-03

## 2022-10-20 RX ORDER — DOCUSATE SODIUM 100 MG/1
100 CAPSULE, LIQUID FILLED ORAL 2 TIMES DAILY PRN
Qty: 10 CAPSULE | Refills: 0 | Status: SHIPPED | OUTPATIENT
Start: 2022-10-20 | End: 2022-10-25

## 2022-10-20 RX ADMIN — IBUPROFEN 600 MG: 600 TABLET, FILM COATED ORAL at 08:10

## 2022-10-20 RX ADMIN — PRENATAL VITAMINS-IRON FUMARATE 27 MG IRON-FOLIC ACID 0.8 MG TABLET 1 TABLET: at 08:10

## 2022-10-20 RX ADMIN — HYDROCODONE BITARTRATE AND ACETAMINOPHEN 1 TABLET: 7.5; 325 TABLET ORAL at 08:10

## 2022-10-20 RX ADMIN — IBUPROFEN 600 MG: 600 TABLET, FILM COATED ORAL at 02:10

## 2022-10-20 NOTE — DISCHARGE SUMMARY
Delivery Discharge Summary  [facility]    Admission date: 10/17/2022  Discharge date: 10/20/2022    Admit Dx:   Patient Active Problem List   Diagnosis    Attention deficit hyperactivity disorder (ADHD)    Bipolar II disorder, most recent episode major depressive    Obesity    Generalized anxiety disorder    Tobacco use    Gastroesophageal reflux disease without esophagitis    Type 2 diabetes mellitus without complication, without long-term current use of insulin    Migraine with aura    Anemia    Spontaneous vaginal delivery      Discharge Dx:    Patient Active Problem List   Diagnosis    Attention deficit hyperactivity disorder (ADHD)    Bipolar II disorder, most recent episode major depressive    Obesity    Generalized anxiety disorder    Tobacco use    Gastroesophageal reflux disease without esophagitis    Type 2 diabetes mellitus without complication, without long-term current use of insulin    Migraine with aura    Anemia    Spontaneous vaginal delivery     Procedure:     Hospital Course:  Valente Wilson is a 22 y.o. now  female who was admitted on 10/17/2022 for induction of labor 2/2 pre-existing type II DM with concern for poor control/non compliance on metformin. She had the benefit of an epidural, and external fetal monitoring. Patient and infant tolerated labor well with pitocin. Patient delivered a viable  via  at 37^6 WGA on 10/18/22. Apgars 8/9. Vaginal exam after delivery without laceration. Patient with postpartum hemorrhage with EBL 1000 mL due to uterine atony. Patient received IV pitocin, IM methergine and uterine message with initial response but subsequent return of uterine atony. Buccal misoprostol given. Bleeding decreased and uterus became firm. Please see delivery note for further details. Pt was in stable condition post delivery and was transferred to the Mother-Baby Unit. POD 1 H/H 7.0/22.6 with patient reporting symptoms of anemia. Patient offered iron  vs transfusion and decision made for transfusion. Transfused 1 unit without complication. H/H 9.2/30.3 day of discharge. On the date of discharge, patient's pain is controlled with oral pain medications. No fever or chills. She is ambulating without lightheadedness, change in vision, SOB or CP, and tolerating PO diet without N/V. Good urinary function. Reported lochia is within the normal range. Patient has had no complaints or questions that were not addressed during the duration of her stay. Pt in stable condition and ready for discharge on PPD 2.     Physical exam:   Vitals:   Vitals:    10/19/22 2044 10/20/22 0012 10/20/22 0750 10/20/22 0850   BP:  112/76 112/73    Pulse:  79 81    Resp: 16  18 20   Temp:  98.1 °F (36.7 °C) 98.1 °F (36.7 °C)    TempSrc:  Oral Oral    SpO2:          Gen: AAO x 3, NAD  HEENT: MMM, mild subconjunctival pallor   CV: RRR  Resp: Non labored breathing    Abd: large body habitus, soft  : uterus firm below umbilicus  Ext: no edema, DP and Radial 2+     Pertinent studies:  Postpartum CBC  H/H: 8.9/28.7 -> 7.0/22.6 -> 9.2/30.3 (s/p 1 unit pRBC)    Prenatal labs reviewed   Rh-positive, rubella and varicella non immune  Admission H/H 10.2/33.3    Disposition: To home, self care    Follow Up: [facility] with Dr. Melgoza in 3 weeks (11/7/22 @ 10 am)    Patient Instructions:   1. Report to OB ED or call clinic for any bleeding >2 pads/hour for >2 hours, temperature >100.4, foul smelling discharge, pain uncontrolled with medications, incision with warmth or drainage,or or for any other concerns.  2. Pelvic rest x6 weeks  3. Rx for Norco 5, Motrin and Colace provided. No driving while on narcotics.  4. Post partum contraception: declined, fertility status discussed, recommendation for interpregnancy interval >18 months, patient expressed understanding   5. Daily PNV, continue metformin as prescribed prior to pregnancy     Current Discharge Medication List        START taking these  medications    Details   docusate sodium (COLACE) 100 MG capsule Take 1 capsule (100 mg total) by mouth 2 (two) times daily as needed for Constipation.  Qty: 10 capsule, Refills: 0      HYDROcodone-acetaminophen (NORCO) 5-325 mg per tablet Take 1 tablet by mouth every 4 (four) hours as needed for Pain.  Qty: 5 tablet, Refills: 0    Comments: Quantity prescribed more than 7 day supply? No      ibuprofen (ADVIL,MOTRIN) 600 MG tablet Take 1 tablet (600 mg total) by mouth every 6 (six) hours as needed for Pain.  Qty: 28 tablet, Refills: 0           CONTINUE these medications which have NOT CHANGED    Details   metFORMIN (GLUCOPHAGE) 500 MG tablet Take 1 tablet (500 mg total) by mouth As instructed (prediabetes). Take 2 tablets (1000mg) in the morning and 1 tablet (500mg) at night  Qty: 90 tablet, Refills: 3    Associated Diagnoses: Impaired fasting glucose      PRENATAL VITAMIN 27 mg iron- 0.8 mg Tab Take 1 tablet by mouth once daily.  Qty: 30 tablet, Refills: 6           STOP taking these medications       aspirin 81 MG Chew Comments:   Reason for Stopping:         famotidine (PEPCID) 20 MG tablet Comments:   Reason for Stopping:         blood-glucose meter kit Comments:   Reason for Stopping:         lansoprazole (PREVACID SOLUTAB) 15 MG disintegrating tablet Comments:   Reason for Stopping:         ONETOUCH ULTRA TEST Strp Comments:   Reason for Stopping:         ONETOUCH ULTRA2 METER Misc Comments:   Reason for Stopping:               Time spent on discharge: 35 minutes    Sharmila Polanco MD  U  Resident, [facility]

## 2022-10-20 NOTE — PLAN OF CARE
Problem: Adult Inpatient Plan of Care  Goal: Plan of Care Review  Outcome: Ongoing, Progressing  Goal: Patient-Specific Goal (Individualized)  Outcome: Ongoing, Progressing  Goal: Absence of Hospital-Acquired Illness or Injury  Outcome: Ongoing, Progressing  Goal: Optimal Comfort and Wellbeing  Outcome: Ongoing, Progressing  Goal: Readiness for Transition of Care  Outcome: Ongoing, Progressing     Problem: Diabetes Comorbidity  Goal: Blood Glucose Level Within Targeted Range  Outcome: Ongoing, Progressing     Problem: Bariatric Environmental Safety  Goal: Safety Maintained with Care  Outcome: Ongoing, Progressing     Problem:  Fall Injury Risk  Goal: Absence of Fall, Infant Drop and Related Injury  Outcome: Ongoing, Progressing     Problem: Infection  Goal: Absence of Infection Signs and Symptoms  Outcome: Ongoing, Progressing     Problem: Breastfeeding  Goal: Effective Breastfeeding  Outcome: Ongoing, Progressing

## 2022-10-20 NOTE — DISCHARGE INSTRUCTIONS
1. Report to OB ED or call clinic for any bleeding >2 pads/hour for >2 hours, temperature >100.4, foul smelling discharge, pain uncontrolled with medications, incision with warmth or drainage,or or for any other concerns.  2. Pelvic rest x6 weeks  3. Rx for Percocet, Motrin and Colace provided. No driving while on narcotics.

## 2022-10-21 ENCOUNTER — PATIENT OUTREACH (OUTPATIENT)
Dept: ADMINISTRATIVE | Facility: CLINIC | Age: 22
End: 2022-10-21
Payer: MEDICAID

## 2022-10-21 NOTE — PROGRESS NOTES
C3 nurse attempted to contact Valente iWlson for a TCC post hospital discharge follow up call. No answer, left VM, CB# provided.  The patient has a scheduled 3 wk pp HOSFU appointment with [facility] on 11/7/22 @ 1000.

## 2022-10-22 ENCOUNTER — PATIENT MESSAGE (OUTPATIENT)
Dept: ADMINISTRATIVE | Facility: OTHER | Age: 22
End: 2022-10-22
Payer: MEDICAID

## 2022-10-24 NOTE — PROGRESS NOTES
C3 nurse spoke with Valente Wilson for a TCC post hospital discharge follow up call, call completed. The patient has a scheduled HOSFU appointment with [facility] on 11/7/22 @ 1000.

## 2022-10-24 NOTE — PROGRESS NOTES
I have personally reviewed the review of systems (ROS) and past, family and social histories (PFSH) documented above by the resident.  I have reviewed the care furnished by the resident during the encounter, including a review of the patient's medical history, the resident's findings on physical examination, diagnosis, and the treatment plan.  I participated in the management of the patient and was immediately available throughout the encounter.   I was physically present during all key portions of the service(s) provided with the resident.  Services were furnished in a primary care center located in the outpatient department of a [facility].

## 2022-11-07 ENCOUNTER — OFFICE VISIT (OUTPATIENT)
Dept: FAMILY MEDICINE | Facility: CLINIC | Age: 22
End: 2022-11-07
Payer: MEDICAID

## 2022-11-07 VITALS
SYSTOLIC BLOOD PRESSURE: 106 MMHG | OXYGEN SATURATION: 100 % | HEIGHT: 71 IN | WEIGHT: 293 LBS | HEART RATE: 93 BPM | DIASTOLIC BLOOD PRESSURE: 72 MMHG | RESPIRATION RATE: 18 BRPM | TEMPERATURE: 98 F | BODY MASS INDEX: 41.02 KG/M2

## 2022-11-07 DIAGNOSIS — D64.9 ANEMIA, UNSPECIFIED TYPE: ICD-10-CM

## 2022-11-07 DIAGNOSIS — K59.00 CONSTIPATION, UNSPECIFIED CONSTIPATION TYPE: ICD-10-CM

## 2022-11-07 LAB
BASOPHILS # BLD AUTO: 0.09 X10(3)/MCL (ref 0–0.2)
BASOPHILS NFR BLD AUTO: 0.9 %
EOSINOPHIL # BLD AUTO: 0.73 X10(3)/MCL (ref 0–0.9)
EOSINOPHIL NFR BLD AUTO: 7.2 %
ERYTHROCYTE [DISTWIDTH] IN BLOOD BY AUTOMATED COUNT: 15.4 % (ref 11.5–17)
HCT VFR BLD AUTO: 33.7 % (ref 37–47)
HGB BLD-MCNC: 10 GM/DL (ref 12–16)
IMM GRANULOCYTES # BLD AUTO: 0.05 X10(3)/MCL (ref 0–0.04)
IMM GRANULOCYTES NFR BLD AUTO: 0.5 %
LYMPHOCYTES # BLD AUTO: 3.08 X10(3)/MCL (ref 0.6–4.6)
LYMPHOCYTES NFR BLD AUTO: 30.4 %
MCH RBC QN AUTO: 24.4 PG (ref 27–31)
MCHC RBC AUTO-ENTMCNC: 29.7 MG/DL (ref 33–36)
MCV RBC AUTO: 82.2 FL (ref 80–94)
MONOCYTES # BLD AUTO: 0.62 X10(3)/MCL (ref 0.1–1.3)
MONOCYTES NFR BLD AUTO: 6.1 %
NEUTROPHILS # BLD AUTO: 5.6 X10(3)/MCL (ref 2.1–9.2)
NEUTROPHILS NFR BLD AUTO: 54.9 %
NRBC BLD AUTO-RTO: 0 %
PLATELET # BLD AUTO: 437 X10(3)/MCL (ref 130–400)
PMV BLD AUTO: 11.6 FL (ref 7.4–10.4)
RBC # BLD AUTO: 4.1 X10(6)/MCL (ref 4.2–5.4)
WBC # SPEC AUTO: 10.1 X10(3)/MCL (ref 4.5–11.5)

## 2022-11-07 PROCEDURE — 85025 COMPLETE CBC W/AUTO DIFF WBC: CPT

## 2022-11-07 PROCEDURE — 36415 COLL VENOUS BLD VENIPUNCTURE: CPT

## 2022-11-07 PROCEDURE — 99213 OFFICE O/P EST LOW 20 MIN: CPT | Mod: PBBFAC | Performed by: NURSE PRACTITIONER

## 2022-11-07 RX ORDER — POLYETHYLENE GLYCOL 3350 17 G/17G
17 POWDER, FOR SOLUTION ORAL 2 TIMES DAILY
Qty: 60 PACKET | Refills: 0 | Status: SHIPPED | OUTPATIENT
Start: 2022-11-07 | End: 2022-12-07

## 2022-11-07 RX ORDER — NORELGESTROMIN AND ETHINYL ESTRADIOL 35; 150 UG/MG; UG/MG
1 PATCH TRANSDERMAL WEEKLY
Qty: 12 PATCH | Refills: 0 | Status: SHIPPED | OUTPATIENT
Start: 2022-11-07 | End: 2023-02-13

## 2022-11-07 NOTE — PROGRESS NOTES
Family Medicine Clinic Note:    PCP: Audrey Melgoza MD    Valente Wilson is a 22 y.o. female   female presents to clinic today for c/o constipation and post partum    Per chart review: 10/17/2022 had induction of labor 2/2 pre-existing type II DM with concern for poor control/non compliance on metformin.   at 37^6 WGA on 10/18/22. Apgars 8/9. Vaginal exam after delivery without laceration.  postpartum hemorrhage with EBL 1000 mL due to uterine atony. Patient received IV pitocin, IM methergine and uterine message with initial response but subsequent return of uterine atony. Buccal misoprostol given. Bleeding decreased and uterus became firm.  POD 1 H/H 7.0/22.6 with patient reporting symptoms of anemia. Patient offered iron vs transfusion and decision made for transfusion. Transfused 1 unit without complication. H/H 9.2/30.3 day of discharge.  Rh-positive, rubella and varicella non immune. Pt was discharged on Norco, colace and Motrin.     Subjective:     23 yo F presents for postpartum visit. Delivered _10/18/22  female infant at via  without complication.  e)?    Complaints: Constipation and for the Bipolar II latuda makes her sleepy  Changes/challenges since birth: No challenges  Current medications: colace, Norco and Motrin     Infant feeding: bottle feeding with Similac every 3-4 hours and breast feeding TID and  10-15 minutes on each breast and pumping  Difficulties?: none    Bleeding: no  Amount, color, odor: n/a    Voids/stools:  Dysuria, incontinence: no issues  Constipation, hemorrhoids, incontinence: Charles was not working  Having a BM once every 2-3 days  LBM last night   Drinking three 16 ounces of water everyday    Incision/lacerations: none; s/p vaginal delivery with no lacerations    Safety: feels safe at home, lives in [place], [place]; boyfriend helps out a lot with baby    Nutrition/exercise:  has not started to exercise -   Emotions:  Anxiety?: yes   Difficulty  sleeping, sad, crying, suicidal thoughts?: No doing well  Depression is controlled with the Latuda but it is making her drowsy and she is breast feeding at night  Denies and SI/HI    Contraception: requesting the patches (Xulane)  Sexuality/pelvic rest::  sexually active   Smoking, alcohol, drug use: smoking 1/4 PPD   Infant care: No issues caring for infant, feeding well  Sleep/rest: no issues, sleeping well  Support: has good support system at home;  very helpful    Resources:  WIC program?: yes; receives [facility]  Housing: lives in [place], [place] with     VTE: any SOB or calf tenderness?  Postpartum depression: PHQ-9 score 6  Intimate partner violence: HITS score 5 (>10 is positive)  Urinary incontinence: none  Hemorrhoids: none  Constipation: currenttly not taking stool softeners, last BM  11/6/22  +hard, blood none  Breastfeeding: 10-15 minutes on each breast and pumping  Postpartum weight: 156 lb  Sexuality: heterosexuality  Contraception: none currently        ROS  Constitutional: No fevers, chills or fatigue  Respiratory: no trouble breathing or SOB  Cardiovascular: no chest pain or tightness, no SOB on activity, no ankle swelling  Gastrointestinal: no constipation, no diarrhea, no nausea, no vomiting  Musculoskeletal: no muscle pain, no joint pain    Current Outpatient Medications   Medication Sig Dispense Refill    HYDROcodone-acetaminophen (NORCO) 5-325 mg per tablet Take 1 tablet by mouth every 4 (four) hours as needed for Pain. 5 tablet 0    metFORMIN (GLUCOPHAGE) 500 MG tablet Take 1 tablet (500 mg total) by mouth As instructed (prediabetes). Take 2 tablets (1000mg) in the morning and 1 tablet (500mg) at night 90 tablet 3    PRENATAL VITAMIN 27 mg iron- 0.8 mg Tab Take 1 tablet by mouth once daily. 30 tablet 6    norelgestromin-ethinyl estradiol 150-35 mcg/24 hr Place 1 patch onto the skin once a week. 12 patch 0    polyethylene glycol (GLYCOLAX) 17 gram PwPk Take 17 g by mouth 2  "(two) times daily. 60 packet 0     No current facility-administered medications for this visit.       Objective:     /72 (BP Location: Left arm, Patient Position: Sitting, BP Method: Large (Automatic))   Pulse 93   Temp 97.9 °F (36.6 °C) (Oral)   Resp 18   Ht 5' 10.98" (1.803 m)   Wt 135.2 kg (298 lb)   LMP 01/26/2022   SpO2 100%   Breastfeeding Yes   BMI 41.58 kg/m²   BP Readings from Last 3 Encounters:   11/07/22 106/72   10/20/22 112/73   10/13/22 120/63     Wt Readings from Last 3 Encounters:   11/07/22 135.2 kg (298 lb)   10/13/22 (!) 141.5 kg (312 lb)   10/10/22 (!) 141.7 kg (312 lb 6.3 oz)     Recent Results (from the past 200 hour(s))   CBC with Differential    Collection Time: 11/07/22 12:00 PM   Result Value Ref Range    WBC 10.1 4.5 - 11.5 x10(3)/mcL    RBC 4.10 (L) 4.20 - 5.40 x10(6)/mcL    Hgb 10.0 (L) 12.0 - 16.0 gm/dL    Hct 33.7 (L) 37.0 - 47.0 %    MCV 82.2 80.0 - 94.0 fL    MCH 24.4 (L) 27.0 - 31.0 pg    MCHC 29.7 (L) 33.0 - 36.0 mg/dL    RDW 15.4 11.5 - 17.0 %    Platelet 437 (H) 130 - 400 x10(3)/mcL    MPV 11.6 (H) 7.4 - 10.4 fL    Neut % 54.9 %    Lymph % 30.4 %    Mono % 6.1 %    Eos % 7.2 %    Basophil % 0.9 %    Lymph # 3.08 0.6 - 4.6 x10(3)/mcL    Neut # 5.6 2.1 - 9.2 x10(3)/mcL    Mono # 0.62 0.1 - 1.3 x10(3)/mcL    Eos # 0.73 0 - 0.9 x10(3)/mcL    Baso # 0.09 0 - 0.2 x10(3)/mcL    IG# 0.05 (H) 0 - 0.04 x10(3)/mcL    IG% 0.5 %    NRBC% 0.0 %     Body mass index is 41.58 kg/m².    Physical Exam  Constitutional: NAD  Lungs: CTAB, No crackles, No wheezes  Cardiovascular: Normal heart sounds, No MRG  Abdomen: Soft, Nontender, No palpable hepatosplenomegaly, No abdominal masses, No ascites  Extremities: No cyanosis, No clubbing, No edema    LABS: no new labs      Assessment:   Valente Wilson is a 22 y.o. female with:    1. Postpartum exam    2. Anemia, unspecified type    3. Constipation, unspecified constipation type        Plan:   Post partum    doing well; encourage " ambulation  - contraception: Xulane patches and side effects discussed and discussed to start within 24 hours of menses onset.  Advised to use back up birth control x the first 7 days of use. Instructed on alternating sites.  - post partum blues/depression discussed, information provided  -edinburgh postpartum depression score not available but phq 9 is 6  - ER Precautions discussed   -Begin exercising with walking daily for at least 30 minutes and increase as tolerated    Constipation  -Ordered Miralax BID  -Okay to use Fiber One OTC  and increase  fruits and vegetable  -Increase water intake    Pre Diabetes  -pt was prediabetic prior to pregnancy and does not feel that she had GDM  -last A1C 5.6 on 9/29/22 (The highest A1C is 6.4)  -She was on  metformin to assist with getting pregnant and for PCOS.  She is currently on Metformin 1500 mg daily.  Will consider tapering down to 500 mg daily next visit      Bipolar II  -Pt is established with [facility] and was instructed to call and make an appointment for  for medication management regarding changing the Latuda.  -Discussed with sonja ()      Anemia  -Check CBC today  -Pt is asymptomatic      Follow up in about 2 weeks (around 11/21/2022) for adhd f/up and consider tapering down on the metformin.    Future Appointments   Date Time Provider Department Center   11/22/2022 12:50 PM ZULEMA [facility] GYN [place]   11/29/2022 10:20 AM RESIDENT 5, [facility] FAMILY MEDICINE [facility] FM RES [facility]       Staff: Dr. Karo Cade MD  Family Medicine PGY-2

## 2022-11-08 ENCOUNTER — PATIENT MESSAGE (OUTPATIENT)
Dept: MATERNAL FETAL MEDICINE | Facility: CLINIC | Age: 22
End: 2022-11-08
Payer: MEDICAID

## 2022-11-29 ENCOUNTER — DOCUMENTATION ONLY (OUTPATIENT)
Dept: FAMILY MEDICINE | Facility: CLINIC | Age: 22
End: 2022-11-29
Payer: MEDICAID

## 2022-11-29 ENCOUNTER — OFFICE VISIT (OUTPATIENT)
Dept: FAMILY MEDICINE | Facility: CLINIC | Age: 22
End: 2022-11-29
Payer: MEDICAID

## 2022-11-29 VITALS
WEIGHT: 293 LBS | TEMPERATURE: 97 F | RESPIRATION RATE: 20 BRPM | OXYGEN SATURATION: 100 % | SYSTOLIC BLOOD PRESSURE: 120 MMHG | HEIGHT: 70 IN | HEART RATE: 82 BPM | DIASTOLIC BLOOD PRESSURE: 81 MMHG | BODY MASS INDEX: 41.95 KG/M2

## 2022-11-29 DIAGNOSIS — F90.2 ATTENTION DEFICIT HYPERACTIVITY DISORDER (ADHD), COMBINED TYPE: Primary | ICD-10-CM

## 2022-11-29 DIAGNOSIS — Z13.31 POSITIVE DEPRESSION SCREENING: ICD-10-CM

## 2022-11-29 PROCEDURE — 99213 OFFICE O/P EST LOW 20 MIN: CPT | Mod: PBBFAC

## 2022-11-29 RX ORDER — DEXTROAMPHETAMINE SACCHARATE, AMPHETAMINE ASPARTATE MONOHYDRATE, DEXTROAMPHETAMINE SULFATE AND AMPHETAMINE SULFATE 5; 5; 5; 5 MG/1; MG/1; MG/1; MG/1
20 CAPSULE, EXTENDED RELEASE ORAL EVERY MORNING
Qty: 7 CAPSULE | Refills: 0 | Status: SHIPPED | OUTPATIENT
Start: 2022-11-29 | End: 2023-02-13

## 2022-11-29 NOTE — PROGRESS NOTES
"Chief Complaint  ADHD and Medication Refill      History of Present Illness  Valente Wilson is a 22 y.o. year old female presents to the clinic for requesting prescription for Adderall. Pt is s/p spontaneous vaginal delivery on 10/18/22. States she used to take Adderall 20mg prior to her pregnancy but was discontinued when she found out she was pregnant. She is planning on going back to work and would like to get back on the medicine so she can focus better. She is not in school currently.   Also diagnosed with Bipolar disorder, seeing Ms. Parker. Used to be on Latuda, but since it was making her drowsy, she does not want to take it while having to care for the baby. Pt is currently both breast and formula feeding.   In addition, pt also reports feeling down and her mood being sad more often than not. Previous PHQ 6 was a score of 9, she states her score was 19 at the beginning of her pregnancy. Was never on any anti-depressants. Not suicidal, not homicidal. Denies having any thoughts hurting her children (including her )    Pf note, pt states she has not really taken the Metformin during her pregnancy, only a "few times". Pt was diagnosed with pre-diabetes and was initiated on Metformin to prevent progression. No sx of polyuria or polydipsia currently.     For contraception, she has elected to use the transdermal patch, however she has not gotten her menstrual period yet and was instructed to wait till then to initiate it.       Review of Systems   Constitutional:  Negative for chills, fever and weight loss.   Eyes:  Negative for double vision.   Respiratory:  Negative for shortness of breath.    Cardiovascular:  Negative for chest pain, palpitations and leg swelling.   Gastrointestinal:  Negative for diarrhea, nausea and vomiting.   Genitourinary:  Negative for dysuria.   Neurological:  Negative for dizziness and headaches.   Psychiatric/Behavioral:  Positive for depression. Negative for substance " abuse and suicidal ideas. The patient does not have insomnia.        Physical Exam  Constitutional:       Appearance: She is obese. She is not ill-appearing.   Cardiovascular:      Rate and Rhythm: Normal rate and regular rhythm.   Pulmonary:      Effort: Pulmonary effort is normal.      Breath sounds: Normal breath sounds.   Musculoskeletal:         General: Normal range of motion.      Cervical back: Normal range of motion.   Neurological:      General: No focal deficit present.   Psychiatric:         Attention and Perception: Attention normal.         Mood and Affect: Mood normal.         Speech: Speech normal.         Behavior: Behavior normal. Behavior is cooperative.       Vitals:    22 1103   BP: 120/81   Pulse: 82   Resp: 20   Temp: 97.2 °F (36.2 °C)     Wt Readings from Last 2 Encounters:   22 136.1 kg (300 lb)   22 135.2 kg (298 lb)         Current Outpatient Medications  Current Outpatient Medications   Medication Instructions    HYDROcodone-acetaminophen (NORCO) 5-325 mg per tablet 1 tablet, Oral, Every 4 hours PRN    metFORMIN (GLUCOPHAGE) 500 mg, Oral, See admin instructions, Take 2 tablets (1000mg) in the morning and 1 tablet (500mg) at night    norelgestromin-ethinyl estradiol 150-35 mcg/24 hr 1 patch, Transdermal, Weekly    polyethylene glycol (GLYCOLAX) 17 g, Oral, 2 times daily    PRENATAL VITAMIN 27 mg iron- 0.8 mg Tab 1 tablet, Oral, Daily             Assessment / Plan:    1. Attention deficit hyperactivity disorder (ADHD), combined type  -pt requesting Rx for Adderall. Discussed with patient at length, that this medicine is not safe to take during lactation. Pt verbalized she will stop breastfeeding just so she can take the medicine as she is not able to go back to work without it.   - discussed with patient that it is imperative she stops breast feeding as this medicine can get into the breast milk even at low doses and can lead to abnormalities in her  that are not  well studied  -Paper Rx for Adderall  given to patient for 1 week duration, discussed with patient that she will need to follow up with her PCP if she desires more refills  -also, given history of Bipolar disorder, Adderall should be used with great caution with history of underlying bipolar disorder, bipolar disorder risk and psychosis as it can precipitate mami   -recommended patient to follow up with a mental health provider to better optimize her medicines so it is safe for both herself and her  given she is breast feeding  -pt was supposed to be set up with [facility], but has not had a chance to make an appointment yet      2. Positive depression screening  -PHQ-9 score 10 today  -no suicidal or homicidal or infanticidal thoughts  -discussed with patient that initiating an anti-depressant such as Zoloft could also exacerbate her mami given her underlying bipolar disorder  -encouraged patient to continue with counseling       Follow up:    In 2 weeks with PCP, or earlier if needed.     Meena Milan M.D.  [facility] PGY-2

## 2022-11-29 NOTE — LETTER
November 29, 2022      [facility]  [address] [facility] [address]  Phone: [phone]       Patient: Valente Wilson   YOB: 2000  Date of Visit: 11/29/2022    To Whom It May Concern:    Rachael Wilson  was at [facility] on 11/29/2022. The patient may return to work/school on 11/30/22 with no restrictions. If you have any questions or concerns, or if I can be of further assistance, please do not hesitate to contact me.    Sincerely,    Meena Milan MD

## 2023-01-04 NOTE — PROGRESS NOTES
Pt has been a no show.  She has been notified by the  Selina Staley. Selina stated that she has called and left messages.  I have also called this morning and left a message.  Selina to send out a letter to the patient regarding follow up.    Audrey Cade MD, HO-3  [facility] Family Medicine

## 2023-01-04 NOTE — PROGRESS NOTES
Faculty Attestation: Patient was seen in [facility] Family Medicine clinic. Patient was seen and examined by the resident.  I have personally reviewed History of the Present Illness , Review of Systems, PFSH , Physical Exam, Assessment and Plan documented by the resident. I was immediately available throughout the encounter. Importance of adherence to treatment recommendations discussed with patient at the time of the visit. Services were furnished in a primary care center in the outpatient department at a [facility] hospital. I discussed the patient with the resident and agree with resident's findings and plan as documented in the resident note. Bianca Crane MD

## 2023-02-13 ENCOUNTER — OFFICE VISIT (OUTPATIENT)
Dept: FAMILY MEDICINE | Facility: CLINIC | Age: 23
End: 2023-02-13
Payer: MEDICAID

## 2023-02-13 VITALS
RESPIRATION RATE: 18 BRPM | WEIGHT: 293 LBS | DIASTOLIC BLOOD PRESSURE: 75 MMHG | OXYGEN SATURATION: 98 % | TEMPERATURE: 98 F | HEART RATE: 87 BPM | BODY MASS INDEX: 41.95 KG/M2 | HEIGHT: 70 IN | SYSTOLIC BLOOD PRESSURE: 115 MMHG

## 2023-02-13 DIAGNOSIS — Z30.02 ENCOUNTER FOR COUNSELING AND INSTRUCTION IN NATURAL FAMILY PLANNING TO AVOID PREGNANCY: Primary | ICD-10-CM

## 2023-02-13 DIAGNOSIS — F90.2 ATTENTION DEFICIT HYPERACTIVITY DISORDER (ADHD), COMBINED TYPE: ICD-10-CM

## 2023-02-13 DIAGNOSIS — F31.81 BIPOLAR II DISORDER, MOST RECENT EPISODE MAJOR DEPRESSIVE: ICD-10-CM

## 2023-02-13 DIAGNOSIS — R73.01 IMPAIRED FASTING GLUCOSE: ICD-10-CM

## 2023-02-13 PROCEDURE — 99214 OFFICE O/P EST MOD 30 MIN: CPT | Mod: PBBFAC | Performed by: NURSE PRACTITIONER

## 2023-02-13 RX ORDER — DEXTROAMPHETAMINE SACCHARATE, AMPHETAMINE ASPARTATE, DEXTROAMPHETAMINE SULFATE AND AMPHETAMINE SULFATE 5; 5; 5; 5 MG/1; MG/1; MG/1; MG/1
1 TABLET ORAL DAILY
Qty: 31 TABLET | Refills: 0 | Status: SHIPPED | OUTPATIENT
Start: 2023-03-17 | End: 2023-02-27

## 2023-02-13 RX ORDER — METFORMIN HYDROCHLORIDE 500 MG/1
500 TABLET ORAL
Qty: 93 TABLET | Refills: 0
Start: 2023-02-13 | End: 2023-06-08

## 2023-02-13 RX ORDER — DEXTROAMPHETAMINE SACCHARATE, AMPHETAMINE ASPARTATE, DEXTROAMPHETAMINE SULFATE AND AMPHETAMINE SULFATE 5; 5; 5; 5 MG/1; MG/1; MG/1; MG/1
1 TABLET ORAL DAILY
Qty: 31 TABLET | Refills: 0 | Status: SHIPPED | OUTPATIENT
Start: 2023-03-17 | End: 2023-02-13

## 2023-02-13 RX ORDER — DEXTROAMPHETAMINE SACCHARATE, AMPHETAMINE ASPARTATE, DEXTROAMPHETAMINE SULFATE AND AMPHETAMINE SULFATE 5; 5; 5; 5 MG/1; MG/1; MG/1; MG/1
1 TABLET ORAL DAILY
Qty: 31 TABLET | Refills: 0 | Status: SHIPPED | OUTPATIENT
Start: 2023-04-18 | End: 2023-02-13

## 2023-02-13 RX ORDER — DEXTROAMPHETAMINE SACCHARATE, AMPHETAMINE ASPARTATE, DEXTROAMPHETAMINE SULFATE AND AMPHETAMINE SULFATE 5; 5; 5; 5 MG/1; MG/1; MG/1; MG/1
1 TABLET ORAL DAILY
Qty: 31 TABLET | Refills: 0 | Status: SHIPPED | OUTPATIENT
Start: 2023-04-18 | End: 2023-02-27

## 2023-02-13 RX ORDER — DEXTROAMPHETAMINE SACCHARATE, AMPHETAMINE ASPARTATE, DEXTROAMPHETAMINE SULFATE AND AMPHETAMINE SULFATE 5; 5; 5; 5 MG/1; MG/1; MG/1; MG/1
1 TABLET ORAL DAILY
Qty: 31 TABLET | Refills: 0 | Status: SHIPPED | OUTPATIENT
Start: 2023-02-13 | End: 2023-02-13

## 2023-02-13 RX ORDER — DEXTROAMPHETAMINE SACCHARATE, AMPHETAMINE ASPARTATE, DEXTROAMPHETAMINE SULFATE AND AMPHETAMINE SULFATE 5; 5; 5; 5 MG/1; MG/1; MG/1; MG/1
1 TABLET ORAL DAILY
Qty: 31 TABLET | Refills: 0 | Status: SHIPPED | OUTPATIENT
Start: 2023-02-13 | End: 2023-02-27

## 2023-02-13 RX ORDER — METFORMIN HYDROCHLORIDE 500 MG/1
1500 TABLET ORAL SEE ADMIN INSTRUCTIONS
Qty: 93 TABLET | Refills: 3 | Status: SHIPPED | OUTPATIENT
Start: 2023-02-13 | End: 2023-02-13 | Stop reason: SDUPTHER

## 2023-02-13 RX ORDER — NORELGESTROMIN AND ETHINYL ESTRADIOL 35; 150 UG/MG; UG/MG
1 PATCH TRANSDERMAL WEEKLY
Qty: 4 PATCH | Refills: 11 | Status: SHIPPED | OUTPATIENT
Start: 2023-02-13 | End: 2023-02-27

## 2023-02-20 NOTE — PROGRESS NOTES
Spoke to patient who said that she had tried the patch but it fell off in a day or two and so she discontinued the patch.  Instructed pt to come in for a UPT.  Pt verbalized understanding.     Audrey Cade MD, PGY-2  [facility] Family [facility]

## 2023-02-27 ENCOUNTER — OFFICE VISIT (OUTPATIENT)
Dept: FAMILY MEDICINE | Facility: CLINIC | Age: 23
End: 2023-02-27
Payer: MEDICAID

## 2023-02-27 VITALS
HEIGHT: 70 IN | TEMPERATURE: 99 F | RESPIRATION RATE: 18 BRPM | WEIGHT: 293 LBS | OXYGEN SATURATION: 100 % | SYSTOLIC BLOOD PRESSURE: 134 MMHG | DIASTOLIC BLOOD PRESSURE: 82 MMHG | HEART RATE: 109 BPM | BODY MASS INDEX: 41.95 KG/M2

## 2023-02-27 DIAGNOSIS — Z32.00 ENCOUNTER FOR PREGNANCY TEST, RESULT UNKNOWN: Primary | ICD-10-CM

## 2023-02-27 LAB
B-HCG UR QL: POSITIVE
CTP QC/QA: YES

## 2023-02-27 PROCEDURE — 81025 URINE PREGNANCY TEST: CPT | Mod: PBBFAC | Performed by: NURSE PRACTITIONER

## 2023-02-27 PROCEDURE — 99214 OFFICE O/P EST MOD 30 MIN: CPT | Mod: PBBFAC | Performed by: NURSE PRACTITIONER

## 2023-02-28 NOTE — PROGRESS NOTES
Discussed with resident at time of encounter (02-27-23).  In-office UPT positive.    Approx. 5-6 weeks EGA (per LMP).    Resident's note reviewed 02-28-23.  Fundal height not appreciable at listed gestational age.  Professional services provided in an outpatient primary care center affiliated with a teaching institution.

## 2023-03-04 DIAGNOSIS — Z34.90 PREGNANCY, UNSPECIFIED GESTATIONAL AGE: Primary | ICD-10-CM

## 2023-03-14 ENCOUNTER — TELEPHONE (OUTPATIENT)
Dept: FAMILY MEDICINE | Facility: CLINIC | Age: 23
End: 2023-03-14
Payer: MEDICAID

## 2023-03-16 NOTE — PROGRESS NOTES
Family Medicine Clinic Note:    PCP: Audrey Melgoza MD    Valente Wilson is a 22 y.o. female with a history of Pre-diabetes, Obesity, KRISTINA, Anemia, Migraines, Bipolar II with depressive episodes (was on Latuda during pregnancy and was seeing Ms. Rachel but is no longer seeing her). Has an appointment with Fidel on 3/26/23 ), PCOS (on metformin) , tobacco use, ADD who presents to clinic today for complaints of a sore throat.      Subjective:   22 y.o female 7^5 WGA with an  JULIA 10/29/23 and  5^7 WGA by LMP of 01/22/2023      Acute:  Viral Tonsillitis  AR  otalgia  Onset: 1 week ago  Denies Fever,diarrhea, no coughing, excessive lacrimation , sneezing,   +Pain on swallowing, and  rhinorrhea   Denies sick contacts  Associated symptoms:   Headache on the right temporal x  3 days and has not taken any medications. This is the first occurrence.  Right ear pain and denies putting anything in the ear.  Denies drainage      Nausea/Vomiting in Pregnancy  -Occurring every morning and night  -Vomiting 5 times per day  -Had gone to the urgent care and was given ondansetron and she took 1 pill  Denies spotting or bleeding  ED precautions given      Encounter for Smoking Cessation  Pt states that she is smoking 1/2 PPD and trying to cut down  States her her  has been helping her to quit     ROS  Antepartum specific   - Fetal movements: not yet  - Vaginal bleeding: none  - Vaginal discharge: none  - Loss of fluid: none  - Contractions: none  - Headaches: yes (see above0  - Vision changes:none  - Edema: none -bp stable today 105/66    General ROS  Constitutional: no fever, no chills, no weight loss  CV: no swelling, no edema, no chest pain  : no urinary retention, no urinary incontinence, denies dysuria  GI: no fecal incontinence, no constipation, no diarrhea, no nausea, no vomiting  RESP: no SOB, no wheezing, no difficulty breathing  Psych: no depression, no anxiety         Objective:       Physical  Exam  General: well developed gravid female   HEENT:  PERRLA, nasal turbinates with erythema and edematous.  No maxillary tenderness noted TMs clear bilaterally with appropriate landmarks noted  Psych: alert and oriented with appro mood and affect  Resp: lungs CTAB-L, no wheezing, non-labored resp  CV: +2 sym pulses U/E, no edema of the lower ext Shashi, no murmurs, rubs, or gallops  Abd:  Bowel sounds acitve x 4, non tender x 4 quadrants       03/17/23 12:59   Clarity, UA Slightly Cloudy   Color, UA Light Yellow   pH, UA 7.0   Spec Grav UA 1.020   Blood, UA TRACE   WBC, UA NEGATIVE   Nitrite, UA NEGATIVE   Glucose, UA NEGATIVE   Bilirubin, POC NEGATIVE   Protein, POC NEGATIVE   Ketones, UA NEGATIVE   Urobilinogen, UA 0.2     Assessment:   Valente Wilson is a 22 y.o. female with:  Viral Tonsillitis  AR  Otalgia  Nausea and vomiting in pregnancy  Encounter for smoking cessation  Plan:   Acute:  Viral Tonsillitis  AR  Centor criteria is 1  Ordered strep Group A by PCR  Ordered Influenza  A&B and COVID by PCR   It is likely to be viral in nature.  Will wait for the results but instructed pt gargle with warm salt water   Ordered zyrtec 10 mg qHs prn    Otalgia  Suspect post nasal drip and tonsillitis is contributing to the ear symptoms.        Nausea/Vomiting 2/2 pregnancy during first trimester  Ordered  doxylamine 25 mg qHs prn  and Vitamin B6  50  mg qHs and can increase to TID prn (OTC use)  Advised to discontinue the zofran  Handout for safe medications to be used during pregnancy given    Of note, urinalysis was WNL    Encounter for smoking cessation  Assistance with smoking cessation was offered, including:  []  Medications  [x]  Counseling   []  Printed Information on Smoking Cessation  [x]  Referral to a Smoking Cessation Program  Discussed benefits of smoking cessation  Advised to quit smoking for the pregnancy as well as for lifelong maternal and health and to avoid the known risks of continued smoking to  her fetus.  Goal to cut down 1 cigarette a week  Discussed the use of NRT in regard to gum only as an adjunct to smoking cessation program and counseling. Discussed Risk and benefits of using NRT.  Patient was counseled regarding smoking for 3-10 minutes.    Addendum: Strep, Flu and COVID were all negative        Future Appointments   Date Time Provider Department Center   3/17/2023  1:00 PM RESIDENT 13, [facility] FAMILY MEDICINE [facility] RES [place]    4/20/2023  9:00 AM [place] [address] [facility]    4/20/2023  9:30 AM INITIAL OB, [facility] FM RESIDENTS [facility] RES [place]    11/28/2023  8:10 AM ZULEMA Peters [facility] GYN [facility]       Staff: Dr. Fred Cade MD  Family Medicine PGY-2

## 2023-03-17 ENCOUNTER — OFFICE VISIT (OUTPATIENT)
Dept: FAMILY MEDICINE | Facility: CLINIC | Age: 23
End: 2023-03-17
Payer: MEDICAID

## 2023-03-17 VITALS
HEART RATE: 80 BPM | OXYGEN SATURATION: 100 % | BODY MASS INDEX: 41.95 KG/M2 | SYSTOLIC BLOOD PRESSURE: 105 MMHG | WEIGHT: 293 LBS | TEMPERATURE: 99 F | DIASTOLIC BLOOD PRESSURE: 66 MMHG | HEIGHT: 70 IN

## 2023-03-17 DIAGNOSIS — J30.9 ALLERGIC RHINITIS, UNSPECIFIED SEASONALITY, UNSPECIFIED TRIGGER: ICD-10-CM

## 2023-03-17 DIAGNOSIS — O21.9 NAUSEA AND VOMITING IN PREGNANCY: ICD-10-CM

## 2023-03-17 DIAGNOSIS — J02.9 PHARYNGITIS, UNSPECIFIED ETIOLOGY: ICD-10-CM

## 2023-03-17 DIAGNOSIS — J03.90 TONSILLITIS: ICD-10-CM

## 2023-03-17 DIAGNOSIS — R11.10 VOMITING, UNSPECIFIED VOMITING TYPE, UNSPECIFIED WHETHER NAUSEA PRESENT: ICD-10-CM

## 2023-03-17 DIAGNOSIS — Z3A.01 5 WEEKS GESTATION OF PREGNANCY: Primary | ICD-10-CM

## 2023-03-17 PROBLEM — E11.9 TYPE 2 DIABETES MELLITUS WITHOUT COMPLICATION, WITHOUT LONG-TERM CURRENT USE OF INSULIN: Status: RESOLVED | Noted: 2022-05-12 | Resolved: 2023-03-17

## 2023-03-17 PROBLEM — K59.00 CONSTIPATION: Status: RESOLVED | Noted: 2022-11-07 | Resolved: 2023-03-17

## 2023-03-17 PROBLEM — D64.9 ANEMIA: Status: RESOLVED | Noted: 2022-04-12 | Resolved: 2023-03-17

## 2023-03-17 PROBLEM — Z32.00 ENCOUNTER FOR PREGNANCY TEST, RESULT UNKNOWN: Status: RESOLVED | Noted: 2023-02-27 | Resolved: 2023-03-17

## 2023-03-17 PROBLEM — G43.109 MIGRAINE WITH AURA: Status: RESOLVED | Noted: 2022-08-11 | Resolved: 2023-03-17

## 2023-03-17 LAB
BILIRUB SERPL-MCNC: NEGATIVE MG/DL
BLOOD URINE, POC: NORMAL
CLARITY, POC UA: NORMAL
COLOR, POC UA: NORMAL
FLUAV AG UPPER RESP QL IA.RAPID: NOT DETECTED
FLUBV AG UPPER RESP QL IA.RAPID: NOT DETECTED
GLUCOSE UR QL STRIP: NEGATIVE
KETONES UR QL STRIP: NEGATIVE
LEUKOCYTE ESTERASE URINE, POC: NEGATIVE
NITRITE, POC UA: NEGATIVE
PH, POC UA: 7
PROTEIN, POC: NEGATIVE
SARS-COV-2 RNA RESP QL NAA+PROBE: NOT DETECTED
SPECIFIC GRAVITY, POC UA: 1.02
STREP A PCR (OHS): NOT DETECTED
UROBILINOGEN, POC UA: 0.2

## 2023-03-17 PROCEDURE — 81002 URINALYSIS NONAUTO W/O SCOPE: CPT | Mod: PBBFAC | Performed by: NURSE PRACTITIONER

## 2023-03-17 PROCEDURE — 99214 OFFICE O/P EST MOD 30 MIN: CPT | Mod: PBBFAC

## 2023-03-17 PROCEDURE — 87651 STREP A DNA AMP PROBE: CPT

## 2023-03-17 PROCEDURE — 0240U COVID/FLU A&B PCR: CPT

## 2023-03-17 RX ORDER — VITAMIN A, VITAMIN C, VITAMIN D, VITAMIN E, THIAMINE, RIBOFLAVIN, NIACIN, VITAMIN B6, FOLIC ACID, VITAMIN B12, CALCIUM, IRON, ZINC, COPPER 4000; 120; 400; 22; 1.84; 3; 20; 10; 1; 12; 200; 27; 25; 2 [IU]/1; MG/1; [IU]/1; [IU]/1; MG/1; MG/1; MG/1; MG/1; MG/1; UG/1; MG/1; MG/1; MG/1; MG/1
1 TABLET ORAL
COMMUNITY
Start: 2023-03-04 | End: 2023-07-13 | Stop reason: SDUPTHER

## 2023-03-18 PROBLEM — J30.9 ALLERGIC RHINITIS: Status: ACTIVE | Noted: 2023-03-18

## 2023-03-18 PROBLEM — O21.9 NAUSEA AND VOMITING IN PREGNANCY: Status: ACTIVE | Noted: 2023-03-18

## 2023-03-18 PROBLEM — R11.10 VOMITING: Status: ACTIVE | Noted: 2023-03-18

## 2023-03-18 PROBLEM — Z3A.01 5 WEEKS GESTATION OF PREGNANCY: Status: ACTIVE | Noted: 2023-03-18

## 2023-03-18 PROBLEM — J02.9 PHARYNGITIS: Status: ACTIVE | Noted: 2023-03-18

## 2023-03-18 PROBLEM — J03.90 TONSILLITIS: Status: ACTIVE | Noted: 2023-03-18

## 2023-03-18 RX ORDER — CETIRIZINE HYDROCHLORIDE 10 MG/1
10 TABLET ORAL NIGHTLY
Qty: 30 TABLET | Refills: 0 | Status: SHIPPED | OUTPATIENT
Start: 2023-03-18 | End: 2023-08-17

## 2023-03-18 RX ORDER — LANOLIN ALCOHOL/MO/W.PET/CERES
50 CREAM (GRAM) TOPICAL DAILY
Qty: 30 TABLET | Refills: 2 | Status: SHIPPED | OUTPATIENT
Start: 2023-03-18 | End: 2023-08-10

## 2023-03-18 RX ORDER — DOXYLAMINE SUCCINATE 25 MG/1
25 TABLET ORAL NIGHTLY PRN
Qty: 30 TABLET | Refills: 2 | Status: SHIPPED | OUTPATIENT
Start: 2023-03-18 | End: 2023-06-08

## 2023-03-20 NOTE — PROGRESS NOTES
Faculty Attestation: Valente Wilson  was seen in Family Medicine Clinic. Discussed with resident at the time of the visit. History of Present Illness, Physical Exam, and Assessment and Plan reviewed. Treatment plan is reasonable and appropriate. Compliance with treatment recommendations is important.       Kayode Ibarra MD

## 2023-03-21 ENCOUNTER — TELEPHONE (OUTPATIENT)
Dept: FAMILY MEDICINE | Facility: CLINIC | Age: 23
End: 2023-03-21
Payer: MEDICAID

## 2023-04-19 ENCOUNTER — PATIENT MESSAGE (OUTPATIENT)
Dept: RESEARCH | Facility: HOSPITAL | Age: 23
End: 2023-04-19
Payer: MEDICAID

## 2023-04-20 ENCOUNTER — OFFICE VISIT (OUTPATIENT)
Dept: FAMILY MEDICINE | Facility: CLINIC | Age: 23
End: 2023-04-20
Payer: MEDICAID

## 2023-04-20 ENCOUNTER — HOSPITAL ENCOUNTER (OUTPATIENT)
Dept: RADIOLOGY | Facility: HOSPITAL | Age: 23
Discharge: HOME | End: 2023-04-20
Attending: OBSTETRICS & GYNECOLOGY
Payer: MEDICAID

## 2023-04-20 VITALS
WEIGHT: 293 LBS | DIASTOLIC BLOOD PRESSURE: 85 MMHG | RESPIRATION RATE: 20 BRPM | OXYGEN SATURATION: 98 % | HEART RATE: 111 BPM | SYSTOLIC BLOOD PRESSURE: 122 MMHG | TEMPERATURE: 98 F | HEIGHT: 70 IN | BODY MASS INDEX: 41.95 KG/M2

## 2023-04-20 DIAGNOSIS — O24.111 PRE-EXISTING TYPE 2 DIABETES MELLITUS DURING PREGNANCY IN FIRST TRIMESTER: ICD-10-CM

## 2023-04-20 DIAGNOSIS — Z34.90 PREGNANCY: ICD-10-CM

## 2023-04-20 DIAGNOSIS — Z32.00 ENCOUNTER FOR PREGNANCY TEST, RESULT UNKNOWN: ICD-10-CM

## 2023-04-20 DIAGNOSIS — Z34.90 PREGNANCY, UNSPECIFIED GESTATIONAL AGE: Primary | ICD-10-CM

## 2023-04-20 LAB
ALBUMIN SERPL-MCNC: 3.3 G/DL (ref 3.5–5)
ALBUMIN/GLOB SERPL: 0.8 RATIO (ref 1.1–2)
ALP SERPL-CCNC: 56 UNIT/L (ref 40–150)
ALT SERPL-CCNC: 7 UNIT/L (ref 0–55)
APPEARANCE UR: ABNORMAL
AST SERPL-CCNC: 11 UNIT/L (ref 5–34)
BACTERIA #/AREA URNS AUTO: ABNORMAL /HPF
BASOPHILS # BLD AUTO: 0.04 X10(3)/MCL (ref 0–0.2)
BASOPHILS NFR BLD AUTO: 0.5 %
BILIRUB SERPL-MCNC: NORMAL MG/DL
BILIRUB UR QL STRIP.AUTO: NEGATIVE MG/DL
BILIRUBIN DIRECT+TOT PNL SERPL-MCNC: 0.2 MG/DL
BLOOD URINE, POC: NORMAL
BUN SERPL-MCNC: 6.4 MG/DL (ref 7–18.7)
C TRACH DNA SPEC QL NAA+PROBE: NOT DETECTED
CALCIUM SERPL-MCNC: 9.1 MG/DL (ref 8.4–10.2)
CHLORIDE SERPL-SCNC: 106 MMOL/L (ref 98–107)
CLARITY, POC UA: CLEAR
CO2 SERPL-SCNC: 23 MMOL/L (ref 22–29)
COLOR UR AUTO: ABNORMAL
COLOR, POC UA: YELLOW
CREAT SERPL-MCNC: 0.72 MG/DL (ref 0.55–1.02)
EOSINOPHIL # BLD AUTO: 0.3 X10(3)/MCL (ref 0–0.9)
EOSINOPHIL NFR BLD AUTO: 3.6 %
ERYTHROCYTE [DISTWIDTH] IN BLOOD BY AUTOMATED COUNT: 17 % (ref 11.5–17)
EST. AVERAGE GLUCOSE BLD GHB EST-MCNC: 134.1 MG/DL
GFR SERPLBLD CREATININE-BSD FMLA CKD-EPI: >60 MLS/MIN/1.73/M2
GLOBULIN SER-MCNC: 4 GM/DL (ref 2.4–3.5)
GLUCOSE 1H P 100 G GLC PO SERPL-MCNC: 128 MG/DL (ref 74–100)
GLUCOSE SERPL-MCNC: 128 MG/DL (ref 74–100)
GLUCOSE UR QL STRIP.AUTO: NORMAL MG/DL
GLUCOSE UR QL STRIP: NORMAL
GROUP & RH: NORMAL
HBA1C MFR BLD: 6.3 %
HBV SURFACE AG SERPL QL IA: NONREACTIVE
HCT VFR BLD AUTO: 32.2 % (ref 37–47)
HCV AB SERPL QL IA: NONREACTIVE
HGB BLD-MCNC: 10 G/DL (ref 12–16)
HIV 1+2 AB+HIV1 P24 AG SERPL QL IA: NONREACTIVE
HYALINE CASTS #/AREA URNS LPF: ABNORMAL /LPF
IMM GRANULOCYTES # BLD AUTO: 0.02 X10(3)/MCL (ref 0–0.04)
IMM GRANULOCYTES NFR BLD AUTO: 0.2 %
INDIRECT COOMBS GEL: NORMAL
KETONES UR QL STRIP.AUTO: NEGATIVE MG/DL
KETONES UR QL STRIP: NORMAL
LEUKOCYTE ESTERASE UR QL STRIP.AUTO: 250 UNIT/L
LEUKOCYTE ESTERASE URINE, POC: NORMAL
LYMPHOCYTES # BLD AUTO: 1.26 X10(3)/MCL (ref 0.6–4.6)
LYMPHOCYTES NFR BLD AUTO: 15 %
MCH RBC QN AUTO: 23.8 PG (ref 27–31)
MCHC RBC AUTO-ENTMCNC: 31.1 G/DL (ref 33–36)
MCV RBC AUTO: 76.5 FL (ref 80–94)
MONOCYTES # BLD AUTO: 0.47 X10(3)/MCL (ref 0.1–1.3)
MONOCYTES NFR BLD AUTO: 5.6 %
MUCOUS THREADS URNS QL MICRO: ABNORMAL /LPF
N GONORRHOEA DNA SPEC QL NAA+PROBE: NOT DETECTED
NEUTROPHILS # BLD AUTO: 6.32 X10(3)/MCL (ref 2.1–9.2)
NEUTROPHILS NFR BLD AUTO: 75.1 %
NITRITE UR QL STRIP.AUTO: NEGATIVE
NITRITE, POC UA: NORMAL
NRBC BLD AUTO-RTO: 0 %
PH UR STRIP.AUTO: 6 [PH]
PH, POC UA: 6
PLATELET # BLD AUTO: 293 X10(3)/MCL (ref 130–400)
PMV BLD AUTO: 11.5 FL (ref 7.4–10.4)
POTASSIUM SERPL-SCNC: 3.9 MMOL/L (ref 3.5–5.1)
PROT SERPL-MCNC: 7.3 GM/DL (ref 6.4–8.3)
PROT UR QL STRIP.AUTO: ABNORMAL MG/DL
PROTEIN, POC: NORMAL
RBC # BLD AUTO: 4.21 X10(6)/MCL (ref 4.2–5.4)
RBC #/AREA URNS AUTO: ABNORMAL /HPF
RBC UR QL AUTO: ABNORMAL UNIT/L
SODIUM SERPL-SCNC: 137 MMOL/L (ref 136–145)
SP GR UR STRIP.AUTO: 1.02
SPECIFIC GRAVITY, POC UA: 1.02
SPECIMEN OUTDATE: NORMAL
SQUAMOUS #/AREA URNS LPF: ABNORMAL /HPF
T PALLIDUM AB SER QL: NONREACTIVE
UROBILINOGEN UR STRIP-ACNC: NORMAL MG/DL
UROBILINOGEN, POC UA: 0.2
WBC # SPEC AUTO: 8.4 X10(3)/MCL (ref 4.5–11.5)
WBC #/AREA URNS AUTO: ABNORMAL /HPF

## 2023-04-20 PROCEDURE — 82950 GLUCOSE TEST: CPT

## 2023-04-20 PROCEDURE — 83036 HEMOGLOBIN GLYCOSYLATED A1C: CPT

## 2023-04-20 PROCEDURE — 85660 RBC SICKLE CELL TEST: CPT

## 2023-04-20 PROCEDURE — 76801 OB US < 14 WKS SINGLE FETUS: CPT | Mod: TC

## 2023-04-20 PROCEDURE — 93005 ELECTROCARDIOGRAM TRACING: CPT

## 2023-04-20 PROCEDURE — 87340 HEPATITIS B SURFACE AG IA: CPT

## 2023-04-20 PROCEDURE — 87389 HIV-1 AG W/HIV-1&-2 AB AG IA: CPT

## 2023-04-20 PROCEDURE — 86762 RUBELLA ANTIBODY: CPT | Mod: 90

## 2023-04-20 PROCEDURE — 36415 COLL VENOUS BLD VENIPUNCTURE: CPT

## 2023-04-20 PROCEDURE — 87088 URINE BACTERIA CULTURE: CPT

## 2023-04-20 PROCEDURE — 85025 COMPLETE CBC W/AUTO DIFF WBC: CPT

## 2023-04-20 PROCEDURE — 86780 TREPONEMA PALLIDUM: CPT

## 2023-04-20 PROCEDURE — 81001 URINALYSIS AUTO W/SCOPE: CPT

## 2023-04-20 PROCEDURE — 86803 HEPATITIS C AB TEST: CPT

## 2023-04-20 PROCEDURE — 87591 N.GONORRHOEAE DNA AMP PROB: CPT

## 2023-04-20 PROCEDURE — 81002 URINALYSIS NONAUTO W/O SCOPE: CPT | Mod: 59,PBBFAC

## 2023-04-20 PROCEDURE — 80053 COMPREHEN METABOLIC PANEL: CPT

## 2023-04-20 PROCEDURE — 99214 OFFICE O/P EST MOD 30 MIN: CPT | Mod: PBBFAC

## 2023-04-20 PROCEDURE — 86787 VARICELLA-ZOSTER ANTIBODY: CPT | Mod: 90

## 2023-04-20 PROCEDURE — 86900 BLOOD TYPING SEROLOGIC ABO: CPT | Performed by: FAMILY MEDICINE

## 2023-04-20 RX ORDER — LANCETS
1 EACH MISCELLANEOUS 3 TIMES DAILY
Qty: 100 EACH | Refills: 3 | Status: SHIPPED | OUTPATIENT
Start: 2023-04-20 | End: 2023-06-08

## 2023-04-20 RX ORDER — ASPIRIN 81 MG/1
81 TABLET ORAL DAILY
Qty: 90 TABLET | Refills: 3 | Status: SHIPPED | OUTPATIENT
Start: 2023-04-20 | End: 2023-10-02

## 2023-04-20 NOTE — PROGRESS NOTES
OB Office Visit Note    Name: Valente Wilson  MRN: [id number]  Date: 2023    Subjective:      Chief Complaint: Initial Prenatal Visit      Valente Wilson is a 22 y.o.  at 12w4d with JULIA 10/29/2023, by Last Menstrual Period which is consistent with first semester ultrasound.    Current issues: URI symptoms with nasal congestion as main complaint; daughter recently diagnosed with parainfluenza virus.    Chronic issues:                          1. Prediabetes/ PCOS Takes metformin. Denied diabetes history.                          2. Mood Disorder (Anxiety, Depression, Bipolar, PTSD) Previously took Latuda. Does not to resume. Has appt with [facility] next month                         3. ADHD                         4. Migraines                         5. Obesity                                                             Antepartum specific ROS  - Fetal movements: Yes flutters  - Vaginal bleeding: No  - Vaginal discharge: No  - Loss of fluid: No  - Contractions: No  - Headaches: Yes   - Vision changes: No  - Edema: occasional swelling      PMHx: Mood Disorders, PCOS, prediabetes, treated as pre-gestational diabetes last pregnancy (patient denied diabetic diagnosis)  PSHx: Salpingectomy s/p ectopic pregnancy   SH: quit smoking 2 weeks ago, denied etoh, illicits, works at grocery store, 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 year old, has family support  FHx: Mom HTN, Dad mood disorder, Sister Mood disorder, Brother HTN  Meds:   Prior to Admission medications    Medication Sig Start Date End Date Taking? Authorizing Provider   M-ZAINA PLUS 27 mg iron- 1 mg Tab Take 1 tablet by mouth. 3/4/23  Yes Historical Provider   cetirizine (ZYRTEC) 10 MG tablet Take 1 tablet (10 mg total) by mouth every evening. 3/18/23 4/17/23  Audrey Melgoza MD   doxylamine succinate (UNISOM, DOXYLAMINE,) 25 mg tablet Take 1 tablet (25 mg total) by mouth nightly as needed for Insomnia.  Patient  not taking: Reported on 2023 3/18/23   Audrey Melgoza MD   metFORMIN (GLUCOPHAGE) 500 MG tablet Take 1 tablet (500 mg total) by mouth daily with breakfast. 2/13/23 3/17/23  Audrey Melgoza MD   pyridoxine, vitamin B6, (B-6) 50 MG Tab Take 1 tablet (50 mg total) by mouth once daily.  Patient not taking: Reported on 2023 3/18/23   Audrey Melgoza MD     Allergies:   Review of patient's allergies indicates:   Allergen Reactions    Fentanyl (bulk) Itching    Oxycodone-acetaminophen Hives     pt broke out in rash  Other reaction(s): RASH       Gestational History:   OB History    Para Term  AB Living   4 2 2 0 1 2   SAB IAB Ectopic Multiple Live Births   0 0 1 0 2      # Outcome Date GA Lbr Maykel/2nd Weight Sex Delivery Anes PTL Lv   4 Current            3 Term 10/18/22 37w6d / 00:16 3.05 kg (6 lb 11.6 oz) F Vag-Spont EPI N FLACO      Name: AUGUSTINA,GIRL KAHLIL      Apgar1: 8  Apgar5: 9   2 Ectopic 18 7w0d    ECTOPIC   FD   1 Term 06/29/15 40w0d  4.111 kg (9 lb 1 oz) M Vag-Spont EPI N FLACO       GYNHx:   LMP: Patient's last menstrual period was 2023 (exact date).   Menarche at 9   Menstrual Hx: regular, 3-4 pads/day, 4-5 days per period  Hx of birth control: OCP (estrogen/progesterone), Depo-Provera injections, and Ortho-Evra patches weekly   Hx of STDs: chlamydia   History of Abnormal PAP: No         Review of Systems  Constitutional: no fever, no chills  CV: no chest pain  RESP: no SOB  : no dysuria, no hematuria  GI: no constipation, no diarrhea, no nausea, no vomiting  Psych: no depression, no anxiety; No SI/HI    Objective:      Vitals:    23 0955   BP: 122/85   Pulse: (!) 111   Resp: 20   Temp: 98.2 °F (36.8 °C)           General:   RESP: clear to auscultation bilaterally, non labored  CV: regular rate and rhythm, no murmurs, no edema  ABD: gravid, nontender, BS+ FHT present  FHTs: 150 bpm;   Cervix: no lesions or cervical motion  tenderness    Initial OB Labs: Ordered 4/20/23  - Blood Type and Rh:   - Antibody Screen:   - CBC H/H:   - HIV:   - RPR:   - GC:   - CT:   - HBsAg:   - HCVAb:   - Rubella:   - Varicella:   - UA & Culture:   - Sickle Cell Screen:   - PAP:   - Influenza vaccine date:   - Contraception:     15-20 Weeks: Lab Ordered   - Quad Screen:     - 20 wk anatomy US:    28 Week Lab: Ordered   - 1H GTT:   - Rhogam:   - Date of Tdap:   - CBC H/H:   - RPR:   - BTL consent:     36 Week Lab: Ordered   - CBC H/H:   - RPR:   - GBS Culture:   - HIV:   - Cervical GC:     Urine dip:    Assessment/Plan:     Valente was seen today for initial prenatal visit.    Diagnoses and all orders for this visit:    Pregnancy, unspecified gestational age  -     Type & Screen; Future  -     CBC Auto Differential; Future  -     HIV 1/2 Ag/Ab (4th Gen); Future  -     SYPHILIS ANTIBODY (WITH REFLEX RPR); Future  -     Chlamydia/GC, PCR  -     Hepatitis B Surface Antigen; Future  -     Hepatitis C Antibody; Future  -     Rubella antibody, IgG; Future  -     Varicella zoster antibody, IgG; Future  -     Urinalysis  -     Urine Culture High Risk  -     Sickle Cell Screen; Future  -     Hemoglobin A1C; Future  -     POCT URINE DIPSTICK WITHOUT MICROSCOPE  -     GTT 1 Hour; Future    Encounter for pregnancy test, result unknown  -     Ambulatory referral/consult to Obstetrics / Gynecology    Pre-existing type 2 diabetes mellitus during pregnancy in first trimester  -     SCHEDULED EKG 12-LEAD (to Muse); Future  -     Protein, 24 Hr Urine; Future  -     Comprehensive Metabolic Panel; Future  -     Protein, 24 Hr Urine  -     blood sugar diagnostic Strp; 1 each by Misc.(Non-Drug; Combo Route) route 3 (three) times daily.  -     lancets (ONETOUCH ULTRASOFT LANCETS) Misc; 1 Units by Misc.(Non-Drug; Combo Route) route 3 (three) times daily.    Other orders  -     aspirin (ECOTRIN) 81 MG EC tablet; Take 1 tablet (81 mg total) by mouth once daily.        - Urine dip  agove  - Initial OB labs  - Also ordered EKG, 24 hour urine, CMP due to history of DM. May also need Echocardiogram  - continue prenatal vitamins  - Contraception: discussed different contraceptive options. Undecided but considering Depo  - Desires to breast and bottle feeding  - ASA initiated  - Labor precautions discussed    Mood Disorder  - Denied SI/HI  - Has appt with [facility] for Bipolar  - Does not want to resume Latuda   - Encouraged to keep behavioral health appointments     Diabetes mellitus   - Denied History of DM  - Does not want to do glucose logs; agreeable to 1 hour  - Prefers to not follow with HROB  - Takes Metform 500mg             STEPHAN JIMENEZ MD

## 2023-04-20 NOTE — PATIENT INSTRUCTIONS
Well Child Exam    About this topic  A well child exam is a visit with your child's doctor to check your child's health. The doctor will check your child's growth, progress, and shot record. It is also a time for you to ask your child's doctor any questions you have about your child's health. Your child will have a full exam during the office visit. Other things that are sometimes checked are hearing, eyesight, and urine or blood tests. The doctor may give shots during your child's well visit.    General    Getting Ready for a Well Child Exam    A well child exam is a good time for you to talk with your child's doctor about any of these topics:    Eating habits or diet    How your child acts    Sleep issues    Growth    Safety    Vaccines    Toilet training    Teen years    How your child is doing in school or any learning concerns    Home life    You may want to make a written list of the things you want to talk about with your child's doctor. Be sure to bring your list of questions to your child's well visit. You may also want to do some research on your own before your office visit by reading books or looking at Web sites. Other family members, child caregivers, and grandparents may be able to help you too. Your child's doctor may ask also you about your family's health history or if your child is around anyone who smokes.    The Exam    The doctor measures your child's weight, height, and sometimes head size or body mass index (BMI). The doctor plots these numbers on a growth curve. The growth curve gives a picture of your baby's growth at each visit. The doctor may check your child's temperature, blood pressure, breathing, and heart rate. The doctor may listen to your child's heart, lungs, and belly. Your doctor will do a full exam of your child from the head to the toes.    Growth and Development Questions    Your doctor will ask you about your child's progress. The doctor will focus on the skills that are  likely to happen at your child's age. Some of these are motor skills like rolling over, walking, and running, while others are social skills, or how your child interacts with other people. Your child's doctor will also ask you how your child is doing in school.    Help for Parents    Your doctor will talk with you about any concerns you have about your child during this visit. The doctor may also talk with you about:    Getting family help or other support    Ways to help your child's brain growth    How your child plays and acts with others    Ways to help your child exercise    Safety    Eating habits    Vaccines    Quitting smoking    Help if you have a low mood after having a baby    Shots or Vaccines    It is important for your child to get shots on time. This protects from very serious illnesses like pertussis, measles, or some kinds of pneumonia. Sometimes, your child may need more than one dose of vaccine. The vaccines used today are safer than ever. Talk to your doctor if you have any questions or concerns about giving your child vaccines.    Well Child Exam Schedule    The American Academy of Pediatrics (AAP) suggests this plan for well child visits:    [place] (3 to 5 days old)    1 month old    2 months old    4 months old    6 months old    9 months old    12 months old    15 months old    18 months old    2 years old    30 months old    3 years old    4 years old    Once each year until age 21    Well child exams are very important. Since your child is healthy at this visit and it is scheduled ahead of time, you can think about things you want to ask your child's doctor. Be sure to follow the above plan for well child visits as well as any other visits your child's doctor suggests.    Where can I learn more?    Centers for Disease Control and Prevention    http://www.cdc.gov/vaccines     Healthy  Children    https://www.healthychildren.org/English/family-life/health-management/Pages/Well-Child-Care-A-Check-Up-for-Success.aspx    Disclaimer.  This generalized information is a limited summary of diagnosis, treatment, and/or medication information. It is not meant to be comprehensive and should be used as a tool to help the user understand and/or assess potential diagnostic and treatment options. It does NOT include all information about conditions, treatments, medications, side effects, or risks that may apply to a specific patient. It is not intended to be medical advice or a substitute for the medical advice, diagnosis, or treatment of a health care provider based on the health care provider's examination and assessment of a patients specific and unique circumstances. Patients must speak with a health care provider for complete information about their health, medical questions, and treatment options, including any risks or benefits regarding use of medications. This information does not endorse any treatments or medications as safe, effective, or approved for treating a specific patient. UpToDate, Inc[employer] and its affiliates disclaim any warranty or liability relating to this information or the use thereof. The use of this information is governed by the Terms of Use, available at Terms of Use. ©2022 UpToDate, Inc. and its affiliates and/or licensors. All rights reserved.

## 2023-04-21 LAB
HGB S BLD QL SOLY: NEGATIVE
RUBV IGG SERPL IA-ACNC: 0.7
RUBV IGG SERPL QL IA: NEGATIVE
VZV IGG SER IA-ACNC: 0.2
VZV IGG SER QL IA: NEGATIVE

## 2023-04-22 LAB — BACTERIA UR CULT: NO GROWTH

## 2023-04-24 ENCOUNTER — APPOINTMENT (OUTPATIENT)
Dept: LAB | Facility: HOSPITAL | Age: 23
End: 2023-04-24
Attending: FAMILY MEDICINE
Payer: MEDICAID

## 2023-04-24 LAB
PROT 24H UR-MCNC: NORMAL G/DL
PROT UR STRIP-MCNC: <6.8 MG/DL
TOTAL VOLUME  (OHS): 2300 ML

## 2023-04-25 ENCOUNTER — PATIENT OUTREACH (OUTPATIENT)
Dept: FAMILY MEDICINE | Facility: CLINIC | Age: 23
End: 2023-04-25
Payer: MEDICAID

## 2023-04-25 NOTE — PROGRESS NOTES
SDOH Questionnaire  Which of the following best describes your current living situation? (Select ONE only)  [] Live alone in my own home (house, apartment, condo, trailer, etc.)   [] Live in a household with other people (roommates, family, friends.)  [] Live in a residential facility where meals/ household help are routinely provided by paid staff (or could be if requested)  [] Live in a facility such as a nursing home which provides meals and 24-hour nursing care  [x] Temporarily staying with a relative or friend  [] Temporarily staying in a shelter or homeless  [x] Other: Currently on a waiting list for housing      Do you have any concerns about your current living situation, like housing conditions, safety, and costs?  []Condition of housing [x]Lack of more permanent housing []Ability to pay for housing or utilities    []Feeling safe  [] No concerns    []Other:     In the past 3 months, did you have trouble paying for any of the following? (Select ALL that apply)  []Food   []Housing []Heat and electricity []Medical needs  []Transportation  []Childcare  [x]None of these  []Other:     In the past 3 months, how often have you worried that your food would run out before you had money to buy more?   [x]Never  []Sometimes []Often  []Very often  Has lack of transportation kept you from medical appointments or doing daily living tasks? (Select ALL that apply)  [] Kept me from medical appointments or from getting medications  [] Kept me from doing things needed for daily living  [x] Not a problem for me    In the last month, how often have you felt difficulties were piling up so high that you could not overcome them?  []Never  [x]Almost never []Sometimes []Fairly often []Very often    Which of the following would you like to receive help with at this time? (Select ALL that apply)  []Food      []Activities of daily living            []Housing     []Childcare/other child-related issues  []Transportation     []Applying for  public benefits (SSI, Medicaid, SNAP)  []Utilities (heat, electricity, water, etc.)  []Legal issues  []Medical care, medicine, medical supplies  []Employment  []Dental services     []Other   []Vision services     [x]None of the above     Who answered these questions?                    X     Patient alone        Patient with help     Family member, friend, or caregiver of patient                Follow-up: 5/10/23    Appointment reminder given for : 05/11/23        Patient verbalized understanding. Yes        Other comments:Spoke with patient,says she went to the ED in [place] 04/21/23 due to a URI. She was prescribed antihistamine and cough medication but has not gone to pick it up. States she had to change pharmacy cause of medicine cost. Her initial OB clinic visit was 04/20/23. When asked if she had started taking the low dose ASA as ordered by her OB provider and she said she has not. Stressed the importance of taking as ordered to decrease the risk of preeclampsia during her pregnancy.Denies any vaginal bleeding,or abdominal pain at this time. Also denies any issues with SI or HI and she does follow  up with a BH provider at [facility]. Says she had a BH appointment this month but pushed it back until next month May for follow up. She mentioned that it is documented that she is a diabetic and she says she is not and is taking Metformin not because of being diagnosed with diabetes. She is not checking her BS daily as recommended by OB and she does not want to be treated as HROB. Explained that her next appointment will be at [facility]. Denies any needs now but she did say she is on a waiting list for housing and she is currently living with family members.I mentioned I could refer her to [place] but she declined.  Stressed the importance of taking her medications//PNV and following a diabetic diet. ED precautions discussed. Encouraged to utilized urgent care for minor  issues.                    Education given on

## 2023-04-26 ENCOUNTER — TELEPHONE (OUTPATIENT)
Dept: FAMILY MEDICINE | Facility: CLINIC | Age: 23
End: 2023-04-26
Payer: MEDICAID

## 2023-04-26 DIAGNOSIS — O99.012 ANEMIA DURING PREGNANCY IN SECOND TRIMESTER: Primary | ICD-10-CM

## 2023-04-26 RX ORDER — FERROUS GLUCONATE 324(37.5)
324 TABLET ORAL
Qty: 90 TABLET | Refills: 1 | Status: SHIPPED | OUTPATIENT
Start: 2023-04-26 | End: 2023-08-10

## 2023-04-26 NOTE — PROGRESS NOTES
Contacted [facility] to push her annual exam visit 11/28/23 with Clarita Chase NP back 1 month. Patient JULIA is 10/29/23. Her new GYN appointment is 12/06/23.

## 2023-05-09 ENCOUNTER — PATIENT OUTREACH (OUTPATIENT)
Dept: FAMILY MEDICINE | Facility: CLINIC | Age: 23
End: 2023-05-09
Payer: MEDICAID

## 2023-05-09 DIAGNOSIS — Z3A.15 15 WEEKS GESTATION OF PREGNANCY: Primary | ICD-10-CM

## 2023-05-09 NOTE — PROGRESS NOTES
Patient called with questions about her OB appointment on Thursday 05/11/23. She said she did called the OB clinic and left a message but has not heard back. Explained that they are in clinic today and she should get a call back after clinic. Will sent the clinic a message to return her phone call to address her questions.

## 2023-05-09 NOTE — PROGRESS NOTES
Contacted patient informing her once OB Clinic is completed ,the OB nurse  will call her to answer her questions. She said someone from the [facility] returned her call and will relay the message to the OB team.

## 2023-05-11 ENCOUNTER — OFFICE VISIT (OUTPATIENT)
Dept: FAMILY MEDICINE | Facility: CLINIC | Age: 23
End: 2023-05-11
Payer: MEDICAID

## 2023-05-11 VITALS
WEIGHT: 293 LBS | HEART RATE: 102 BPM | HEIGHT: 70 IN | BODY MASS INDEX: 41.95 KG/M2 | SYSTOLIC BLOOD PRESSURE: 109 MMHG | TEMPERATURE: 98 F | OXYGEN SATURATION: 100 % | DIASTOLIC BLOOD PRESSURE: 76 MMHG | RESPIRATION RATE: 20 BRPM

## 2023-05-11 DIAGNOSIS — Z3A.15 15 WEEKS GESTATION OF PREGNANCY: Primary | ICD-10-CM

## 2023-05-11 DIAGNOSIS — R12 HEART BURN: ICD-10-CM

## 2023-05-11 LAB
BILIRUB SERPL-MCNC: NORMAL MG/DL
BLOOD URINE, POC: NORMAL
CLARITY, POC UA: CLEAR
COLOR, POC UA: NORMAL
GLUCOSE UR QL STRIP: NORMAL
KETONES UR QL STRIP: 15
LEUKOCYTE ESTERASE URINE, POC: NORMAL
NITRITE, POC UA: NORMAL
PH, POC UA: 6.5
PROTEIN, POC: 30
SPECIFIC GRAVITY, POC UA: >1.03
UROBILINOGEN, POC UA: 0.2

## 2023-05-11 PROCEDURE — 99214 OFFICE O/P EST MOD 30 MIN: CPT | Mod: PBBFAC

## 2023-05-11 PROCEDURE — 81511 FTL CGEN ABNOR FOUR ANAL: CPT | Mod: 90

## 2023-05-11 PROCEDURE — 36415 COLL VENOUS BLD VENIPUNCTURE: CPT

## 2023-05-11 PROCEDURE — 81002 URINALYSIS NONAUTO W/O SCOPE: CPT | Mod: PBBFAC

## 2023-05-11 RX ORDER — CALC/MAG/B COMPLEX/D3/HERB 61
15 TABLET ORAL DAILY
Qty: 30 CAPSULE | Refills: 6 | Status: SHIPPED | OUTPATIENT
Start: 2023-05-11 | End: 2023-06-08

## 2023-05-11 NOTE — PROGRESS NOTES
OB Office Visit Note     Name: Valente Wilson  MRN: [id number]  Date: 2023     Subjective:      Chief Complaint: Routine Prenatal Visit        Valente Wilson is a 22 y.o.  at 15w4d with JULIA 10/29/2023, by Last Menstrual Period which is consistent with first trimester ultrasound.     Current issues: URI symptoms with nasal congestion as main complaint; daughter recently diagnosed with parainfluenza virus.     Chronic issues:                          1. Prediabetes/ PCOS Takes metformin. Denied diabetes history.                          2. Mood Disorder (Anxiety, Depression, Bipolar, PTSD) Previously took Latuda. Does not to resume. Has appt with [facility] next month                         3. ADHD                         4. Migraines                         5. Obesity                                                               Antepartum specific ROS  - Fetal movements: Yes flutters  - Vaginal bleeding: No  - Vaginal discharge: No  - Loss of fluid: No  - Contractions: No  - Headaches: Yes, tylenol helps minimally   - Vision changes: No  - Edema: occasional swelling     Review of Systems  Constitutional: no fever, no chills  CV: no chest pain  RESP: no SOB  : no dysuria, no hematuria  GI: no constipation, no diarrhea, no nausea, no vomiting  Psych: no depression, no anxiety; No SI/HI      PMHx: Mood Disorders, PCOS, prediabetes, treated as pre-gestational diabetes last pregnancy (patient denied diabetic diagnosis)  PSHx: Salpingectomy s/p ectopic pregnancy   SH: quit smoking 1 month ago, denied etoh, illicits, works at grocery store, 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 year old, has family support  FHx: Mom HTN, Dad mood disorder, Sister Mood disorder, Brother HTN      Meds: PNV; not taking metformin or aspirin 79    Allergies:         Review of patient's allergies indicates:   Allergen Reactions    Fentanyl (bulk) Itching    Oxycodone-acetaminophen Hives       pt broke  "out in rash  Other reaction(s): RASH         Gestational History:                    OB History    Para Term  AB Living   4 2 2 0 1 2   SAB IAB Ectopic Multiple Live Births      0 0 1 0 2          # Outcome Date GA Lbr Maykel/2nd Weight Sex Delivery Anes PTL Lv   4 Current                     3 Term 10/18/22 37w6d / 00:16 3.05 kg (6 lb 11.6 oz) F Vag-Spont EPI N FLACO      Name: RICCARDO JACKMAN      Apgar1: 8  Apgar5: 9   2 Ectopic 18 7w0d       ECTOPIC     FD   1 Term 06/29/15 40w0d   4.111 kg (9 lb 1 oz) M Vag-Spont EPI N FLACO         GYNHx:              LMP: Patient's last menstrual period was 2023 (exact date).              Menarche at 9              Menstrual Hx: regular, 3-4 pads/day, 4-5 days per period  Hx of birth control: OCP (estrogen/progesterone), Depo-Provera injections, and Ortho-Evra patches weekly              Hx of STDs: chlamydia              History of Abnormal PAP: No                        Objective:      Objective      Vitals:  Vitals:    23 1328   BP: 109/76   BP Location: Right arm   Patient Position: Sitting   BP Method: Large (Automatic)   Pulse: 102   Resp: 20   Temp: 98.2 °F (36.8 °C)   TempSrc: Oral   SpO2: 100%   Weight: (!) 138.4 kg (305 lb 3.2 oz)   Height: 5' 10" (1.778 m)           Physical Exam:  General: Well appearing young woman in no acute distress  RESP: clear to auscultation bilaterally, non labored  CV: regular rate and rhythm, no murmurs, no edema  ABD: gravid, nontender, BS+ FHT present  FHTs: 140 bpm per doppler  Cervix: no lesions or cervical motion tenderness     Initial OB Labs: Ordered 23  - Blood Type and Rh: A POS  - Antibody Screen: NEG  - CBC H/H: 10.0/32.2  - HIV: NR  - RPR: NR  - GC: NEG  - CT: NEG  - HBsAg: NR  - HCVAb: NR  - Rubella: Nonimmune  - Varicella: Nonimmune  - UA & Culture: No growth  - Sickle Cell Screen: NEG  - PAP: NIL  - Influenza vaccine date: Not done  - Contraception: TBD  - 1H GTT:  128 (from " 4/20/23)    15-20 Weeks: Lab Ordered 5/11/23  - Quad Screen: pending    - 20 wk anatomy US:      28 Week Lab: Ordered   - 1H GTT:   - Rhogam:   - Date of Tdap:   - CBC H/H:   - RPR:   - BTL consent:     36 Week Lab: Ordered   - CBC H/H:   - RPR:   - GBS Culture:   - HIV:   - Cervical GC:      Urine dip:  Component 14:26   Color, UA Dark Yellow    pH, UA 6.5    WBC, UA neg    Nitrite, UA neg    Protein, POC 30    Glucose, UA neg    Ketones, UA 15    Urobilinogen, UA 0.2    Bilirubin, POC neg    Blood, UA trace    Clarity, UA Clear    Spec Grav UA >1.030               Specimen Collected: 05/11/23 14:26 Last Resulted: 05/11/23 14:26                     Assessment/Plan:      Valente was seen today for initial prenatal visit.     Diagnoses and all orders for this visit:     15 Weeks Gestation of Pregnancy  - Urine dip reviewed as above  - Initial OB labs reviewed   - Continue prenatal vitamins  - Contraception: discussed different contraceptive options. Undecided but considering Depo  - Desires to breast and bottle feeding  - ASA initiated but patient not compliant  - Prevacid 15 mg daily ordered for GERD symptoms  - Labor precautions discussed     Pre-existing type 2 diabetes mellitus during pregnancy in first trimester  - Patient says she was diet controlled in her last pregnancy and did not take medications for Diabetes.   - Does not want to do glucose logs; agreeable to repeat 1 hour GTT in 1 week from today's visit  - Last 1 hr GTT resulted at 128 on 04/20/2023; last A1c 6.3 4/20/23  - Prescribed Metform 500mg but not compliant; patient says it was for PCOS, not diabetes   - Metformin last taken ~2 weeks ago per patient  - Will follow with MFM for further evaluation; appt scheduled for 6/18/23     Mood Disorder  - Denied SI/HI  - Has appt with [facility] for Bipolar  - Does not want to resume Latuda   - Encouraged to keep behavioral health appointment        Follow up in HROB clinic 4 weeks for routine  prenatal follow up.        Laurence Loja DO  [employer]  Resident, HO-1

## 2023-05-15 LAB
# FETUSES: NORMAL
2ND TRIMESTER 4 SCREEN SERPL-IMP: NORMAL
AFP ADJ MOM SERPL: 0.36 MOM
AFP SERPL IA-MCNC: 9.4 NG/ML
AGE AT DELIVERY: NORMAL
ANNOTATION COMMENT IMP: NORMAL
B-HCG ADJ MOM SERPL: 0.8 MOM
CHORION TYPE: NORMAL
COLLECT DATE: NORMAL
CURRENT SMOKER: NORMAL
FET TS 21 RISK FROM MAT AGE: NORMAL
GA EST FROM LMP: NORMAL WK,D
GA METHOD: NORMAL
HCG SERPL IA-ACNC: 31 IU/ML
HX OF NTD QL: NORMAL
HX OF NTD QL: NORMAL
HX OF TRISOMY 21 QL: NORMAL
IDDM PATIENT QL: YES
INHIBIN A ADJ MOM SERPL: 1.28 MOM
INHIBIN SERPL-MCNC: 152 PG/ML
IVF PREGNANCY: NO
LABORATORY COMMENT REPORT: NORMAL
M PHYSICIAN PHONE NUMBER: NORMAL
MATERNAL RISK FACTORS: NORMAL
NEURAL TUBE DEFECT RISK FETUS: NORMAL %
RECOM F/U: NORMAL
TEST PERFORMANCE INFO SPEC: NORMAL
TS 18 RISK FETUS: NORMAL
TS 21 RISK FETUS: NORMAL
U ESTRIOL ADJ MOM SERPL: 0.59 MOM
U ESTRIOL SERPL-MCNC: 0.33 NG/ML

## 2023-05-15 NOTE — PROGRESS NOTES
I have personally reviewed the review of systems (ROS) and past, family and social histories (PFSH) documented above by the resident.  I have reviewed the care furnished by the resident during the encounter, including a review of the patient's medical history, the resident's findings on physical examination, diagnosis, and the treatment plan.  I participated in the management of the patient and was immediately available throughout the encounter.   I was physically present during all key portions of the service(s) provided with the resident.  Services were furnished in a primary care center located in the outpatient department of a [facility].

## 2023-05-17 ENCOUNTER — TELEPHONE (OUTPATIENT)
Dept: SMOKING CESSATION | Facility: CLINIC | Age: 23
End: 2023-05-17
Payer: MEDICAID

## 2023-05-17 NOTE — TELEPHONE ENCOUNTER
Pt returned phone call.  Pt stated that she is no longer smoking.  Pt stated that she quit about a month ago.  Congratulated pt on her quit.  Gave pt our contact information if she needs any additional support.

## 2023-05-22 ENCOUNTER — LAB VISIT (OUTPATIENT)
Dept: FAMILY MEDICINE | Facility: CLINIC | Age: 23
End: 2023-05-22
Payer: MEDICAID

## 2023-05-22 DIAGNOSIS — Z3A.15 15 WEEKS GESTATION OF PREGNANCY: ICD-10-CM

## 2023-05-22 LAB — GLUCOSE 1H P 100 G GLC PO SERPL-MCNC: 106 MG/DL (ref 74–100)

## 2023-05-22 PROCEDURE — 36415 COLL VENOUS BLD VENIPUNCTURE: CPT

## 2023-05-22 PROCEDURE — 82950 GLUCOSE TEST: CPT

## 2023-06-08 ENCOUNTER — OFFICE VISIT (OUTPATIENT)
Dept: MATERNAL FETAL MEDICINE | Facility: CLINIC | Age: 23
End: 2023-06-08
Payer: MEDICAID

## 2023-06-08 ENCOUNTER — PROCEDURE VISIT (OUTPATIENT)
Dept: MATERNAL FETAL MEDICINE | Facility: CLINIC | Age: 23
End: 2023-06-08
Payer: MEDICAID

## 2023-06-08 VITALS
HEART RATE: 87 BPM | WEIGHT: 293 LBS | SYSTOLIC BLOOD PRESSURE: 121 MMHG | DIASTOLIC BLOOD PRESSURE: 61 MMHG | BODY MASS INDEX: 43.84 KG/M2

## 2023-06-08 DIAGNOSIS — O26.899 INSULIN RESISTANCE COMPLICATING PREGNANCY: ICD-10-CM

## 2023-06-08 DIAGNOSIS — E88.819 INSULIN RESISTANCE COMPLICATING PREGNANCY: ICD-10-CM

## 2023-06-08 DIAGNOSIS — O99.212 OBESITY AFFECTING PREGNANCY IN SECOND TRIMESTER: ICD-10-CM

## 2023-06-08 DIAGNOSIS — O99.342 MENTAL DISORDER AFFECTING PREGNANCY IN SECOND TRIMESTER: ICD-10-CM

## 2023-06-08 DIAGNOSIS — Z87.59 HISTORY OF POSTPARTUM HEMORRHAGE: ICD-10-CM

## 2023-06-08 DIAGNOSIS — Z3A.15 15 WEEKS GESTATION OF PREGNANCY: ICD-10-CM

## 2023-06-08 PROCEDURE — 1160F RVW MEDS BY RX/DR IN RCRD: CPT | Mod: CPTII,S$GLB,, | Performed by: OBSTETRICS & GYNECOLOGY

## 2023-06-08 PROCEDURE — 99214 OFFICE O/P EST MOD 30 MIN: CPT | Mod: 25,TH,S$GLB, | Performed by: OBSTETRICS & GYNECOLOGY

## 2023-06-08 PROCEDURE — 99214 PR OFFICE/OUTPT VISIT, EST, LEVL IV, 30-39 MIN: ICD-10-PCS | Mod: 25,TH,S$GLB, | Performed by: OBSTETRICS & GYNECOLOGY

## 2023-06-08 PROCEDURE — 1159F MED LIST DOCD IN RCRD: CPT | Mod: CPTII,S$GLB,, | Performed by: OBSTETRICS & GYNECOLOGY

## 2023-06-08 PROCEDURE — 76811 OB US DETAILED SNGL FETUS: CPT | Mod: 26,S$GLB,, | Performed by: OBSTETRICS & GYNECOLOGY

## 2023-06-08 PROCEDURE — 3074F SYST BP LT 130 MM HG: CPT | Mod: CPTII,S$GLB,, | Performed by: OBSTETRICS & GYNECOLOGY

## 2023-06-08 PROCEDURE — 1159F PR MEDICATION LIST DOCUMENTED IN MEDICAL RECORD: ICD-10-PCS | Mod: CPTII,S$GLB,, | Performed by: OBSTETRICS & GYNECOLOGY

## 2023-06-08 PROCEDURE — 1160F PR REVIEW ALL MEDS BY PRESCRIBER/CLIN PHARMACIST DOCUMENTED: ICD-10-PCS | Mod: CPTII,S$GLB,, | Performed by: OBSTETRICS & GYNECOLOGY

## 2023-06-08 PROCEDURE — 76811 PR US, OB FETAL EVAL & EXAM, TRANSABDOM,FIRST GESTATION: ICD-10-PCS | Mod: 26,S$GLB,, | Performed by: OBSTETRICS & GYNECOLOGY

## 2023-06-08 PROCEDURE — 3078F DIAST BP <80 MM HG: CPT | Mod: CPTII,S$GLB,, | Performed by: OBSTETRICS & GYNECOLOGY

## 2023-06-08 PROCEDURE — 3074F PR MOST RECENT SYSTOLIC BLOOD PRESSURE < 130 MM HG: ICD-10-PCS | Mod: CPTII,S$GLB,, | Performed by: OBSTETRICS & GYNECOLOGY

## 2023-06-08 PROCEDURE — 3008F PR BODY MASS INDEX (BMI) DOCUMENTED: ICD-10-PCS | Mod: CPTII,S$GLB,, | Performed by: OBSTETRICS & GYNECOLOGY

## 2023-06-08 PROCEDURE — 3008F BODY MASS INDEX DOCD: CPT | Mod: CPTII,S$GLB,, | Performed by: OBSTETRICS & GYNECOLOGY

## 2023-06-08 PROCEDURE — 3078F PR MOST RECENT DIASTOLIC BLOOD PRESSURE < 80 MM HG: ICD-10-PCS | Mod: CPTII,S$GLB,, | Performed by: OBSTETRICS & GYNECOLOGY

## 2023-06-08 NOTE — ASSESSMENT & PLAN NOTE
The patient was counseled on the  risks associated with obesity which include and increased risk of hypertension, preeclampsia, gestational diabetes, venous thromboembolic disease,  delivery, macrosomia (with resultant shoulder dystocia, obstetric sphincter injury), IUFD, longer first stage of labor, decreased TOLAC success rate, emergent & scheduled  & complications of  (prolonged OR time, delayed delivery, excessive EBL, infection, wound separation/infection, higher spinal failure rate, more difficult intubation).  Obesity is also associated with fetal anomalies, specifically neural tube defects.  Studies have shown that the rate of complication increases with rising BMI and thus pregnancies with maternal morbid obesity are at increased risk.      BMI 43.79 @ 19w    Recommendations:   Discussed that TWG goal is 11-20 lbs   Screen for signs/symptoms of obstructive sleep apnea   Nutritionist consult offered (this is to be ordered by primary OB provider)   Consider early 1 hr GCT (1hr-128; A1C 6.3); repeat at 24-28 weeks gestation   Start low dose aspirin 81 mg daily at 12-16 weeks for preeclampsia risk reduction (pt refuses)   Targeted anatomical survey scheduled at 18-20 weeks (started today)   Fetal growth ultrasound at 32 weeks if BMI >= 40   Weekly  testing at 32 weeks if pre-pregnancy BMI >= 45   Lovenox 40 mg BID for VTE prophylaxis while admitted to the hospital (antepartum or postpartum) if BMI >= 40.    Encouraged breastfeeding   Postpartum lifestyle modifications & weight loss

## 2023-06-08 NOTE — ASSESSMENT & PLAN NOTE
She reports a history of anxiety, bipolar, PTSD, and ADD. She is not taking medication at this time. She reports stable symptoms. Discussed increased risk for peripartum and postpartum mood disorders. Precautions provided.        We have discussed the importance of optimization of mental health conditions during pregnancy in an effort to reduce the risk of postpartum mood disorders. We have discussed options for management including psychotherapy, counseling, cognitive behavioral therapy, exercise/yoga, journaling, and psychiatry services. She will notify our office if she is interested in being referred for any of the above.    She had an appt scheduled with [facility], however, did not keep it. We have encouraged her to contact [place] to reschedule.

## 2023-06-08 NOTE — ASSESSMENT & PLAN NOTE
She reports blood transfusion after both of her deliveries. Atony is suspected cause of bleeding.     Active management of third stage of labor recommended.   Check CBC each trimester in order to optimize pre-delivery levels

## 2023-06-08 NOTE — PROGRESS NOTES
MATERNAL-FETAL MEDICINE   CONSULT NOTE    Provider requesting consultation: [facility]    SUBJECTIVE:     Ms. Valente Wilson is a 22 y.o.  female with IUP at 19w4d who is seen in consultation by MFM for evaluation and management of:  Problem   1. Insulin resistance complicating pregnancy   2. BMI affecting pregnancy in second trimester   3. Mental disorder affecting pregnancy in second trimester     She has a history of polycystic ovarian syndrome, pre-diabetes, is obese, and has a history of macrosomia in her 1st pregnancy.  In her  pregnancy, she failed early glucose testing and was on Metformin during that pregnancy.  She was non-compliant and did not submit sugar logs for review.  With her current pregnancy, baseline A1c was 6.3.  Early glucose testing results- 128.  Her primary OB encouraged 4 times daily sugar checks due to her history an increased risk for diabetes in pregnancy, however, she refused to check sugars.  She is not taking daily low-dose aspirin as prescribed.    She has an extensive mental health history including PTSD, anxiety, bipolar, ADD.  She is on no medications at this time.  In her prior pregnancy, we attempted multiple times to get her established with a mental health provider.  Unfortunately, she no showed and/or cancel those appointments.  Her primary OB attempted to get her established at [facility] with her current pregnancy.  However, she missed that appointment in May.  We have encouraged her to get established with a mental health provider.    Today she has no other issues or needs.        Medication List with Changes/Refills   Current Medications    ASPIRIN (ECOTRIN) 81 MG EC TABLET    Take 1 tablet (81 mg total) by mouth once daily.    BLOOD SUGAR DIAGNOSTIC STRP    1 each by Misc.(Non-Drug; Combo Route) route 3 (three) times daily.    CETIRIZINE (ZYRTEC) 10 MG TABLET    Take 1 tablet (10 mg total) by mouth every evening.    DOXYLAMINE SUCCINATE (UNISOM,  DOXYLAMINE,) 25 MG TABLET    Take 1 tablet (25 mg total) by mouth nightly as needed for Insomnia.    FERROUS GLUCONATE 324 MG (37.5 MG IRON) TAB TABLET    Take 1 tablet (324 mg total) by mouth daily with breakfast.    LANCETS (ONETOUCH ULTRASOFT LANCETS) MISC    1 Units by Misc.(Non-Drug; Combo Route) route 3 (three) times daily.    LANSOPRAZOLE (PREVACID) 15 MG CAPSULE    Take 1 capsule (15 mg total) by mouth once daily.    M- PLUS 27 MG IRON- 1 MG TAB    Take 1 tablet by mouth.    METFORMIN (GLUCOPHAGE) 500 MG TABLET    Take 1 tablet (500 mg total) by mouth daily with breakfast.    PYRIDOXINE, VITAMIN B6, (B-6) 50 MG TAB    Take 1 tablet (50 mg total) by mouth once daily.       Review of patient's allergies indicates:   Allergen Reactions    Fentanyl (bulk) Itching    Oxycodone-acetaminophen Hives     pt broke out in rash  Other reaction(s): RASH       PMH:  Past Medical History:   Diagnosis Date    ADD (attention deficit disorder)     Anemia     Bipolar disorder, unspecified     PCOS (polycystic ovarian syndrome)     Tobacco use 2022       PObHx:  OB History    Para Term  AB Living   4 2 2 0 1 2   SAB IAB Ectopic Multiple Live Births   0 0 1 0 2      # Outcome Date GA Lbr Maykel/2nd Weight Sex Delivery Anes PTL Lv   4 Current            3 Term 10/18/22 37w6d / 00:16 3.05 kg (6 lb 11.6 oz) F Vag-Spont EPI N FLACO   2 Ectopic 18 7w0d    ECTOPIC   FD   1 Term 06/29/15 40w0d  4.111 kg (9 lb 1 oz) M Vag-Spont EPI N FLACO       PSH:  Past Surgical History:   Procedure Laterality Date    SALPINGECTOMY      WISDOM TOOTH EXTRACTION         Family history:family history includes Bipolar disorder in her father; Diabetes in her maternal grandmother; Hypertension in her mother; Mental illness in her father.    Social history: reports that she quit smoking about 3 months ago. Her smoking use included cigarettes. She has never been exposed to tobacco smoke. She has never used smokeless tobacco. She  reports that she does not currently use alcohol. She reports that she does not use drugs.    Genetic history:  The patient denies any inherited genetic diseases or birth defects in herself or her partner's personal history or family.    Objective:   /61 (BP Location: Right arm)   Pulse 87   Wt (!) 138.6 kg (305 lb 8.9 oz)   LMP 2023 (Exact Date)   BMI 43.84 kg/m²     Ultrasound performed. See viewpoint for full ultrasound report.    A detailed fetal anatomic ultrasound examination was performed for the following high risk indication: BMI.   No fetal structural malformations are identified; however, fetal imaging is incomplete today for 3VTV, IVC/SVC, spine, genitalia.  A follow-up study will be scheduled to complete the fetal anatomic survey.   Fetal size today is consistent with established gestational age.   Cervical length by TA scanning is normal.   Placental location is posterior without evidence of previa.   JULIA has been reviewed and is based on 11 week scan in agreement with LMP.     ASSESSMENT/PLAN:     22 y.o.  female with IUP at 19w4d     1. Insulin resistance complicating pregnancy  She has a history of polycystic ovarian syndrome, pre-diabetes, is obese, and has a history of macrosomia in her 1st pregnancy.  In her  pregnancy, she failed early glucose testing and was on Metformin during that pregnancy.  She was non-compliant and did not submit sugar logs for review.  With her current pregnancy, baseline A1c was 6.3.  Early glucose testing results- 128.  Her primary OB encouraged 4 times daily sugar checks due to her history an increased risk for diabetes in pregnancy, however, she refused to check sugars.    The patient was counseled regarding the risks of diabetes in pregnancy. These risks include but are not limited to an increased risk of , preeclampsia/gestational hypertension, fetal structural anomalies, macrosomia, prematurity, shoulder dystocia/birth injury,   hyperbilirubinemia and electrolyte issues, pulmonary immaturity and sudden stillbirth especially in those patients with poor glucose control. I also discussed with the patient the need for increased fetal surveillance with serial fetal growth assessments and third trimester fetal testing. I also reviewed the possibility of need for early delivery in those with poor glucose control or evidence of fetal growth abnormalities.  She understands the correlation between glycemic control in pregnancy outcome.    We agree with primary OB that she should be checking her sugars 4 times daily.  We have recommended this to her, however, she refuses to check her sugars. In lieu of this optimal plan, we advise recheck glucola 24-28 weeks.     Recommendations:  Baseline evaluation (to be ordered by primary OB provider):  24 hour urine protein or urine P/C ratio, CBC, CMP (too low to calculate, serum cr 0.72, LFTs normal, )  Maternal EKG; echocardiogram if BMI > 30 or EKG is abnormal  Maternal ophthalmic exam (if hasn't been performed in last year) and podiatry exam  Hemoglobin A1C every trimester  Diabetic education referral and counseling re: goal blood sugars (< 95 mg/dl for fasting, < 120 mg/dl for 2 hour postprandial)  Medications:   Start low dose aspirin 81 mg daily at 12-16 weeks for preeclampsia risk reduction (refuses)  1 mg folic acid daily  Ultrasounds with MFM:  Targeted anatomy survey at 20 weeks  Serial growth ultrasounds q 4-6 weeks at 26-28 weeks    A fetal echocardiogram is recommended at 22-24 weeks with pediatric cardiology (scheduled)    Initiate  surveillance at 32 weeks:   Weekly BPP or NST + AFV if good control.   If control is poor, twice weekly  fetal surveillance is recommended with BPP alternating with NST.   Delivery timin 0/7 to 38 and 6/7 weeks if under good control without comorbidities  37 0/7 to 37 and 67 weeks if longstanding diabetes or poorly controlled,  polyhydramnios, EFW>90th percentile, or BMI >= 40  36 0/7 to 37 and 6/7 weeks if vascular complications, prior stillbirth or other complicating conditions.  Recommend consideration of earlier delivery if IUGR, HTN, or other complications  Recommend offering  for delivery is EFW is 4500g or more near the time of delivery    The patient was advised to call for blood sugars less than 70 or greater than 200 prior to her next appointment. She was also advised that if she notes nausea/vomiting, inability to tolerate PO, or concerns about being able to take her insulin that she should contact her provider or present to the OB ED.          2. BMI affecting pregnancy in second trimester  The patient was counseled on the  risks associated with obesity which include and increased risk of hypertension, preeclampsia, gestational diabetes, venous thromboembolic disease,  delivery, macrosomia (with resultant shoulder dystocia, obstetric sphincter injury), IUFD, longer first stage of labor, decreased TOLAC success rate, emergent & scheduled  & complications of  (prolonged OR time, delayed delivery, excessive EBL, infection, wound separation/infection, higher spinal failure rate, more difficult intubation).  Obesity is also associated with fetal anomalies, specifically neural tube defects.  Studies have shown that the rate of complication increases with rising BMI and thus pregnancies with maternal morbid obesity are at increased risk.      BMI 43.79 @ 19w    Recommendations:  Discussed that TWG goal is 11-20 lbs  Screen for signs/symptoms of obstructive sleep apnea  Nutritionist consult offered (this is to be ordered by primary OB provider)  Consider early 1 hr GCT (1hr-128; A1C 6.3); repeat at 24-28 weeks gestation  Start low dose aspirin 81 mg daily at 12-16 weeks for preeclampsia risk reduction (pt refuses)  Targeted anatomical survey scheduled at 18-20 weeks (started today)  Fetal growth  ultrasound at 32 weeks if BMI >= 40  Weekly  testing at 32 weeks if pre-pregnancy BMI >= 45  Lovenox 40 mg BID for VTE prophylaxis while admitted to the hospital (antepartum or postpartum) if BMI >= 40.   Encouraged breastfeeding  Postpartum lifestyle modifications & weight loss      3. Mental disorder affecting pregnancy in second trimester  She reports a history of anxiety, bipolar, PTSD, and ADD. She is not taking medication at this time. She reports stable symptoms. Discussed increased risk for peripartum and postpartum mood disorders. Precautions provided.        We have discussed the importance of optimization of mental health conditions during pregnancy in an effort to reduce the risk of postpartum mood disorders. We have discussed options for management including psychotherapy, counseling, cognitive behavioral therapy, exercise/yoga, journaling, and psychiatry services. She will notify our office if she is interested in being referred for any of the above.    She had an appt scheduled with [facility], however, did not keep it. We have encouraged her to contact [place] to reschedule.     History of postpartum hemorrhage  She reports blood transfusion after both of her deliveries. Atony is suspected cause of bleeding.     Active management of third stage of labor recommended.   Check CBC each trimester in order to optimize pre-delivery levels      FOLLOW UP:   F/u in 4 weeks for US/MFM visit      This consultation was completed with the assistance of Digna Cardoza NP.      Juanis Kimbrough MD  Maternal Fetal Medicine

## 2023-06-08 NOTE — ASSESSMENT & PLAN NOTE
She has a history of polycystic ovarian syndrome, pre-diabetes, is obese, and has a history of macrosomia in her 1st pregnancy.  In her  pregnancy, she failed early glucose testing and was on Metformin during that pregnancy.  She was non-compliant and did not submit sugar logs for review.  With her current pregnancy, baseline A1c was 6.3.  Early glucose testing results- 128.  Her primary OB encouraged 4 times daily sugar checks due to her history an increased risk for diabetes in pregnancy, however, she refused to check sugars.    The patient was counseled regarding the risks of diabetes in pregnancy. These risks include but are not limited to an increased risk of , preeclampsia/gestational hypertension, fetal structural anomalies, macrosomia, prematurity, shoulder dystocia/birth injury,  hyperbilirubinemia and electrolyte issues, pulmonary immaturity and sudden stillbirth especially in those patients with poor glucose control. I also discussed with the patient the need for increased fetal surveillance with serial fetal growth assessments and third trimester fetal testing. I also reviewed the possibility of need for early delivery in those with poor glucose control or evidence of fetal growth abnormalities.  She understands the correlation between glycemic control in pregnancy outcome.    We agree with primary OB that she should be checking her sugars 4 times daily.  We have recommended this to her, however, she refuses to check her sugars. In lieu of this optimal plan, we advise recheck glucola 24-28 weeks.     Recommendations:  Baseline evaluation (to be ordered by primary OB provider):   24 hour urine protein or urine P/C ratio, CBC, CMP (too low to calculate, serum cr 0.72, LFTs normal, )   Maternal EKG; echocardiogram if BMI > 30 or EKG is abnormal   Maternal ophthalmic exam (if hasn't been performed in last year) and podiatry exam   Hemoglobin A1C every trimester   Diabetic  education referral and counseling re: goal blood sugars (< 95 mg/dl for fasting, < 120 mg/dl for 2 hour postprandial)  Medications:    Start low dose aspirin 81 mg daily at 12-16 weeks for preeclampsia risk reduction (refuses)   1 mg folic acid daily  Ultrasounds with MFM:   Targeted anatomy survey at 20 weeks   Serial growth ultrasounds q 4-6 weeks at 26-28 weeks    A fetal echocardiogram is recommended at 22-24 weeks with pediatric cardiology (scheduled)    Initiate  surveillance at 32 weeks:    Weekly BPP or NST + AFV if good control.    If control is poor, twice weekly  fetal surveillance is recommended with BPP alternating with NST.   Delivery timing:   38 0/7 to 38 and 6/7 weeks if under good control without comorbidities   37 0/7 to 37 and 67 weeks if longstanding diabetes or poorly controlled, polyhydramnios, EFW>90th percentile, or BMI >= 40   36 0/7 to 37 and 6/7 weeks if vascular complications, prior stillbirth or other complicating conditions.  Recommend consideration of earlier delivery if IUGR, HTN, or other complications  Recommend offering  for delivery is EFW is 4500g or more near the time of delivery    The patient was advised to call for blood sugars less than 70 or greater than 200 prior to her next appointment. She was also advised that if she notes nausea/vomiting, inability to tolerate PO, or concerns about being able to take her insulin that she should contact her provider or present to the OB ED.

## 2023-06-09 ENCOUNTER — TELEPHONE (OUTPATIENT)
Dept: OBGYN | Facility: CLINIC | Age: 23
End: 2023-06-09
Payer: MEDICAID

## 2023-06-09 NOTE — TELEPHONE ENCOUNTER
Pt called with questions re: delivery doctor. Explained how clinic runs; and reviewed other options with pt. Pt also has concerns about DM diagnosis. Encouraged patient to write down concerns/questions to discuss w/MD at next visit.  Pt also states she needs to change next appt day/time and will call back at a later time.

## 2023-06-14 ENCOUNTER — PATIENT OUTREACH (OUTPATIENT)
Dept: FAMILY MEDICINE | Facility: CLINIC | Age: 23
End: 2023-06-14
Payer: MEDICAID

## 2023-06-14 NOTE — PROGRESS NOTES
Follow-up:Yes    Appointment reminder given for 06/15/23 Family Med OB        Patient verbalized understanding. Yes        Other comments: Spoke with patient her appointment for FM OB was confirmed for tomorrow. No complaints of vaginal bleeding or abdominal pain. States she has stopped taking her Metformin and not checking her BS at home as recommended by OB provider. Says she is watching  her diet and what she eats to control her sugars Voices no complaints with any issues at this time. Denies SI or HI ideations. She had questions about wanting the doctor she has been seeing to be the one to delivery her baby. Advised patient to discuss issue with the provider and have her questions available to ask the doctor. Reminded to report to the ED for any issues with her pregnancy. Voices understanding  .                  Education given on: ED precautions

## 2023-06-15 ENCOUNTER — OFFICE VISIT (OUTPATIENT)
Dept: FAMILY MEDICINE | Facility: CLINIC | Age: 23
End: 2023-06-15
Payer: MEDICAID

## 2023-06-15 VITALS
DIASTOLIC BLOOD PRESSURE: 67 MMHG | WEIGHT: 293 LBS | HEART RATE: 101 BPM | BODY MASS INDEX: 41.95 KG/M2 | RESPIRATION RATE: 20 BRPM | SYSTOLIC BLOOD PRESSURE: 134 MMHG | TEMPERATURE: 98 F | OXYGEN SATURATION: 100 % | HEIGHT: 70 IN

## 2023-06-15 DIAGNOSIS — Z3A.20 20 WEEKS GESTATION OF PREGNANCY: Primary | ICD-10-CM

## 2023-06-15 DIAGNOSIS — F39 MOOD DISORDER: ICD-10-CM

## 2023-06-15 DIAGNOSIS — O26.899 INSULIN RESISTANCE COMPLICATING PREGNANCY: ICD-10-CM

## 2023-06-15 DIAGNOSIS — E88.819 INSULIN RESISTANCE COMPLICATING PREGNANCY: ICD-10-CM

## 2023-06-15 LAB
BILIRUB SERPL-MCNC: NEGATIVE MG/DL
BLOOD URINE, POC: NEGATIVE
CLARITY, POC UA: CLEAR
COLOR, POC UA: NORMAL
GLUCOSE UR QL STRIP: NEGATIVE
KETONES UR QL STRIP: NEGATIVE
LEUKOCYTE ESTERASE URINE, POC: NORMAL
NITRITE, POC UA: NEGATIVE
PH, POC UA: 6
PROTEIN, POC: NORMAL
SPECIFIC GRAVITY, POC UA: >=1.03
UROBILINOGEN, POC UA: 0.2

## 2023-06-15 PROCEDURE — 81002 URINALYSIS NONAUTO W/O SCOPE: CPT | Mod: PBBFAC

## 2023-06-15 PROCEDURE — 99213 OFFICE O/P EST LOW 20 MIN: CPT | Mod: PBBFAC

## 2023-06-15 NOTE — PROGRESS NOTES
[facility] Family Medicine OB Clinic Note    Subjective:     Chief Complaint:   Chief Complaint   Patient presents with    Routine Prenatal Visit     20wks, 4d     HPI  21 yo  @ 20^4 WGA by LMP consistent with 1st trimester US (JULIA: 10/29/23) presents for routine prenatal visit.    Acute Concerns:  No issues currently. Taking PNV daily.     Chronic Conditions:  - Prediabetes/PCOS: follows with [facility], most recent 1 hr gtt 103.   - Mood disorder: previously on Latuda, does not wish to resume treatment. Follows with [facility].   - ADHD    OB/GYN History:  OB History    Para Term  AB Living   4 2 2 0 1 2   SAB IAB Ectopic Multiple Live Births   0 0 1 0 2      # Outcome Date GA Lbr Maykel/2nd Weight Sex Delivery Anes PTL Lv   4 Current            3 Term 10/18/22 37w6d / 00:16 3.05 kg (6 lb 11.6 oz) F Vag-Spont EPI N FLACO   2 Ectopic 18 7w0d    ECTOPIC   FD   1 Term 06/29/15 40w0d  4.111 kg (9 lb 1 oz) M Vag-Spont EPI N FLACO     Antepartum Review of Systems  Fetal movements: yes  Vaginal bleeding: no  Vaginal discharge: no  Loss of fluid: no  Contractions: no  Headaches: no  Vision changes: no  Edema: no    Objective:     Vitals:    06/15/23 1306   BP: 134/67   Pulse: 101   Resp: 20   Temp: 98 °F (36.7 °C)       Physical Exam    General: Well developed, no acute distress  CV: RRR, no murmurs, no edema, 2+ peripheral pulses  Resp: CTAB, non labored breathing on room air  Abd: gravid, non-tender, +BS    FH: 19.5 cm    FHT: 163 bpm    Initial OB Labs:  - Blood Type and Rh: A+  - Antibody Screen: negative  - CBC H/H: 10.0/32.2  - HIV: NR  - Syphilis Ab: NR  - GC: negative  - CT: negative  - HBsAg: NR  - HCVAb: NR  - Rubella: non immune  - Varicella: non immune  - UA & Culture: no growth  - Sickle Cell Screen: negative  - PAP: NIL  - Influenza vaccine date: not given  - 1 hr gtt: 128    15-20 Weeks Lab:  - Quad Screen: normal risk  - Repeat 1 hr gtt: 106    28 Week Lab:  - 1H GTT: _  - Rhogam: _  - Date  of Tdap: _  - CBC H/H: _  - RPR: _  - BTL Consent: _    36 Week Lab  - CBC H/H: _  - RPR: _  - GBS Culture: _  - HIV: _  - Urine: GC: _    Urine Dipstick  Component 13:28   Color, UA Dark Yellow    pH, UA 6.0    WBC, UA Trace    Nitrite, UA Negative    Protein, POC 30mg/dL    Glucose, UA Negative    Ketones, UA Negative    Urobilinogen, UA 0.2    Bilirubin, POC Negative    Blood, UA Negative    Clarity, UA Clear    Spec Grav UA >=1.030        Ultrasound  Initial US  Single intrauterine pregnancy with a viable fetus with a fetal heart rate of 164 beats per minute. Estimated age by ultrasound 11 weeks and 6 days +/-1 week 0 days. Estimated date of delivery November 3, 2023    20 wk anatomy US  A follow-up study will be scheduled to complete the fetal anatomic survey. Fetal size today is consistent with established gestational age.  Cervical length by TA scanning is normal. Placental location is posterior without evidence of previa. JULIA has been reviewed and is based on 11 week scan in agreement with LMP.     Assessment/Plan:       20 weeks gestation of pregnancy  - OB Protocol   - PNVs  - Urine dip reviewed as above  - Routine labs: pending  - Mother plans to breast and/or bottlefeed  - Postpartum contraception discussion: undecided  - Labor/ED precautions discussed    Mood disorder  - Had appointment with [facility] but had to cancel due to ill children  - Advised to call [facility] to reschedule  - No current SI  - Informed refusal of pharmaceutical treatment    Insulin resistance affecting pregnancy  - Diet controlled in previous pregnancy  - Initial 1 hr gtt 128, repeat 103  - Last A1c 6.3 4/20/23  - Non-adherent to treatment with metformin  - MFM recs recheck 1 hr gtt @ 24-28 weeks    Follow-up: 4 weeks in [facility] for routine prenatal    Jose Márquez MD  [facility] - PGY-1

## 2023-06-20 NOTE — PROGRESS NOTES
I have personally reviewed the review of systems (ROS) and past, family and social histories (PFSH) documented above by the resident.  I have reviewed the care furnished by the resident during the encounter, including a review of the patient's medical history, the resident's findings on physical examination, diagnosis, and the treatment plan.  I participated in the management of the patient and was immediately available throughout the encounter.   I was physically present during all key portions of the service(s) provided with the resident.  Services were furnished in a primary care center located in the outpatient department of a [facility].

## 2023-07-10 ENCOUNTER — PATIENT OUTREACH (OUTPATIENT)
Dept: FAMILY MEDICINE | Facility: CLINIC | Age: 23
End: 2023-07-10
Payer: MEDICAID

## 2023-07-10 NOTE — PROGRESS NOTES
Follow-up: Yes    Appointment reminder given for   07/11/23 [facility]Pediatric Cardiologist and Fetal Echo      Patient verbalized understanding. Yes        Other comments:  Spoke with patient ,she is aware of all her appointments for tomorrow. She also mentioned she has someone coming for a home visit on Wednesday 07/12/23 from [facility]. Denies any vaginal bleeding or abdominal pain. Fetal movement. Says she is continuing to control her BS by following her diet and she chooses to not do daily finger pricks for BS checks.She prefers to do GTT test when ordered by OB provider. No SI or HI ideations voiced. No other issues to address at this time.                  Education given on: ED Precautions

## 2023-07-11 ENCOUNTER — CLINICAL SUPPORT (OUTPATIENT)
Dept: PEDIATRIC CARDIOLOGY | Facility: CLINIC | Age: 23
End: 2023-07-11
Payer: MEDICAID

## 2023-07-11 ENCOUNTER — OFFICE VISIT (OUTPATIENT)
Dept: PEDIATRIC CARDIOLOGY | Facility: CLINIC | Age: 23
End: 2023-07-11
Payer: MEDICAID

## 2023-07-11 ENCOUNTER — OFFICE VISIT (OUTPATIENT)
Dept: MATERNAL FETAL MEDICINE | Facility: CLINIC | Age: 23
End: 2023-07-11
Payer: MEDICAID

## 2023-07-11 ENCOUNTER — PROCEDURE VISIT (OUTPATIENT)
Dept: MATERNAL FETAL MEDICINE | Facility: CLINIC | Age: 23
End: 2023-07-11
Payer: MEDICAID

## 2023-07-11 VITALS
HEART RATE: 87 BPM | WEIGHT: 293 LBS | HEART RATE: 87 BPM | DIASTOLIC BLOOD PRESSURE: 59 MMHG | WEIGHT: 293 LBS | SYSTOLIC BLOOD PRESSURE: 118 MMHG | SYSTOLIC BLOOD PRESSURE: 118 MMHG | HEIGHT: 70 IN | BODY MASS INDEX: 43.78 KG/M2 | BODY MASS INDEX: 41.95 KG/M2 | DIASTOLIC BLOOD PRESSURE: 59 MMHG

## 2023-07-11 DIAGNOSIS — O99.212 OBESITY AFFECTING PREGNANCY IN SECOND TRIMESTER: Primary | ICD-10-CM

## 2023-07-11 DIAGNOSIS — O09.292 HISTORY OF MACROSOMIA IN INFANT IN PRIOR PREGNANCY, CURRENTLY PREGNANT IN SECOND TRIMESTER: ICD-10-CM

## 2023-07-11 DIAGNOSIS — E88.819 INSULIN RESISTANCE COMPLICATING PREGNANCY: ICD-10-CM

## 2023-07-11 DIAGNOSIS — O26.899 INSULIN RESISTANCE COMPLICATING PREGNANCY: Primary | ICD-10-CM

## 2023-07-11 DIAGNOSIS — O99.212 OBESITY AFFECTING PREGNANCY IN SECOND TRIMESTER: ICD-10-CM

## 2023-07-11 DIAGNOSIS — E88.819 INSULIN RESISTANCE COMPLICATING PREGNANCY: Primary | ICD-10-CM

## 2023-07-11 DIAGNOSIS — E66.01 CLASS 3 SEVERE OBESITY WITHOUT SERIOUS COMORBIDITY WITH BODY MASS INDEX (BMI) OF 40.0 TO 44.9 IN ADULT, UNSPECIFIED OBESITY TYPE: ICD-10-CM

## 2023-07-11 DIAGNOSIS — O99.342 MENTAL DISORDER AFFECTING PREGNANCY IN SECOND TRIMESTER: ICD-10-CM

## 2023-07-11 DIAGNOSIS — Z87.59 HISTORY OF POSTPARTUM HEMORRHAGE: ICD-10-CM

## 2023-07-11 DIAGNOSIS — O26.899 INSULIN RESISTANCE COMPLICATING PREGNANCY: ICD-10-CM

## 2023-07-11 PROCEDURE — 3078F PR MOST RECENT DIASTOLIC BLOOD PRESSURE < 80 MM HG: ICD-10-PCS | Mod: CPTII,S$GLB,, | Performed by: OBSTETRICS & GYNECOLOGY

## 2023-07-11 PROCEDURE — 76827 ECHO EXAM OF FETAL HEART: CPT | Mod: S$GLB,,, | Performed by: PEDIATRICS

## 2023-07-11 PROCEDURE — 93325 PR DOPPLER COLOR FLOW VELOCITY MAP: ICD-10-PCS | Mod: S$GLB,,, | Performed by: PEDIATRICS

## 2023-07-11 PROCEDURE — 3074F SYST BP LT 130 MM HG: CPT | Mod: CPTII,S$GLB,, | Performed by: PEDIATRICS

## 2023-07-11 PROCEDURE — 99213 OFFICE O/P EST LOW 20 MIN: CPT | Mod: 25,TH,S$GLB, | Performed by: OBSTETRICS & GYNECOLOGY

## 2023-07-11 PROCEDURE — 1160F RVW MEDS BY RX/DR IN RCRD: CPT | Mod: CPTII,S$GLB,, | Performed by: OBSTETRICS & GYNECOLOGY

## 2023-07-11 PROCEDURE — 3078F DIAST BP <80 MM HG: CPT | Mod: CPTII,S$GLB,, | Performed by: PEDIATRICS

## 2023-07-11 PROCEDURE — 99213 PR OFFICE/OUTPT VISIT, EST, LEVL III, 20-29 MIN: ICD-10-PCS | Mod: 25,S$GLB,, | Performed by: PEDIATRICS

## 2023-07-11 PROCEDURE — 3078F PR MOST RECENT DIASTOLIC BLOOD PRESSURE < 80 MM HG: ICD-10-PCS | Mod: CPTII,S$GLB,, | Performed by: PEDIATRICS

## 2023-07-11 PROCEDURE — 3074F SYST BP LT 130 MM HG: CPT | Mod: CPTII,S$GLB,, | Performed by: OBSTETRICS & GYNECOLOGY

## 2023-07-11 PROCEDURE — 3008F BODY MASS INDEX DOCD: CPT | Mod: CPTII,S$GLB,, | Performed by: PEDIATRICS

## 2023-07-11 PROCEDURE — 76825 ECHO EXAM OF FETAL HEART: CPT | Mod: S$GLB,,, | Performed by: PEDIATRICS

## 2023-07-11 PROCEDURE — 3008F BODY MASS INDEX DOCD: CPT | Mod: CPTII,S$GLB,, | Performed by: OBSTETRICS & GYNECOLOGY

## 2023-07-11 PROCEDURE — 1159F PR MEDICATION LIST DOCUMENTED IN MEDICAL RECORD: ICD-10-PCS | Mod: CPTII,S$GLB,, | Performed by: OBSTETRICS & GYNECOLOGY

## 2023-07-11 PROCEDURE — 76816 OB US FOLLOW-UP PER FETUS: CPT | Mod: S$GLB,,, | Performed by: OBSTETRICS & GYNECOLOGY

## 2023-07-11 PROCEDURE — 99213 PR OFFICE/OUTPT VISIT, EST, LEVL III, 20-29 MIN: ICD-10-PCS | Mod: 25,TH,S$GLB, | Performed by: OBSTETRICS & GYNECOLOGY

## 2023-07-11 PROCEDURE — 1160F PR REVIEW ALL MEDS BY PRESCRIBER/CLIN PHARMACIST DOCUMENTED: ICD-10-PCS | Mod: CPTII,S$GLB,, | Performed by: OBSTETRICS & GYNECOLOGY

## 2023-07-11 PROCEDURE — 3008F PR BODY MASS INDEX (BMI) DOCUMENTED: ICD-10-PCS | Mod: CPTII,S$GLB,, | Performed by: PEDIATRICS

## 2023-07-11 PROCEDURE — 1159F MED LIST DOCD IN RCRD: CPT | Mod: CPTII,S$GLB,, | Performed by: OBSTETRICS & GYNECOLOGY

## 2023-07-11 PROCEDURE — 3074F PR MOST RECENT SYSTOLIC BLOOD PRESSURE < 130 MM HG: ICD-10-PCS | Mod: CPTII,S$GLB,, | Performed by: PEDIATRICS

## 2023-07-11 PROCEDURE — 3008F PR BODY MASS INDEX (BMI) DOCUMENTED: ICD-10-PCS | Mod: CPTII,S$GLB,, | Performed by: OBSTETRICS & GYNECOLOGY

## 2023-07-11 PROCEDURE — 76825 PR  SO2 FETAL HEART: ICD-10-PCS | Mod: S$GLB,,, | Performed by: PEDIATRICS

## 2023-07-11 PROCEDURE — 99213 OFFICE O/P EST LOW 20 MIN: CPT | Mod: 25,S$GLB,, | Performed by: PEDIATRICS

## 2023-07-11 PROCEDURE — 76827 PR  SO2 FETAL HEART DOPPLER: ICD-10-PCS | Mod: S$GLB,,, | Performed by: PEDIATRICS

## 2023-07-11 PROCEDURE — 3078F DIAST BP <80 MM HG: CPT | Mod: CPTII,S$GLB,, | Performed by: OBSTETRICS & GYNECOLOGY

## 2023-07-11 PROCEDURE — 76816 PR  US,PREGNANT UTERUS,F/U,TRANSABD APP: ICD-10-PCS | Mod: S$GLB,,, | Performed by: OBSTETRICS & GYNECOLOGY

## 2023-07-11 PROCEDURE — 93325 DOPPLER ECHO COLOR FLOW MAPG: CPT | Mod: S$GLB,,, | Performed by: PEDIATRICS

## 2023-07-11 PROCEDURE — 3074F PR MOST RECENT SYSTOLIC BLOOD PRESSURE < 130 MM HG: ICD-10-PCS | Mod: CPTII,S$GLB,, | Performed by: OBSTETRICS & GYNECOLOGY

## 2023-07-11 NOTE — ASSESSMENT & PLAN NOTE
Please see prior documentation for specific counseling.      BMI 43 @24w    Recommendations:   Discussed that TWG goal is 11-20 lbs   Screen for signs/symptoms of obstructive sleep apnea   Nutritionist consult offered (this is to be ordered by primary OB provider)   Consider early 1 hr GCT (1hr-128; A1C 6.3); repeat at 24-28 weeks gestation   Start low dose aspirin 81 mg daily at 12-16 weeks for preeclampsia risk reduction (pt refuses)   Targeted anatomical survey scheduled at 18-20 weeks (completed)   Fetal growth ultrasound at 32 weeks if BMI >= 40   Weekly  testing at 32 weeks if pre-pregnancy BMI >= 45   Lovenox 40 mg BID for VTE prophylaxis while admitted to the hospital (antepartum or postpartum) if BMI >= 40.    Encouraged breastfeeding   Postpartum lifestyle modifications & weight loss

## 2023-07-11 NOTE — PROGRESS NOTES
[facility] Pediatric Cardiology Fetal Cardiology Clinic    Today, I had the pleasure of evaluating Valente Wilson who is now 22 y.o. and carrying her fourth pregnancy at 24 2/7 weeks gestation with an JULIA of 10/29/23. She was referred for evaluation of the fetal heart due to maternal pre-diabetes and insulin resistance.     She is carrying a female fetus, to be named Levi.  Pregnancy thus far has been otherwise uncomplicated.     Obstetric History:    .  Her OB history is notable for prior ectopic pregnancy.     Past Medical History:   Diagnosis Date    ADD (attention deficit disorder)     Anemia     Bipolar disorder, unspecified     PCOS (polycystic ovarian syndrome)     Tobacco use 2022         Current Outpatient Medications:     aspirin (ECOTRIN) 81 MG EC tablet, Take 1 tablet (81 mg total) by mouth once daily., Disp: 90 tablet, Rfl: 3    cetirizine (ZYRTEC) 10 MG tablet, Take 1 tablet (10 mg total) by mouth every evening., Disp: 30 tablet, Rfl: 0    ferrous gluconate 324 mg (37.5 mg iron) Tab tablet, Take 1 tablet (324 mg total) by mouth daily with breakfast. (Patient not taking: Reported on 2023), Disp: 90 tablet, Rfl: 1    M- PLUS 27 mg iron- 1 mg Tab, Take 1 tablet by mouth., Disp: , Rfl:     pyridoxine, vitamin B6, (B-6) 50 MG Tab, Take 1 tablet (50 mg total) by mouth once daily. (Patient not taking: Reported on 2023), Disp: 30 tablet, Rfl: 2     Review of patient's allergies indicates:   Allergen Reactions    Fentanyl (bulk) Itching    Oxycodone-acetaminophen Hives     pt broke out in rash  Other reaction(s): RASH       Family History: Negative for congenital heart disease, early coronary artery disease, sudden unexplained death, connective tissues disorders, genetic syndromes, multiple miscarriages or other congenital anomalies.    Social History: Ms. Valente Wilson is in a relationship with the father of the baby.  The father of the baby is involved.      FETAL ECHOCARDIOGRAM (summary):  Fetal echo at 24 2/7wga, JULIA 10/29/23.   Normal fetal echocardiogram.  (Full report in electronic medical record)    Impression:  Single active female fetus at 24 2/7 wga.  Normal fetal echocardiogram.      Todays fetal echocardiogram is normal, within the limitations of fetal echocardiography.  I discussed with her that fetal echocardiography is insufficiently sensitive to rule out all septal defects, anomalies of pulmonary and systemic veins, arch anomalies, and some valvar abnormalities, nor can it ensure that the ductus arteriosus and foramen ovale will spontaneously close.     Recommendations:  Location, timing, and mode of delivery will be determined by the obstetrical team.  She does not require further follow-up in the fetal echocardiography clinic, but I would be happy to see her again if additional questions or concerns arise.    Should there be any concerns about the baby's heart after birth, a post-dione echocardiogram and cardiology consultation are recommended.           The above information was discussed in detail including the use of diagrams, with 30 minutes of total face to face time, with greater than 50% with counseling and coordination of care.  The discussion of the diagnosis and treatment options is as described above.

## 2023-07-11 NOTE — ASSESSMENT & PLAN NOTE
She has a history of polycystic ovarian syndrome, pre-diabetes, is obese, and has a history of macrosomia in her 1st pregnancy.  In her  pregnancy, she failed early glucose testing and was on Metformin during that pregnancy.  She was non-compliant and did not submit sugar logs for review.  With her current pregnancy, baseline A1c was 6.3.  Early glucose testing results- 128.  Her primary OB encouraged 4 times daily sugar checks due to her history an increased risk for diabetes in pregnancy, however, she refused to check sugars.    The patient was counseled regarding the risks of diabetes in pregnancy. These risks include but are not limited to an increased risk of , preeclampsia/gestational hypertension, fetal structural anomalies, macrosomia, prematurity, shoulder dystocia/birth injury,  hyperbilirubinemia and electrolyte issues, pulmonary immaturity and sudden stillbirth especially in those patients with poor glucose control. I also discussed with the patient the need for increased fetal surveillance with serial fetal growth assessments and third trimester fetal testing. I also reviewed the possibility of need for early delivery in those with poor glucose control or evidence of fetal growth abnormalities.  She understands the correlation between glycemic control in pregnancy outcome.    We agree with primary OB that she should be checking her sugars 4 times daily.  We have recommended this to her, however, she refuses to check her sugars. In lieu of this optimal plan, we advise recheck glucola 24-28 weeks.     Recommendations:  Baseline evaluation (to be ordered by primary OB provider):   24 hour urine protein or urine P/C ratio, CBC, CMP (too low to calculate, serum cr 0.72, LFTs normal, )   Maternal EKG; echocardiogram if BMI > 30 or EKG is abnormal   Maternal ophthalmic exam (if hasn't been performed in last year) and podiatry exam   Hemoglobin A1C every trimester   Diabetic  education referral and counseling re: goal blood sugars (< 95 mg/dl for fasting, < 120 mg/dl for 2 hour postprandial)  Medications:    Start low dose aspirin 81 mg daily at 12-16 weeks for preeclampsia risk reduction (refuses)   1 mg folic acid daily  Ultrasounds with MFM:   Targeted anatomy survey at 20 weeks   Serial growth ultrasounds q 4-6 weeks at 26-28 weeks    A fetal echocardiogram is recommended at 22-24 weeks with pediatric cardiology (completing today, 23 following [facility] appt)    Initiate  surveillance at 32 weeks:    Weekly BPP or NST + AFV if good control.    If control is poor, twice weekly  fetal surveillance is recommended with BPP alternating with NST.   Delivery timing:   38 0/7 to 38 and 6/7 weeks if under good control without comorbidities   37 0/7 to 37 and 67 weeks if longstanding diabetes or poorly controlled, polyhydramnios, EFW>90th percentile, or BMI >= 40   36 0/7 to 37 and 6/7 weeks if vascular complications, prior stillbirth or other complicating conditions.  Recommend consideration of earlier delivery if IUGR, HTN, or other complications  Recommend offering  for delivery is EFW is 4500g or more near the time of delivery    The patient was advised to call for blood sugars less than 70 or greater than 200 prior to her next appointment. She was also advised that if she notes nausea/vomiting, inability to tolerate PO, or concerns about being able to take her insulin that she should contact her provider or present to the OB ED.

## 2023-07-11 NOTE — PROGRESS NOTES
Maternal Fetal Medicine follow up consult    SUBJECTIVE:     Valente Wilson is a 22 y.o.  female with IUP at 24w2d who is seen in follow up consultation by MFM.  Pregnancy complications include:   Problem   1. Insulin resistance complicating pregnancy   2. BMI affecting pregnancy in second trimester   4. History of postpartum hemorrhage   3. Mental disorder affecting pregnancy in second trimester     She is not checking her sugars. States she has routine glucola later this week.  Has fetal echo immediately following this MFM visit.  She is without complaints. Denies LOF, VB, contractions. Reports positive fetal movement.    Previous notes reviewed.   No changes to medical, surgical, family, social, or obstetric history.    Interval history since last MFM visit: no changes    Medications:  Current Outpatient Medications   Medication Instructions    aspirin (ECOTRIN) 81 mg, Oral, Daily    cetirizine (ZYRTEC) 10 mg, Oral, Nightly    ferrous gluconate 324 mg, Oral, With breakfast    M-ZAINA PLUS 27 mg iron- 1 mg Tab 1 tablet, Oral    pyridoxine (vitamin B6) (B-6) 50 mg, Oral, Daily       Care team members:  [facility] - Primary OB       OBJECTIVE:   BP (!) 118/59 (BP Location: Right arm)   Pulse 87   Wt (!) 138.4 kg (305 lb 1.9 oz)   LMP 2023 (Exact Date)   BMI 43.78 kg/m²     Ultrasound performed. See viewpoint for full ultrasound report.    A viable oliva pregnancy is visualized in breech presentation.  Estimated fetal weight is at the 21st percentile with an abdominal circumference at the 13th percentile.    No fetal abnormalities are noted and anatomic survey is complete. Amniotic fluid volume is normal.  Placenta is posterior.    ASSESSMENT/PLAN:     22 y.o.  female with IUP at 24w2d    1. Insulin resistance complicating pregnancy  She has a history of polycystic ovarian syndrome, pre-diabetes, is obese, and has a history of macrosomia in her 1st pregnancy.  In her  pregnancy, she  failed early glucose testing and was on Metformin during that pregnancy.  She was non-compliant and did not submit sugar logs for review.  With her current pregnancy, baseline A1c was 6.3.  Early glucose testing results- 128.  Her primary OB encouraged 4 times daily sugar checks due to her history an increased risk for diabetes in pregnancy, however, she refused to check sugars.    The patient was counseled regarding the risks of diabetes in pregnancy. These risks include but are not limited to an increased risk of , preeclampsia/gestational hypertension, fetal structural anomalies, macrosomia, prematurity, shoulder dystocia/birth injury,  hyperbilirubinemia and electrolyte issues, pulmonary immaturity and sudden stillbirth especially in those patients with poor glucose control. I also discussed with the patient the need for increased fetal surveillance with serial fetal growth assessments and third trimester fetal testing. I also reviewed the possibility of need for early delivery in those with poor glucose control or evidence of fetal growth abnormalities.  She understands the correlation between glycemic control in pregnancy outcome.    We agree with primary OB that she should be checking her sugars 4 times daily.  We have recommended this to her, however, she refuses to check her sugars. In lieu of this optimal plan, we advise recheck glucola 24-28 weeks.     Recommendations:  Baseline evaluation (to be ordered by primary OB provider):  24 hour urine protein or urine P/C ratio, CBC, CMP (too low to calculate, serum cr 0.72, LFTs normal, )  Maternal EKG; echocardiogram if BMI > 30 or EKG is abnormal  Maternal ophthalmic exam (if hasn't been performed in last year) and podiatry exam  Hemoglobin A1C every trimester  Diabetic education referral and counseling re: goal blood sugars (< 95 mg/dl for fasting, < 120 mg/dl for 2 hour postprandial)  Medications:   Start low dose aspirin 81 mg daily at  12-16 weeks for preeclampsia risk reduction (refuses)  1 mg folic acid daily  Ultrasounds with MFM:  Targeted anatomy survey at 20 weeks  Serial growth ultrasounds q 4-6 weeks at 26-28 weeks    A fetal echocardiogram is recommended at 22-24 weeks with pediatric cardiology (completing today, 23 following M appt)    Initiate  surveillance at 32 weeks:   Weekly BPP or NST + AFV if good control.   If control is poor, twice weekly  fetal surveillance is recommended with BPP alternating with NST.   Delivery timin 0/7 to 38 and 6/7 weeks if under good control without comorbidities  37 0/7 to 37 and 67 weeks if longstanding diabetes or poorly controlled, polyhydramnios, EFW>90th percentile, or BMI >= 40  36 0/7 to 37 and 6/7 weeks if vascular complications, prior stillbirth or other complicating conditions.  Recommend consideration of earlier delivery if IUGR, HTN, or other complications  Recommend offering  for delivery is EFW is 4500g or more near the time of delivery    The patient was advised to call for blood sugars less than 70 or greater than 200 prior to her next appointment. She was also advised that if she notes nausea/vomiting, inability to tolerate PO, or concerns about being able to take her insulin that she should contact her provider or present to the OB ED.          2. BMI affecting pregnancy in second trimester  Please see prior documentation for specific counseling.      BMI 43 @24w    Recommendations:  Discussed that TWG goal is 11-20 lbs  Screen for signs/symptoms of obstructive sleep apnea  Nutritionist consult offered (this is to be ordered by primary OB provider)  Consider early 1 hr GCT (1hr-128; A1C 6.3); repeat at 24-28 weeks gestation  Start low dose aspirin 81 mg daily at 12-16 weeks for preeclampsia risk reduction (pt refuses)  Targeted anatomical survey scheduled at 18-20 weeks (completed)  Fetal growth ultrasound at 32 weeks if BMI >= 40  Weekly   testing at 32 weeks if pre-pregnancy BMI >= 45  Lovenox 40 mg BID for VTE prophylaxis while admitted to the hospital (antepartum or postpartum) if BMI >= 40.   Encouraged breastfeeding  Postpartum lifestyle modifications & weight loss      3. Mental disorder affecting pregnancy in second trimester  She reports a history of anxiety, bipolar, PTSD, and ADD. She is not taking medication at this time. She reports stable symptoms. Discussed increased risk for peripartum and postpartum mood disorders. Precautions provided.        We have discussed the importance of optimization of mental health conditions during pregnancy in an effort to reduce the risk of postpartum mood disorders. We have discussed options for management including psychotherapy, counseling, cognitive behavioral therapy, exercise/yoga, journaling, and psychiatry services. She will notify our office if she is interested in being referred for any of the above.    She had an appt scheduled with [facility], however, did not keep it. We have encouraged her to contact [place] to reschedule.     4. History of postpartum hemorrhage  She reports blood transfusion after both of her deliveries. Atony is suspected cause of bleeding.     Active management of third stage of labor recommended.   Check CBC each trimester in order to optimize pre-delivery levels    F/u in 4 weeks for MFM visit/ultrasound    Juanis Kimbrough MD  Maternal Fetal Medicine

## 2023-07-11 NOTE — LETTER
July 11, 2023        Jan Boyer MD  [address]  [facility] [address]             [facility] - Pediatric Cardiology  [address]  [facility] [address]  Phone: [phone]  Fax: [phone]   Patient: Valente Wilson   MR Number: [id number]   YOB: 2000   Date of Visit: 7/11/2023       Dear Dr. Boyer:    Thank you for referring Valente Wilson to me for evaluation. Below are the relevant portions of my assessment and plan of care.            If you have questions, please do not hesitate to call me. I look forward to following Valente along with you.    Sincerely,      Parul Nathan MD           CC    No Recipients

## 2023-07-11 NOTE — ASSESSMENT & PLAN NOTE
She reports a history of anxiety, bipolar, PTSD, and ADD. She is not taking medication at this time. She reports stable symptoms. Discussed increased risk for peripartum and postpartum mood disorders. Precautions provided.        We have discussed the importance of optimization of mental health conditions during pregnancy in an effort to reduce the risk of postpartum mood disorders. We have discussed options for management including psychotherapy, counseling, cognitive behavioral therapy, exercise/yoga, journaling, and psychiatry services. She will notify our office if she is interested in being referred for any of the above.    She had an appt scheduled with [facility], however, did not keep it. We have encouraged her to contact [place] to reschedule.

## 2023-07-13 ENCOUNTER — OFFICE VISIT (OUTPATIENT)
Dept: FAMILY MEDICINE | Facility: CLINIC | Age: 23
End: 2023-07-13
Payer: MEDICAID

## 2023-07-13 VITALS
DIASTOLIC BLOOD PRESSURE: 68 MMHG | BODY MASS INDEX: 41.95 KG/M2 | WEIGHT: 293 LBS | TEMPERATURE: 98 F | OXYGEN SATURATION: 99 % | SYSTOLIC BLOOD PRESSURE: 104 MMHG | RESPIRATION RATE: 20 BRPM | HEIGHT: 70 IN | HEART RATE: 82 BPM

## 2023-07-13 DIAGNOSIS — K21.9 GASTROESOPHAGEAL REFLUX DISEASE, UNSPECIFIED WHETHER ESOPHAGITIS PRESENT: ICD-10-CM

## 2023-07-13 DIAGNOSIS — Z3A.25 25 WEEKS GESTATION OF PREGNANCY: Primary | ICD-10-CM

## 2023-07-13 DIAGNOSIS — E88.819 INSULIN RESISTANCE COMPLICATING PREGNANCY: ICD-10-CM

## 2023-07-13 DIAGNOSIS — Z87.59 HISTORY OF POSTPARTUM HEMORRHAGE: ICD-10-CM

## 2023-07-13 DIAGNOSIS — O26.899 INSULIN RESISTANCE COMPLICATING PREGNANCY: ICD-10-CM

## 2023-07-13 DIAGNOSIS — O99.212 OBESITY AFFECTING PREGNANCY IN SECOND TRIMESTER: ICD-10-CM

## 2023-07-13 LAB
BILIRUB SERPL-MCNC: NEGATIVE MG/DL
BLOOD URINE, POC: NEGATIVE
CLARITY, POC UA: CLEAR
COLOR, POC UA: YELLOW
GLUCOSE 1H P 100 G GLC PO SERPL-MCNC: 150 MG/DL (ref 100–180)
GLUCOSE UR QL STRIP: NEGATIVE
KETONES UR QL STRIP: NEGATIVE
LEUKOCYTE ESTERASE URINE, POC: NORMAL
NITRITE, POC UA: NEGATIVE
PH, POC UA: 6
PROTEIN, POC: NEGATIVE
SPECIFIC GRAVITY, POC UA: 1.02
UROBILINOGEN, POC UA: 0.2

## 2023-07-13 PROCEDURE — 82950 GLUCOSE TEST: CPT

## 2023-07-13 PROCEDURE — 81002 URINALYSIS NONAUTO W/O SCOPE: CPT | Mod: PBBFAC

## 2023-07-13 PROCEDURE — 36415 COLL VENOUS BLD VENIPUNCTURE: CPT

## 2023-07-13 PROCEDURE — 99214 OFFICE O/P EST MOD 30 MIN: CPT | Mod: PBBFAC

## 2023-07-13 RX ORDER — VITAMIN A, VITAMIN C, VITAMIN D, VITAMIN E, THIAMINE, RIBOFLAVIN, NIACIN, VITAMIN B6, FOLIC ACID, VITAMIN B12, CALCIUM, IRON, ZINC, COPPER 4000; 120; 400; 22; 1.84; 3; 20; 10; 1; 12; 200; 27; 25; 2 [IU]/1; MG/1; [IU]/1; [IU]/1; MG/1; MG/1; MG/1; MG/1; MG/1; UG/1; MG/1; MG/1; MG/1; MG/1
1 TABLET ORAL DAILY
Qty: 30 TABLET | Refills: 0 | Status: SHIPPED | OUTPATIENT
Start: 2023-07-13 | End: 2023-10-02

## 2023-07-13 RX ORDER — CALC/MAG/B COMPLEX/D3/HERB 61
15 TABLET ORAL DAILY
Qty: 30 CAPSULE | Refills: 1 | Status: ON HOLD | OUTPATIENT
Start: 2023-07-13 | End: 2023-10-12

## 2023-07-13 NOTE — PROGRESS NOTES
OB Office Visit Note    Name: Valente Wilson  MRN: [id number]  Date: 2023    Subjective:      Chief Complaint: Routine Prenatal Visit (HROB 24w4d. C/O acid reflex/heartburn.)      Valente Wilson is a 22 y.o.  at 24w4d with JULIA 10/29/2023, by Last Menstrual Period    Current issues: Endorses occasional right sided headache, GERD, insulin resistance affecting pregnancy    Chronic issues: PCOS/pre-diabetes, KRISTINA, Bipolar II (follows / [facility]), Allergic rhinitis, obesity, history of postpartum hemorrhage    PMHx: 2nd pregnancy was ectopic  PSHx: Had right fallopian tube removed  SH: support at home  FHx: No reported pertinent FHx  Meds: PNV  Prior to Admission medications    Medication Sig Start Date End Date Taking? Authorizing Provider   aspirin (ECOTRIN) 81 MG EC tablet Take 1 tablet (81 mg total) by mouth once daily. 23 Yes Katelyn Ureña MD   cetirizine (ZYRTEC) 10 MG tablet Take 1 tablet (10 mg total) by mouth every evening. 3/18/23 4/17/23  Audrey Melgoza MD   ferrous gluconate 324 mg (37.5 mg iron) Tab tablet Take 1 tablet (324 mg total) by mouth daily with breakfast.  Patient not taking: Reported on 2023  Katelyn Ureña MD   M-ZAINA PLUS 27 mg iron- 1 mg Tab Take 1 tablet by mouth. 3/4/23   Historical Provider   pyridoxine, vitamin B6, (B-6) 50 MG Tab Take 1 tablet (50 mg total) by mouth once daily.  Patient not taking: Reported on 2023 3/18/23   Audrey Melgoza MD     Allergies:   Review of patient's allergies indicates:   Allergen Reactions    Fentanyl (bulk) Itching    Oxycodone-acetaminophen Hives     pt broke out in rash  Other reaction(s): RASH       Gestational History:   OB History    Para Term  AB Living   4 2 2 0 1 2   SAB IAB Ectopic Multiple Live Births   0 0 1 0 2      # Outcome Date GA Lbr Maykel/2nd Weight Sex Delivery Anes PTL Lv   4 Current            3 Term 10/18/22 37w6d / 00:16 3.05 kg (6 lb 11.6  "oz) F Vag-Spont EPI N FLACO      Name: AUGUSTINA,GIRL KAHLIL      Apgar1: 8  Apgar5: 9   2 Ectopic 09/12/18 7w0d    ECTOPIC   FD   1 Term 06/29/15 40w0d  4.111 kg (9 lb 1 oz) M Vag-Spont EPI N FLACO       GYNHx:   LMP: Patient's last menstrual period was 01/22/2023 (exact date).   Menarche at 10   Menstrual Hx: regular, 3-5 day cycles  Hx of birth control: Depo-Provera injections   Hx of STDs: chlamydia (2017) trichomonas (2015) both treated   History of Abnormal PAP: No   4/12/2022    Antepartum specific ROS  - Fetal movements: Yes   - Vaginal bleeding: No  - Vaginal discharge: No  - Loss of fluid: No  - Contractions: No  - Headaches: Yes - 4 episodes of right sided pain since pregnancy   - Vision changes: Yes - left eye gets blurry w/ headache  - Edema: No    Review of Systems  Constitutional: no fever, no chills  CV: no chest pain  RESP: no SOB  : no dysuria, no hematuria  GI: no constipation, no diarrhea, no nausea, no vomiting  Psych: no depression, no anxiety; No SI/HI    Objective:      Vitals:    07/13/23 1133   BP: 104/68   BP Location: Left arm   Patient Position: Lying   BP Method: Large (Automatic)   Pulse: 82   Resp: 20   Temp: 98.4 °F (36.9 °C)   TempSrc: Oral   SpO2: 99%   Weight: (!) 138.4 kg (305 lb 3.2 oz)   Height: 5' 10" (1.778 m)       Lab Results   Component Value Date    COLORU Yellow 07/13/2023    SPECGRAV 1.025 07/13/2023    PHUR 6.0 07/13/2023    WBCUR Trace 07/13/2023    NITRITE Negative 07/13/2023    PROTEINPOC Negative 07/13/2023    GLUCOSEUR Negative 07/13/2023    KETONESU Negative 07/13/2023    UROBILINOGEN 0.2 07/13/2023    BILIRUBINPOC Negative 07/13/2023    RBCUR Negative 07/13/2023       General:   RESP: clear to auscultation bilaterally, non labored  CV: regular rate and rhythm, no murmurs, no edema  ABD: gravid, nontender, BS+ FHT present  FHTs: 149 bpm; (location)  Fundal height: 24 cm  Protective Sensation (w/ 10 gram monofilament):  Right:  Intact w/ exception of heel  Left:  " Intact w/ exception of heel    Visual Inspection:  Normal -  Bilateral, Nails Intact - without Evidence of Foot Deformity- Bilateral, Ulceration -  Neither, Blister -  Neither, Skin Breakdown -  Neither, Callus -  Bilateral, and Dry Skin -  Bilateral    Pedal Pulses:   Right: Present  Left: Present    Posterior Tibialis Pulses:   Right:Diminished  Left: Diminished     Initial OB Labs:  - Blood Type and Rh: A+  - Antibody Screen: negative  - CBC H/H: 10.0/32.2  - HIV: NR  - Syphilis Ab: NR  - GC: negative  - CT: negative  - HBsAg: NR  - HCVAb: NR  - Rubella: non immune  - Varicella: non immune  - UA & Culture: no growth  - Sickle Cell Screen: negative  - PAP: NIL  - Influenza vaccine date: not given  - 1 hr gtt: 128  - Contraception: none    15-20 Weeks:  - Quad Screen: normal risk    - 20 wk anatomy US:    28 Week Lab: Ordered   - 1H GTT:   - Rhogam:   - Date of Tdap:   - CBC H/H:   - RPR:   - BTL consent:     36 Week Lab: Ordered   - CBC H/H:   - RPR:   - GBS Culture:   - HIV:   - Cervical GC:     Urine dip:  Lab Results   Component Value Date    COLORU Yellow 2023    SPECGRAV 1.025 2023    PHUR 6.0 2023    WBCUR Trace 2023    NITRITE Negative 2023    PROTEINPOC Negative 2023    GLUCOSEUR Negative 2023    KETONESU Negative 2023    UROBILINOGEN 0.2 2023    BILIRUBINPOC Negative 2023    RBCUR Negative 2023     Assessment/Plan:     Valente was seen today for routine prenatal visit.    Diagnoses and all orders for this visit:    25 weeks gestation of pregnancy  -     POCT urine dipstick without microscope  -     M-ZAINA PLUS 27 mg iron- 1 mg Tab; Take 1 tablet (1 each total) by mouth once daily.  - ASA 81mg daily    -     OB Protocol  -     Urine dip reviewed as above  -     Routine (initial) labs: as mentioned above/pending  -     Mother plans to breast and/or bottlefeed  -     Postpartum contraception discussion: PENDING  -     Labor precautions  discussed in depth    1. Insulin resistance complicating pregnancy  -     Glucose Tolerance 1 Hour  -     Ambulatory referral/consult to Ophthalmology; Future    2. BMI affecting pregnancy in second trimester  -     Echo; Future    Gastroesophageal reflux disease, unspecified whether esophagitis present  -     lansoprazole (PREVACID) 15 MG capsule; Take 1 capsule (15 mg total) by mouth once daily.    Return to clinic in No follow-ups on file.    Franck Carnes MD  [employer] [facility]

## 2023-07-14 NOTE — PROGRESS NOTES
Patient refused to do home glucose monitoring revisit at next visit failed 1 hour and most likely will fail 3 hour    I have personally reviewed the review of systems (ROS) and past, family and social histories (PFSH) documented above by the resident.  I have reviewed the care furnished by the resident during the encounter, including a review of the patient's medical history, the resident's findings on physical examination, diagnosis, and the treatment plan.  I participated in the management of the patient and was immediately available throughout the encounter.   I was physically present during all key portions of the service(s) provided with the resident.  Services were furnished in a primary care center located in the outpatient department of a [facility].

## 2023-07-25 ENCOUNTER — TELEPHONE (OUTPATIENT)
Dept: FAMILY MEDICINE | Facility: CLINIC | Age: 23
End: 2023-07-25
Payer: MEDICAID

## 2023-07-25 NOTE — TELEPHONE ENCOUNTER
Called pt on 7/14/2023 to discuss need for 3 hr GTT, was unable to speak to pt but left voicemail with instructions. Called pt again on 07/25/2023, earliest day that pt could come for 3 hr GTT was 07/31/2023. Pt agreed to fast starting at midnight the day before the challenge and come between 7:30-8:00 AM on Monday.

## 2023-08-07 ENCOUNTER — LAB VISIT (OUTPATIENT)
Dept: FAMILY MEDICINE | Facility: CLINIC | Age: 23
End: 2023-08-07
Payer: MEDICAID

## 2023-08-07 DIAGNOSIS — O24.113 PRE-EXISTING TYPE 2 DIABETES MELLITUS DURING PREGNANCY IN THIRD TRIMESTER: Primary | ICD-10-CM

## 2023-08-07 DIAGNOSIS — E88.819 INSULIN RESISTANCE COMPLICATING PREGNANCY: ICD-10-CM

## 2023-08-07 DIAGNOSIS — O26.899 INSULIN RESISTANCE COMPLICATING PREGNANCY: ICD-10-CM

## 2023-08-07 LAB
GLUCOSE 1H P 100 G GLC PO SERPL-MCNC: 175 MG/DL (ref 100–180)
GLUCOSE 2H P 100 G GLC PO SERPL-MCNC: 164 MG/DL (ref 70–140)
GLUCOSE 3H P 100 G GLC PO SERPL-MCNC: 109 MG/DL (ref 70–115)
GLUCOSE P FAST SERPL-MCNC: 98 MG/DL (ref 70–155)

## 2023-08-07 PROCEDURE — 82950 GLUCOSE TEST: CPT

## 2023-08-07 PROCEDURE — 82951 GLUCOSE TOLERANCE TEST (GTT): CPT

## 2023-08-07 PROCEDURE — 82947 ASSAY GLUCOSE BLOOD QUANT: CPT

## 2023-08-07 PROCEDURE — 82952 GTT-ADDED SAMPLES: CPT

## 2023-08-07 PROCEDURE — 36415 COLL VENOUS BLD VENIPUNCTURE: CPT

## 2023-08-09 ENCOUNTER — PROCEDURE VISIT (OUTPATIENT)
Dept: MATERNAL FETAL MEDICINE | Facility: CLINIC | Age: 23
End: 2023-08-09
Payer: MEDICAID

## 2023-08-09 ENCOUNTER — OFFICE VISIT (OUTPATIENT)
Dept: MATERNAL FETAL MEDICINE | Facility: CLINIC | Age: 23
End: 2023-08-09
Payer: MEDICAID

## 2023-08-09 VITALS
DIASTOLIC BLOOD PRESSURE: 63 MMHG | SYSTOLIC BLOOD PRESSURE: 116 MMHG | WEIGHT: 293 LBS | HEART RATE: 84 BPM | BODY MASS INDEX: 44.49 KG/M2

## 2023-08-09 DIAGNOSIS — O99.213 OBESITY AFFECTING PREGNANCY IN THIRD TRIMESTER: ICD-10-CM

## 2023-08-09 DIAGNOSIS — O24.113 PRE-EXISTING TYPE 2 DIABETES MELLITUS DURING PREGNANCY IN THIRD TRIMESTER: ICD-10-CM

## 2023-08-09 DIAGNOSIS — E88.819 INSULIN RESISTANCE COMPLICATING PREGNANCY: ICD-10-CM

## 2023-08-09 DIAGNOSIS — O24.419 GESTATIONAL DIABETES MELLITUS (GDM) IN THIRD TRIMESTER, GESTATIONAL DIABETES METHOD OF CONTROL UNSPECIFIED: Primary | ICD-10-CM

## 2023-08-09 DIAGNOSIS — O26.899 INSULIN RESISTANCE COMPLICATING PREGNANCY: ICD-10-CM

## 2023-08-09 DIAGNOSIS — O99.343 MENTAL DISORDER AFFECTING PREGNANCY IN THIRD TRIMESTER: ICD-10-CM

## 2023-08-09 DIAGNOSIS — Z87.59 HISTORY OF POSTPARTUM HEMORRHAGE: ICD-10-CM

## 2023-08-09 PROCEDURE — 99214 PR OFFICE/OUTPT VISIT, EST, LEVL IV, 30-39 MIN: ICD-10-PCS | Mod: 25,TH,S$GLB, | Performed by: OBSTETRICS & GYNECOLOGY

## 2023-08-09 PROCEDURE — 3074F PR MOST RECENT SYSTOLIC BLOOD PRESSURE < 130 MM HG: ICD-10-PCS | Mod: CPTII,S$GLB,, | Performed by: OBSTETRICS & GYNECOLOGY

## 2023-08-09 PROCEDURE — 3008F BODY MASS INDEX DOCD: CPT | Mod: CPTII,S$GLB,, | Performed by: OBSTETRICS & GYNECOLOGY

## 2023-08-09 PROCEDURE — 3044F HG A1C LEVEL LT 7.0%: CPT | Mod: CPTII,S$GLB,, | Performed by: OBSTETRICS & GYNECOLOGY

## 2023-08-09 PROCEDURE — 1160F RVW MEDS BY RX/DR IN RCRD: CPT | Mod: CPTII,S$GLB,, | Performed by: OBSTETRICS & GYNECOLOGY

## 2023-08-09 PROCEDURE — 3078F DIAST BP <80 MM HG: CPT | Mod: CPTII,S$GLB,, | Performed by: OBSTETRICS & GYNECOLOGY

## 2023-08-09 PROCEDURE — 1159F PR MEDICATION LIST DOCUMENTED IN MEDICAL RECORD: ICD-10-PCS | Mod: CPTII,S$GLB,, | Performed by: OBSTETRICS & GYNECOLOGY

## 2023-08-09 PROCEDURE — 1160F PR REVIEW ALL MEDS BY PRESCRIBER/CLIN PHARMACIST DOCUMENTED: ICD-10-PCS | Mod: CPTII,S$GLB,, | Performed by: OBSTETRICS & GYNECOLOGY

## 2023-08-09 PROCEDURE — 76816 OB US FOLLOW-UP PER FETUS: CPT | Mod: S$GLB,,, | Performed by: OBSTETRICS & GYNECOLOGY

## 2023-08-09 PROCEDURE — 1159F MED LIST DOCD IN RCRD: CPT | Mod: CPTII,S$GLB,, | Performed by: OBSTETRICS & GYNECOLOGY

## 2023-08-09 PROCEDURE — 3078F PR MOST RECENT DIASTOLIC BLOOD PRESSURE < 80 MM HG: ICD-10-PCS | Mod: CPTII,S$GLB,, | Performed by: OBSTETRICS & GYNECOLOGY

## 2023-08-09 PROCEDURE — 3008F PR BODY MASS INDEX (BMI) DOCUMENTED: ICD-10-PCS | Mod: CPTII,S$GLB,, | Performed by: OBSTETRICS & GYNECOLOGY

## 2023-08-09 PROCEDURE — 76816 PR  US,PREGNANT UTERUS,F/U,TRANSABD APP: ICD-10-PCS | Mod: S$GLB,,, | Performed by: OBSTETRICS & GYNECOLOGY

## 2023-08-09 PROCEDURE — 3074F SYST BP LT 130 MM HG: CPT | Mod: CPTII,S$GLB,, | Performed by: OBSTETRICS & GYNECOLOGY

## 2023-08-09 PROCEDURE — 99214 OFFICE O/P EST MOD 30 MIN: CPT | Mod: 25,TH,S$GLB, | Performed by: OBSTETRICS & GYNECOLOGY

## 2023-08-09 PROCEDURE — 3044F PR MOST RECENT HEMOGLOBIN A1C LEVEL <7.0%: ICD-10-PCS | Mod: CPTII,S$GLB,, | Performed by: OBSTETRICS & GYNECOLOGY

## 2023-08-09 NOTE — ASSESSMENT & PLAN NOTE
She has a history of polycystic ovarian syndrome, pre-diabetes, is obese, and has a history of macrosomia in her 1st pregnancy.  In her  pregnancy, she failed early glucose testing and was on Metformin during that pregnancy.  She was non-compliant and did not submit sugar logs for review.  With her current pregnancy, baseline A1c was 6.3.  Early glucose testing results- 128.  Her primary OB encouraged 4 times daily sugar checks due to her history an increased risk for diabetes in pregnancy, however, she refused to check sugars.    The patient was counseled regarding the risks of diabetes in pregnancy. These risks include but are not limited to an increased risk of , preeclampsia/gestational hypertension, fetal structural anomalies, macrosomia, prematurity, shoulder dystocia/birth injury,  hyperbilirubinemia and electrolyte issues, pulmonary immaturity and sudden stillbirth especially in those patients with poor glucose control. I also discussed with the patient the need for increased fetal surveillance with serial fetal growth assessments and third trimester fetal testing. I also reviewed the possibility of need for early delivery in those with poor glucose control or evidence of fetal growth abnormalities.  She understands the correlation between glycemic control in pregnancy outcome.      23- She recently failed 3hr OGTT (she has been diagnosed with gestational diabetes). We have discussed this diagnosis with her today and recommend she check her sugars four times daily and submit a log to our office weekly for review. She states she has all supplies at home and does not need refills for lancets/strips. We have encouraged compliance.     Recommendations:  Baseline evaluation (to be ordered by primary OB provider):   24 hour urine protein or urine P/C ratio, CBC, CMP (too low to calculate, serum cr 0.72, LFTs normal, )   Maternal EKG; echocardiogram if BMI > 30 or EKG is  abnormal   Maternal ophthalmic exam (if hasn't been performed in last year) and podiatry exam   Hemoglobin A1C every trimester   Diabetic education referral and counseling re: goal blood sugars (< 95 mg/dl for fasting, < 120 mg/dl for 2 hour postprandial)  Medications:    Start low dose aspirin 81 mg daily at 12-16 weeks for preeclampsia risk reduction (refuses)   1 mg folic acid daily  Ultrasounds with MFM:   Targeted anatomy survey at 20 weeks (completed)   Serial growth ultrasounds q 4-6 weeks at 26-28 weeks    A fetal echocardiogram is recommended at 22-24 weeks with pediatric cardiology (completed)    Initiate  surveillance at 32 weeks:    Weekly BPP or NST + AFV if good control.    If control is poor, twice weekly  fetal surveillance is recommended with BPP alternating with NST.   Delivery timing:   38 0/7 to 38 and 6/7 weeks if under good control without comorbidities   37 0/7 to 37 and 67 weeks if longstanding diabetes or poorly controlled, polyhydramnios, EFW>90th percentile, or BMI >= 40   36 0/7 to 37 and 6/7 weeks if vascular complications, prior stillbirth or other complicating conditions.  Recommend consideration of earlier delivery if IUGR, HTN, or other complications  Recommend offering  for delivery is EFW is 4500g or more near the time of delivery    The patient was advised to call for blood sugars less than 70 or greater than 200 prior to her next appointment. She was also advised that if she notes nausea/vomiting, inability to tolerate PO, or concerns about being able to take her insulin that she should contact her provider or present to the OB ED.

## 2023-08-09 NOTE — PROGRESS NOTES
Maternal Fetal Medicine follow up consult    SUBJECTIVE:     Valente Wilson is a 23 y.o.  female with IUP at 28w3d who is seen in follow up consultation by MFM.  Pregnancy complications include:   Problem   1. Insulin resistance complicating pregnancy   2. BMI affecting pregnancy in third trimester   4. History of postpartum hemorrhage   3. PTSD/bipolar/anxiety/ADD affecting pregnancy in third trimester     She reports doing well without complaints.   She recently failed 3hr OGTT and we have reviewed results with her. She is in disbelief that she has GDM despite her history.   Denies LOF, VB, contractions. Positive fetal movement.    Previous notes reviewed.   No changes to medical, surgical, family, social, or obstetric history.    Interval history since last MFM visit: no changes    Medications:  Current Outpatient Medications   Medication Instructions    aspirin (ECOTRIN) 81 mg, Oral, Daily    cetirizine (ZYRTEC) 10 mg, Oral, Nightly    ferrous gluconate 324 mg, Oral, With breakfast    lansoprazole (PREVACID) 15 mg, Oral, Daily    M- PLUS 27 mg iron- 1 mg Tab 1 each, Oral, Daily    pyridoxine (vitamin B6) (B-6) 50 mg, Oral, Daily       Care team members:  [facility] - Primary OB       OBJECTIVE:   /63 (BP Location: Right arm)   Pulse 84   Wt (!) 140.6 kg (310 lb 1.2 oz)   LMP 2023 (Exact Date)   BMI 44.49 kg/m²     Ultrasound performed. See viewpoint for full ultrasound report.    A viable oliva pregnancy is visualized in breech presentation.  Estimated fetal weight is at the 25th percentile with an abdominal circumference at the 18th percentile.    No fetal abnormalities are noted and anatomic survey is complete. Amniotic fluid volume is normal.  Placenta is posterior/fundal.      ASSESSMENT/PLAN:     23 y.o.  female with IUP at 28w3d    1. Insulin resistance complicating pregnancy  She has a history of polycystic ovarian syndrome, pre-diabetes, is obese, and has a history  of macrosomia in her 1st pregnancy.  In her  pregnancy, she failed early glucose testing and was on Metformin during that pregnancy.  She was non-compliant and did not submit sugar logs for review.  With her current pregnancy, baseline A1c was 6.3.  Early glucose testing results- 128.  Her primary OB encouraged 4 times daily sugar checks due to her history an increased risk for diabetes in pregnancy, however, she refused to check sugars.    The patient was counseled regarding the risks of diabetes in pregnancy. These risks include but are not limited to an increased risk of , preeclampsia/gestational hypertension, fetal structural anomalies, macrosomia, prematurity, shoulder dystocia/birth injury,  hyperbilirubinemia and electrolyte issues, pulmonary immaturity and sudden stillbirth especially in those patients with poor glucose control. I also discussed with the patient the need for increased fetal surveillance with serial fetal growth assessments and third trimester fetal testing. I also reviewed the possibility of need for early delivery in those with poor glucose control or evidence of fetal growth abnormalities.  She understands the correlation between glycemic control in pregnancy outcome.      23- She recently failed 3hr OGTT (she has been diagnosed with gestational diabetes). We have discussed this diagnosis with her today and recommend she check her sugars four times daily and submit a log to our office weekly for review. She states she has all supplies at home and does not need refills for lancets/strips. We have encouraged compliance.     Recommendations:  Baseline evaluation (to be ordered by primary OB provider):  24 hour urine protein or urine P/C ratio, CBC, CMP (too low to calculate, serum cr 0.72, LFTs normal, )  Maternal EKG; echocardiogram if BMI > 30 or EKG is abnormal  Maternal ophthalmic exam (if hasn't been performed in last year) and podiatry exam  Hemoglobin A1C  every trimester  Diabetic education referral and counseling re: goal blood sugars (< 95 mg/dl for fasting, < 120 mg/dl for 2 hour postprandial)  Medications:   Start low dose aspirin 81 mg daily at 12-16 weeks for preeclampsia risk reduction (refuses)  1 mg folic acid daily  Ultrasounds with MFM:  Targeted anatomy survey at 20 weeks (completed)  Serial growth ultrasounds q 4-6 weeks at 26-28 weeks    A fetal echocardiogram is recommended at 22-24 weeks with pediatric cardiology (completed)    Initiate  surveillance at 32 weeks:   Weekly BPP or NST + AFV if good control.   If control is poor, twice weekly  fetal surveillance is recommended with BPP alternating with NST.   Delivery timin 0/7 to 38 and 6/7 weeks if under good control without comorbidities  37 0/7 to 37 and 67 weeks if longstanding diabetes or poorly controlled, polyhydramnios, EFW>90th percentile, or BMI >= 40  36 0/7 to 37 and 6/7 weeks if vascular complications, prior stillbirth or other complicating conditions.  Recommend consideration of earlier delivery if IUGR, HTN, or other complications  Recommend offering  for delivery is EFW is 4500g or more near the time of delivery    The patient was advised to call for blood sugars less than 70 or greater than 200 prior to her next appointment. She was also advised that if she notes nausea/vomiting, inability to tolerate PO, or concerns about being able to take her insulin that she should contact her provider or present to the OB ED.          2. BMI affecting pregnancy in third trimester  Please see prior documentation for specific counseling.      BMI 44    Recommendations:  Discussed that TWG goal is 11-20 lbs  Screen for signs/symptoms of obstructive sleep apnea  Nutritionist consult offered (this is to be ordered by primary OB provider)  Consider early 1 hr GCT (1hr-128; A1C 6.3); repeat at 24-28 weeks gestation (failed routine testing)  Start low dose aspirin 81 mg  daily at 12-16 weeks for preeclampsia risk reduction (pt refuses)  Targeted anatomical survey scheduled at 18-20 weeks (completed)  Fetal growth ultrasound at 32 weeks if BMI >= 40  Weekly  testing at 32 weeks if pre-pregnancy BMI >= 45  Lovenox 40 mg BID for VTE prophylaxis while admitted to the hospital (antepartum or postpartum) if BMI >= 40.   Encouraged breastfeeding  Postpartum lifestyle modifications & weight loss      3. PTSD/bipolar/anxiety/ADD affecting pregnancy in third trimester  She reports a history of anxiety, bipolar, PTSD, and ADD. She is not taking medication at this time. She reports stable symptoms. Discussed increased risk for peripartum and postpartum mood disorders. Precautions provided.        We have discussed the importance of optimization of mental health conditions during pregnancy in an effort to reduce the risk of postpartum mood disorders. We have discussed options for management including psychotherapy, counseling, cognitive behavioral therapy, exercise/yoga, journaling, and psychiatry services. She will notify our office if she is interested in being referred for any of the above.    She had an appt scheduled with [facility], however, did not keep it. We have encouraged her to contact [facility] to reschedule.     4. History of postpartum hemorrhage  She reports blood transfusion after both of her deliveries. Atony is suspected cause of bleeding.     Active management of third stage of labor recommended.   Check CBC each trimester in order to optimize pre-delivery levels    F/u in 4 weeks for MFM visit/ultrasound    Juanis Kimbrouhg MD  Maternal Fetal Medicine

## 2023-08-09 NOTE — ASSESSMENT & PLAN NOTE
Please see prior documentation for specific counseling.      BMI 44    Recommendations:   Discussed that TWG goal is 11-20 lbs   Screen for signs/symptoms of obstructive sleep apnea   Nutritionist consult offered (this is to be ordered by primary OB provider)   Consider early 1 hr GCT (1hr-128; A1C 6.3); repeat at 24-28 weeks gestation (failed routine testing)   Start low dose aspirin 81 mg daily at 12-16 weeks for preeclampsia risk reduction (pt refuses)   Targeted anatomical survey scheduled at 18-20 weeks (completed)   Fetal growth ultrasound at 32 weeks if BMI >= 40   Weekly  testing at 32 weeks if pre-pregnancy BMI >= 45   Lovenox 40 mg BID for VTE prophylaxis while admitted to the hospital (antepartum or postpartum) if BMI >= 40.    Encouraged breastfeeding   Postpartum lifestyle modifications & weight loss

## 2023-08-09 NOTE — ASSESSMENT & PLAN NOTE
She reports a history of anxiety, bipolar, PTSD, and ADD. She is not taking medication at this time. She reports stable symptoms. Discussed increased risk for peripartum and postpartum mood disorders. Precautions provided.        We have discussed the importance of optimization of mental health conditions during pregnancy in an effort to reduce the risk of postpartum mood disorders. We have discussed options for management including psychotherapy, counseling, cognitive behavioral therapy, exercise/yoga, journaling, and psychiatry services. She will notify our office if she is interested in being referred for any of the above.    She had an appt scheduled with [facility], however, did not keep it. We have encouraged her to contact [facility] to reschedule.

## 2023-08-10 ENCOUNTER — HOSPITAL ENCOUNTER (OUTPATIENT)
Dept: CARDIOLOGY | Facility: HOSPITAL | Age: 23
Discharge: HOME | End: 2023-08-10
Payer: MEDICAID

## 2023-08-10 ENCOUNTER — OFFICE VISIT (OUTPATIENT)
Dept: FAMILY MEDICINE | Facility: CLINIC | Age: 23
End: 2023-08-10
Payer: MEDICAID

## 2023-08-10 VITALS
BODY MASS INDEX: 41.95 KG/M2 | DIASTOLIC BLOOD PRESSURE: 69 MMHG | OXYGEN SATURATION: 100 % | TEMPERATURE: 98 F | HEIGHT: 70 IN | HEART RATE: 96 BPM | RESPIRATION RATE: 20 BRPM | SYSTOLIC BLOOD PRESSURE: 104 MMHG | WEIGHT: 293 LBS

## 2023-08-10 VITALS
HEIGHT: 70 IN | SYSTOLIC BLOOD PRESSURE: 122 MMHG | DIASTOLIC BLOOD PRESSURE: 77 MMHG | BODY MASS INDEX: 41.95 KG/M2 | WEIGHT: 293 LBS

## 2023-08-10 DIAGNOSIS — E88.819 INSULIN RESISTANCE COMPLICATING PREGNANCY: ICD-10-CM

## 2023-08-10 DIAGNOSIS — O99.343 MENTAL DISORDER AFFECTING PREGNANCY IN THIRD TRIMESTER: ICD-10-CM

## 2023-08-10 DIAGNOSIS — Z3A.28 28 WEEKS GESTATION OF PREGNANCY: Primary | ICD-10-CM

## 2023-08-10 DIAGNOSIS — O99.213 OBESITY AFFECTING PREGNANCY IN THIRD TRIMESTER: ICD-10-CM

## 2023-08-10 DIAGNOSIS — O26.899 INSULIN RESISTANCE COMPLICATING PREGNANCY: ICD-10-CM

## 2023-08-10 DIAGNOSIS — O24.419 GESTATIONAL DIABETES MELLITUS (GDM) IN THIRD TRIMESTER, GESTATIONAL DIABETES METHOD OF CONTROL UNSPECIFIED: ICD-10-CM

## 2023-08-10 DIAGNOSIS — O99.212 OBESITY AFFECTING PREGNANCY IN SECOND TRIMESTER: ICD-10-CM

## 2023-08-10 LAB
AV INDEX (PROSTH): 0.73
AV MEAN GRADIENT: 3 MMHG
AV PEAK GRADIENT: 7 MMHG
AV VALVE AREA BY VELOCITY RATIO: 3.34 CM²
AV VALVE AREA: 3.65 CM²
AV VELOCITY RATIO: 0.67
BASOPHILS # BLD AUTO: 0.05 X10(3)/MCL
BASOPHILS NFR BLD AUTO: 0.6 %
BILIRUB SERPL-MCNC: NORMAL MG/DL
BLOOD URINE, POC: NORMAL
BSA FOR ECHO PROCEDURE: 2.64 M2
CLARITY, POC UA: CLEAR
COLOR, POC UA: NORMAL
CV ECHO LV RWT: 0.54 CM
DOP CALC AO PEAK VEL: 1.33 M/S
DOP CALC AO VTI: 24.3 CM
DOP CALC LVOT AREA: 5 CM2
DOP CALC LVOT DIAMETER: 2.52 CM
DOP CALC LVOT PEAK VEL: 0.89 M/S
DOP CALC LVOT STROKE VOLUME: 88.73 CM3
DOP CALC MV VTI: 24.7 CM
DOP CALCLVOT PEAK VEL VTI: 17.8 CM
E WAVE DECELERATION TIME: 153.55 MSEC
E/A RATIO: 1.25
E/E' RATIO: 7.91 M/S
ECHO LV POSTERIOR WALL: 1.41 CM (ref 0.6–1.1)
EOSINOPHIL # BLD AUTO: 0.18 X10(3)/MCL (ref 0–0.9)
EOSINOPHIL NFR BLD AUTO: 2.1 %
ERYTHROCYTE [DISTWIDTH] IN BLOOD BY AUTOMATED COUNT: 15.5 % (ref 11.5–17)
FRACTIONAL SHORTENING: 33 % (ref 28–44)
GLUCOSE UR QL STRIP: NORMAL
HCT VFR BLD AUTO: 33.2 % (ref 37–47)
HGB BLD-MCNC: 9.9 G/DL (ref 12–16)
IMM GRANULOCYTES # BLD AUTO: 0.09 X10(3)/MCL (ref 0–0.04)
IMM GRANULOCYTES NFR BLD AUTO: 1 %
INTERVENTRICULAR SEPTUM: 1.11 CM (ref 0.6–1.1)
KETONES UR QL STRIP: NORMAL
LEFT ATRIUM SIZE: 3.76 CM
LEFT INTERNAL DIMENSION IN SYSTOLE: 3.53 CM (ref 2.1–4)
LEFT VENTRICLE DIASTOLIC VOLUME INDEX: 52.36 ML/M2
LEFT VENTRICLE DIASTOLIC VOLUME: 131.42 ML
LEFT VENTRICLE MASS INDEX: 107 G/M2
LEFT VENTRICLE SYSTOLIC VOLUME INDEX: 20.7 ML/M2
LEFT VENTRICLE SYSTOLIC VOLUME: 51.84 ML
LEFT VENTRICULAR INTERNAL DIMENSION IN DIASTOLE: 5.23 CM (ref 3.5–6)
LEFT VENTRICULAR MASS: 268.86 G
LEUKOCYTE ESTERASE URINE, POC: NORMAL
LV LATERAL E/E' RATIO: 7 M/S
LV SEPTAL E/E' RATIO: 9.1 M/S
LVOT MG: 1.59 MMHG
LVOT MV: 0.58 CM/S
LYMPHOCYTES # BLD AUTO: 1.93 X10(3)/MCL (ref 0.6–4.6)
LYMPHOCYTES NFR BLD AUTO: 22.2 %
MCH RBC QN AUTO: 23.6 PG (ref 27–31)
MCHC RBC AUTO-ENTMCNC: 29.8 G/DL (ref 33–36)
MCV RBC AUTO: 79.2 FL (ref 80–94)
MONOCYTES # BLD AUTO: 0.59 X10(3)/MCL (ref 0.1–1.3)
MONOCYTES NFR BLD AUTO: 6.8 %
MV MEAN GRADIENT: 2 MMHG
MV PEAK A VEL: 0.73 M/S
MV PEAK E VEL: 0.91 M/S
MV PEAK GRADIENT: 3 MMHG
MV STENOSIS PRESSURE HALF TIME: 44.53 MS
MV VALVE AREA BY CONTINUITY EQUATION: 3.59 CM2
MV VALVE AREA P 1/2 METHOD: 4.94 CM2
NEUTROPHILS # BLD AUTO: 5.85 X10(3)/MCL (ref 2.1–9.2)
NEUTROPHILS NFR BLD AUTO: 67.3 %
NITRITE, POC UA: NORMAL
NRBC BLD AUTO-RTO: 0 %
OHS LV EJECTION FRACTION SIMPSONS BIPLANE MOD: 59 %
PH, POC UA: 6.5
PISA TR MAX VEL: 2.44 M/S
PLATELET # BLD AUTO: 288 X10(3)/MCL (ref 130–400)
PMV BLD AUTO: 11.7 FL (ref 7.4–10.4)
PROTEIN, POC: NORMAL
RA PRESSURE ESTIMATED: 3 MMHG
RBC # BLD AUTO: 4.19 X10(6)/MCL (ref 4.2–5.4)
RIGHT VENTRICULAR END-DIASTOLIC DIMENSION: 3.7 CM
RV TB RVSP: 5 MMHG
SPECIFIC GRAVITY, POC UA: 1.02
T PALLIDUM AB SER QL: NONREACTIVE
TDI LATERAL: 0.13 M/S
TDI SEPTAL: 0.1 M/S
TDI: 0.12 M/S
TR MAX PG: 24 MMHG
TV REST PULMONARY ARTERY PRESSURE: 27 MMHG
UROBILINOGEN, POC UA: 0.2
WBC # SPEC AUTO: 8.69 X10(3)/MCL (ref 4.5–11.5)
Z-SCORE OF LEFT VENTRICULAR DIMENSION IN END DIASTOLE: -10.06
Z-SCORE OF LEFT VENTRICULAR DIMENSION IN END SYSTOLE: -6.92

## 2023-08-10 PROCEDURE — 99213 OFFICE O/P EST LOW 20 MIN: CPT | Mod: PBBFAC,25

## 2023-08-10 PROCEDURE — 81002 URINALYSIS NONAUTO W/O SCOPE: CPT | Mod: PBBFAC

## 2023-08-10 PROCEDURE — 86780 TREPONEMA PALLIDUM: CPT

## 2023-08-10 PROCEDURE — 93306 TTE W/DOPPLER COMPLETE: CPT

## 2023-08-10 PROCEDURE — 90471 IMMUNIZATION ADMIN: CPT | Mod: PBBFAC

## 2023-08-10 PROCEDURE — 90715 TDAP VACCINE 7 YRS/> IM: CPT | Mod: PBBFAC

## 2023-08-10 PROCEDURE — 36415 COLL VENOUS BLD VENIPUNCTURE: CPT

## 2023-08-10 PROCEDURE — 85025 COMPLETE CBC W/AUTO DIFF WBC: CPT

## 2023-08-10 RX ADMIN — TETANUS TOXOID, REDUCED DIPHTHERIA TOXOID AND ACELLULAR PERTUSSIS VACCINE, ADSORBED 0.5 ML: 5; 2.5; 8; 8; 2.5 SUSPENSION INTRAMUSCULAR at 11:08

## 2023-08-10 NOTE — PROGRESS NOTES
Subjective:      Patient ID: Valente Wilson is a 23 y.o. female.    Chief Complaint:  Routine Prenatal Visit (HROB 28w4d, no complaints.)      History of Present Illness  23 yo  @ 28w4d WGA by LMP consistent with 1st trimester US (JULIA: 10/29/23) presents for routine prenatal visit.    Acute Concerns:  Recently diagnosed with gestational diabetes. Has not started checking sugars. Will send weekly to [facility].  Had echo today. Taking PNV daily.      Chronic Conditions:  - Prediabetes/PCOS  - Mood disorder: previously on Latuda, does not wish to resume treatment. Follows with [facility]. Denied SI/HI. Reports mood is stable       GYN & OB History  Patient's last menstrual period was 2023 (exact date).   Date of Last Pap: 2022    OB History    Para Term  AB Living   4 2 2 0 1 2   SAB IAB Ectopic Multiple Live Births   0 0 1 0 2      # Outcome Date GA Lbr Maykel/2nd Weight Sex Delivery Anes PTL Lv   4 Current            3 Term 10/18/22 37w6d / 00:16 3.05 kg (6 lb 11.6 oz) F Vag-Spont EPI N FLACO   2 Ectopic 18 7w0d    ECTOPIC   FD   1 Term 06/29/15 40w0d  4.111 kg (9 lb 1 oz) M Vag-Spont EPI N FLACO       Review of Systems  Fetal movements: yes  Vaginal bleeding: no  Vaginal discharge: no  Loss of fluid: no  Contractions: no  Headaches: no  Vision changes: no  Edema: no       Objective:     Physical Exam      Vitals:    08/10/23 1043   BP: 104/69   Pulse: 96   Resp: 20   Temp: 98 °F (36.7 °C)   General: Well developed, no acute distress  CV: RRR, no murmurs, no edema, 2+ peripheral pulses  Resp: CTAB, non labored breathing on room air  Abd: gravid, non-tender, +BS   FHT: 145 bpm    Initial OB Labs:  - Blood Type and Rh: A+  - Antibody Screen: negative  - CBC H/H: 10.0/32.2  - HIV: NR  - Syphilis Ab: NR  - GC: negative  - CT: negative  - HBsAg: NR  - HCVAb: NR  - Rubella: non immune  - Varicella: non immune  - UA & Culture: no growth  - Sickle Cell Screen: negative  - PAP:  NIL    15-20 Weeks Lab:  - Quad Screen: normal risk      28 Week Lab:  - 1H GTT: _  - Rhogam: _  - Date of Tdap: _  - CBC H/H: _  - RPR: _  - BTL Consent: _    36 Week Lab  - CBC H/H: _  - RPR: _  - GBS Culture: _  - HIV: _  - Urine: GC: _    Ultrasound  Initial US  Single intrauterine pregnancy with a viable fetus with a fetal heart rate of 164 beats per minute. Estimated age by ultrasound 11 weeks and 6 days +/-1 week 0 days. Estimated date of delivery November 3, 2023     20 wk anatomy US  A follow-up study will be scheduled to complete the fetal anatomic survey. Fetal size today is consistent with established gestational age.  Cervical length by TA scanning is normal. Placental location is posterior without evidence of previa. JULIA has been reviewed and is based on 11 week scan in agreement with LMP.   Assessment:     1. 28 weeks gestation of pregnancy    2. 3. PTSD/bipolar/anxiety/ADD affecting pregnancy in third trimester    3. Obesity affecting pregnancy in third trimester    4. Gestational diabetes mellitus (GDM) in third trimester, gestational diabetes method of control unspecified       - OB Protocol   - PNVs, ASA  - Start glucose logs; discussed diabetic diet  - 28 week labs today  - Urine dip reviewed as above  - Routine labs reviewed  - Denied SI/HI   - Plan to resume Fidel Appts after pregnancy  - Labor/ED precautions discussed       Follow-up: 2 weeks

## 2023-08-14 DIAGNOSIS — O99.019 ANTEPARTUM ANEMIA: Primary | ICD-10-CM

## 2023-08-17 ENCOUNTER — TELEPHONE (OUTPATIENT)
Dept: OBGYN | Facility: CLINIC | Age: 23
End: 2023-08-17

## 2023-08-17 ENCOUNTER — ON-DEMAND VIRTUAL (OUTPATIENT)
Dept: URGENT CARE | Facility: CLINIC | Age: 23
End: 2023-08-17
Payer: MEDICAID

## 2023-08-17 DIAGNOSIS — O23.593 VAGINITIS AFFECTING PREGNANCY IN THIRD TRIMESTER, ANTEPARTUM: Primary | ICD-10-CM

## 2023-08-17 PROCEDURE — 99202 OFFICE O/P NEW SF 15 MIN: CPT | Mod: 95,,, | Performed by: NURSE PRACTITIONER

## 2023-08-17 PROCEDURE — 99202 PR OFFICE/OUTPT VISIT, NEW, LEVL II, 15-29 MIN: ICD-10-PCS | Mod: 95,,, | Performed by: NURSE PRACTITIONER

## 2023-08-17 RX ORDER — DOXYLAMINE SUCCINATE 25 MG
TABLET ORAL 2 TIMES DAILY
Qty: 15 G | Refills: 0 | Status: SHIPPED | OUTPATIENT
Start: 2023-08-17 | End: 2023-09-25

## 2023-08-17 NOTE — TELEPHONE ENCOUNTER
"Returned call. Pt c/o vaginal itching and burning, and believes she has a yeast infection. Advised to use OTC "monistat" 3-day or 7-day. Pt will call back on Monday if no improvement for possible walk in appt.  "

## 2023-08-17 NOTE — PROGRESS NOTES
Subjective:      Patient ID: Valente Wilson is a 23 y.o. female.    Vitals:  vitals were not taken for this visit.     Chief Complaint: Vaginitis (Symptoms began . Reports vaginal itching and swelling. No dysuria. No abdominal or pelvic pain. No odor. No other associated symptoms to report. Used OTC meds with no relief. Has follow-up 23.)      Visit Type: TELE AUDIOVISUAL    Present with the patient at the time of consultation: TELEMED PRESENT WITH PATIENT: family member    Past Medical History:   Diagnosis Date    ADD (attention deficit disorder)     Anemia     Bipolar disorder, unspecified     PCOS (polycystic ovarian syndrome)     Tobacco use 2022     Past Surgical History:   Procedure Laterality Date    SALPINGECTOMY      WISDOM TOOTH EXTRACTION       Review of patient's allergies indicates:   Allergen Reactions    Fentanyl (bulk) Itching    Oxycodone-acetaminophen Hives     pt broke out in rash  Other reaction(s): RASH     Current Outpatient Medications on File Prior to Visit   Medication Sig Dispense Refill    aspirin (ECOTRIN) 81 MG EC tablet Take 1 tablet (81 mg total) by mouth once daily. 90 tablet 3    lansoprazole (PREVACID) 15 MG capsule Take 1 capsule (15 mg total) by mouth once daily. 30 capsule 1    M-ZAINA PLUS 27 mg iron- 1 mg Tab Take 1 tablet (1 each total) by mouth once daily. 30 tablet 0    [DISCONTINUED] cetirizine (ZYRTEC) 10 MG tablet Take 1 tablet (10 mg total) by mouth every evening. 30 tablet 0     No current facility-administered medications on file prior to visit.     Family History   Problem Relation Age of Onset    Hypertension Mother     Diabetes Maternal Grandmother     Bipolar disorder Father     Mental illness Father        Medications Ordered                [facility] - [place],  [address] [place] [address]    Telephone: [phone]   Fax: [phone]   Hours: Not open 24 hours                         E-Prescribed (1 of 1)               miconazole (MICOTIN) 2 % cream    Sig: Apply topically 2 (two) times daily. for 14 days       Start: 8/17/23     Quantity: 15 g Refills: 0                           Ohs Peq Odvv Intake    8/17/2023  8:31 AM CDT - Filed by Patient   Describe your reason for todays visit Yeast infection while 29 weeks and 4 days   What is your current physical address in the event of a medical emergency? [address] [place], [place]   Are you able to take your vital signs? No   Please attach any relevant images or files          Vaginal irritation. Currently 29 weeks pregnant.        Genitourinary:  Positive for vaginal pain. Negative for dysuria, frequency, urgency, flank pain, vaginal discharge, vaginal bleeding, vaginal odor, genital sore and pelvic pain.   Skin:  Positive for rash (vaginal).        Objective:   The physical exam was conducted virtually.  Physical Exam   Constitutional: She is oriented to person, place, and time. She appears well-developed. She is cooperative. No distress. awake  HENT:   Head: Normocephalic and atraumatic.   Ears:   Right Ear: Hearing normal.   Left Ear: Hearing normal.   Nose: Nose normal.   Mouth/Throat: Uvula is midline, oropharynx is clear and moist and mucous membranes are normal. Mucous membranes are moist. Oropharynx is clear.   Eyes: Conjunctivae and lids are normal. Pupils are equal, round, and reactive to light. Extraocular movement intact   Neck: Trachea normal and phonation normal. Neck supple.   Cardiovascular:      Comments: Unable to assess heart sounds   Pulmonary/Chest: Effort normal. No accessory muscle usage. No respiratory distress.   Unable to assess lung sounds         Comments: Unable to assess lung sounds and Unable to assess lung sounds    Abdominal: Normal appearance.      Comments: Unable to assess Bowel sounds   Genitourinary:         Comments: Unable to assess     Musculoskeletal: Normal range of motion.         General: Normal range of motion.    Neurological: no focal deficit. She is alert, oriented to person, place, and time and at baseline.   Skin: Skin is not diaphoretic and not pale.   Psychiatric: She experiences Normal attention. Her speech is normal and behavior is normal. Mood, affect and thought content normal. Cognition normal  Vitals reviewed.      Assessment:     1. Vaginitis affecting pregnancy in third trimester, antepartum        Plan:     Patient encouraged to monitor symptoms closely and instructed to follow-up for new or worsening symptoms. Further, in-person, evaluation may be necessary for continued treatment. Please follow up with your primary care doctor or specialist as needed. Verbally discussed plan. Patient confirms understanding and is in agreement with treatment and plan.     You must understand that you've received a Virtual Care evaluation only and that you may be released before all your medical problems are known or treated. You, the patient, will arrange for follow up care as instructed.   Vaginitis affecting pregnancy in third trimester, antepartum  -     miconazole (MICOTIN) 2 % cream; Apply topically 2 (two) times daily. for 14 days  Dispense: 15 g; Refill: 0

## 2023-08-28 DIAGNOSIS — O99.213 OBESITY AFFECTING PREGNANCY IN THIRD TRIMESTER: ICD-10-CM

## 2023-08-28 DIAGNOSIS — O26.899 INSULIN RESISTANCE COMPLICATING PREGNANCY: Primary | ICD-10-CM

## 2023-08-28 DIAGNOSIS — E28.2 PCOS (POLYCYSTIC OVARIAN SYNDROME): ICD-10-CM

## 2023-08-28 DIAGNOSIS — O99.343 MENTAL DISORDER AFFECTING PREGNANCY IN THIRD TRIMESTER: ICD-10-CM

## 2023-08-28 DIAGNOSIS — E88.819 INSULIN RESISTANCE COMPLICATING PREGNANCY: Primary | ICD-10-CM

## 2023-08-31 ENCOUNTER — OFFICE VISIT (OUTPATIENT)
Dept: FAMILY MEDICINE | Facility: CLINIC | Age: 23
End: 2023-08-31
Payer: MEDICAID

## 2023-08-31 VITALS
TEMPERATURE: 99 F | OXYGEN SATURATION: 98 % | HEART RATE: 105 BPM | SYSTOLIC BLOOD PRESSURE: 114 MMHG | HEIGHT: 70 IN | DIASTOLIC BLOOD PRESSURE: 68 MMHG | RESPIRATION RATE: 20 BRPM | BODY MASS INDEX: 41.95 KG/M2 | WEIGHT: 293 LBS

## 2023-08-31 DIAGNOSIS — E88.819 INSULIN RESISTANCE COMPLICATING PREGNANCY: ICD-10-CM

## 2023-08-31 DIAGNOSIS — O99.343 MENTAL DISORDER AFFECTING PREGNANCY IN THIRD TRIMESTER: ICD-10-CM

## 2023-08-31 DIAGNOSIS — F90.9 ATTENTION DEFICIT HYPERACTIVITY DISORDER (ADHD), UNSPECIFIED ADHD TYPE: ICD-10-CM

## 2023-08-31 DIAGNOSIS — O24.419 GESTATIONAL DIABETES MELLITUS (GDM) IN THIRD TRIMESTER, GESTATIONAL DIABETES METHOD OF CONTROL UNSPECIFIED: ICD-10-CM

## 2023-08-31 DIAGNOSIS — D64.9 ANEMIA, UNSPECIFIED TYPE: ICD-10-CM

## 2023-08-31 DIAGNOSIS — F41.1 GENERALIZED ANXIETY DISORDER: ICD-10-CM

## 2023-08-31 DIAGNOSIS — O26.899 INSULIN RESISTANCE COMPLICATING PREGNANCY: ICD-10-CM

## 2023-08-31 DIAGNOSIS — Z3A.31 31 WEEKS GESTATION OF PREGNANCY: Primary | ICD-10-CM

## 2023-08-31 LAB
BILIRUB SERPL-MCNC: NORMAL MG/DL
BLOOD URINE, POC: NORMAL
CLARITY, POC UA: NORMAL
COLOR, POC UA: NORMAL
GLUCOSE UR QL STRIP: NORMAL
KETONES UR QL STRIP: NORMAL
LEUKOCYTE ESTERASE URINE, POC: NORMAL
NITRITE, POC UA: NORMAL
PH, POC UA: 6.5
PROTEIN, POC: 30
SPECIFIC GRAVITY, POC UA: 1.02
UROBILINOGEN, POC UA: 1

## 2023-08-31 PROCEDURE — 99213 OFFICE O/P EST LOW 20 MIN: CPT | Mod: PBBFAC

## 2023-08-31 PROCEDURE — 81002 URINALYSIS NONAUTO W/O SCOPE: CPT | Mod: PBBFAC

## 2023-08-31 NOTE — PROGRESS NOTES
Subjective:      Patient ID: Valente Wilson is a 23 y.o. female.    Chief Complaint:  Routine Prenatal Visit (HROB, 31w4d, c/o feeling tired all the time, decreased appetite.)      History of Present Illness  23 yo  @ 28w4d WGA by LMP consistent with 1st trimester US (JULIA: 10/29/23) presents for routine prenatal visit.     Acute Concerns:  Reports checking sugars TID but did not bring log. Fasting has been elevated 117- 130s. Post prandial highest at 150 - 160. Thinks she should resume metformin. Has metformin 500mg at home.    Chronic Conditions:  - Gestational DM  - Prediabetes/PCOS  - Mood disorder: previously on Latuda, does not wish to resume treatment. Follows with [facility]. Denied SI/HI. Reports mood is stable    GYN & OB History  Patient's last menstrual period was 2023 (exact date).   Date of Last Pap: 2022    OB History    Para Term  AB Living   4 2 2 0 1 2   SAB IAB Ectopic Multiple Live Births   0 0 1 0 2      # Outcome Date GA Lbr Maykel/2nd Weight Sex Delivery Anes PTL Lv   4 Current            3 Term 10/18/22 37w6d / 00:16 3.05 kg (6 lb 11.6 oz) F Vag-Spont EPI N FLACO   2 Ectopic 18 7w0d    ECTOPIC   FD   1 Term 06/29/15 40w0d  4.111 kg (9 lb 1 oz) M Vag-Spont EPI N FLACO       Review of Systems  Fetal movements: yes  Vaginal bleeding: no  Vaginal discharge: no  Loss of fluid: no  Contractions: no  Headaches: no  Vision changes: no  Edema: no       Objective:     Physical Exam   Vitals:    23 1329   BP: 114/68   Pulse: 105   Resp: 20   Temp: 98.8 °F (37.1 °C)   General: Well developed, no acute distress  CV: RRR, no murmurs, no edema, 2+ peripheral pulses  Resp: CTAB, non labored breathing on room air  Abd: gravid, non-tender, +BS   FHT: 155 bpm    Initial OB Labs:  - Blood Type and Rh: A+  - Antibody Screen: negative  - CBC H/H: 10.0/32.2  - HIV: NR  - Syphilis Ab: NR  - GC: negative  - CT: negative  - HBsAg: NR  - HCVAb: NR  - Rubella: non  immune  - Varicella: non immune  - UA & Culture: no growth  - Sickle Cell Screen: negative  - PAP: NIL    15-20 Weeks Lab:  - Quad Screen: normal risk      28 Week Lab:  - 1H GTT: Failed 1 hour and 3 hour  - Rhogam: Not indicated  - Date of Tdap: 8/2023  - CBC H/H: 9.9/32  - RPR: Nonreactive      36 Week Lab  - CBC H/H: _  - RPR: _  - GBS Culture: _  - HIV: _  - Urine: GC: _     Assessment:     1. 31 weeks gestation of pregnancy    2. Attention deficit hyperactivity disorder (ADHD), unspecified ADHD type    3. Generalized anxiety disorder    4. 3. PTSD/bipolar/anxiety/ADD affecting pregnancy in third trimester    5. 1. Insulin resistance complicating pregnancy    6. Gestational diabetes mellitus (GDM) in third trimester, gestational diabetes method of control unspecified              Plan:     - OB Protocol   - PNVs, ASA  - Continue glucose logs; discussed diabetic diet  - Resume Metformin 500mg BID  - Has appt with MFM  - Ferritin reordered  - Urine dip reviewed as above  - Routine labs reviewed  - Denied SI/HI   - Labor/ED precautions discussed

## 2023-09-05 ENCOUNTER — PATIENT OUTREACH (OUTPATIENT)
Dept: FAMILY MEDICINE | Facility: CLINIC | Age: 23
End: 2023-09-05

## 2023-09-06 ENCOUNTER — PROCEDURE VISIT (OUTPATIENT)
Dept: MATERNAL FETAL MEDICINE | Facility: CLINIC | Age: 23
End: 2023-09-06
Payer: MEDICAID

## 2023-09-06 ENCOUNTER — OFFICE VISIT (OUTPATIENT)
Dept: MATERNAL FETAL MEDICINE | Facility: CLINIC | Age: 23
End: 2023-09-06
Payer: MEDICAID

## 2023-09-06 VITALS
WEIGHT: 293 LBS | BODY MASS INDEX: 41.95 KG/M2 | DIASTOLIC BLOOD PRESSURE: 56 MMHG | RESPIRATION RATE: 18 BRPM | HEIGHT: 70 IN | HEART RATE: 102 BPM | SYSTOLIC BLOOD PRESSURE: 120 MMHG

## 2023-09-06 DIAGNOSIS — E28.2 PCOS (POLYCYSTIC OVARIAN SYNDROME): ICD-10-CM

## 2023-09-06 DIAGNOSIS — O99.343 MENTAL DISORDER AFFECTING PREGNANCY IN THIRD TRIMESTER: ICD-10-CM

## 2023-09-06 DIAGNOSIS — O26.899 INSULIN RESISTANCE COMPLICATING PREGNANCY: ICD-10-CM

## 2023-09-06 DIAGNOSIS — E88.819 INSULIN RESISTANCE COMPLICATING PREGNANCY: ICD-10-CM

## 2023-09-06 DIAGNOSIS — O99.213 OBESITY AFFECTING PREGNANCY IN THIRD TRIMESTER: ICD-10-CM

## 2023-09-06 DIAGNOSIS — Z87.59 HISTORY OF POSTPARTUM HEMORRHAGE: ICD-10-CM

## 2023-09-06 PROCEDURE — 3078F PR MOST RECENT DIASTOLIC BLOOD PRESSURE < 80 MM HG: ICD-10-PCS | Mod: CPTII,S$GLB,, | Performed by: OBSTETRICS & GYNECOLOGY

## 2023-09-06 PROCEDURE — 76816 PR  US,PREGNANT UTERUS,F/U,TRANSABD APP: ICD-10-PCS | Mod: S$GLB,,, | Performed by: OBSTETRICS & GYNECOLOGY

## 2023-09-06 PROCEDURE — 76816 OB US FOLLOW-UP PER FETUS: CPT | Mod: S$GLB,,, | Performed by: OBSTETRICS & GYNECOLOGY

## 2023-09-06 PROCEDURE — 3008F PR BODY MASS INDEX (BMI) DOCUMENTED: ICD-10-PCS | Mod: CPTII,S$GLB,, | Performed by: OBSTETRICS & GYNECOLOGY

## 2023-09-06 PROCEDURE — 1159F PR MEDICATION LIST DOCUMENTED IN MEDICAL RECORD: ICD-10-PCS | Mod: CPTII,S$GLB,, | Performed by: OBSTETRICS & GYNECOLOGY

## 2023-09-06 PROCEDURE — 1159F MED LIST DOCD IN RCRD: CPT | Mod: CPTII,S$GLB,, | Performed by: OBSTETRICS & GYNECOLOGY

## 2023-09-06 PROCEDURE — 99214 OFFICE O/P EST MOD 30 MIN: CPT | Mod: 25,TH,S$GLB, | Performed by: OBSTETRICS & GYNECOLOGY

## 2023-09-06 PROCEDURE — 3074F SYST BP LT 130 MM HG: CPT | Mod: CPTII,S$GLB,, | Performed by: OBSTETRICS & GYNECOLOGY

## 2023-09-06 PROCEDURE — 99214 PR OFFICE/OUTPT VISIT, EST, LEVL IV, 30-39 MIN: ICD-10-PCS | Mod: 25,TH,S$GLB, | Performed by: OBSTETRICS & GYNECOLOGY

## 2023-09-06 PROCEDURE — 3044F HG A1C LEVEL LT 7.0%: CPT | Mod: CPTII,S$GLB,, | Performed by: OBSTETRICS & GYNECOLOGY

## 2023-09-06 PROCEDURE — 3008F BODY MASS INDEX DOCD: CPT | Mod: CPTII,S$GLB,, | Performed by: OBSTETRICS & GYNECOLOGY

## 2023-09-06 PROCEDURE — 3044F PR MOST RECENT HEMOGLOBIN A1C LEVEL <7.0%: ICD-10-PCS | Mod: CPTII,S$GLB,, | Performed by: OBSTETRICS & GYNECOLOGY

## 2023-09-06 PROCEDURE — 76819 PR US, OB, FETAL BIOPHYSICAL, W/O NST: ICD-10-PCS | Mod: S$GLB,,, | Performed by: OBSTETRICS & GYNECOLOGY

## 2023-09-06 PROCEDURE — 1160F RVW MEDS BY RX/DR IN RCRD: CPT | Mod: CPTII,S$GLB,, | Performed by: OBSTETRICS & GYNECOLOGY

## 2023-09-06 PROCEDURE — 1160F PR REVIEW ALL MEDS BY PRESCRIBER/CLIN PHARMACIST DOCUMENTED: ICD-10-PCS | Mod: CPTII,S$GLB,, | Performed by: OBSTETRICS & GYNECOLOGY

## 2023-09-06 PROCEDURE — 3074F PR MOST RECENT SYSTOLIC BLOOD PRESSURE < 130 MM HG: ICD-10-PCS | Mod: CPTII,S$GLB,, | Performed by: OBSTETRICS & GYNECOLOGY

## 2023-09-06 PROCEDURE — 76819 FETAL BIOPHYS PROFIL W/O NST: CPT | Mod: S$GLB,,, | Performed by: OBSTETRICS & GYNECOLOGY

## 2023-09-06 PROCEDURE — 3078F DIAST BP <80 MM HG: CPT | Mod: CPTII,S$GLB,, | Performed by: OBSTETRICS & GYNECOLOGY

## 2023-09-06 RX ORDER — METFORMIN HYDROCHLORIDE 500 MG/1
1000 TABLET ORAL 2 TIMES DAILY
Qty: 30 TABLET | Refills: 1 | Status: SHIPPED | OUTPATIENT
Start: 2023-09-06 | End: 2023-09-25

## 2023-09-06 RX ORDER — METFORMIN HYDROCHLORIDE 500 MG/1
500 TABLET ORAL 2 TIMES DAILY WITH MEALS
COMMUNITY
End: 2023-09-06 | Stop reason: SDUPTHER

## 2023-09-06 NOTE — PROGRESS NOTES
"Maternal Fetal Medicine follow up consult    SUBJECTIVE:     Valente Wilson is a 23 y.o.  female with IUP at 32w3d who is seen in follow up consultation by MFM.  Pregnancy complications include:   Problem   1. Insulin resistance complicating pregnancy   2. BMI affecting pregnancy in third trimester   4. History of postpartum hemorrhage   3. PTSD/bipolar/anxiety/ADD affecting pregnancy in third trimester     Primary OB team has restarted Metformin 500mg BID based on her verbal report of what her BS have been running. She presents with a sugar log to today's appt (first time we've received a log).     She tells us conflicting information about her diet today but has lost 7lbs in the last month. She cites lack of appetite and we discussed fortifying her diet with small meals (she is currently skipping meals).     Previous notes reviewed.   No changes to medical, surgical, family, social, or obstetric history.    Interval history since last MFM visit: see above    Medications:  Current Outpatient Medications   Medication Instructions    aspirin (ECOTRIN) 81 mg, Oral, Daily    lansoprazole (PREVACID) 15 mg, Oral, Daily    M-ZAINA PLUS 27 mg iron- 1 mg Tab 1 each, Oral, Daily    metFORMIN (GLUCOPHAGE) 1,000 mg, Oral, 2 times daily, WITH BREAKFAST AND AT BEDTIME    miconazole (MICOTIN) 2 % cream Topical (Top), 2 times daily       Care team members:  [facility] - Primary OB       OBJECTIVE:   BP (!) 120/56 (BP Location: Left arm, Patient Position: Sitting, BP Method: Large (Automatic))   Pulse 102   Resp 18   Ht 5' 10" (1.778 m)   Wt (!) 137.5 kg (303 lb 0.4 oz)   LMP 2023 (Exact Date)   BMI 43.48 kg/m²     Ultrasound performed. See viewpoint for full ultrasound report.    A viable oliva pregnancy is visualized in cephalic presentation.  Estimated fetal weight is at the 18th percentile with an abdominal circumference at the 34th percentile.    No fetal abnormalities are noted and previous anatomic " survey was normal. Amniotic fluid volume is normal.  Placenta is posterior. Biophysical profile Is .           ASSESSMENT/PLAN:     23 y.o.  female with IUP at 32w3d    1. Insulin resistance complicating pregnancy  She has a history of polycystic ovarian syndrome, pre-diabetes, is obese, and has a history of macrosomia in her 1st pregnancy.  In her  pregnancy, she failed early glucose testing and was on Metformin during that pregnancy.  She was non-compliant and did not submit sugar logs for review.  With her current pregnancy, baseline A1c was 6.3.  Early glucose testing results- 128.  Her primary OB encouraged 4 times daily sugar checks due to her history an increased risk for diabetes in pregnancy, however, she refused to check sugars.    The patient was counseled regarding the risks of diabetes in pregnancy. These risks include but are not limited to an increased risk of , preeclampsia/gestational hypertension, fetal structural anomalies, macrosomia, prematurity, shoulder dystocia/birth injury,  hyperbilirubinemia and electrolyte issues, pulmonary immaturity and sudden stillbirth especially in those patients with poor glucose control. I also discussed with the patient the need for increased fetal surveillance with serial fetal growth assessments and third trimester fetal testing. I also reviewed the possibility of need for early delivery in those with poor glucose control or evidence of fetal growth abnormalities.  She understands the correlation between glycemic control in pregnancy outcome.    23- She recently failed 3hr OGTT (she has been diagnosed with gestational diabetes). We have discussed this diagnosis with her today and recommend she check her sugars four times daily and submit a log to our office weekly for review. She states she has all supplies at home and does not need refills for lancets/strips. We have encouraged compliance.     23- She has not submitted her  log since her last appointment. We reviewed her last visit at [facility] and noted her verbal report of BS checks. Metformin 500mg BID restarted. She presents with a sugar log today. She is checking her sugars three times daily (fasting and 2h PP lunch and dinner). Fasting values have improved since starting Metformin. However, all postprandial values remain out of range.     Recommendations:  Baseline evaluation (to be ordered by primary OB provider):  24 hour urine protein or urine P/C ratio, CBC, CMP (too low to calculate, serum cr 0.72, LFTs normal, )  Maternal EKG; echocardiogram if BMI > 30 or EKG is abnormal  Maternal ophthalmic exam (if hasn't been performed in last year) and podiatry exam  Hemoglobin A1C every trimester  Diabetic education referral and counseling re: goal blood sugars (< 95 mg/dl for fasting, < 120 mg/dl for 2 hour postprandial)  Medications:   Start low dose aspirin 81 mg daily at 12-16 weeks for preeclampsia risk reduction (refuses)  1 mg folic acid daily  Metformin 1000mg BID  Ultrasounds with MFM:  Targeted anatomy survey at 20 weeks (completed)  Serial growth ultrasounds q 4-6 weeks at 26-28 weeks    A fetal echocardiogram is recommended at 22-24 weeks with pediatric cardiology (completed)    Initiate  surveillance at 32 weeks:   Weekly BPP or NST + AFV if good control.   If control is poor, twice weekly  fetal surveillance is recommended with BPP alternating with NST.   Delivery timin 0/7 to 38 and 6/7 weeks if under good control without comorbidities  37 0/7 to 37 and 67 weeks if longstanding diabetes or poorly controlled, polyhydramnios, EFW>90th percentile, or BMI >= 40  36 0/7 to 37 and 6/7 weeks if vascular complications, prior stillbirth or other complicating conditions.  Recommend consideration of earlier delivery if IUGR, HTN, or other complications  Recommend offering  for delivery is EFW is 4500g or more near the time of delivery    The  patient was advised to call for blood sugars less than 70 or greater than 200 prior to her next appointment. She was also advised that if she notes nausea/vomiting, inability to tolerate PO, or concerns about being able to take her insulin that she should contact her provider or present to the OB ED.          2. BMI affecting pregnancy in third trimester  Please see prior documentation for specific counseling.      BMI 44    Recommendations:  Discussed that TWG goal is 11-20 lbs  Screen for signs/symptoms of obstructive sleep apnea  Nutritionist consult offered (this is to be ordered by primary OB provider)  Consider early 1 hr GCT (1hr-128; A1C 6.3); repeat at 24-28 weeks gestation (failed routine testing)  Start low dose aspirin 81 mg daily at 12-16 weeks for preeclampsia risk reduction (pt refuses)  Targeted anatomical survey scheduled at 18-20 weeks (completed)  Fetal growth ultrasound at 32 weeks if BMI >= 40  Weekly  testing at 32 weeks if pre-pregnancy BMI >= 45  Lovenox 40 mg BID for VTE prophylaxis while admitted to the hospital (antepartum or postpartum) if BMI >= 40.   Encouraged breastfeeding  Postpartum lifestyle modifications & weight loss      3. PTSD/bipolar/anxiety/ADD affecting pregnancy in third trimester  She reports a history of anxiety, bipolar, PTSD, and ADD. She is not taking medication at this time. She reports stable symptoms. Discussed increased risk for peripartum and postpartum mood disorders. Precautions provided.        We have discussed the importance of optimization of mental health conditions during pregnancy in an effort to reduce the risk of postpartum mood disorders. We have discussed options for management including psychotherapy, counseling, cognitive behavioral therapy, exercise/yoga, journaling, and psychiatry services. She will notify our office if she is interested in being referred for any of the above.    She had an appt scheduled with [facility],  however, did not keep it. We have encouraged her to contact [facility] to reschedule.     4. History of postpartum hemorrhage  She reports blood transfusion after both of her deliveries. Atony is suspected cause of bleeding.     Active management of third stage of labor recommended.   Check CBC each trimester in order to optimize pre-delivery levels    F/u for weekly BPP until delivery  F/u in 4 weeks for MFM visit/growth ultrasound    Juanis Kimbrough MD  Maternal Fetal Medicine

## 2023-09-06 NOTE — ASSESSMENT & PLAN NOTE
She reports a history of anxiety, bipolar, PTSD, and ADD. She is not taking medication at this time. She reports stable symptoms. Discussed increased risk for peripartum and postpartum mood disorders. Precautions provided.        We have discussed the importance of optimization of mental health conditions during pregnancy in an effort to reduce the risk of postpartum mood disorders. We have discussed options for management including psychotherapy, counseling, cognitive behavioral therapy, exercise/yoga, journaling, and psychiatry services. She will notify our office if she is interested in being referred for any of the above.    She had an appt scheduled with [facility], however, did not keep it. We have encouraged her to contact [facility] to reschedule.

## 2023-09-06 NOTE — ASSESSMENT & PLAN NOTE
She has a history of polycystic ovarian syndrome, pre-diabetes, is obese, and has a history of macrosomia in her 1st pregnancy.  In her  pregnancy, she failed early glucose testing and was on Metformin during that pregnancy.  She was non-compliant and did not submit sugar logs for review.  With her current pregnancy, baseline A1c was 6.3.  Early glucose testing results- 128.  Her primary OB encouraged 4 times daily sugar checks due to her history an increased risk for diabetes in pregnancy, however, she refused to check sugars.    The patient was counseled regarding the risks of diabetes in pregnancy. These risks include but are not limited to an increased risk of , preeclampsia/gestational hypertension, fetal structural anomalies, macrosomia, prematurity, shoulder dystocia/birth injury,  hyperbilirubinemia and electrolyte issues, pulmonary immaturity and sudden stillbirth especially in those patients with poor glucose control. I also discussed with the patient the need for increased fetal surveillance with serial fetal growth assessments and third trimester fetal testing. I also reviewed the possibility of need for early delivery in those with poor glucose control or evidence of fetal growth abnormalities.  She understands the correlation between glycemic control in pregnancy outcome.    23- She recently failed 3hr OGTT (she has been diagnosed with gestational diabetes). We have discussed this diagnosis with her today and recommend she check her sugars four times daily and submit a log to our office weekly for review. She states she has all supplies at home and does not need refills for lancets/strips. We have encouraged compliance.     23- She has not submitted her log since her last appointment. We reviewed her last visit at [facility] and noted her verbal report of BS checks. Metformin 500mg BID restarted. She presents with a sugar log today. She is checking her sugars three times daily  (fasting and 2h PP lunch and dinner). Fasting values have improved since starting Metformin. However, all postprandial values remain out of range.     Recommendations:  Baseline evaluation (to be ordered by primary OB provider):   24 hour urine protein or urine P/C ratio, CBC, CMP (too low to calculate, serum cr 0.72, LFTs normal, )   Maternal EKG; echocardiogram if BMI > 30 or EKG is abnormal   Maternal ophthalmic exam (if hasn't been performed in last year) and podiatry exam   Hemoglobin A1C every trimester   Diabetic education referral and counseling re: goal blood sugars (< 95 mg/dl for fasting, < 120 mg/dl for 2 hour postprandial)  Medications:    Start low dose aspirin 81 mg daily at 12-16 weeks for preeclampsia risk reduction (refuses)   1 mg folic acid daily   Metformin 1000mg BID  Ultrasounds with MFM:   Targeted anatomy survey at 20 weeks (completed)   Serial growth ultrasounds q 4-6 weeks at 26-28 weeks    A fetal echocardiogram is recommended at 22-24 weeks with pediatric cardiology (completed)    Initiate  surveillance at 32 weeks:    Weekly BPP or NST + AFV if good control.    If control is poor, twice weekly  fetal surveillance is recommended with BPP alternating with NST.   Delivery timing:   38 0/7 to 38 and 6/7 weeks if under good control without comorbidities   37 0/7 to 37 and 67 weeks if longstanding diabetes or poorly controlled, polyhydramnios, EFW>90th percentile, or BMI >= 40   36 0/7 to 37 and 6/7 weeks if vascular complications, prior stillbirth or other complicating conditions.  Recommend consideration of earlier delivery if IUGR, HTN, or other complications  Recommend offering  for delivery is EFW is 4500g or more near the time of delivery    The patient was advised to call for blood sugars less than 70 or greater than 200 prior to her next appointment. She was also advised that if she notes nausea/vomiting, inability to tolerate PO, or  concerns about being able to take her insulin that she should contact her provider or present to the OB ED.

## 2023-09-07 DIAGNOSIS — O24.913 DIABETES MELLITUS DURING PREGNANCY IN THIRD TRIMESTER, UNSPECIFIED DIABETES MELLITUS TYPE: Primary | ICD-10-CM

## 2023-09-11 DIAGNOSIS — O24.113 PREGNANCY WITH TYPE 2 DIABETES MELLITUS IN THIRD TRIMESTER: Primary | ICD-10-CM

## 2023-09-12 ENCOUNTER — DOCUMENTATION ONLY (OUTPATIENT)
Dept: MATERNAL FETAL MEDICINE | Facility: CLINIC | Age: 23
End: 2023-09-12

## 2023-09-12 ENCOUNTER — PROCEDURE VISIT (OUTPATIENT)
Dept: MATERNAL FETAL MEDICINE | Facility: CLINIC | Age: 23
End: 2023-09-12
Payer: MEDICAID

## 2023-09-12 DIAGNOSIS — O24.913 DIABETES MELLITUS DURING PREGNANCY IN THIRD TRIMESTER, UNSPECIFIED DIABETES MELLITUS TYPE: ICD-10-CM

## 2023-09-12 PROCEDURE — 76819 US MFM PROCEDURE (VIEWPOINT): ICD-10-PCS | Mod: S$GLB,,, | Performed by: OBSTETRICS & GYNECOLOGY

## 2023-09-12 PROCEDURE — 76819 FETAL BIOPHYS PROFIL W/O NST: CPT | Mod: S$GLB,,, | Performed by: OBSTETRICS & GYNECOLOGY

## 2023-09-12 NOTE — PROGRESS NOTES
Here today for BPP  Does not have BS log  /74  C/o bilat hip pain (worse on right side) started few days ago. Discussed stretches/relief measures

## 2023-09-14 ENCOUNTER — OFFICE VISIT (OUTPATIENT)
Dept: FAMILY MEDICINE | Facility: CLINIC | Age: 23
End: 2023-09-14
Payer: MEDICAID

## 2023-09-14 VITALS
TEMPERATURE: 99 F | SYSTOLIC BLOOD PRESSURE: 95 MMHG | OXYGEN SATURATION: 98 % | BODY MASS INDEX: 43.48 KG/M2 | DIASTOLIC BLOOD PRESSURE: 62 MMHG | HEART RATE: 77 BPM | HEIGHT: 70 IN | RESPIRATION RATE: 20 BRPM

## 2023-09-14 DIAGNOSIS — Z3A.33 33 WEEKS GESTATION OF PREGNANCY: Primary | ICD-10-CM

## 2023-09-14 DIAGNOSIS — O24.419 GESTATIONAL DIABETES MELLITUS (GDM) IN THIRD TRIMESTER, GESTATIONAL DIABETES METHOD OF CONTROL UNSPECIFIED: ICD-10-CM

## 2023-09-14 DIAGNOSIS — Z87.59 HISTORY OF POSTPARTUM HEMORRHAGE: ICD-10-CM

## 2023-09-14 DIAGNOSIS — O99.343 MENTAL DISORDER AFFECTING PREGNANCY IN THIRD TRIMESTER: ICD-10-CM

## 2023-09-14 DIAGNOSIS — D64.9 ANEMIA, UNSPECIFIED TYPE: ICD-10-CM

## 2023-09-14 LAB
BILIRUB SERPL-MCNC: NORMAL MG/DL
BLOOD URINE, POC: NORMAL
CLARITY, POC UA: CLEAR
COLOR, POC UA: YELLOW
FERRITIN SERPL-MCNC: 10.99 NG/ML (ref 4.63–204)
GLUCOSE UR QL STRIP: NORMAL
KETONES UR QL STRIP: NORMAL
LEUKOCYTE ESTERASE URINE, POC: NORMAL
NITRITE, POC UA: NORMAL
PH, POC UA: 6.5
PROTEIN, POC: NORMAL
SPECIFIC GRAVITY, POC UA: 1.02
UROBILINOGEN, POC UA: 1

## 2023-09-14 PROCEDURE — 81002 URINALYSIS NONAUTO W/O SCOPE: CPT | Mod: PBBFAC

## 2023-09-14 PROCEDURE — 82728 ASSAY OF FERRITIN: CPT

## 2023-09-14 PROCEDURE — 99213 OFFICE O/P EST LOW 20 MIN: CPT | Mod: PBBFAC

## 2023-09-14 PROCEDURE — 36415 COLL VENOUS BLD VENIPUNCTURE: CPT

## 2023-09-14 RX ORDER — CALCIUM CARBONATE 200(500)MG
500 TABLET,CHEWABLE ORAL
Status: COMPLETED | OUTPATIENT
Start: 2023-09-14 | End: 2023-09-14

## 2023-09-14 RX ADMIN — Medication 500 MG: at 01:09

## 2023-09-14 NOTE — PROGRESS NOTES
OB Office Visit Note    Name: Valente Wilson  MRN: [id number]  Date: 2023    Subjective:      Chief Complaint: Routine Prenatal Visit (HROB 33w4d, c/o contractions at times.)      Valente Wilson is a 23 y.o.  at 33w4d with JULIA 10/29/2023, by Last Menstrual Period consistent with 1st trimester US presenting for routine OB visit.    Current issues: has intermittent heartburn and nausea, on Prevacid. States she has occasional, left and right sided abdominal pain/cramping with positional changes.    Chronic issues:   Gestational DM (did not bring BS logs as she states she has been inconsistent with home BS checks, reports compliance with Metformin 1000mg BID and ASA; following MFM)  PCOS  PTSD/KRISTINA/ADD/bipolar (not currently on meds, previously on Latuda)  Anemia  H/o PP hemorrhage (blood transfusion after both prior deliveries; atony suspected)      PMHx:  see above  PSHx:  ectopic pregnancy 2018 s/p salpingectomy    FHx: No reported pertinent FHx  Meds:   Prior to Admission medications    Medication Sig Start Date End Date Taking? Authorizing Provider   aspirin (ECOTRIN) 81 MG EC tablet Take 1 tablet (81 mg total) by mouth once daily.  Patient not taking: Reported on 2023  Katelyn Ureña MD   lansoprazole (PREVACID) 15 MG capsule Take 1 capsule (15 mg total) by mouth once daily. 23  Franck Carnes MD   M-ZAINA PLUS 27 mg iron- 1 mg Tab Take 1 tablet (1 each total) by mouth once daily. 23   Franck Carnes MD   metFORMIN (GLUCOPHAGE) 500 MG tablet Take 2 tablets (1,000 mg total) by mouth 2 (two) times a day. WITH BREAKFAST AND AT BEDTIME 23   Digna Cardoza NP   miconazole (MICOTIN) 2 % cream Apply topically 2 (two) times daily. for 14 days 23  Sylvia Ochoa NP     Allergies:   Review of patient's allergies indicates:   Allergen Reactions    Fentanyl (bulk) Itching    Oxycodone-acetaminophen Hives     pt broke out in rash  Other  "reaction(s): RASH       Gestational History:   OB History    Para Term  AB Living   4 2 2 0 1 2   SAB IAB Ectopic Multiple Live Births   0 0 1 0 2      # Outcome Date GA Lbr Maykel/2nd Weight Sex Delivery Anes PTL Lv   4 Current            3 Term 10/18/22 37w6d / 00:16 3.05 kg (6 lb 11.6 oz) F Vag-Spont EPI N FLACO      Name: RICCARDO JACKMAN      Apgar1: 8  Apgar5: 9   2 Ectopic 18 7w0d    ECTOPIC   FD   1 Term 06/29/15 40w0d  4.111 kg (9 lb 1 oz) M Vag-Spont EPI N FLACO       GYNHx:  LMP: Patient's last menstrual period was 2023 (exact date).  Last pap: 2022      Antepartum specific ROS  - Fetal movements: Yes   - Vaginal bleeding: No  - Vaginal discharge: No  - Loss of fluid: No  - Contractions: No  - Headaches: mild, occasional, self resolving  - Vision changes: No  - Edema: occasional LE edema    Review of Systems  Constitutional: no fever, no chills  CV: no chest pain  RESP: no SOB  : no dysuria, no hematuria  GI: + heartburn and nausea. no constipation, no diarrhea, no vomiting  Psych: mood stable; no SI/HI    Objective:      Vitals:    23 1247   BP: 95/62   BP Location: Right arm   Patient Position: Lying   BP Method: Large (Automatic)   Pulse: 77   Resp: 20   Temp: 98.9 °F (37.2 °C)   TempSrc: Oral   SpO2: 98%   Height: 5' 10" (1.778 m)       Lab Results   Component Value Date    COLORU Yellow 2023    SPECGRAV 1.025 2023    PHUR 6.5 2023    WBCUR trace 2023    NITRITE neg 2023    PROTEINPOC trace 2023    GLUCOSEUR neg 2023    KETONESU neg 2023    UROBILINOGEN 1.0 2023    BILIRUBINPOC neg 2023    RBCUR neg 2023       General: alert, NAD  RESP: clear to auscultation bilaterally, non labored  CV: regular rate and rhythm, no murmurs, no edema  ABD: gravid, nontender, BS+   Fundal height: 34 cm    NST: reactive NST in clinic today with 150s baseline FHR    Initial OB Labs:   - Blood Type and Rh: A+  - Antibody " Screen: negative  - CBC H/H: 10.0/32.2  - HIV: NR  - Syphilis Ab: NR  - GC: negative  - CT: negative  - HBsAg: NR  - HCVAb: NR  - Rubella: non immune  - Varicella: non immune  - UA & Culture: no growth  - Sickle Cell Screen: negative  - PAP: NIL    15-20 Weeks:   - Quad Screen: normal risk    28 Week Lab:   - 1H GTT: Failed 1 hour and 3 hour  - Rhogam: Not indicated  - Date of Tdap: 8/2023  - CBC H/H: 9.9/32  - RPR: Nonreactive    36 Week Lab:   - CBC H/H: -  - RPR: -  - GBS Culture: -  - HIV: -  - Cervical GC: -    Urine dip:  Lab Results   Component Value Date    COLORU Yellow 09/14/2023    SPECGRAV 1.025 09/14/2023    PHUR 6.5 09/14/2023    WBCUR trace 09/14/2023    NITRITE neg 09/14/2023    PROTEINPOC trace 09/14/2023    GLUCOSEUR neg 09/14/2023    KETONESU neg 09/14/2023    UROBILINOGEN 1.0 09/14/2023    BILIRUBINPOC neg 09/14/2023    RBCUR neg 09/14/2023     Assessment/Plan:     Valente was seen today for routine prenatal visit.    Diagnoses and all orders for this visit:    33 weeks gestation of pregnancy  -     POCT urine dipstick without microscope  -     Ferritin; Future  -     Fetal non-stress test; Future  -     Ferritin    PTSD/bipolar/anxiety/ADD affecting pregnancy in third trimester    Gestational diabetes mellitus (GDM) in third trimester, gestational diabetes method of control unspecified    Anemia, unspecified type    History of postpartum hemorrhage         Plan  - Reported abdominal pain likely 2/2 round ligament pain. Recommended use of belly band.  - Advised pt to resume checking BS at home and keeping logs; bring logs to visits so that we can determine if she is being treated/controlled. Continue Metformin 1000mg BID. Keep visits with MFM.   -     PNVs and ASA  -     Urine dip reviewed   -     Routine (initial) labs: as mentioned above/pending  - Ferritin ordered today  -     Mother plans to breast and/or bottlefeed  -     Postpartum contraception discussion: not discussed  -     ED  precautions discussed in depth: vaginal bleeding or leaking fluid, belly cramping or pain, SOB/chest pain, swelling of the face/lower extremities, vision changes. If don't feel the baby move in over an hour. Severe headache that are not resolved with medication  - Weekly NST  - Per MFM, delivery timin 0/7 to 37 and 67 weeks     Follow up in about 1 week (around 2023) for NST. F/u in 2 weeks for HROB visit..        Cecily Shaw MD  [facility] [facility]

## 2023-09-14 NOTE — PROGRESS NOTES
FETAL ASSESSMENT REPORT    RE: Valente Wilson  MRN:  [id number]  :  2000  AGE:  23 y.o.    Date:  2023    REFERRAL PHYSICIAN: [facility]    Allergies: Fentanyl (bulk) and Oxycodone-acetaminophen    Valente is a 23 y.o.  at 33w4d gestational age here today for a NST.    10/29/2023, by Last Menstrual Period    MEDICATIONS AT TIME OF TEST:    Current Outpatient Medications   Medication Sig Dispense Refill    aspirin (ECOTRIN) 81 MG EC tablet Take 1 tablet (81 mg total) by mouth once daily. (Patient not taking: Reported on 2023) 90 tablet 3    lansoprazole (PREVACID) 15 MG capsule Take 1 capsule (15 mg total) by mouth once daily. 30 capsule 1    M-ZAINA PLUS 27 mg iron- 1 mg Tab Take 1 tablet (1 each total) by mouth once daily. 30 tablet 0    metFORMIN (GLUCOPHAGE) 500 MG tablet Take 2 tablets (1,000 mg total) by mouth 2 (two) times a day. WITH BREAKFAST AND AT BEDTIME 30 tablet 1    miconazole (MICOTIN) 2 % cream Apply topically 2 (two) times daily. for 14 days 15 g 0     No current facility-administered medications for this visit.       Indication: gestational diabetes mellitus    Interpretation:  150 BPM baseline    Variability:  Good {> 6 bpm)    Accelerations:  Reactive    Decelerations:  none    Assessment: reactive    Plan:  NST scheduled, NST reactive      Jan Boyer MD  2023; 1:31 PM

## 2023-09-17 NOTE — PROGRESS NOTES
I have personally reviewed the review of systems (ROS) and past, family and social histories (PFSH) documented above by the resident.  I have reviewed the care furnished by the resident during the encounter, including a review of the patient's medical history, the resident's findings on physical examination, diagnosis, and the treatment plan.  I participated in the management of the patient and was immediately available throughout the encounter.   I was physically present during all key portions of the service(s) provided with the resident.  Services were furnished in a primary care center located in the outpatient department of a [facility].

## 2023-09-18 ENCOUNTER — PROCEDURE VISIT (OUTPATIENT)
Dept: MATERNAL FETAL MEDICINE | Facility: CLINIC | Age: 23
End: 2023-09-18
Payer: MEDICAID

## 2023-09-18 VITALS
DIASTOLIC BLOOD PRESSURE: 56 MMHG | WEIGHT: 293 LBS | SYSTOLIC BLOOD PRESSURE: 118 MMHG | BODY MASS INDEX: 44.1 KG/M2 | HEART RATE: 95 BPM

## 2023-09-18 DIAGNOSIS — O24.113 PREGNANCY WITH TYPE 2 DIABETES MELLITUS IN THIRD TRIMESTER: ICD-10-CM

## 2023-09-18 LAB — PRENATAL STREP B CULTURE: NEGATIVE

## 2023-09-18 PROCEDURE — 76819 FETAL BIOPHYS PROFIL W/O NST: CPT | Mod: S$GLB,,, | Performed by: OBSTETRICS & GYNECOLOGY

## 2023-09-18 PROCEDURE — 76819 US MFM PROCEDURE (VIEWPOINT): ICD-10-PCS | Mod: S$GLB,,, | Performed by: OBSTETRICS & GYNECOLOGY

## 2023-09-19 ENCOUNTER — TELEPHONE (OUTPATIENT)
Dept: MATERNAL FETAL MEDICINE | Facility: CLINIC | Age: 23
End: 2023-09-19

## 2023-09-19 DIAGNOSIS — O24.414 INSULIN CONTROLLED GESTATIONAL DIABETES MELLITUS (GDM) IN THIRD TRIMESTER: Primary | ICD-10-CM

## 2023-09-19 RX ORDER — INSULIN DETEMIR 100 [IU]/ML
10 INJECTION, SOLUTION SUBCUTANEOUS 2 TIMES DAILY
Qty: 3 ML | Refills: 1 | Status: ON HOLD | OUTPATIENT
Start: 2023-09-19 | End: 2023-10-12

## 2023-09-19 RX ORDER — PEN NEEDLE, DIABETIC 30 GX3/16"
1 NEEDLE, DISPOSABLE MISCELLANEOUS 2 TIMES DAILY
Qty: 50 EACH | Refills: 1 | Status: SHIPPED | OUTPATIENT
Start: 2023-09-19 | End: 2023-09-25 | Stop reason: SDUPTHER

## 2023-09-20 ENCOUNTER — TELEPHONE (OUTPATIENT)
Dept: FAMILY MEDICINE | Facility: CLINIC | Age: 23
End: 2023-09-20

## 2023-09-20 RX ORDER — FERROUS GLUCONATE 324(37.5)
324 TABLET ORAL EVERY OTHER DAY
Qty: 90 TABLET | Refills: 1 | Status: SHIPPED | OUTPATIENT
Start: 2023-09-20 | End: 2023-09-29 | Stop reason: SDUPTHER

## 2023-09-20 NOTE — TELEPHONE ENCOUNTER
Iron supplements started; sent to [facility] per patient request. Now on insulin.

## 2023-09-21 ENCOUNTER — CLINICAL SUPPORT (OUTPATIENT)
Dept: FAMILY MEDICINE | Facility: CLINIC | Age: 23
End: 2023-09-21
Payer: MEDICAID

## 2023-09-21 VITALS
OXYGEN SATURATION: 97 % | DIASTOLIC BLOOD PRESSURE: 79 MMHG | HEART RATE: 85 BPM | RESPIRATION RATE: 16 BRPM | SYSTOLIC BLOOD PRESSURE: 111 MMHG | TEMPERATURE: 99 F

## 2023-09-21 DIAGNOSIS — Z3A.34 34 WEEKS GESTATION OF PREGNANCY: Primary | ICD-10-CM

## 2023-09-21 DIAGNOSIS — Z3A.33 33 WEEKS GESTATION OF PREGNANCY: ICD-10-CM

## 2023-09-21 DIAGNOSIS — O99.343 MENTAL DISORDER AFFECTING PREGNANCY IN THIRD TRIMESTER: ICD-10-CM

## 2023-09-21 DIAGNOSIS — Z3A.36 36 WEEKS GESTATION OF PREGNANCY: ICD-10-CM

## 2023-09-21 PROCEDURE — 99213 OFFICE O/P EST LOW 20 MIN: CPT | Mod: PBBFAC

## 2023-09-21 NOTE — PROGRESS NOTES
Pt placed on NST monitor x20 minutes. Strip images sent to Dr. Boyer and shown in clinic to Dr. Redd. OK given to discharge patient home.

## 2023-09-22 ENCOUNTER — PATIENT OUTREACH (OUTPATIENT)
Dept: FAMILY MEDICINE | Facility: CLINIC | Age: 23
End: 2023-09-22

## 2023-09-25 ENCOUNTER — OFFICE VISIT (OUTPATIENT)
Dept: MATERNAL FETAL MEDICINE | Facility: CLINIC | Age: 23
End: 2023-09-25
Payer: MEDICAID

## 2023-09-25 ENCOUNTER — PROCEDURE VISIT (OUTPATIENT)
Dept: MATERNAL FETAL MEDICINE | Facility: CLINIC | Age: 23
End: 2023-09-25
Payer: MEDICAID

## 2023-09-25 VITALS — HEART RATE: 96 BPM | SYSTOLIC BLOOD PRESSURE: 125 MMHG | DIASTOLIC BLOOD PRESSURE: 61 MMHG

## 2023-09-25 VITALS — DIASTOLIC BLOOD PRESSURE: 61 MMHG | SYSTOLIC BLOOD PRESSURE: 125 MMHG | HEART RATE: 96 BPM

## 2023-09-25 DIAGNOSIS — O24.113 PREGNANCY WITH TYPE 2 DIABETES MELLITUS IN THIRD TRIMESTER: ICD-10-CM

## 2023-09-25 DIAGNOSIS — O99.343 MENTAL DISORDER AFFECTING PREGNANCY IN THIRD TRIMESTER: ICD-10-CM

## 2023-09-25 DIAGNOSIS — O24.414 INSULIN CONTROLLED GESTATIONAL DIABETES MELLITUS (GDM) IN THIRD TRIMESTER: ICD-10-CM

## 2023-09-25 DIAGNOSIS — O99.213 OBESITY AFFECTING PREGNANCY IN THIRD TRIMESTER: ICD-10-CM

## 2023-09-25 DIAGNOSIS — Z87.59 HISTORY OF POSTPARTUM HEMORRHAGE: ICD-10-CM

## 2023-09-25 DIAGNOSIS — E88.819 INSULIN RESISTANCE COMPLICATING PREGNANCY: ICD-10-CM

## 2023-09-25 DIAGNOSIS — O26.899 INSULIN RESISTANCE COMPLICATING PREGNANCY: ICD-10-CM

## 2023-09-25 PROCEDURE — 1159F PR MEDICATION LIST DOCUMENTED IN MEDICAL RECORD: ICD-10-PCS | Mod: CPTII,S$GLB,, | Performed by: OBSTETRICS & GYNECOLOGY

## 2023-09-25 PROCEDURE — 99214 PR OFFICE/OUTPT VISIT, EST, LEVL IV, 30-39 MIN: ICD-10-PCS | Mod: 25,TH,S$GLB, | Performed by: OBSTETRICS & GYNECOLOGY

## 2023-09-25 PROCEDURE — 76819 FETAL BIOPHYS PROFIL W/O NST: CPT | Mod: S$GLB,,, | Performed by: OBSTETRICS & GYNECOLOGY

## 2023-09-25 PROCEDURE — 3074F PR MOST RECENT SYSTOLIC BLOOD PRESSURE < 130 MM HG: ICD-10-PCS | Mod: CPTII,S$GLB,, | Performed by: OBSTETRICS & GYNECOLOGY

## 2023-09-25 PROCEDURE — 3078F DIAST BP <80 MM HG: CPT | Mod: CPTII,S$GLB,, | Performed by: OBSTETRICS & GYNECOLOGY

## 2023-09-25 PROCEDURE — 3044F PR MOST RECENT HEMOGLOBIN A1C LEVEL <7.0%: ICD-10-PCS | Mod: CPTII,S$GLB,, | Performed by: OBSTETRICS & GYNECOLOGY

## 2023-09-25 PROCEDURE — 1160F PR REVIEW ALL MEDS BY PRESCRIBER/CLIN PHARMACIST DOCUMENTED: ICD-10-PCS | Mod: CPTII,S$GLB,, | Performed by: OBSTETRICS & GYNECOLOGY

## 2023-09-25 PROCEDURE — 3044F HG A1C LEVEL LT 7.0%: CPT | Mod: CPTII,S$GLB,, | Performed by: OBSTETRICS & GYNECOLOGY

## 2023-09-25 PROCEDURE — 1160F RVW MEDS BY RX/DR IN RCRD: CPT | Mod: CPTII,S$GLB,, | Performed by: OBSTETRICS & GYNECOLOGY

## 2023-09-25 PROCEDURE — 1159F MED LIST DOCD IN RCRD: CPT | Mod: CPTII,S$GLB,, | Performed by: OBSTETRICS & GYNECOLOGY

## 2023-09-25 PROCEDURE — 3074F SYST BP LT 130 MM HG: CPT | Mod: CPTII,S$GLB,, | Performed by: OBSTETRICS & GYNECOLOGY

## 2023-09-25 PROCEDURE — 99214 OFFICE O/P EST MOD 30 MIN: CPT | Mod: 25,TH,S$GLB, | Performed by: OBSTETRICS & GYNECOLOGY

## 2023-09-25 PROCEDURE — 76819 PR US, OB, FETAL BIOPHYSICAL, W/O NST: ICD-10-PCS | Mod: S$GLB,,, | Performed by: OBSTETRICS & GYNECOLOGY

## 2023-09-25 PROCEDURE — 3078F PR MOST RECENT DIASTOLIC BLOOD PRESSURE < 80 MM HG: ICD-10-PCS | Mod: CPTII,S$GLB,, | Performed by: OBSTETRICS & GYNECOLOGY

## 2023-09-25 RX ORDER — PEN NEEDLE, DIABETIC 30 GX3/16"
1 NEEDLE, DISPOSABLE MISCELLANEOUS 2 TIMES DAILY
Qty: 50 EACH | Refills: 1 | Status: ON HOLD | OUTPATIENT
Start: 2023-09-25 | End: 2023-10-12

## 2023-09-25 NOTE — PROGRESS NOTES
"  Maternal Fetal Medicine follow up consult    SUBJECTIVE:     Valente Wilson is a 23 y.o.  female with IUP at 35w1d who is seen in follow up consultation by MFM.  Pregnancy complications include:   Problem   1. Insulin resistance complicating pregnancy   2. BMI affecting pregnancy in third trimester   4. History of postpartum hemorrhage   3. PTSD/bipolar/anxiety/ADD affecting pregnancy in third trimester       She presents today for problem visit as she is having significant diarrhea since adding insulin to her metformin. She has not taken her metformin since Friday and has a log with her today.  She admits she is not taking her ASA.    Previous notes reviewed.   No changes to medical, surgical, family, social, or obstetric history.    Interval history since last MFM visit: see above    Medications:  Current Outpatient Medications   Medication Instructions    aspirin (ECOTRIN) 81 mg, Oral, Daily    ferrous gluconate 324 mg, Oral, Every other day    lansoprazole (PREVACID) 15 mg, Oral, Daily    LEVEMIR FLEXPEN 10 Units, Subcutaneous, 2 times daily    M- PLUS 27 mg iron- 1 mg Tab 1 each, Oral, Daily    pen needle, diabetic (PEN NEEDLE) 31 gauge x 5/16" Ndle 1 application , Misc.(Non-Drug; Combo Route), 2 times daily, Use pen needle to inject insulin every night at bedtime       Care team members:  [facility] - Primary OB       OBJECTIVE:   /61 (BP Location: Right arm)   Pulse 96   LMP 2023 (Exact Date)     Ultrasound performed. See viewpoint for full ultrasound report.    A viable oliva pregnancy is seen in cephalic presentation.   No abnormalities are seen.  Amniotic fluid volume is normal.   Placenta is posterior.  Biophysical profile is 8/8.                ASSESSMENT/PLAN:     23 y.o.  female with IUP at 35w1d    3. PTSD/bipolar/anxiety/ADD affecting pregnancy in third trimester  She reports a history of anxiety, bipolar, PTSD, and ADD. She is not taking medication at this time. " She reports stable symptoms. Discussed increased risk for peripartum and postpartum mood disorders. Precautions provided.        We have discussed the importance of optimization of mental health conditions during pregnancy in an effort to reduce the risk of postpartum mood disorders. We have discussed options for management including psychotherapy, counseling, cognitive behavioral therapy, exercise/yoga, journaling, and psychiatry services. She will notify our office if she is interested in being referred for any of the above.    She had an appt scheduled with [facility], however, did not keep it. We have encouraged her to contact [facility] to reschedule.     1. Insulin resistance complicating pregnancy  She has a history of polycystic ovarian syndrome, pre-diabetes, is obese, and has a history of macrosomia in her 1st pregnancy.  In her  pregnancy, she failed early glucose testing and was on Metformin during that pregnancy.  She was non-compliant and did not submit sugar logs for review.  With her current pregnancy, baseline A1c was 6.3.  Early glucose testing results- 128.  Her primary OB encouraged 4 times daily sugar checks due to her history an increased risk for diabetes in pregnancy, however, she refused to check sugars.    The patient was counseled regarding the risks of diabetes in pregnancy. These risks include but are not limited to an increased risk of , preeclampsia/gestational hypertension, fetal structural anomalies, macrosomia, prematurity, shoulder dystocia/birth injury,  hyperbilirubinemia and electrolyte issues, pulmonary immaturity and sudden stillbirth especially in those patients with poor glucose control. I also discussed with the patient the need for increased fetal surveillance with serial fetal growth assessments and third trimester fetal testing. I also reviewed the possibility of need for early delivery in those with poor glucose control or evidence of fetal  growth abnormalities.  She understands the correlation between glycemic control in pregnancy outcome.    8/9/23- She recently failed 3hr OGTT (she has been diagnosed with gestational diabetes). We have discussed this diagnosis with her today and recommend she check her sugars four times daily and submit a log to our office weekly for review. She states she has all supplies at home and does not need refills for lancets/strips. We have encouraged compliance.     9/6/23- She has not submitted her log since her last appointment. We reviewed her last visit at [facility] and noted her verbal report of BS checks. Metformin 500mg BID restarted. She presents with a sugar log today. She is checking her sugars three times daily (fasting and 2h PP lunch and dinner). Fasting values have improved since starting Metformin. However, all postprandial values remain out of range.     9/19: Added insulin to regimen- levemir 10 BID    9/25- Metformin+insulin combo resulted in significant diarrhea and she has stopped metformin. She is taking levemir 10 BID. BG log shows elevated postprandials and a couple of elevated fastings. Dosing increased to levelmir 14uAM and 12uqHS today. Sent pen needles.     Recommendations:  Baseline evaluation (to be ordered by primary OB provider):  24 hour urine protein or urine P/C ratio, CBC, CMP (too low to calculate, serum cr 0.72, LFTs normal, )  Maternal EKG; echocardiogram if BMI > 30 or EKG is abnormal  Maternal ophthalmic exam (if hasn't been performed in last year) and podiatry exam  Hemoglobin A1C every trimester  Diabetic education referral and counseling re: goal blood sugars (< 95 mg/dl for fasting, < 120 mg/dl for 2 hour postprandial)  Medications:   Start low dose aspirin 81 mg daily at 12-16 weeks for preeclampsia risk reduction (refuses)  1 mg folic acid daily  Levemir 14u AM/ 12u HS (increased today)  Ultrasounds with MFM:  Targeted anatomy survey at 20 weeks (completed)  Serial growth  ultrasounds q 4-6 weeks at 26-28 weeks    A fetal echocardiogram is recommended at 22-24 weeks with pediatric cardiology (completed)    Initiate  surveillance at 32 weeks:   Weekly BPP or NST + AFV if good control.   If control is poor, twice weekly  fetal surveillance is recommended with BPP alternating with NST.   Delivery timin 0/7 to 38 and 6/7 weeks if under good control without comorbidities  37 0/7 to 37 and 67 weeks if longstanding diabetes or poorly controlled, polyhydramnios, EFW>90th percentile, or BMI >= 40  36 0/7 to 37 and 6/7 weeks if vascular complications, prior stillbirth or other complicating conditions.  Recommend consideration of earlier delivery if IUGR, HTN, or other complications  Recommend offering  for delivery is EFW is 4500g or more near the time of delivery    The patient was advised to call for blood sugars less than 70 or greater than 200 prior to her next appointment. She was also advised that if she notes nausea/vomiting, inability to tolerate PO, or concerns about being able to take her insulin that she should contact her provider or present to the OB ED.          2. BMI affecting pregnancy in third trimester  Please see prior documentation for specific counseling.      BMI 44    Recommendations:  Discussed that TWG goal is 11-20 lbs  Screen for signs/symptoms of obstructive sleep apnea  Nutritionist consult offered (this is to be ordered by primary OB provider)  Consider early 1 hr GCT (1hr-128; A1C 6.3); repeat at 24-28 weeks gestation (failed routine testing)  Start low dose aspirin 81 mg daily at 12-16 weeks for preeclampsia risk reduction (pt refuses)  Targeted anatomical survey scheduled at 18-20 weeks (completed)  Fetal growth ultrasound at 32 weeks if BMI >= 40  Weekly  testing at 32 weeks if pre-pregnancy BMI >= 45  Lovenox 40 mg BID for VTE prophylaxis while admitted to the hospital (antepartum or postpartum) if BMI >= 40.    Encouraged breastfeeding  Postpartum lifestyle modifications & weight loss      4. History of postpartum hemorrhage  She reports blood transfusion after both of her deliveries. Atony is suspected cause of bleeding.     Active management of third stage of labor recommended.   Check CBC each trimester in order to optimize pre-delivery levels      F/u for weekly BPP until delivery    Juanis Kimbrough MD  Maternal Fetal Medicine

## 2023-09-25 NOTE — ASSESSMENT & PLAN NOTE
She has a history of polycystic ovarian syndrome, pre-diabetes, is obese, and has a history of macrosomia in her 1st pregnancy.  In her  pregnancy, she failed early glucose testing and was on Metformin during that pregnancy.  She was non-compliant and did not submit sugar logs for review.  With her current pregnancy, baseline A1c was 6.3.  Early glucose testing results- 128.  Her primary OB encouraged 4 times daily sugar checks due to her history an increased risk for diabetes in pregnancy, however, she refused to check sugars.    The patient was counseled regarding the risks of diabetes in pregnancy. These risks include but are not limited to an increased risk of , preeclampsia/gestational hypertension, fetal structural anomalies, macrosomia, prematurity, shoulder dystocia/birth injury,  hyperbilirubinemia and electrolyte issues, pulmonary immaturity and sudden stillbirth especially in those patients with poor glucose control. I also discussed with the patient the need for increased fetal surveillance with serial fetal growth assessments and third trimester fetal testing. I also reviewed the possibility of need for early delivery in those with poor glucose control or evidence of fetal growth abnormalities.  She understands the correlation between glycemic control in pregnancy outcome.    23- She recently failed 3hr OGTT (she has been diagnosed with gestational diabetes). We have discussed this diagnosis with her today and recommend she check her sugars four times daily and submit a log to our office weekly for review. She states she has all supplies at home and does not need refills for lancets/strips. We have encouraged compliance.     23- She has not submitted her log since her last appointment. We reviewed her last visit at [facility] and noted her verbal report of BS checks. Metformin 500mg BID restarted. She presents with a sugar log today. She is checking her sugars three times daily  (fasting and 2h PP lunch and dinner). Fasting values have improved since starting Metformin. However, all postprandial values remain out of range.     : Added insulin to regimen- levemir 10 BID    - Metformin+insulin combo resulted in significant diarrhea and she has stopped metformin. She is taking levemir 10 BID. BG log shows elevated postprandials and a couple of elevated fastings. Dosing increased to levelmir 14uAM and 12uqHS today. Sent pen needles.     Recommendations:  Baseline evaluation (to be ordered by primary OB provider):   24 hour urine protein or urine P/C ratio, CBC, CMP (too low to calculate, serum cr 0.72, LFTs normal, )   Maternal EKG; echocardiogram if BMI > 30 or EKG is abnormal   Maternal ophthalmic exam (if hasn't been performed in last year) and podiatry exam   Hemoglobin A1C every trimester   Diabetic education referral and counseling re: goal blood sugars (< 95 mg/dl for fasting, < 120 mg/dl for 2 hour postprandial)  Medications:    Start low dose aspirin 81 mg daily at 12-16 weeks for preeclampsia risk reduction (refuses)   1 mg folic acid daily   Levemir 14u AM/ 12u HS (increased today)  Ultrasounds with M:   Targeted anatomy survey at 20 weeks (completed)   Serial growth ultrasounds q 4-6 weeks at 26-28 weeks    A fetal echocardiogram is recommended at 22-24 weeks with pediatric cardiology (completed)    Initiate  surveillance at 32 weeks:    Weekly BPP or NST + AFV if good control.    If control is poor, twice weekly  fetal surveillance is recommended with BPP alternating with NST.   Delivery timing:   38 0/7 to 38 and 6/7 weeks if under good control without comorbidities   37 0/7 to 37 and 67 weeks if longstanding diabetes or poorly controlled, polyhydramnios, EFW>90th percentile, or BMI >= 40   36 0/7 to 37 and 6/7 weeks if vascular complications, prior stillbirth or other complicating conditions.  Recommend consideration of earlier  delivery if IUGR, HTN, or other complications  Recommend offering  for delivery is EFW is 4500g or more near the time of delivery    The patient was advised to call for blood sugars less than 70 or greater than 200 prior to her next appointment. She was also advised that if she notes nausea/vomiting, inability to tolerate PO, or concerns about being able to take her insulin that she should contact her provider or present to the OB ED.

## 2023-09-25 NOTE — ASSESSMENT & PLAN NOTE
She reports a history of anxiety, bipolar, PTSD, and ADD. She is not taking medication at this time. She reports stable symptoms. Discussed increased risk for peripartum and postpartum mood disorders. Precautions provided.        We have discussed the importance of optimization of mental health conditions during pregnancy in an effort to reduce the risk of postpartum mood disorders. We have discussed options for management including psychotherapy, counseling, cognitive behavioral therapy, exercise/yoga, journaling, and psychiatry services. She will notify our office if she is interested in being referred for any of the above.    She had an appt scheduled with [facility], however, did not keep it. We have encouraged her to contact [place] to reschedule.

## 2023-09-26 DIAGNOSIS — O26.899 INSULIN RESISTANCE COMPLICATING PREGNANCY: Primary | ICD-10-CM

## 2023-09-26 DIAGNOSIS — O99.343 MENTAL DISORDER AFFECTING PREGNANCY IN THIRD TRIMESTER: ICD-10-CM

## 2023-09-26 DIAGNOSIS — E88.819 INSULIN RESISTANCE COMPLICATING PREGNANCY: Primary | ICD-10-CM

## 2023-09-28 ENCOUNTER — OFFICE VISIT (OUTPATIENT)
Dept: FAMILY MEDICINE | Facility: CLINIC | Age: 23
End: 2023-09-28
Payer: MEDICAID

## 2023-09-28 VITALS
SYSTOLIC BLOOD PRESSURE: 117 MMHG | TEMPERATURE: 99 F | HEIGHT: 70 IN | BODY MASS INDEX: 41.95 KG/M2 | DIASTOLIC BLOOD PRESSURE: 78 MMHG | WEIGHT: 293 LBS | OXYGEN SATURATION: 99 % | HEART RATE: 106 BPM | RESPIRATION RATE: 20 BRPM

## 2023-09-28 DIAGNOSIS — O99.343 MENTAL DISORDER AFFECTING PREGNANCY IN THIRD TRIMESTER: Primary | ICD-10-CM

## 2023-09-28 DIAGNOSIS — O24.419 GESTATIONAL DIABETES MELLITUS (GDM) IN THIRD TRIMESTER, GESTATIONAL DIABETES METHOD OF CONTROL UNSPECIFIED: ICD-10-CM

## 2023-09-28 DIAGNOSIS — F31.81 BIPOLAR II DISORDER, MOST RECENT EPISODE MAJOR DEPRESSIVE: ICD-10-CM

## 2023-09-28 DIAGNOSIS — E66.01 CLASS 3 SEVERE OBESITY WITHOUT SERIOUS COMORBIDITY WITH BODY MASS INDEX (BMI) OF 40.0 TO 44.9 IN ADULT, UNSPECIFIED OBESITY TYPE: ICD-10-CM

## 2023-09-28 DIAGNOSIS — Z87.59 HISTORY OF POSTPARTUM HEMORRHAGE: ICD-10-CM

## 2023-09-28 DIAGNOSIS — Z3A.35 PREGNANCY WITH 35 COMPLETED WEEKS GESTATION: ICD-10-CM

## 2023-09-28 LAB
BILIRUB SERPL-MCNC: NORMAL MG/DL
BLOOD URINE, POC: NORMAL
C TRACH DNA SPEC QL NAA+PROBE: NOT DETECTED
CLARITY, POC UA: CLEAR
COLOR, POC UA: YELLOW
GLUCOSE UR QL STRIP: NORMAL
HIV 1+2 AB+HIV1 P24 AG SERPL QL IA: NONREACTIVE
KETONES UR QL STRIP: NORMAL
LEUKOCYTE ESTERASE URINE, POC: NORMAL
N GONORRHOEA DNA SPEC QL NAA+PROBE: NOT DETECTED
NITRITE, POC UA: NORMAL
PH, POC UA: 6.5
PROTEIN, POC: NORMAL
SOURCE (OHS): NORMAL
SPECIFIC GRAVITY, POC UA: 1.02
T PALLIDUM AB SER QL: NONREACTIVE
UROBILINOGEN, POC UA: 1

## 2023-09-28 PROCEDURE — 36415 COLL VENOUS BLD VENIPUNCTURE: CPT

## 2023-09-28 PROCEDURE — 87081 CULTURE SCREEN ONLY: CPT

## 2023-09-28 PROCEDURE — 87491 CHLMYD TRACH DNA AMP PROBE: CPT

## 2023-09-28 PROCEDURE — 81002 URINALYSIS NONAUTO W/O SCOPE: CPT | Mod: PBBFAC

## 2023-09-28 PROCEDURE — 99213 OFFICE O/P EST LOW 20 MIN: CPT | Mod: PBBFAC

## 2023-09-28 NOTE — PROGRESS NOTES
Subjective:      Patient ID: Valente Wilson is a 23 y.o. female.    Chief Complaint:  Routine Prenatal Visit (HROB 35w4d. C/O indigestion/vomiting.)      History of Present Illness  Valente Wilson is a 23 y.o.  at 35w4d with JULIA 10/29/2023, by Last Menstrual Period consistent with 1st trimester US presenting for routine OB visit.     Current issues: None. Reports insulin compliance.Did not bring logs; sends to [facility]. Good fetal movement     Chronic issues:   Gestational DM on Levemir 14AM and 12HS  PCOS  PTSD/KRISTINA/ADD/bipolar (not currently on meds, previously on Latuda) Plans to follow up with [facility] outpatient  Anemia  H/o PP hemorrhage (blood transfusion after both prior deliveries; atony suspected)      GYN & OB History  Patient's last menstrual period was 2023 (exact date).   Date of Last Pap: 2022    OB History    Para Term  AB Living   4 2 2 0 1 2   SAB IAB Ectopic Multiple Live Births   0 0 1 0 2      # Outcome Date GA Lbr Maykel/2nd Weight Sex Delivery Anes PTL Lv   4 Current            3 Term 10/18/22 37w6d / 00:16 3.05 kg (6 lb 11.6 oz) F Vag-Spont EPI N FLACO   2 Ectopic 18 7w0d    ECTOPIC   FD   1 Term 06/29/15 40w0d  4.111 kg (9 lb 1 oz) M Vag-Spont EPI N FLACO       Review of Systems  - Fetal movements: Yes   - Vaginal bleeding: No  - Vaginal discharge: No  - Loss of fluid: No  - Contractions: No  - Headaches: No  - Vision changes: No  - Edema: trace     Objective:     Physical Exam   Vitals:    23 1310   BP: 117/78   Pulse: 106   Resp: 20   Temp: 98.6 °F (37 °C)   92 on exam  General: alert, NAD  RESP:  non labored  CV: no edema  ABD: gravid, nontender, BS+   NST: reactive NST , 145 baseline      Initial OB Labs:   - Blood Type and Rh: A+  - Antibody Screen: negative  - CBC H/H: 10.0/32.2  - HIV: NR  - Syphilis Ab: NR  - GC: negative  - CT: negative  - HBsAg: NR  - HCVAb: NR  - Rubella: non immune  - Varicella: non immune  - UA & Culture: no growth  -  Sickle Cell Screen: negative  - PAP: NIL    15-20 Weeks:   - Quad Screen: normal risk     28 Week Lab:   - 1H GTT: Failed 1 hour and 3 hour  - Rhogam: Not indicated  - Date of Tdap: 2023  - CBC H/H: 9.932  - RPR: Nonreactive    36 Week Lab:   - CBC H/H: -  - RPR: -  - GBS Culture: -  - HIV: -  - Cervical GC: -    1. 3. PTSD/bipolar/anxiety/ADD affecting pregnancy in third trimester    2. Bipolar II disorder, most recent episode major depressive    3. Class 3 severe obesity without serious comorbidity with body mass index (BMI) of 40.0 to 44.9 in adult, unspecified obesity type    4. 4. History of postpartum hemorrhage    5. Gestational diabetes mellitus (GDM) in third trimester, gestational diabetes method of control unspecified          Plan:   -    OB Protocol  -     PNVs and ASA  -     Urine dip reviewed   -     Labs reviewed  -     Mother plans to breast and/or bottlefeed  -     Postpartum contraception discussion: Depo Provea  -     Continue Glucose logs, Continue Levemir  -     ED precautions discussed in depth: vaginal bleeding or leaking fluid, belly cramping or pain, SOB/chest pain, swelling of the face/lower extremities, vision changes. If don't feel the baby move in over an hour. Severe headache that are not resolved with medication  -     Weekly NST  -     Per MFM, delivery timin 0/7 to 37 and 67 weeks               Induction scheduled 10/9/23 at 9pm (37w1d)

## 2023-09-28 NOTE — PROGRESS NOTES
" FETAL ASSESSMENT REPORT    RE: Valente Wilson  MRN:  [id number]  :  2000  AGE:  23 y.o.    Date:  2023    REFERRAL PHYSICIAN: [facility]    Allergies: Fentanyl (bulk) and Oxycodone-acetaminophen    Valente is a 23 y.o.  at 35w4d gestational age here today for a NST.    10/29/2023, by Last Menstrual Period    MEDICATIONS AT TIME OF TEST:    Current Outpatient Medications   Medication Sig Dispense Refill    aspirin (ECOTRIN) 81 MG EC tablet Take 1 tablet (81 mg total) by mouth once daily. (Patient not taking: Reported on 2023) 90 tablet 3    ferrous gluconate 324 mg (37.5 mg iron) Tab tablet Take 1 tablet (324 mg total) by mouth every other day. 90 tablet 1    insulin detemir U-100, Levemir, (LEVEMIR FLEXPEN) 100 unit/mL (3 mL) InPn pen Inject 10 Units into the skin 2 (two) times daily. 3 mL 1    lansoprazole (PREVACID) 15 MG capsule Take 1 capsule (15 mg total) by mouth once daily. 30 capsule 1    M- PLUS 27 mg iron- 1 mg Tab Take 1 tablet (1 each total) by mouth once daily. 30 tablet 0    pen needle, diabetic (PEN NEEDLE) 31 gauge x 5/16" Ndle 1 application  by Misc.(Non-Drug; Combo Route) route 2 (two) times daily. Use pen needle to inject insulin every night at bedtime 50 each 1     No current facility-administered medications for this visit.       Indication: diabetes mellitus    Interpretation:  145 BPM baseline    Variability:  Good {> 6 bpm)    Accelerations:  Reactive    Decelerations:  none    Assessment: reactive    Plan:  NST scheduled, NST reactive      Jan Boyer MD  2023; 1:32 PM         "

## 2023-09-29 ENCOUNTER — TELEPHONE (OUTPATIENT)
Dept: OBGYN | Facility: CLINIC | Age: 23
End: 2023-09-29

## 2023-09-29 RX ORDER — FERROUS GLUCONATE 324(37.5)
324 TABLET ORAL 2 TIMES DAILY
Qty: 90 TABLET | Refills: 1 | Status: SHIPPED | OUTPATIENT
Start: 2023-09-29 | End: 2023-10-02

## 2023-09-30 LAB — BACTERIA SPEC CULT: NORMAL

## 2023-10-02 ENCOUNTER — PROCEDURE VISIT (OUTPATIENT)
Dept: MATERNAL FETAL MEDICINE | Facility: CLINIC | Age: 23
End: 2023-10-02
Payer: MEDICAID

## 2023-10-02 ENCOUNTER — OFFICE VISIT (OUTPATIENT)
Dept: MATERNAL FETAL MEDICINE | Facility: CLINIC | Age: 23
End: 2023-10-02
Payer: MEDICAID

## 2023-10-02 VITALS
DIASTOLIC BLOOD PRESSURE: 56 MMHG | WEIGHT: 293 LBS | HEART RATE: 101 BPM | SYSTOLIC BLOOD PRESSURE: 116 MMHG | BODY MASS INDEX: 43.38 KG/M2

## 2023-10-02 DIAGNOSIS — O26.899 INSULIN RESISTANCE COMPLICATING PREGNANCY: ICD-10-CM

## 2023-10-02 DIAGNOSIS — E66.01 SEVERE OBESITY DUE TO EXCESS CALORIES AFFECTING PREGNANCY IN THIRD TRIMESTER: ICD-10-CM

## 2023-10-02 DIAGNOSIS — O99.343 MENTAL DISORDER AFFECTING PREGNANCY IN THIRD TRIMESTER: ICD-10-CM

## 2023-10-02 DIAGNOSIS — O99.213 SEVERE OBESITY DUE TO EXCESS CALORIES AFFECTING PREGNANCY IN THIRD TRIMESTER: ICD-10-CM

## 2023-10-02 DIAGNOSIS — Z87.59 HISTORY OF POSTPARTUM HEMORRHAGE: ICD-10-CM

## 2023-10-02 DIAGNOSIS — E88.819 INSULIN RESISTANCE COMPLICATING PREGNANCY: ICD-10-CM

## 2023-10-02 PROCEDURE — 99214 OFFICE O/P EST MOD 30 MIN: CPT | Mod: 25,TH,S$GLB, | Performed by: OBSTETRICS & GYNECOLOGY

## 2023-10-02 PROCEDURE — 1160F RVW MEDS BY RX/DR IN RCRD: CPT | Mod: CPTII,S$GLB,, | Performed by: OBSTETRICS & GYNECOLOGY

## 2023-10-02 PROCEDURE — 3008F BODY MASS INDEX DOCD: CPT | Mod: CPTII,S$GLB,, | Performed by: OBSTETRICS & GYNECOLOGY

## 2023-10-02 PROCEDURE — 3044F HG A1C LEVEL LT 7.0%: CPT | Mod: CPTII,S$GLB,, | Performed by: OBSTETRICS & GYNECOLOGY

## 2023-10-02 PROCEDURE — 3074F PR MOST RECENT SYSTOLIC BLOOD PRESSURE < 130 MM HG: ICD-10-PCS | Mod: CPTII,S$GLB,, | Performed by: OBSTETRICS & GYNECOLOGY

## 2023-10-02 PROCEDURE — 3078F DIAST BP <80 MM HG: CPT | Mod: CPTII,S$GLB,, | Performed by: OBSTETRICS & GYNECOLOGY

## 2023-10-02 PROCEDURE — 76816 OB US FOLLOW-UP PER FETUS: CPT | Mod: S$GLB,,, | Performed by: OBSTETRICS & GYNECOLOGY

## 2023-10-02 PROCEDURE — 3078F PR MOST RECENT DIASTOLIC BLOOD PRESSURE < 80 MM HG: ICD-10-PCS | Mod: CPTII,S$GLB,, | Performed by: OBSTETRICS & GYNECOLOGY

## 2023-10-02 PROCEDURE — 1159F PR MEDICATION LIST DOCUMENTED IN MEDICAL RECORD: ICD-10-PCS | Mod: CPTII,S$GLB,, | Performed by: OBSTETRICS & GYNECOLOGY

## 2023-10-02 PROCEDURE — 76816 PR  US,PREGNANT UTERUS,F/U,TRANSABD APP: ICD-10-PCS | Mod: S$GLB,,, | Performed by: OBSTETRICS & GYNECOLOGY

## 2023-10-02 PROCEDURE — 76819 FETAL BIOPHYS PROFIL W/O NST: CPT | Mod: S$GLB,,, | Performed by: OBSTETRICS & GYNECOLOGY

## 2023-10-02 PROCEDURE — 1159F MED LIST DOCD IN RCRD: CPT | Mod: CPTII,S$GLB,, | Performed by: OBSTETRICS & GYNECOLOGY

## 2023-10-02 PROCEDURE — 76819 PR US, OB, FETAL BIOPHYSICAL, W/O NST: ICD-10-PCS | Mod: S$GLB,,, | Performed by: OBSTETRICS & GYNECOLOGY

## 2023-10-02 PROCEDURE — 3044F PR MOST RECENT HEMOGLOBIN A1C LEVEL <7.0%: ICD-10-PCS | Mod: CPTII,S$GLB,, | Performed by: OBSTETRICS & GYNECOLOGY

## 2023-10-02 PROCEDURE — 3008F PR BODY MASS INDEX (BMI) DOCUMENTED: ICD-10-PCS | Mod: CPTII,S$GLB,, | Performed by: OBSTETRICS & GYNECOLOGY

## 2023-10-02 PROCEDURE — 99214 PR OFFICE/OUTPT VISIT, EST, LEVL IV, 30-39 MIN: ICD-10-PCS | Mod: 25,TH,S$GLB, | Performed by: OBSTETRICS & GYNECOLOGY

## 2023-10-02 PROCEDURE — 1160F PR REVIEW ALL MEDS BY PRESCRIBER/CLIN PHARMACIST DOCUMENTED: ICD-10-PCS | Mod: CPTII,S$GLB,, | Performed by: OBSTETRICS & GYNECOLOGY

## 2023-10-02 PROCEDURE — 3074F SYST BP LT 130 MM HG: CPT | Mod: CPTII,S$GLB,, | Performed by: OBSTETRICS & GYNECOLOGY

## 2023-10-02 NOTE — PROGRESS NOTES
"Maternal Fetal Medicine follow up consult    SUBJECTIVE:     Valente Wilson is a 23 y.o.  female with IUP at 36w1d who is seen in follow up consultation by MFM.  Pregnancy complications include:   Problem   1. Insulin resistance complicating pregnancy   2. BMI affecting pregnancy in third trimester   4. History of postpartum hemorrhage   3. PTSD/bipolar/anxiety/ADD affecting pregnancy in third trimester     Valente reports compliance with insulin regimen. She presents with a sugar log today. Her fastings look good and her postprandials are slightly elevated but not dramatically. I increased her insulin today. She has IOL scheduled 10/9! No LOF, VB, ctx. +FM.     Previous notes reviewed.   No changes to medical, surgical, family, social, or obstetric history.    Interval history since last M visit: see above    Medications:  Current Outpatient Medications   Medication Instructions    aspirin (ECOTRIN) 81 mg, Oral, Daily    ferrous gluconate 324 mg, Oral, 2 times daily    lansoprazole (PREVACID) 15 mg, Oral, Daily    LEVEMIR FLEXPEN 10 Units, Subcutaneous, 2 times daily    M- PLUS 27 mg iron- 1 mg Tab 1 each, Oral, Daily    pen needle, diabetic (PEN NEEDLE) 31 gauge x 5/16" Ndle 1 application , Misc.(Non-Drug; Combo Route), 2 times daily, Use pen needle to inject insulin every night at bedtime       Care team members:  [facility] - Primary OB     OBJECTIVE:   BP (!) 116/56 (BP Location: Right arm)   Pulse 101   Wt (!) 137.1 kg (302 lb 5.8 oz)   LMP 2023 (Exact Date)   BMI 43.38 kg/m²     Ultrasound performed. See viewpoint for full ultrasound report.    A viable oliva pregnancy is visualized in cephalic presentation.  Estimated fetal weight is at the 26th percentile with an abdominal circumference at the 60th percentile.    No fetal abnormalities are noted and previous anatomic survey was normal. Amniotic fluid volume is normal.  Placenta is posterior. Biophysical profile Is 8/8. "             ASSESSMENT/PLAN:     23 y.o.  female with IUP at 36w1d    1. Insulin resistance complicating pregnancy  She has a history of polycystic ovarian syndrome, pre-diabetes, is obese, and has a history of macrosomia in her 1st pregnancy.  In her  pregnancy, she failed early glucose testing and was on Metformin during that pregnancy.  She was non-compliant and did not submit sugar logs for review.  With her current pregnancy, baseline A1c was 6.3.  Early glucose testing results- 128.  Her primary OB encouraged 4 times daily sugar checks due to her history an increased risk for diabetes in pregnancy, however, she refused to check sugars.    The patient was counseled regarding the risks of diabetes in pregnancy. These risks include but are not limited to an increased risk of , preeclampsia/gestational hypertension, fetal structural anomalies, macrosomia, prematurity, shoulder dystocia/birth injury,  hyperbilirubinemia and electrolyte issues, pulmonary immaturity and sudden stillbirth especially in those patients with poor glucose control. I also discussed with the patient the need for increased fetal surveillance with serial fetal growth assessments and third trimester fetal testing. I also reviewed the possibility of need for early delivery in those with poor glucose control or evidence of fetal growth abnormalities.  She understands the correlation between glycemic control in pregnancy outcome.    23- She recently failed 3hr OGTT (she has been diagnosed with gestational diabetes). We have discussed this diagnosis with her today and recommend she check her sugars four times daily and submit a log to our office weekly for review. She states she has all supplies at home and does not need refills for lancets/strips. We have encouraged compliance.     23- She has not submitted her log since her last appointment. We reviewed her last visit at [facility] and noted her verbal report of LAUREL  checks. Metformin 500mg BID restarted. She presents with a sugar log today. She is checking her sugars three times daily (fasting and 2h PP lunch and dinner). Fasting values have improved since starting Metformin. However, all postprandial values remain out of range.     : Added insulin to regimen- levemir 10 BID    - Metformin+insulin combo resulted in significant diarrhea and she has stopped metformin. She is taking levemir 10 BID. BG log shows elevated postprandials and a couple of elevated fastings. Dosing increased to levelmir 14uAM and 12uqHS today. Sent pen needles.    10/2- She is currently taking Levemir 14u QAM and 12u QHS. She presents with a sugar log today. AM dose increased to 18u.      Recommendations:  Baseline evaluation (to be ordered by primary OB provider):  24 hour urine protein or urine P/C ratio, CBC, CMP (too low to calculate, serum cr 0.72, LFTs normal, )  Maternal EKG; echocardiogram if BMI > 30 or EKG is abnormal  Maternal ophthalmic exam (if hasn't been performed in last year) and podiatry exam  Hemoglobin A1C every trimester  Diabetic education referral and counseling re: goal blood sugars (< 95 mg/dl for fasting, < 120 mg/dl for 2 hour postprandial)  Medications:   Start low dose aspirin 81 mg daily at 12-16 weeks for preeclampsia risk reduction (refuses)  1 mg folic acid daily  Levemir 18u AM/ 12u HS  Ultrasounds with MFM:  Targeted anatomy survey at 20 weeks (completed)  Serial growth ultrasounds q 4-6 weeks at 26-28 weeks    A fetal echocardiogram is recommended at 22-24 weeks with pediatric cardiology (completed)    Initiate  surveillance at 32 weeks:   Weekly BPP or NST + AFV if good control.   If control is poor, twice weekly  fetal surveillance is recommended with BPP alternating with NST.   Delivery timin 0/7 to 38 and 6/7 weeks if under good control without comorbidities  37 0/7 to 37 and 67 weeks if longstanding diabetes or poorly  controlled, polyhydramnios, EFW>90th percentile, or BMI >= 40  36 0/7 to 37 and 6/7 weeks if vascular complications, prior stillbirth or other complicating conditions.  Recommend consideration of earlier delivery if IUGR, HTN, or other complications  Recommend offering  for delivery is EFW is 4500g or more near the time of delivery    The patient was advised to call for blood sugars less than 70 or greater than 200 prior to her next appointment. She was also advised that if she notes nausea/vomiting, inability to tolerate PO, or concerns about being able to take her insulin that she should contact her provider or present to the OB ED.          2. BMI affecting pregnancy in third trimester  Please see prior documentation for specific counseling.      BMI 43    Recommendations:  Discussed that TWG goal is 11-20 lbs  Screen for signs/symptoms of obstructive sleep apnea  Nutritionist consult offered (this is to be ordered by primary OB provider)  Consider early 1 hr GCT (1hr-128; A1C 6.3); repeat at 24-28 weeks gestation (failed routine testing)  Start low dose aspirin 81 mg daily at 12-16 weeks for preeclampsia risk reduction (pt refuses)  Targeted anatomical survey scheduled at 18-20 weeks (completed)  Fetal growth ultrasound at 32 weeks if BMI >= 40  Weekly  testing at 32 weeks if pre-pregnancy BMI >= 45  Lovenox 40 mg BID for VTE prophylaxis while admitted to the hospital (antepartum or postpartum) if BMI >= 40.   Encouraged breastfeeding  Postpartum lifestyle modifications & weight loss      3. PTSD/bipolar/anxiety/ADD affecting pregnancy in third trimester  She reports a history of anxiety, bipolar, PTSD, and ADD. She is not taking medication at this time. She reports stable symptoms. Discussed increased risk for peripartum and postpartum mood disorders. Precautions provided.        We have discussed the importance of optimization of mental health conditions during pregnancy in an effort to reduce  the risk of postpartum mood disorders. We have discussed options for management including psychotherapy, counseling, cognitive behavioral therapy, exercise/yoga, journaling, and psychiatry services. She will notify our office if she is interested in being referred for any of the above.    She had an appt scheduled with [facility], however, did not keep it. We have encouraged her to contact [place] to reschedule.     4. History of postpartum hemorrhage  She reports blood transfusion after both of her deliveries. Atony is suspected cause of bleeding.     Active management of third stage of labor recommended.   Check CBC each trimester in order to optimize pre-delivery levels    No further appts with MFM have been made as she will be induced in 1 week.     Juanis Kimbrough MD  Maternal Fetal Medicine

## 2023-10-02 NOTE — ASSESSMENT & PLAN NOTE
She has a history of polycystic ovarian syndrome, pre-diabetes, is obese, and has a history of macrosomia in her 1st pregnancy.  In her  pregnancy, she failed early glucose testing and was on Metformin during that pregnancy.  She was non-compliant and did not submit sugar logs for review.  With her current pregnancy, baseline A1c was 6.3.  Early glucose testing results- 128.  Her primary OB encouraged 4 times daily sugar checks due to her history an increased risk for diabetes in pregnancy, however, she refused to check sugars.    The patient was counseled regarding the risks of diabetes in pregnancy. These risks include but are not limited to an increased risk of , preeclampsia/gestational hypertension, fetal structural anomalies, macrosomia, prematurity, shoulder dystocia/birth injury,  hyperbilirubinemia and electrolyte issues, pulmonary immaturity and sudden stillbirth especially in those patients with poor glucose control. I also discussed with the patient the need for increased fetal surveillance with serial fetal growth assessments and third trimester fetal testing. I also reviewed the possibility of need for early delivery in those with poor glucose control or evidence of fetal growth abnormalities.  She understands the correlation between glycemic control in pregnancy outcome.    23- She recently failed 3hr OGTT (she has been diagnosed with gestational diabetes). We have discussed this diagnosis with her today and recommend she check her sugars four times daily and submit a log to our office weekly for review. She states she has all supplies at home and does not need refills for lancets/strips. We have encouraged compliance.     23- She has not submitted her log since her last appointment. We reviewed her last visit at [facility] and noted her verbal report of BS checks. Metformin 500mg BID restarted. She presents with a sugar log today. She is checking her sugars three times daily  (fasting and 2h PP lunch and dinner). Fasting values have improved since starting Metformin. However, all postprandial values remain out of range.     : Added insulin to regimen- levemir 10 BID    - Metformin+insulin combo resulted in significant diarrhea and she has stopped metformin. She is taking levemir 10 BID. BG log shows elevated postprandials and a couple of elevated fastings. Dosing increased to levelmir 14uAM and 12uqHS today. Sent pen needles.    10/2- She is currently taking Levemir 14u QAM and 12u QHS. She presents with a sugar log today. AM dose increased to 18u.      Recommendations:  Baseline evaluation (to be ordered by primary OB provider):   24 hour urine protein or urine P/C ratio, CBC, CMP (too low to calculate, serum cr 0.72, LFTs normal, )   Maternal EKG; echocardiogram if BMI > 30 or EKG is abnormal   Maternal ophthalmic exam (if hasn't been performed in last year) and podiatry exam   Hemoglobin A1C every trimester   Diabetic education referral and counseling re: goal blood sugars (< 95 mg/dl for fasting, < 120 mg/dl for 2 hour postprandial)  Medications:    Start low dose aspirin 81 mg daily at 12-16 weeks for preeclampsia risk reduction (refuses)   1 mg folic acid daily   Levemir 18u AM/ 12u HS  Ultrasounds with M:   Targeted anatomy survey at 20 weeks (completed)   Serial growth ultrasounds q 4-6 weeks at 26-28 weeks    A fetal echocardiogram is recommended at 22-24 weeks with pediatric cardiology (completed)    Initiate  surveillance at 32 weeks:    Weekly BPP or NST + AFV if good control.    If control is poor, twice weekly  fetal surveillance is recommended with BPP alternating with NST.   Delivery timing:   38 0/7 to 38 and 6/7 weeks if under good control without comorbidities   37 0/7 to 37 and 67 weeks if longstanding diabetes or poorly controlled, polyhydramnios, EFW>90th percentile, or BMI >= 40   36 0/7 to 37 and 6/7 weeks if  vascular complications, prior stillbirth or other complicating conditions.  Recommend consideration of earlier delivery if IUGR, HTN, or other complications  Recommend offering  for delivery is EFW is 4500g or more near the time of delivery    The patient was advised to call for blood sugars less than 70 or greater than 200 prior to her next appointment. She was also advised that if she notes nausea/vomiting, inability to tolerate PO, or concerns about being able to take her insulin that she should contact her provider or present to the OB ED.

## 2023-10-02 NOTE — ASSESSMENT & PLAN NOTE
Please see prior documentation for specific counseling.      BMI 43    Recommendations:   Discussed that TWG goal is 11-20 lbs   Screen for signs/symptoms of obstructive sleep apnea   Nutritionist consult offered (this is to be ordered by primary OB provider)   Consider early 1 hr GCT (1hr-128; A1C 6.3); repeat at 24-28 weeks gestation (failed routine testing)   Start low dose aspirin 81 mg daily at 12-16 weeks for preeclampsia risk reduction (pt refuses)   Targeted anatomical survey scheduled at 18-20 weeks (completed)   Fetal growth ultrasound at 32 weeks if BMI >= 40   Weekly  testing at 32 weeks if pre-pregnancy BMI >= 45   Lovenox 40 mg BID for VTE prophylaxis while admitted to the hospital (antepartum or postpartum) if BMI >= 40.    Encouraged breastfeeding   Postpartum lifestyle modifications & weight loss

## 2023-10-02 NOTE — ASSESSMENT & PLAN NOTE
She reports a history of anxiety, bipolar, PTSD, and ADD. She is not taking medication at this time. She reports stable symptoms. Discussed increased risk for peripartum and postpartum mood disorders. Precautions provided.        We have discussed the importance of optimization of mental health conditions during pregnancy in an effort to reduce the risk of postpartum mood disorders. We have discussed options for management including psychotherapy, counseling, cognitive behavioral therapy, exercise/yoga, journaling, and psychiatry services. She will notify our office if she is interested in being referred for any of the above.    She had an appt scheduled with [facility], however, did not keep it. We have encouraged her to contact [place] to reschedule.

## 2023-10-04 ENCOUNTER — PATIENT OUTREACH (OUTPATIENT)
Dept: FAMILY MEDICINE | Facility: CLINIC | Age: 23
End: 2023-10-04

## 2023-10-04 NOTE — PROGRESS NOTES
Follow-up:Yes    Appointment reminder given for  FM OB 10/5/23        Patient verbalized understanding. Yes        Other comments: Spoke with patient ,says she is doing good and checking her BS 3 x daily as ordered. She stated the MFM provider increased her AM dose if insulin. Denies any vaginal bleeding or abdominal pain Fetal movement. Mentioned she has issues with a crack tooth and had questions on the criteria regarding dental work and what medications she can take. Explained that the dentist will send  a pre-op form for the OB doctor to complete prior to any dental work being done with precautions while a patient is pregnant . Advised patient to discuss this issue with her OB doctor tomorrow at her clinic visit or she can wait to do her dental work after her baby is born. Induction is scheduled on Monday 10/09/23. Voices understanding.                      Education given on  ED Precautions

## 2023-10-05 ENCOUNTER — OFFICE VISIT (OUTPATIENT)
Dept: FAMILY MEDICINE | Facility: CLINIC | Age: 23
End: 2023-10-05
Payer: MEDICAID

## 2023-10-05 VITALS
TEMPERATURE: 99 F | SYSTOLIC BLOOD PRESSURE: 102 MMHG | HEIGHT: 70 IN | BODY MASS INDEX: 41.95 KG/M2 | WEIGHT: 293 LBS | RESPIRATION RATE: 18 BRPM | DIASTOLIC BLOOD PRESSURE: 68 MMHG | OXYGEN SATURATION: 98 % | HEART RATE: 101 BPM

## 2023-10-05 DIAGNOSIS — Z3A.36 36 WEEKS GESTATION OF PREGNANCY: Primary | ICD-10-CM

## 2023-10-05 DIAGNOSIS — O24.414 INSULIN CONTROLLED GESTATIONAL DIABETES MELLITUS (GDM) IN THIRD TRIMESTER: ICD-10-CM

## 2023-10-05 LAB
BILIRUB SERPL-MCNC: NORMAL MG/DL
BLOOD URINE, POC: NORMAL
CLARITY, POC UA: NORMAL
COLOR, POC UA: NORMAL
GLUCOSE UR QL STRIP: NORMAL
HIV 1+2 AB+HIV1 P24 AG SERPL QL IA: NONREACTIVE
KETONES UR QL STRIP: NORMAL
LEUKOCYTE ESTERASE URINE, POC: NORMAL
NITRITE, POC UA: NORMAL
PH, POC UA: 7
PROTEIN, POC: 100
SPECIFIC GRAVITY, POC UA: 1.02
T PALLIDUM AB SER QL: NONREACTIVE
UROBILINOGEN, POC UA: 2

## 2023-10-05 PROCEDURE — 87389 HIV-1 AG W/HIV-1&-2 AB AG IA: CPT

## 2023-10-05 PROCEDURE — 99213 OFFICE O/P EST LOW 20 MIN: CPT | Mod: PBBFAC

## 2023-10-05 PROCEDURE — 86780 TREPONEMA PALLIDUM: CPT

## 2023-10-05 PROCEDURE — 36415 COLL VENOUS BLD VENIPUNCTURE: CPT

## 2023-10-05 PROCEDURE — 81002 URINALYSIS NONAUTO W/O SCOPE: CPT | Mod: PBBFAC

## 2023-10-05 NOTE — PROGRESS NOTES
" FETAL ASSESSMENT REPORT    RE: Valente Wilson  MRN:  [id number]  :  2000  AGE:  23 y.o.    Date:  10/5/2023    REFERRAL PHYSICIAN: [facility]    Allergies: Fentanyl (bulk) and Oxycodone-acetaminophen    Valente is a 23 y.o.  at 36w4d gestational age here today for a NST.    10/29/2023, by Last Menstrual Period    MEDICATIONS AT TIME OF TEST:    Current Outpatient Medications   Medication Sig Dispense Refill    insulin detemir U-100, Levemir, (LEVEMIR FLEXPEN) 100 unit/mL (3 mL) InPn pen Inject 10 Units into the skin 2 (two) times daily. 3 mL 1    lansoprazole (PREVACID) 15 MG capsule Take 1 capsule (15 mg total) by mouth once daily. 30 capsule 1    pen needle, diabetic (PEN NEEDLE) 31 gauge x 5/16" Ndle 1 application  by Misc.(Non-Drug; Combo Route) route 2 (two) times daily. Use pen needle to inject insulin every night at bedtime 50 each 1     No current facility-administered medications for this visit.       Indication: gestational diabetes mellitus    Interpretation:  150 BPM baseline    Variability:  Good {> 6 bpm)    Accelerations:  Reactive    Decelerations:  none    Assessment: reactive    Plan:  NST scheduled, NST reactive      Jan Boyer MD  10/5/2023; 2:55 PM         "

## 2023-10-05 NOTE — PROGRESS NOTES
Subjective:      Patient ID: Valente Wilson is a 23 y.o. female.    Chief Complaint:  Routine Prenatal Visit (OB 36w4d-routine PNV/)      History of Present Illness  Valente Wilson is a 23 y.o.  at 36w4d with JULIA 10/29/2023, by Last Menstrual Period consistent with 1st trimester US presenting for routine OB visit.     Current issues: None. Reports insulin compliance.Did not bring logs; sends to [facility]. Good fetal movement     Chronic issues:   Gestational DM on Levemir 18AM and 12HS  PCOS  PTSD/KRISTINA/ADD/bipolar (not currently on meds, previously on Latuda) Plans to follow up with [place] outpatient  Anemia  H/o PP hemorrhage (blood transfusion after both prior deliveries; atony suspected)      GYN & OB History  Patient's last menstrual period was 2023 (exact date).   Date of Last Pap: 2022    OB History    Para Term  AB Living   4 2 2 0 1 2   SAB IAB Ectopic Multiple Live Births   0 0 1 0 2      # Outcome Date GA Lbr Maykel/2nd Weight Sex Delivery Anes PTL Lv   4 Current            3 Term 10/18/22 37w6d / 00:16 3.05 kg (6 lb 11.6 oz) F Vag-Spont EPI N FLACO   2 Ectopic 18 7w0d    ECTOPIC   FD   1 Term 06/29/15 40w0d  4.111 kg (9 lb 1 oz) M Vag-Spont EPI N FLACO       Review of Systems  - Fetal movements: Yes   - Vaginal bleeding: No  - Vaginal discharge: No  - Loss of fluid: No  - Contractions: No  - Headaches: No  - Vision changes: No  - Edema: trace     Objective:     Physical Exam   There were no vitals filed for this visit.  92 on exam  General: alert, NAD  RESP:  non labored  CV: no edema  ABD: gravid, nontender, BS+   NST: reactive NST , 150s  FH: 37 cm  : nonodorous, no skin changes/lesions/masses seen in vaginal region. 2 cm dilated fetal station -2      Initial OB Labs:   - Blood Type and Rh: A+  - Antibody Screen: negative  - CBC H/H: 10.0/32.2  - HIV: NR  - Syphilis Ab: NR  - GC: negative  - CT: negative  - HBsAg: NR  - HCVAb: NR  - Rubella: non immune  - Varicella:  non immune  - UA & Culture: no growth  - Sickle Cell Screen: negative  - PAP: NIL    15-20 Weeks:   - Quad Screen: normal risk     28 Week Lab:   - 1H GTT: Failed 1 hour and 3 hour  - Rhogam: Not indicated  - Date of Tdap: 2023  - CBC H/H: 9.9/32  - RPR: Nonreactive    36 Week Lab: ordered 10/05/23  - CBC H/H: 8.7/28.9  - RPR: pending  - GBS Culture: neg  - HIV: pending  - Cervical GC: neg    POCT URINE DIPSTICK WITHOUT MICROSCOPE  Order: [id number]  Status: Final result     Visible to patient: Yes (not seen)     Next appt: 10/09/2023 at 09:00 PM in Obstetrics and Gynecology ([place], [facility])     Dx: 36 weeks gestation of pregnancy     0 Result Notes      Component 14:00   Color, UA Gloria    pH, UA 7.0    WBC, UA trace    Nitrite, UA neg    Protein,     Glucose, UA neg    Ketones, UA neg    Urobilinogen, UA 2.0    Bilirubin, POC neg    Blood, UA trace    Clarity, UA Slightly Cloudy    Spec Grav UA 1.025               Specimen Collected: 10/05/23 14:00 Last Resulted: 10/05/23 14:00             1. 36 weeks gestation of pregnancy    2. Insulin controlled gestational diabetes mellitus (GDM) in third trimester          Plan:   -    OB Protocol  -     PNVs and ASA  -     Urine dip reviewed   -     Labs reviewed  -     Mother plans to breast and/or bottlefeed  -     Postpartum contraception discussion: Depo Provea  -     Continue Glucose logs, Continue Levemir  -     ED precautions discussed in depth: vaginal bleeding or leaking fluid, belly cramping or pain, SOB/chest pain, swelling of the face/lower extremities, vision changes. If don't feel the baby move in over an hour. Severe headache that are not resolved with medication  -     Weekly NST  -     Per MFM, delivery timin 0/7 to 37 and 67 weeks               Induction scheduled 10/9/23 at 9pm (37w1d); patient aware of date

## 2023-10-09 ENCOUNTER — HOSPITAL ENCOUNTER (INPATIENT)
Facility: HOSPITAL | Age: 23
LOS: 3 days | Discharge: HOME | End: 2023-10-12
Attending: OBSTETRICS & GYNECOLOGY | Admitting: OBSTETRICS & GYNECOLOGY
Payer: MEDICAID

## 2023-10-09 DIAGNOSIS — Z34.90 PREGNANCY, UNSPECIFIED GESTATIONAL AGE: ICD-10-CM

## 2023-10-09 DIAGNOSIS — O24.419 GESTATIONAL DIABETES: ICD-10-CM

## 2023-10-09 DIAGNOSIS — Z34.90 PREGNANCY, UNSPECIFIED GESTATIONAL AGE: Primary | ICD-10-CM

## 2023-10-09 LAB
BASOPHILS # BLD AUTO: 0.03 X10(3)/MCL
BASOPHILS NFR BLD AUTO: 0.3 %
EOSINOPHIL # BLD AUTO: 0.34 X10(3)/MCL (ref 0–0.9)
EOSINOPHIL NFR BLD AUTO: 3.2 %
ERYTHROCYTE [DISTWIDTH] IN BLOOD BY AUTOMATED COUNT: 16.4 % (ref 11.5–17)
GROUP & RH: NORMAL
HCT VFR BLD AUTO: 30.3 % (ref 37–47)
HGB BLD-MCNC: 8.9 G/DL (ref 12–16)
IMM GRANULOCYTES # BLD AUTO: 0.11 X10(3)/MCL (ref 0–0.04)
IMM GRANULOCYTES NFR BLD AUTO: 1 %
INDIRECT COOMBS GEL: NORMAL
LYMPHOCYTES # BLD AUTO: 1.81 X10(3)/MCL (ref 0.6–4.6)
LYMPHOCYTES NFR BLD AUTO: 16.8 %
MCH RBC QN AUTO: 22.7 PG (ref 27–31)
MCHC RBC AUTO-ENTMCNC: 29.4 G/DL (ref 33–36)
MCV RBC AUTO: 77.3 FL (ref 80–94)
MONOCYTES # BLD AUTO: 0.65 X10(3)/MCL (ref 0.1–1.3)
MONOCYTES NFR BLD AUTO: 6 %
NEUTROPHILS # BLD AUTO: 7.82 X10(3)/MCL (ref 2.1–9.2)
NEUTROPHILS NFR BLD AUTO: 72.7 %
NRBC BLD AUTO-RTO: 0 %
PLATELET # BLD AUTO: 304 X10(3)/MCL (ref 130–400)
PMV BLD AUTO: 11.1 FL (ref 7.4–10.4)
POCT GLUCOSE: 123 MG/DL (ref 70–110)
RBC # BLD AUTO: 3.92 X10(6)/MCL (ref 4.2–5.4)
SPECIMEN OUTDATE: NORMAL
T PALLIDUM AB SER QL: NONREACTIVE
WBC # SPEC AUTO: 10.76 X10(3)/MCL (ref 4.5–11.5)

## 2023-10-09 PROCEDURE — 85025 COMPLETE CBC W/AUTO DIFF WBC: CPT

## 2023-10-09 PROCEDURE — 86850 RBC ANTIBODY SCREEN: CPT

## 2023-10-09 PROCEDURE — 11000001 HC ACUTE MED/SURG PRIVATE ROOM

## 2023-10-09 PROCEDURE — 25000003 PHARM REV CODE 250: Performed by: OBSTETRICS & GYNECOLOGY

## 2023-10-09 PROCEDURE — 63600175 PHARM REV CODE 636 W HCPCS

## 2023-10-09 PROCEDURE — 86780 TREPONEMA PALLIDUM: CPT

## 2023-10-09 PROCEDURE — 86923 COMPATIBILITY TEST ELECTRIC: CPT | Performed by: OBSTETRICS & GYNECOLOGY

## 2023-10-09 RX ORDER — SODIUM CHLORIDE, SODIUM LACTATE, POTASSIUM CHLORIDE, CALCIUM CHLORIDE 600; 310; 30; 20 MG/100ML; MG/100ML; MG/100ML; MG/100ML
INJECTION, SOLUTION INTRAVENOUS CONTINUOUS
Status: DISCONTINUED | OUTPATIENT
Start: 2023-10-09 | End: 2023-10-12 | Stop reason: HOSPADM

## 2023-10-09 RX ORDER — METHYLERGONOVINE MALEATE 0.2 MG/ML
200 INJECTION INTRAVENOUS ONCE AS NEEDED
Status: DISCONTINUED | OUTPATIENT
Start: 2023-10-09 | End: 2023-10-10 | Stop reason: SDUPTHER

## 2023-10-09 RX ORDER — DIPHENOXYLATE HYDROCHLORIDE AND ATROPINE SULFATE 2.5; .025 MG/1; MG/1
2 TABLET ORAL EVERY 6 HOURS PRN
Status: CANCELLED | OUTPATIENT
Start: 2023-10-09

## 2023-10-09 RX ORDER — OXYTOCIN/RINGER'S LACTATE 30/500 ML
95 PLASTIC BAG, INJECTION (ML) INTRAVENOUS ONCE AS NEEDED
Status: CANCELLED | OUTPATIENT
Start: 2023-10-09 | End: 2035-03-07

## 2023-10-09 RX ORDER — LIDOCAINE HYDROCHLORIDE 10 MG/ML
10 INJECTION INFILTRATION; PERINEURAL ONCE AS NEEDED
Status: DISCONTINUED | OUTPATIENT
Start: 2023-10-09 | End: 2023-10-12 | Stop reason: HOSPADM

## 2023-10-09 RX ORDER — CALCIUM CARBONATE 200(500)MG
500 TABLET,CHEWABLE ORAL 3 TIMES DAILY PRN
Status: DISCONTINUED | OUTPATIENT
Start: 2023-10-09 | End: 2023-10-12 | Stop reason: HOSPADM

## 2023-10-09 RX ORDER — OXYTOCIN 10 [USP'U]/ML
10 INJECTION, SOLUTION INTRAMUSCULAR; INTRAVENOUS ONCE AS NEEDED
Status: DISCONTINUED | OUTPATIENT
Start: 2023-10-09 | End: 2023-10-10 | Stop reason: SDUPTHER

## 2023-10-09 RX ORDER — LIDOCAINE HYDROCHLORIDE 10 MG/ML
10 INJECTION INFILTRATION; PERINEURAL ONCE AS NEEDED
Status: CANCELLED | OUTPATIENT
Start: 2023-10-09 | End: 2035-03-07

## 2023-10-09 RX ORDER — ONDANSETRON 4 MG/1
8 TABLET, ORALLY DISINTEGRATING ORAL EVERY 8 HOURS PRN
Status: CANCELLED | OUTPATIENT
Start: 2023-10-09

## 2023-10-09 RX ORDER — OXYTOCIN/RINGER'S LACTATE 30/500 ML
334 PLASTIC BAG, INJECTION (ML) INTRAVENOUS ONCE AS NEEDED
Status: CANCELLED | OUTPATIENT
Start: 2023-10-09 | End: 2035-03-07

## 2023-10-09 RX ORDER — OXYTOCIN/RINGER'S LACTATE 30/500 ML
334 PLASTIC BAG, INJECTION (ML) INTRAVENOUS ONCE
Status: CANCELLED | OUTPATIENT
Start: 2023-10-09 | End: 2023-10-09

## 2023-10-09 RX ORDER — OXYTOCIN/RINGER'S LACTATE 30/500 ML
334 PLASTIC BAG, INJECTION (ML) INTRAVENOUS ONCE AS NEEDED
Status: DISCONTINUED | OUTPATIENT
Start: 2023-10-09 | End: 2023-10-10 | Stop reason: SDUPTHER

## 2023-10-09 RX ORDER — MISOPROSTOL 100 UG/1
800 TABLET ORAL ONCE AS NEEDED
Status: CANCELLED | OUTPATIENT
Start: 2023-10-09

## 2023-10-09 RX ORDER — OXYTOCIN 10 [USP'U]/ML
10 INJECTION, SOLUTION INTRAMUSCULAR; INTRAVENOUS ONCE AS NEEDED
Status: CANCELLED | OUTPATIENT
Start: 2023-10-09 | End: 2035-03-07

## 2023-10-09 RX ORDER — CALCIUM CARBONATE 200(500)MG
500 TABLET,CHEWABLE ORAL 3 TIMES DAILY PRN
Status: CANCELLED | OUTPATIENT
Start: 2023-10-09

## 2023-10-09 RX ORDER — CARBOPROST TROMETHAMINE 250 UG/ML
250 INJECTION, SOLUTION INTRAMUSCULAR
Status: CANCELLED | OUTPATIENT
Start: 2023-10-09

## 2023-10-09 RX ORDER — METHYLERGONOVINE MALEATE 0.2 MG/ML
200 INJECTION INTRAVENOUS ONCE AS NEEDED
Status: CANCELLED | OUTPATIENT
Start: 2023-10-09 | End: 2035-03-07

## 2023-10-09 RX ORDER — OXYTOCIN/RINGER'S LACTATE 30/500 ML
95 PLASTIC BAG, INJECTION (ML) INTRAVENOUS ONCE AS NEEDED
Status: DISCONTINUED | OUTPATIENT
Start: 2023-10-09 | End: 2023-10-10 | Stop reason: SDUPTHER

## 2023-10-09 RX ORDER — MISOPROSTOL 100 UG/1
800 TABLET ORAL ONCE AS NEEDED
Status: DISCONTINUED | OUTPATIENT
Start: 2023-10-09 | End: 2023-10-10 | Stop reason: SDUPTHER

## 2023-10-09 RX ORDER — PROCHLORPERAZINE EDISYLATE 5 MG/ML
5 INJECTION INTRAMUSCULAR; INTRAVENOUS EVERY 6 HOURS PRN
Status: CANCELLED | OUTPATIENT
Start: 2023-10-09

## 2023-10-09 RX ORDER — CARBOPROST TROMETHAMINE 250 UG/ML
250 INJECTION, SOLUTION INTRAMUSCULAR
Status: DISCONTINUED | OUTPATIENT
Start: 2023-10-09 | End: 2023-10-10 | Stop reason: SDUPTHER

## 2023-10-09 RX ORDER — SIMETHICONE 80 MG
1 TABLET,CHEWABLE ORAL 4 TIMES DAILY PRN
Status: CANCELLED | OUTPATIENT
Start: 2023-10-09

## 2023-10-09 RX ORDER — MISOPROSTOL 100 UG/1
800 TABLET ORAL ONCE AS NEEDED
Status: CANCELLED | OUTPATIENT
Start: 2023-10-09 | End: 2035-03-07

## 2023-10-09 RX ORDER — PROCHLORPERAZINE EDISYLATE 5 MG/ML
5 INJECTION INTRAMUSCULAR; INTRAVENOUS EVERY 6 HOURS PRN
Status: DISCONTINUED | OUTPATIENT
Start: 2023-10-09 | End: 2023-10-10 | Stop reason: SDUPTHER

## 2023-10-09 RX ORDER — OXYTOCIN/RINGER'S LACTATE 30/500 ML
334 PLASTIC BAG, INJECTION (ML) INTRAVENOUS ONCE
Status: DISCONTINUED | OUTPATIENT
Start: 2023-10-09 | End: 2023-10-10 | Stop reason: SDUPTHER

## 2023-10-09 RX ORDER — SIMETHICONE 80 MG
1 TABLET,CHEWABLE ORAL 4 TIMES DAILY PRN
Status: DISCONTINUED | OUTPATIENT
Start: 2023-10-09 | End: 2023-10-10 | Stop reason: SDUPTHER

## 2023-10-09 RX ORDER — OXYTOCIN/RINGER'S LACTATE 30/500 ML
95 PLASTIC BAG, INJECTION (ML) INTRAVENOUS ONCE
Status: DISCONTINUED | OUTPATIENT
Start: 2023-10-09 | End: 2023-10-10 | Stop reason: SDUPTHER

## 2023-10-09 RX ORDER — MEPERIDINE HYDROCHLORIDE 25 MG/ML
50 INJECTION INTRAMUSCULAR; INTRAVENOUS; SUBCUTANEOUS EVERY 4 HOURS PRN
Status: DISPENSED | OUTPATIENT
Start: 2023-10-10 | End: 2023-10-11

## 2023-10-09 RX ORDER — SODIUM CHLORIDE 9 MG/ML
INJECTION, SOLUTION INTRAVENOUS
Status: CANCELLED | OUTPATIENT
Start: 2023-10-09

## 2023-10-09 RX ORDER — DIPHENOXYLATE HYDROCHLORIDE AND ATROPINE SULFATE 2.5; .025 MG/1; MG/1
2 TABLET ORAL EVERY 6 HOURS PRN
Status: DISCONTINUED | OUTPATIENT
Start: 2023-10-09 | End: 2023-10-10 | Stop reason: SDUPTHER

## 2023-10-09 RX ORDER — OXYTOCIN/RINGER'S LACTATE 30/500 ML
95 PLASTIC BAG, INJECTION (ML) INTRAVENOUS ONCE
Status: CANCELLED | OUTPATIENT
Start: 2023-10-09 | End: 2023-10-09

## 2023-10-09 RX ORDER — SODIUM CHLORIDE, SODIUM LACTATE, POTASSIUM CHLORIDE, CALCIUM CHLORIDE 600; 310; 30; 20 MG/100ML; MG/100ML; MG/100ML; MG/100ML
INJECTION, SOLUTION INTRAVENOUS CONTINUOUS
Status: CANCELLED | OUTPATIENT
Start: 2023-10-09

## 2023-10-09 RX ORDER — ONDANSETRON 4 MG/1
8 TABLET, ORALLY DISINTEGRATING ORAL EVERY 8 HOURS PRN
Status: DISCONTINUED | OUTPATIENT
Start: 2023-10-09 | End: 2023-10-10 | Stop reason: SDUPTHER

## 2023-10-09 RX ADMIN — SODIUM CHLORIDE, POTASSIUM CHLORIDE, SODIUM LACTATE AND CALCIUM CHLORIDE: 600; 310; 30; 20 INJECTION, SOLUTION INTRAVENOUS at 10:10

## 2023-10-09 RX ADMIN — DINOPROSTONE 10 MG: 10 INSERT VAGINAL at 10:10

## 2023-10-10 LAB
GLUCOSE SERPL-MCNC: 92 MG/DL (ref 70–110)
POCT GLUCOSE: 154 MG/DL (ref 70–110)
POCT GLUCOSE: 72 MG/DL (ref 70–110)
POCT GLUCOSE: 77 MG/DL (ref 70–110)
POCT GLUCOSE: 81 MG/DL (ref 70–110)
POCT GLUCOSE: 92 MG/DL (ref 70–110)

## 2023-10-10 PROCEDURE — 72200005 HC VAGINAL DELIVERY LEVEL II

## 2023-10-10 PROCEDURE — 72100003 HC LABOR CARE, EA. ADDL. 8 HRS

## 2023-10-10 PROCEDURE — 72100002 HC LABOR CARE, 1ST 8 HOURS

## 2023-10-10 PROCEDURE — 63600175 PHARM REV CODE 636 W HCPCS

## 2023-10-10 PROCEDURE — 63600175 PHARM REV CODE 636 W HCPCS: Performed by: OBSTETRICS & GYNECOLOGY

## 2023-10-10 PROCEDURE — 11000001 HC ACUTE MED/SURG PRIVATE ROOM

## 2023-10-10 PROCEDURE — 25000003 PHARM REV CODE 250

## 2023-10-10 RX ORDER — OXYTOCIN/RINGER'S LACTATE 30/500 ML
0-30 PLASTIC BAG, INJECTION (ML) INTRAVENOUS CONTINUOUS
Status: DISCONTINUED | OUTPATIENT
Start: 2023-10-10 | End: 2023-10-12 | Stop reason: HOSPADM

## 2023-10-10 RX ORDER — DIPHENHYDRAMINE HYDROCHLORIDE 50 MG/ML
25 INJECTION INTRAMUSCULAR; INTRAVENOUS EVERY 4 HOURS PRN
Status: DISCONTINUED | OUTPATIENT
Start: 2023-10-10 | End: 2023-10-12 | Stop reason: HOSPADM

## 2023-10-10 RX ORDER — OXYTOCIN/RINGER'S LACTATE 30/500 ML
95 PLASTIC BAG, INJECTION (ML) INTRAVENOUS ONCE
Status: DISCONTINUED | OUTPATIENT
Start: 2023-10-10 | End: 2023-10-12 | Stop reason: HOSPADM

## 2023-10-10 RX ORDER — IBUPROFEN 600 MG/1
600 TABLET ORAL EVERY 6 HOURS
Status: DISCONTINUED | OUTPATIENT
Start: 2023-10-10 | End: 2023-10-12 | Stop reason: HOSPADM

## 2023-10-10 RX ORDER — DIPHENOXYLATE HYDROCHLORIDE AND ATROPINE SULFATE 2.5; .025 MG/1; MG/1
2 TABLET ORAL EVERY 6 HOURS PRN
Status: DISCONTINUED | OUTPATIENT
Start: 2023-10-10 | End: 2023-10-12 | Stop reason: HOSPADM

## 2023-10-10 RX ORDER — OXYTOCIN 10 [USP'U]/ML
10 INJECTION, SOLUTION INTRAMUSCULAR; INTRAVENOUS ONCE AS NEEDED
Status: DISCONTINUED | OUTPATIENT
Start: 2023-10-10 | End: 2023-10-12 | Stop reason: HOSPADM

## 2023-10-10 RX ORDER — ACETAMINOPHEN 325 MG/1
650 TABLET ORAL EVERY 6 HOURS PRN
Status: DISCONTINUED | OUTPATIENT
Start: 2023-10-10 | End: 2023-10-12 | Stop reason: HOSPADM

## 2023-10-10 RX ORDER — DOCUSATE SODIUM 100 MG/1
200 CAPSULE, LIQUID FILLED ORAL 2 TIMES DAILY PRN
Status: DISCONTINUED | OUTPATIENT
Start: 2023-10-10 | End: 2023-10-12 | Stop reason: HOSPADM

## 2023-10-10 RX ORDER — PROCHLORPERAZINE EDISYLATE 5 MG/ML
5 INJECTION INTRAMUSCULAR; INTRAVENOUS EVERY 6 HOURS PRN
Status: DISCONTINUED | OUTPATIENT
Start: 2023-10-10 | End: 2023-10-12 | Stop reason: HOSPADM

## 2023-10-10 RX ORDER — SODIUM CHLORIDE 0.9 % (FLUSH) 0.9 %
10 SYRINGE (ML) INJECTION
Status: DISCONTINUED | OUTPATIENT
Start: 2023-10-10 | End: 2023-10-12 | Stop reason: HOSPADM

## 2023-10-10 RX ORDER — METHYLERGONOVINE MALEATE 0.2 MG/ML
200 INJECTION INTRAVENOUS ONCE AS NEEDED
Status: DISCONTINUED | OUTPATIENT
Start: 2023-10-10 | End: 2023-10-12 | Stop reason: HOSPADM

## 2023-10-10 RX ORDER — PRENATAL WITH FERROUS FUM AND FOLIC ACID 3080; 920; 120; 400; 22; 1.84; 3; 20; 10; 1; 12; 200; 27; 25; 2 [IU]/1; [IU]/1; MG/1; [IU]/1; MG/1; MG/1; MG/1; MG/1; MG/1; MG/1; UG/1; MG/1; MG/1; MG/1; MG/1
1 TABLET ORAL DAILY
Status: DISCONTINUED | OUTPATIENT
Start: 2023-10-11 | End: 2023-10-12 | Stop reason: HOSPADM

## 2023-10-10 RX ORDER — MISOPROSTOL 100 UG/1
800 TABLET ORAL ONCE AS NEEDED
Status: DISCONTINUED | OUTPATIENT
Start: 2023-10-10 | End: 2023-10-12 | Stop reason: HOSPADM

## 2023-10-10 RX ORDER — SIMETHICONE 80 MG
1 TABLET,CHEWABLE ORAL EVERY 6 HOURS PRN
Status: DISCONTINUED | OUTPATIENT
Start: 2023-10-10 | End: 2023-10-12 | Stop reason: HOSPADM

## 2023-10-10 RX ORDER — CARBOPROST TROMETHAMINE 250 UG/ML
250 INJECTION, SOLUTION INTRAMUSCULAR
Status: DISCONTINUED | OUTPATIENT
Start: 2023-10-10 | End: 2023-10-12 | Stop reason: HOSPADM

## 2023-10-10 RX ORDER — OXYTOCIN/RINGER'S LACTATE 30/500 ML
334 PLASTIC BAG, INJECTION (ML) INTRAVENOUS ONCE AS NEEDED
Status: DISCONTINUED | OUTPATIENT
Start: 2023-10-10 | End: 2023-10-12 | Stop reason: HOSPADM

## 2023-10-10 RX ORDER — ONDANSETRON 4 MG/1
8 TABLET, ORALLY DISINTEGRATING ORAL EVERY 8 HOURS PRN
Status: DISCONTINUED | OUTPATIENT
Start: 2023-10-10 | End: 2023-10-12 | Stop reason: HOSPADM

## 2023-10-10 RX ORDER — OXYTOCIN/RINGER'S LACTATE 30/500 ML
95 PLASTIC BAG, INJECTION (ML) INTRAVENOUS ONCE AS NEEDED
Status: COMPLETED | OUTPATIENT
Start: 2023-10-10 | End: 2023-10-10

## 2023-10-10 RX ORDER — HYDROCORTISONE 25 MG/G
CREAM TOPICAL 3 TIMES DAILY PRN
Status: DISCONTINUED | OUTPATIENT
Start: 2023-10-10 | End: 2023-10-12 | Stop reason: HOSPADM

## 2023-10-10 RX ORDER — HYDROCODONE BITARTRATE AND ACETAMINOPHEN 5; 325 MG/1; MG/1
1 TABLET ORAL EVERY 4 HOURS PRN
Status: DISCONTINUED | OUTPATIENT
Start: 2023-10-10 | End: 2023-10-12 | Stop reason: HOSPADM

## 2023-10-10 RX ORDER — DIPHENHYDRAMINE HCL 25 MG
25 CAPSULE ORAL EVERY 4 HOURS PRN
Status: DISCONTINUED | OUTPATIENT
Start: 2023-10-10 | End: 2023-10-12 | Stop reason: HOSPADM

## 2023-10-10 RX ADMIN — OXYTOCIN 95 MILLI-UNITS/MIN: 10 INJECTION, SOLUTION INTRAMUSCULAR; INTRAVENOUS at 04:10

## 2023-10-10 RX ADMIN — OXYTOCIN 2 MILLI-UNITS/MIN: 10 INJECTION, SOLUTION INTRAMUSCULAR; INTRAVENOUS at 10:10

## 2023-10-10 RX ADMIN — HYDROCODONE BITARTRATE AND ACETAMINOPHEN 1 TABLET: 5; 325 TABLET ORAL at 06:10

## 2023-10-10 RX ADMIN — IBUPROFEN 600 MG: 600 TABLET, FILM COATED ORAL at 04:10

## 2023-10-10 RX ADMIN — MEPERIDINE HYDROCHLORIDE 50 MG: 25 INJECTION INTRAMUSCULAR; INTRAVENOUS; SUBCUTANEOUS at 10:10

## 2023-10-10 RX ADMIN — Medication: at 04:10

## 2023-10-10 NOTE — H&P
"LnD HnP     Admit Date: 10/9/2023  ALEJANDRO Physician: Jan Boyer  Primary OBGYN: [facility]    Admit Diagnosis/Chief Complaint:  induction for  pre gestational diabetes      No chief complaint on file.      Hospital Course:  Valente Wilson is a 23 y.o.  at 37w1d presents  for induction of labor  This IUP is complicated by  pre gestational diabetes.    Patient denies vaginal bleeding, leakage of fluid, and contractions.  Fetal Movement: normal.    /69   Pulse 100   Temp 98.3 °F (36.8 °C)   Resp 16   Ht 5' 10" (1.778 m)   Wt (!) 137.1 kg (302 lb 5.8 oz)   LMP 2023 (Exact Date)   SpO2 100%   Breastfeeding No   BMI 43.38 kg/m²   Temp:  [98.3 °F (36.8 °C)] 98.3 °F (36.8 °C)  Pulse:  [100] 100  Resp:  [16] 16  SpO2:  [100 %] 100 %  BP: (138)/(69) 138/69    General: in no apparent distress  Abdominal: soft, nontender, nondistended, no abnormal masses, no epigastric pain FHT present  Back: lumbar tenderness absent   CVA tenderness none  Extremeties no redness or tenderness in the calves or thighs no edema    SSE:   SVE:SVE (PeriWATCH)  Dilation (cm): 3  Effacement (%): 50  Station: -3  Cervical Position: Posterior  Cervical Consistency: Medium  Examined by:: NGUYEN Contreras  Clemens Score: 4  Simplified Clemens Score: 3     FHT:  Reactive  TOCO: Contractions none      LABS:     Recent Results (from the past 24 hour(s))   Type & Screen    Collection Time: 10/09/23 10:13 PM   Result Value Ref Range    Group & Rh A POS     Indirect Mita GEL NEG     Specimen Outdate 10/12/2023 23:59    SYPHILIS ANTIBODY (WITH REFLEX RPR)    Collection Time: 10/09/23 10:13 PM   Result Value Ref Range    Syphilis Antibody Nonreactive Nonreactive, Equivocal   CBC with Differential    Collection Time: 10/09/23 10:13 PM   Result Value Ref Range    WBC 10.76 4.50 - 11.50 x10(3)/mcL    RBC 3.92 (L) 4.20 - 5.40 x10(6)/mcL    Hgb 8.9 (L) 12.0 - 16.0 g/dL    Hct 30.3 (L) 37.0 - 47.0 %    MCV 77.3 (L) 80.0 - 94.0 fL    MCH 22.7 (L) " 27.0 - 31.0 pg    MCHC 29.4 (L) 33.0 - 36.0 g/dL    RDW 16.4 11.5 - 17.0 %    Platelet 304 130 - 400 x10(3)/mcL    MPV 11.1 (H) 7.4 - 10.4 fL    Neut % 72.7 %    Lymph % 16.8 %    Mono % 6.0 %    Eos % 3.2 %    Basophil % 0.3 %    Lymph # 1.81 0.6 - 4.6 x10(3)/mcL    Neut # 7.82 2.1 - 9.2 x10(3)/mcL    Mono # 0.65 0.1 - 1.3 x10(3)/mcL    Eos # 0.34 0 - 0.9 x10(3)/mcL    Baso # 0.03 <=0.2 x10(3)/mcL    IG# 0.11 (H) 0 - 0.04 x10(3)/mcL    IG% 1.0 %    NRBC% 0.0 %   POCT glucose    Collection Time: 10/09/23 10:19 PM   Result Value Ref Range    POCT Glucose 123 (H) 70 - 110 mg/dL     [unfilled]          ASSESMENT: Valente Wilson is a 23 y.o.   at 37w1d ciro in labor  Admit to Labor and delivery   IV fluids    CBG q.4 until Labor   Cervidil induction  Epidural as needed   Admission labs   Discussed with private physician or on-call physician      Discharge Diagnosis/Clinical Impression**:  induction of labor pregnancy complicated by pre gestational diabetes   Status:Stable                This note was created with the assistance of [employer] voice recognition software. There may be transcription errors as a result of using this technology however minimal. Effort has been made to assure accuracy of transcription but any obvious errors or omissions should be clarified with the author of the document.

## 2023-10-10 NOTE — PROGRESS NOTES
LABOR PROGRESS NOTE:     23 y.o.  @ 37w2d here for IOL for GDM.     Temp:  [98.3 °F (36.8 °C)] 98.3 °F (36.8 °C)  Pulse:  [] 70  Resp:  [16-17] 16  SpO2:  [100 %] 100 %  BP: (108-138)/(58-74) 116/67    FHT:  140s   Variability: moderate   Accelerations:  Reactive   Decelerations:  Absent   Category:  Cat 1   Contractions:  Regular, q2-4       SVE: 5/70/-2       Membranes:  AROM performed with this SVE, minimal clear fluid   Augmentation: S/p Cervidil, will start pitocin   GBS Status:  Neg   Pain: Epidural available upon request, discussion with anesthesia and plan to not use fentanyl following rash with prior epidural       Dispo: AROM performed with this SVE. Will start pitocin to augment labor at this time. Up-titrate as indicated. Repeat SVE in 4 hours or sooner for meternal/fetal indications.      Discussed with Dr. Urbina.    Digna Lora DO  [employer] PGY-2  1

## 2023-10-10 NOTE — PROGRESS NOTES
LABOR PROGRESS NOTE:     23 y.o.  @ 37w2d here for IOL for GDM.     Temp:  [98.3 °F (36.8 °C)-98.6 °F (37 °C)] 98.6 °F (37 °C)  Pulse:  [] 78  Resp:  [14-18] 14  SpO2:  [100 %] 100 %  BP: (108-146)/(58-83) 129/73    FHT:  140s   Variability: moderate   Accelerations:  Present when tracing well   Decelerations:  Occasional variables, overall reassuring    Category:  Overall cat 1   Contractions:  Previously poorly tracing, IUPC placed on this check- contractions regular, q1-2 mins       SVE: /-1       Membranes:  AROM @0940   Augmentation: Pitocin @ 14   GBS Status:  Neg   Pain: Epidural available upon request. Patient requesting now- nurse preloading patient        Dispo: IUPC placed. Will continue to monitor contractions and up-titrate pitocin as indicated. Repeat SVE in 2 hours or sooner for maternal/fetal indications.      Discussed with Dr. Urbina.    Digna Lora DO  [facility] PGY-2

## 2023-10-10 NOTE — L&D DELIVERY NOTE
[facility] - Labor and Delivery  Vaginal Delivery   Operative Note    SUMMARY     Valente Wilson progressed to C/C/+2 station and delivered vaginally under epidural anesthesia. With good maternal effort head was delivered in the EDMOND position over an intact perineum. Head restituted and delivered spontaneously; loose nuchal cord x1 noted and reduced. Gentle downward traction was used to deliver the anterior shoulder followed by gentle upward traction to deliver the posterior shoulder and the remainder of the body. Infant was suctioned on the abdomen of mom after delivery. Cord was clamped x2 and then cut. Cord blood obtained. Placenta was delivered intact with a normal 3-vessel cord. The fundus was firm following initiation of IV Pitocin. There were no lacerations noted upon thorough inspection. Viable female  infant.  mL. Dr. Lora and Dr. Lopes performed the delivery under supervision of Dr. Urbina who was present the entire delivery. Needle, sponge, and Raytec count correct. Infant and patient in good and stable condition.       Indications: <principal problem not specified>  Pregnancy complicated by:   Patient Active Problem List   Diagnosis    Attention deficit hyperactivity disorder (ADHD)    Bipolar II disorder, most recent episode major depressive    Obesity    Generalized anxiety disorder    Tobacco use    Gastroesophageal reflux disease without esophagitis    3. PTSD/bipolar/anxiety/ADD affecting pregnancy in third trimester    5 weeks gestation of pregnancy    Tonsillitis    Nausea and vomiting in pregnancy    Allergic rhinitis    1. Insulin resistance complicating pregnancy    2. BMI affecting pregnancy in third trimester    4. History of postpartum hemorrhage    Insulin controlled gestational diabetes mellitus (GDM) in third trimester     Admitting GA: 37w2d    Delivery Information for Anurag Wilson    Birth information:  YOB: 2025   Time of birth:  3:08 PM   Sex: female   Head Delivery Date/Time: 10/10/2023  3:08 PM   Delivery type: Vaginal, Spontaneous   Gestational Age: 37w2d        Delivery Providers    Delivering clinician: Digna Lora MD   Provider Role    Cristian Sunshine, RN Registered Nurse    Emily Harvey RN Registered Nurse              Measurements    Weight:   Length:          Apgars    Living status:   Apgar Component Scores:  1 min.:  5 min.:  10 min.:  15 min.:  20 min.:    Skin color:         Heart rate:         Reflex irritability:         Muscle tone:         Respiratory effort:         Total:                  Operative Delivery    Forceps attempted?: No  Vacuum extractor attempted?: No         Shoulder Dystocia    Shoulder dystocia present?: No           Presentation    Presentation: Vertex           Interventions/Resuscitation           Cord    Complications: Nuchal  Nuchal Intervention: reduced  Nuchal Cord Description: loose nuchal cord  Number of Loops: 1  Cord Blood Disposition: Sent with Baby       Placenta    Placenta delivery date/time: 10/10/2023 1512  Placenta removal: Spontaneous  Placenta appearance: Intact  Placenta disposition: Discarded           Labor Events:       labor:       Labor Onset Date/Time: 10/09/2023 22:30     Dilation Complete Date/Time:         Start Pushing Date/Time:       Rupture Date/Time: 10/10/23  0940         Rupture type: ARM (Artificial Rupture)         Fluid Amount:       Fluid Color: Clear (blood tinged)       steroids:       Antibiotics given for GBS:       Induction: dinoprostone insert     Indications for induction:  Gestational Diabetic     Augmentation:       Indications for augmentation:       Labor complications:       Additional complications:          Cervical ripening:                     Delivery:      Episiotomy: None     Indication for Episiotomy:       Perineal Lacerations: None Repaired:      Periurethral Laceration:   Repaired:     Labial Laceration:    Repaired:     Sulcus Laceration:   Repaired:     Vaginal Laceration:   Repaired:     Cervical Laceration:   Repaired:     Repair suture: None     Repair # of packets:       Last Value - EBL - Nursing (mL):       Sum - EBL - Nursing (mL): 0     Last Value - EBL - Anesthesia (mL):      Calculated QBL (mL): 150      Vaginal Sweep Performed: No     Surgicount Correct: Yes       Other providers:       Anesthesia    Method: None          Details (if applicable):  Trial of Labor      Categorization:      Priority:     Indications for :     Incision Type:       Additional  information:  Forceps:    Vacuum:    Breech:    Observed anomalies    Other (Comments):         Digna Lora DO  [facility] PGY-2

## 2023-10-11 LAB
BASOPHILS # BLD AUTO: 0.07 X10(3)/MCL
BASOPHILS NFR BLD AUTO: 0.5 %
EOSINOPHIL # BLD AUTO: 0.34 X10(3)/MCL (ref 0–0.9)
EOSINOPHIL NFR BLD AUTO: 2.6 %
ERYTHROCYTE [DISTWIDTH] IN BLOOD BY AUTOMATED COUNT: 16.1 % (ref 11.5–17)
HCT VFR BLD AUTO: 25.8 % (ref 37–47)
HGB BLD-MCNC: 7.7 G/DL (ref 12–16)
IMM GRANULOCYTES # BLD AUTO: 0.11 X10(3)/MCL (ref 0–0.04)
IMM GRANULOCYTES NFR BLD AUTO: 0.8 %
LYMPHOCYTES # BLD AUTO: 2.18 X10(3)/MCL (ref 0.6–4.6)
LYMPHOCYTES NFR BLD AUTO: 16.6 %
MCH RBC QN AUTO: 22.4 PG (ref 27–31)
MCHC RBC AUTO-ENTMCNC: 29.8 G/DL (ref 33–36)
MCV RBC AUTO: 75 FL (ref 80–94)
MONOCYTES # BLD AUTO: 1 X10(3)/MCL (ref 0.1–1.3)
MONOCYTES NFR BLD AUTO: 7.6 %
NEUTROPHILS # BLD AUTO: 9.43 X10(3)/MCL (ref 2.1–9.2)
NEUTROPHILS NFR BLD AUTO: 71.9 %
NRBC BLD AUTO-RTO: 0 %
PLATELET # BLD AUTO: 274 X10(3)/MCL (ref 130–400)
PMV BLD AUTO: 11.9 FL (ref 7.4–10.4)
POCT GLUCOSE: 104 MG/DL (ref 70–110)
POCT GLUCOSE: 108 MG/DL (ref 70–110)
POCT GLUCOSE: 127 MG/DL (ref 70–110)
RBC # BLD AUTO: 3.44 X10(6)/MCL (ref 4.2–5.4)
WBC # SPEC AUTO: 13.13 X10(3)/MCL (ref 4.5–11.5)

## 2023-10-11 PROCEDURE — 11000001 HC ACUTE MED/SURG PRIVATE ROOM

## 2023-10-11 PROCEDURE — 25000003 PHARM REV CODE 250

## 2023-10-11 PROCEDURE — 85025 COMPLETE CBC W/AUTO DIFF WBC: CPT

## 2023-10-11 RX ORDER — MEDROXYPROGESTERONE ACETATE 150 MG/ML
150 INJECTION, SUSPENSION INTRAMUSCULAR ONCE
Status: COMPLETED | OUTPATIENT
Start: 2023-10-12 | End: 2023-10-12

## 2023-10-11 RX ADMIN — IBUPROFEN 600 MG: 600 TABLET, FILM COATED ORAL at 05:10

## 2023-10-11 RX ADMIN — IBUPROFEN 600 MG: 600 TABLET, FILM COATED ORAL at 12:10

## 2023-10-11 RX ADMIN — IBUPROFEN 600 MG: 600 TABLET, FILM COATED ORAL at 06:10

## 2023-10-11 RX ADMIN — HYDROCODONE BITARTRATE AND ACETAMINOPHEN 1 TABLET: 5; 325 TABLET ORAL at 06:10

## 2023-10-11 RX ADMIN — DOCUSATE SODIUM 200 MG: 100 CAPSULE, LIQUID FILLED ORAL at 08:10

## 2023-10-11 RX ADMIN — HYDROCODONE BITARTRATE AND ACETAMINOPHEN 1 TABLET: 5; 325 TABLET ORAL at 12:10

## 2023-10-11 RX ADMIN — HYDROCODONE BITARTRATE AND ACETAMINOPHEN 1 TABLET: 5; 325 TABLET ORAL at 08:10

## 2023-10-11 NOTE — PROGRESS NOTES
Postpartum Progress Note    Valente Wilson is a 23 y.o.  s/p  via induction of labor in setting of gestational DM, currently Post-Partum day 1.    Nurse reports no acute events overnight. Patient reports mild abd pain. Patient denies any issues or complaints. Ambulating, voiding, and tolerating regular diet. Passing flatus. Lochia is anterior and decreasing. No subjective fever or chills. Pain well controlled with Motrin and Norco.  No HA, vision changes, dizziness, N/V, CP, SOB, breast pain or lower extremity pain.  Currently bottle and breast feeding.  Desires Depo for contraception. Baby in room.      Physical Exam:   Vitals:    10/11/23 0009 10/11/23 0335 10/11/23 0603 10/11/23 0753   BP:  100/61  109/74   Pulse:  80  76   Resp: 16  16 20   Temp:  98.1 °F (36.7 °C)  97.9 °F (36.6 °C)   TempSrc:  Oral  Oral   SpO2:       Weight:       Height:           Gen: AAO x 3, NAD, resting in bed comfortably   CV: RRR, S1, S2, no murmurs  Resp: Non labored, lungs CTA bilaterally, good air movement  Abd: fundus firm palpable at the level of the umbilicus, abdomen soft and NT, + BS  Ext: no edema, calves non tender and equal in size, DP/Radial 2+   Psych: cooperative, normal affect    Labs:  H/H: 8.9/30.3 (10/09/23) to 7.7/25.8 (10/10/23)      Assessment/Plan  23 y.o. female   Status Post Vaginal delivery PPD#1.       1) Normal spontaneous vaginal delivery   Continue routine post-partum/operative care, continue to monitor  Patient does plan to Breast and Bottle feed  Continue PNV and Iron.  H/H stable and appropriate  Rubella non-Immune, Rh Positive, Varicella non-Immune  Up ad cele; Pelvic Rest for 6 weeks.  DVT PPx - Ambulation   Contraception: Depo-provera; scheduled to be given 10/12/23 at 0900  Dispo: Likely stable for discharge home on Postpartum day #1    2) Prediabeties with history of gestational DM in recent pregnancy  Last A1c 6.3 on 04/20/23 compared to 5.6 on 22  Patient failed 1 hour  and 3 hour GTT at 28 WGA  GDM was controlled with Levemir 18AM and 12HS per MFM during pregnancy  Postprandial  at 2116 on 10/11; fasting CBG this AM was 104 without insulin use  Will discontinue to scheduled insulin at this time and continue to monitor CBG fasting and 2 hrs postprandial; plan to add/adjust insulin if CBG > 180      Patient and Plan discussed with Dr. Carlitos Ortega MD  [facility] Resident, HO-1

## 2023-10-11 NOTE — PLAN OF CARE
Problem: Adult Inpatient Plan of Care  Goal: Plan of Care Review  Outcome: Ongoing, Progressing  Goal: Patient-Specific Goal (Individualized)  Outcome: Ongoing, Progressing  Goal: Absence of Hospital-Acquired Illness or Injury  Outcome: Ongoing, Progressing  Goal: Optimal Comfort and Wellbeing  Outcome: Ongoing, Progressing  Goal: Readiness for Transition of Care  Outcome: Ongoing, Progressing     Problem: Bariatric Environmental Safety  Goal: Safety Maintained with Care  Outcome: Ongoing, Progressing     Problem: Diabetes in Pregnancy  Goal: Blood Glucose Level Within Targeted Range  Outcome: Ongoing, Progressing     Problem: Infection  Goal: Absence of Infection Signs and Symptoms  Outcome: Ongoing, Progressing     Problem:  Fall Injury Risk  Goal: Absence of Fall, Infant Drop and Related Injury  Outcome: Ongoing, Progressing     Problem: Bleeding (Labor)  Goal: Hemostasis  Outcome: Ongoing, Progressing     Problem: Change in Fetal Wellbeing (Labor)  Goal: Stable Fetal Wellbeing  Outcome: Ongoing, Progressing     Problem: Delayed Labor Progression (Labor)  Goal: Effective Progression to Delivery  Outcome: Ongoing, Progressing     Problem: Infection (Labor)  Goal: Absence of Infection Signs and Symptoms  Outcome: Ongoing, Progressing     Problem: Labor Pain (Labor)  Goal: Acceptable Pain Control  Outcome: Ongoing, Progressing     Problem: Uterine Tachysystole (Labor)  Goal: Normal Uterine Contraction Pattern  Outcome: Ongoing, Progressing

## 2023-10-11 NOTE — LACTATION NOTE
Mom has been doing only formula for some feedings. Educated mom on risk of formula feeding and emphasized the importance of doing at least 8 feedings at the breast per day. Mom verbalized understanding. Encouraged frequent feeds on cue, discussed early hunger cues. Encouraged waking baby if needed to ensure 8 or more feeds per 24 hrs. Tips on waking sleepy baby discussed. Signs of milk transfer/adequate intake discussed. Encouraged to call with any signs indicating a problem, such as painful latch, nipple irritation, unable to sustain latch, or with any questions or needs.

## 2023-10-12 VITALS
TEMPERATURE: 98 F | SYSTOLIC BLOOD PRESSURE: 130 MMHG | OXYGEN SATURATION: 100 % | HEIGHT: 70 IN | DIASTOLIC BLOOD PRESSURE: 86 MMHG | RESPIRATION RATE: 20 BRPM | HEART RATE: 73 BPM | WEIGHT: 293 LBS | BODY MASS INDEX: 41.95 KG/M2

## 2023-10-12 LAB
POCT GLUCOSE: 84 MG/DL (ref 70–110)
POCT GLUCOSE: 85 MG/DL (ref 70–110)

## 2023-10-12 PROCEDURE — 25000003 PHARM REV CODE 250

## 2023-10-12 RX ORDER — IBUPROFEN 600 MG/1
600 TABLET ORAL EVERY 6 HOURS PRN
Qty: 30 TABLET | Refills: 0 | Status: SHIPPED | OUTPATIENT
Start: 2023-10-12

## 2023-10-12 RX ADMIN — IBUPROFEN 600 MG: 600 TABLET, FILM COATED ORAL at 12:10

## 2023-10-12 RX ADMIN — IBUPROFEN 600 MG: 600 TABLET, FILM COATED ORAL at 05:10

## 2023-10-12 RX ADMIN — HYDROCODONE BITARTRATE AND ACETAMINOPHEN 1 TABLET: 5; 325 TABLET ORAL at 06:10

## 2023-10-12 RX ADMIN — MEDROXYPROGESTERONE ACETATE 150 MG: 150 INJECTION, SUSPENSION, EXTENDED RELEASE INTRAMUSCULAR at 10:10

## 2023-10-12 RX ADMIN — HYDROCODONE BITARTRATE AND ACETAMINOPHEN 1 TABLET: 5; 325 TABLET ORAL at 12:10

## 2023-10-12 NOTE — DISCHARGE SUMMARY
Delivery Discharge Summary  [facility] Obstetrics    Admission date: 10/9/2023  Discharge date: 10/12/2023    Admit Dx:   1. Insulin resistance complicating pregnancy   2. BMI affecting pregnancy in third trimester   3. History of postpartum hemorrhage requiring transfusion   4. Insulin controlled gestational diabetes mellitus (GDM) in third trimester     Discharge Dx:    Vaginal delivery     Procedure:     Hospital Course:  Valente Wilson is a 23 y.o. now  female who was admitted on 10/9/2023 for delivery. She had the benefit of an external fetal monitoring. Patient and infant tolerated labor well with pitocin. Patient delivered a viable  via  at 37^2 WGA on 10/10/23. Apgars 9/9. Vaginal exam after delivery was non-significant for any laceration.  Pregnancy was complicated by gestational DM insulin controlled and history of postpartum hemorrhage requiring pRBC transfusion. There was minimal blood loss post-delivery, not requiring any anti-fibrinolytic therapy or blood transfusion. Please see delivery note for further details. Pt was in stable condition post delivery and was transferred to the Mother-Baby Unit. Her postpartum course was uncomplicated. On the floors, patient's CBG were ranging between 90-150s without use of insulin. On the date of discharge, patient's pain is controlled with oral pain medications. No fever or chills. She is ambulating without SOB or CP, and tolerating PO diet without N/V. Good urinary and bowel function. Reported lochia is within the normal range. Patient has had no complaints or questions that were not addressed during the duration of her stay. Pt in stable condition and ready for discharge on PPD 2.     Physical exam:   Vitals:    10/12/23 0728   BP: 130/86   Pulse: 73   Resp: 20   Temp: 98 °F (36.7 °C)      Gen: AAO x 3, NAD  HEENT: NCAT, MMM, EOMI  CV: RRR, no murmurs; S1/S2  Resp: Clear to auscultation bilaterally with no wheezing, stridor, or upper  "airways sounds, good air movement, nontender chest  Abd: soft, +bowel sounds  : uterus firm below umbilicus, lochia minimal  Ext: no edema, DP/Radial 2+     Pertinent studies:  Postpartum CBC  Lab Results   Component Value Date    WBC 13.13 (H) 10/11/2023    HGB 7.7 (L) 10/11/2023    HCT 25.8 (L) 10/11/2023    MCV 75.0 (L) 10/11/2023     10/11/2023       Delivery:    Episiotomy: None   Lacerations: None   Repair suture: None   Repair # of packets:     Blood loss (ml):       Birth information:  YOB: 2025   Time of birth: 3:08 PM   Sex: female   Delivery type: Vaginal, Spontaneous   Gestational Age: 37w2d     Measurements    Weight: 2948 g  Weight (lbs): 6 lb 8 oz  Length: 49.5 cm  Length (in): 19.5"  Head circumference: 33 cm         Delivery Clinician: Delivery Providers    Delivering clinician: Digna Lora MD   Provider Role    Cristian Sunshine RN Registered Nurse    Emily Abraham RN Registered Nurse    Michelle Neri RN Registered Nurse    Elaine Osman RN Registered Nurse             Additional  information:  Forceps:    Vacuum:    Breech:    Observed anomalies      Living?:     Apgars    Living status: Living  Apgar Component Scores:  1 min.:  5 min.:  10 min.:  15 min.:  20 min.:    Skin color:  1  1       Heart rate:  2  2       Reflex irritability:  2  2       Muscle tone:  2  2       Respiratory effort:  2  2       Total:  9  9       Apgars assigned by: ZULMA ABRAHAM RN         Placenta: Delivered:       appearance    Labs: No AM labs. Prenatal labs reviewed - Mother is Rh-positive, rubella non-immune, non-varicella immune. H&H: 7.7/25.8     Disposition: To home, self care    Follow Up: [facility] with Dr. Mukesh Ortega in 6 weeks for postpartum follow-up and 2 hour GTT in the AM.     Patient Instructions:     1. Report to OB ED or call clinic for any bleeding >2 pads/hour for >2 hours, temperature >100.4, foul smelling discharge, pain uncontrolled with medications, or for any " "other concerns.  2. Pelvic rest x6 weeks  3. Rx for Motrin and Colace provided.   4. Post partum contraception: Depo-provera administered on 10/12/2023  5. Gestational DM - controlled with Insulin during pregnancy with history of prediabetes having recent A1c of 6.3 on 04/20/23; will need 2 hour GTT in 6 wk f/u visit; please be fasting in the AM prior to clinic arrival; please exercise moderately for at least 30 mins for 5 days/week and change diet to limit carbohydrate and glucose intake.     Current Discharge Medication List        START taking these medications    Details   docusate sodium (COLACE) 50 MG capsule Take 1 capsule (50 mg total) by mouth 2 (two) times daily as needed for Constipation.  Qty: 10 capsule, Refills: 0      ibuprofen (ADVIL,MOTRIN) 600 MG tablet Take 1 tablet (600 mg total) by mouth every 6 (six) hours as needed for Pain.  Qty: 30 tablet, Refills: 0      PNV 11-iron fum-folic acid-om3 28 mg iron-1 mg -200 mg Cap Take 1 each by mouth once daily.  Qty: 90 each, Refills: 0           STOP taking these medications       insulin detemir U-100, Levemir, (LEVEMIR FLEXPEN) 100 unit/mL (3 mL) InPn pen Comments:   Reason for Stopping:         lansoprazole (PREVACID) 15 MG capsule Comments:   Reason for Stopping:         pen needle, diabetic (PEN NEEDLE) 31 gauge x 5/16" Ndle Comments:   Reason for Stopping:               Mukesh Ortega MD  [employer]  Resident, HO-1    "

## 2023-10-12 NOTE — PLAN OF CARE
Problem: Adult Inpatient Plan of Care  Goal: Plan of Care Review  Outcome: Met  Goal: Patient-Specific Goal (Individualized)  Outcome: Met  Goal: Absence of Hospital-Acquired Illness or Injury  Outcome: Met  Goal: Optimal Comfort and Wellbeing  Outcome: Met  Goal: Readiness for Transition of Care  Outcome: Met     Problem: Bariatric Environmental Safety  Goal: Safety Maintained with Care  Outcome: Met     Problem: Diabetes in Pregnancy  Goal: Blood Glucose Level Within Targeted Range  Outcome: Met     Problem: Infection  Goal: Absence of Infection Signs and Symptoms  Outcome: Met     Problem:  Fall Injury Risk  Goal: Absence of Fall, Infant Drop and Related Injury  Outcome: Met     Problem: Bleeding (Labor)  Goal: Hemostasis  Outcome: Met     Problem: Change in Fetal Wellbeing (Labor)  Goal: Stable Fetal Wellbeing  Outcome: Met     Problem: Delayed Labor Progression (Labor)  Goal: Effective Progression to Delivery  Outcome: Met     Problem: Infection (Labor)  Goal: Absence of Infection Signs and Symptoms  Outcome: Met     Problem: Labor Pain (Labor)  Goal: Acceptable Pain Control  Outcome: Met     Problem: Uterine Tachysystole (Labor)  Goal: Normal Uterine Contraction Pattern  Outcome: Met

## 2023-10-13 LAB
ABO + RH BLD: NORMAL
ABO + RH BLD: NORMAL
BLD PROD TYP BPU: NORMAL
BLD PROD TYP BPU: NORMAL
BLOOD UNIT EXPIRATION DATE: NORMAL
BLOOD UNIT EXPIRATION DATE: NORMAL
BLOOD UNIT TYPE CODE: 6200
BLOOD UNIT TYPE CODE: 6200
CROSSMATCH INTERPRETATION: NORMAL
CROSSMATCH INTERPRETATION: NORMAL
DISPENSE STATUS: NORMAL
DISPENSE STATUS: NORMAL
UNIT NUMBER: NORMAL
UNIT NUMBER: NORMAL

## 2023-10-23 ENCOUNTER — PATIENT OUTREACH (OUTPATIENT)
Dept: FAMILY MEDICINE | Facility: CLINIC | Age: 23
End: 2023-10-23

## 2023-10-23 NOTE — PROGRESS NOTES
Follow-up:Yes    Appointment reminder given for Post-Partum  FM OB 23        Patient verbalized understanding.Yes        Other comments: Spoke with patient, states she is doing well.  10/09/23. Says she is bottle and breast feeding her baby girl and she wakes up every 2 1/2 - 3 hours. She mentioned she is changing to a provider closer to home. Advised patient to go to her post-partum visit 23 and after that visit, she can switch to any provider of her choosing.Explained to patient with her recent delivery her current doctor is up to date on her healthcare for management with her diabetes. Informed patient her navigation for OB will end due to having her baby. Instructed to contact the OB clinic for any issues if needed before her scheduled appointment. Voices understanding. OB navigation closed after having her baby. Instructed to call [facility] if any issues occur before her follow up appointment.                    Education given on

## 2023-10-31 ENCOUNTER — PATIENT MESSAGE (OUTPATIENT)
Dept: MATERNAL FETAL MEDICINE | Facility: CLINIC | Age: 23
End: 2023-10-31

## 2023-10-31 NOTE — PROGRESS NOTES
FETAL ASSESSMENT REPORT    RE: Valente Wilson  MRN:  [id number]  :  2000  AGE:  23 y.o.    Date:  2023    REFERRAL PHYSICIAN: [facility]    Allergies: Fentanyl (bulk) and Oxycodone-acetaminophen    Valente is a 23 y.o.  at 37w2d gestational age here today for a NST.    10/29/2023, by Last Menstrual Period    MEDICATIONS AT TIME OF TEST:    Current Outpatient Medications   Medication Sig Dispense Refill    docusate sodium (COLACE) 50 MG capsule Take 1 capsule (50 mg total) by mouth 2 (two) times daily as needed for Constipation. 10 capsule 0    ibuprofen (ADVIL,MOTRIN) 600 MG tablet Take 1 tablet (600 mg total) by mouth every 6 (six) hours as needed for Pain. 30 tablet 0    PNV 11-iron fum-folic acid-om3 28 mg iron-1 mg -200 mg Cap Take 1 each by mouth once daily. 90 each 0     No current facility-administered medications for this visit.       Indication: gestational diabetes mellitus    Interpretation:  140 BPM baseline    Variability:  Good {> 6 bpm)    Accelerations:  Reactive    Decelerations:  none    Assessment: reactive    Plan:  NST scheduled, NST reactive      Jan Boyer MD  10/31/2023; 10:49 AM     Late note entry, I was available and involved at the time of encounter.

## 2024-01-08 ENCOUNTER — OFFICE VISIT (OUTPATIENT)
Dept: FAMILY MEDICINE | Facility: CLINIC | Age: 24
End: 2024-01-08
Payer: MEDICAID

## 2024-01-08 VITALS
SYSTOLIC BLOOD PRESSURE: 119 MMHG | HEART RATE: 97 BPM | TEMPERATURE: 98 F | WEIGHT: 293 LBS | BODY MASS INDEX: 41.95 KG/M2 | OXYGEN SATURATION: 97 % | DIASTOLIC BLOOD PRESSURE: 83 MMHG | HEIGHT: 70 IN

## 2024-01-08 DIAGNOSIS — M25.531 PAIN IN BOTH WRISTS: ICD-10-CM

## 2024-01-08 DIAGNOSIS — M25.532 PAIN IN BOTH WRISTS: ICD-10-CM

## 2024-01-08 DIAGNOSIS — Z30.42 ENCOUNTER FOR DEPO-PROVERA CONTRACEPTION: ICD-10-CM

## 2024-01-08 DIAGNOSIS — O24.414 INSULIN CONTROLLED GESTATIONAL DIABETES MELLITUS (GDM) IN THIRD TRIMESTER: ICD-10-CM

## 2024-01-08 PROBLEM — J03.90 TONSILLITIS: Status: RESOLVED | Noted: 2023-03-18 | Resolved: 2024-01-08

## 2024-01-08 PROBLEM — E66.9 OBESITY: Status: RESOLVED | Noted: 2022-05-12 | Resolved: 2024-01-08

## 2024-01-08 PROBLEM — Z72.0 TOBACCO USE: Status: RESOLVED | Noted: 2022-05-12 | Resolved: 2024-01-08

## 2024-01-08 PROBLEM — O26.899 INSULIN RESISTANCE COMPLICATING PREGNANCY: Status: RESOLVED | Noted: 2023-06-08 | Resolved: 2024-01-08

## 2024-01-08 PROBLEM — E88.819 INSULIN RESISTANCE COMPLICATING PREGNANCY: Status: RESOLVED | Noted: 2023-06-08 | Resolved: 2024-01-08

## 2024-01-08 PROBLEM — O99.343 MENTAL DISORDER AFFECTING PREGNANCY IN THIRD TRIMESTER: Status: RESOLVED | Noted: 2022-05-26 | Resolved: 2024-01-08

## 2024-01-08 PROBLEM — Z3A.01 5 WEEKS GESTATION OF PREGNANCY: Status: RESOLVED | Noted: 2023-03-18 | Resolved: 2024-01-08

## 2024-01-08 PROBLEM — Z87.59 HISTORY OF POSTPARTUM HEMORRHAGE: Status: RESOLVED | Noted: 2023-06-08 | Resolved: 2024-01-08

## 2024-01-08 PROBLEM — O21.9 NAUSEA AND VOMITING IN PREGNANCY: Status: RESOLVED | Noted: 2023-03-18 | Resolved: 2024-01-08

## 2024-01-08 PROBLEM — O99.213 OBESITY AFFECTING PREGNANCY IN THIRD TRIMESTER: Status: RESOLVED | Noted: 2023-06-08 | Resolved: 2024-01-08

## 2024-01-08 LAB
B-HCG UR QL: NEGATIVE
CTP QC/QA: YES

## 2024-01-08 PROCEDURE — 81025 URINE PREGNANCY TEST: CPT | Mod: PBBFAC

## 2024-01-08 PROCEDURE — 99213 OFFICE O/P EST LOW 20 MIN: CPT | Mod: 25,PBBFAC

## 2024-01-08 PROCEDURE — 96372 THER/PROPH/DIAG INJ SC/IM: CPT | Mod: PBBFAC

## 2024-01-08 RX ORDER — MEDROXYPROGESTERONE ACETATE 150 MG/ML
150 INJECTION, SUSPENSION INTRAMUSCULAR
Status: COMPLETED | OUTPATIENT
Start: 2024-01-08 | End: 2024-01-08

## 2024-01-08 RX ADMIN — MEDROXYPROGESTERONE ACETATE 150 MG: 150 INJECTION, SUSPENSION INTRAMUSCULAR at 03:01

## 2024-01-08 NOTE — PROGRESS NOTES
[facility] OFFICE VISIT NOTE  Valente Wilson  [id number]  2024      Chief Complaint: Postpartum Care     Valente Wilson is a 23 y.o.  presenting to [facility] for post-partum follow up.  She delivered a viable  via  at 37^2 WGA on 10/10/23  without any complications or perineal injury. Pregnancy was complicated by gestational DM insulin controlled and history of postpartum hemorrhage requiring pRBC transfusion in the past.     Complaining of bilateral wrist pain with weakness in  since before pregnancy. + numbness and tingling over palms and 1st 3 digits. States tylenol and Ibuprofen does not relieve pain.      Mental health screening:  Support with childcare- , mother in law, sister in law  Lives with , and 2 other children (9 yo, 2 yo).   Denies SI/HI.       2024     1:00 PM 10/11/2023    10:40 AM   [place]oss  Depression Scale   I have been able to laugh and see the funny side of things. 0 0   I have looked forward with enjoyment to things. 0 1   I have blamed myself unnecessarily when things went wrong. 0 1   I have been anxious or worried for no good reason. 2 2   I have felt scared or panicky for no good reason. 0 2   Things have been getting on top of me. 2 1   I have been so unhappy that I have had difficulty sleeping. 0 0   I have felt sad or miserable. 0 1   I have been so unhappy that I have been crying. 0 1   The thought of harming myself has occurred to me. 0 0     She reports no longer passing lochia. No menstruation yet.   Patient received Depo-provera on 10/12/2023. No issues. Requesting UPT prior to Depo shot today.   Sexually active in monogamous relationship with 1 male partner, no condoms.   Hx of STI: chlamydia 2017 pt and partner both treated  Menstrual History: Onset at 10 years; every 28 days; lasts 5 days; normal flow. Has been amenorrheic since Depo.     She is bottle and breast feeding her baby. Denies pain/mastitis  Contraception  hx: Depo, OCP, patch. Reports no adverse effects.     Review of Systems:   Review of Systems   Constitutional:  Negative for chills and fever.   Eyes:  Negative for blurred vision.   Respiratory:  Negative for shortness of breath.    Cardiovascular:  Negative for chest pain and palpitations.   Gastrointestinal:  Negative for diarrhea, nausea and vomiting.   Genitourinary:  Negative for dysuria and frequency.   Neurological:  Negative for dizziness and headaches.   Psychiatric/Behavioral:  Negative for depression.      Physical Exam:  Vitals:    01/08/24 1320   BP: 119/83   Pulse: 97   Temp: 98.4 °F (36.9 °C)     Physical Exam    Gen: Alert, cooperative, no acute distress  Cardio: RRR  Resp: CTA bilaterally, no increased work of breathing  Abdo: Soft, nontender, nondistended. Fundus firm, small below umbilicus.   Gyn: Declined  Extremities: No LE edema  MSK: Phalen + bilaterally. Decreased sensation over median nerve distribution. Strength fingers and wrist intact bilaterally.   Psych: Normal mood and affect. Thought content and judgement normal      Labs:   Lab Results   Component Value Date    HGB 7.7 (L) 10/11/2023    HCT 25.8 (L) 10/11/2023    WBC 13.13 (H) 10/11/2023     10/11/2023       Current Medications:   Current Outpatient Medications   Medication Sig Dispense Refill    ibuprofen (ADVIL,MOTRIN) 600 MG tablet Take 1 tablet (600 mg total) by mouth every 6 (six) hours as needed for Pain. 30 tablet 0    PNV 11-iron fum-folic acid-om3 28 mg iron-1 mg -200 mg Cap Take 1 each by mouth once daily. 90 each 1     Current Facility-Administered Medications   Medication Dose Route Frequency Provider Last Rate Last Admin    medroxyPROGESTERone (DEPO-PROVERA) injection 150 mg  150 mg Intramuscular 1 time in Clinic/HOD Monisha Howard MD           Assessment and Plan:    1. Postpartum care and examination  To resume exercise, sexual intercourse, housework, driving when comfortable.   Continue to take PNV while  breastfeeding. Prescription refilled.     2. Insulin controlled gestational diabetes mellitus (GDM) in third trimester  - Pt due to 2h GTT, but she declines today. She will come back in 1-2 weeks for GTT.     3. Encounter for Depo-Provera contraception  - POCT urine pregnancy  - administer to day 1/8/24    4. Pain in both wrists  - conservative measures first. Advised OTC wrist splints, to be worn especially at night.       Return to clinic in 1-2 weeks for 2 hr GTT and other issues (including tonsillitis), or sooner if needed.

## 2024-02-01 PROBLEM — Z87.898 HISTORY OF PREDIABETES: Status: ACTIVE | Noted: 2024-02-01

## 2024-02-01 NOTE — PROGRESS NOTES
Chief Complaint  Glucose tolerance test      History of Present Illness  Valente Wilson is a 23 y.o.  presents to the clinic for 2h GTT; last seen 2024    No Polyuria, polydipsia, HA, lightheadedness, no urinary problems  Still breast feeding and formula feeding  Depo for contraception    Interested in referral to ENT for her tonsils.   Was supposed to have a referral in the past that was held due to pregnancy  Reports having 15 episodes of tonsilitis in the last year with persistent white substance in her tonsils, halitosis  Denies current fevers, cough, sore throat    Persistent bilateral wrist pain, numbness and tingling in palms and fingers bilaterally, present prior to pregnancy but has progressively worsened  Now dropping things due to  weakness, unable to  her 12lb baby at times  unable to afford wrist splints, OTC analgesics not beneficial     ROS per HPI      Physical Exam    Vitals:    24 0859   BP: 119/80   Pulse: 77   Temp: 98.4 °F (36.9 °C)     Wt Readings from Last 2 Encounters:   24 (!) 137.3 kg (302 lb 12.8 oz)   24 134 kg (295 lb 6.4 oz)     Gen: NAD, no dysphonia  HEENT: NC/AT, EOMI, normal conjunctiva, shashi TM intact without effusion or retraction, oral cavity and oropharynx without masses or lesions. Shashi tonsils without erythema or enlargement, right tonsillolith. Soft neck, nontender, no LAD, no thyromegaly or tenderness  Neuro: CN II-XII intact   MSK: Shashi wrist fullness and mild left thenar atrophy, ROM intact at wrist and fingers shashi, +Tinels and +Phalens, decreased sensation in median nerve distribution of fingers (first 3 and half digits) shashi      Current Outpatient Medications  Current Outpatient Medications   Medication Instructions    ibuprofen (ADVIL,MOTRIN) 600 mg, Oral, Every 6 hours PRN    PNV 11-iron fum-folic acid-om3 28 mg iron-1 mg -200 mg Cap 1 each, Oral, Daily             Assessment / Plan:    Gestational diabetes mellitus (GDM)  requiring insulin  History of prediabetes  -     Glucose Tolerance, 2 Hours; Future; Expected date: 02/02/2024  -     Glucose, fasting  -     Glucose, Fasting; Future; Expected date: 02/02/2024    Bilateral carpal tunnel syndrome  Unable to optimize conservative management, cost prohibitive. Discussed options of CSI vs surgery. Would like a trial of CSI  -     Ambulatory referral/consult to Sports Medicine; Future; Expected date: 02/09/2024    History of recurrent tonsillitis  -     Ambulatory referral/consult to ENT; Future; Expected date: 02/09/2024               Follow up:    In 1 month, or earlier if needed. GDM     Ligia Hou MD  [employer] FM PGY-3

## 2024-02-02 ENCOUNTER — LAB VISIT (OUTPATIENT)
Dept: FAMILY MEDICINE | Facility: CLINIC | Age: 24
End: 2024-02-02
Payer: MEDICAID

## 2024-02-02 VITALS
BODY MASS INDEX: 41.95 KG/M2 | DIASTOLIC BLOOD PRESSURE: 80 MMHG | OXYGEN SATURATION: 100 % | HEIGHT: 70 IN | HEART RATE: 77 BPM | WEIGHT: 293 LBS | TEMPERATURE: 98 F | SYSTOLIC BLOOD PRESSURE: 119 MMHG

## 2024-02-02 DIAGNOSIS — G56.03 BILATERAL CARPAL TUNNEL SYNDROME: ICD-10-CM

## 2024-02-02 DIAGNOSIS — O24.414 GESTATIONAL DIABETES MELLITUS (GDM) REQUIRING INSULIN: Primary | ICD-10-CM

## 2024-02-02 DIAGNOSIS — Z87.898 HISTORY OF PREDIABETES: ICD-10-CM

## 2024-02-02 DIAGNOSIS — Z87.09 HISTORY OF RECURRENT TONSILLITIS: ICD-10-CM

## 2024-02-02 LAB
GLUCOSE 1H P 100 G GLC PO SERPL-MCNC: 229 MG/DL (ref 100–180)
GLUCOSE P FAST SERPL-MCNC: 138 MG/DL (ref 70–100)
GLUCOSE P FAST SERPL-MCNC: 234 MG/DL (ref 70–100)

## 2024-02-02 PROCEDURE — 82947 ASSAY GLUCOSE BLOOD QUANT: CPT | Mod: 91 | Performed by: STUDENT IN AN ORGANIZED HEALTH CARE EDUCATION/TRAINING PROGRAM

## 2024-02-02 PROCEDURE — 82950 GLUCOSE TEST: CPT | Performed by: STUDENT IN AN ORGANIZED HEALTH CARE EDUCATION/TRAINING PROGRAM

## 2024-02-02 PROCEDURE — 99214 OFFICE O/P EST MOD 30 MIN: CPT | Mod: PBBFAC | Performed by: STUDENT IN AN ORGANIZED HEALTH CARE EDUCATION/TRAINING PROGRAM

## 2024-02-02 PROCEDURE — 82947 ASSAY GLUCOSE BLOOD QUANT: CPT | Performed by: STUDENT IN AN ORGANIZED HEALTH CARE EDUCATION/TRAINING PROGRAM

## 2024-02-02 PROCEDURE — 36415 COLL VENOUS BLD VENIPUNCTURE: CPT | Performed by: STUDENT IN AN ORGANIZED HEALTH CARE EDUCATION/TRAINING PROGRAM

## 2024-02-05 NOTE — PROGRESS NOTES
I reviewed History, PE, A/P and medical record.  Services provided in outpatient department of a teaching hospital/facility, I was immediately available.  I agree with resident. Care provided was reasonable and necessary.   Idiscussed the patient with resident at time of visit.

## 2024-02-07 ENCOUNTER — TELEPHONE (OUTPATIENT)
Dept: FAMILY MEDICINE | Facility: CLINIC | Age: 24
End: 2024-02-07
Payer: MEDICAID

## 2024-02-07 DIAGNOSIS — E11.9 DIABETES MELLITUS WITHOUT COMPLICATION: Primary | ICD-10-CM

## 2024-02-07 RX ORDER — METFORMIN HYDROCHLORIDE 500 MG/1
500 TABLET, EXTENDED RELEASE ORAL
Qty: 90 TABLET | Refills: 1 | Status: SHIPPED | OUTPATIENT
Start: 2024-02-07 | End: 2024-03-05

## 2024-02-07 RX ORDER — INSULIN PUMP SYRINGE, 3 ML
EACH MISCELLANEOUS
Qty: 1 EACH | Refills: 0 | Status: SHIPPED | OUTPATIENT
Start: 2024-02-07 | End: 2024-03-05

## 2024-02-07 NOTE — TELEPHONE ENCOUNTER
Spoke with patient about recent lab results. Failed 2h GTT. Pt understands and agrees with starting metformin  qd and bringing BG log to next visit. Had been on metformin in the past and tolerated fine. All qns answered.

## 2024-02-14 ENCOUNTER — HOSPITAL ENCOUNTER (OUTPATIENT)
Dept: RADIOLOGY | Facility: HOSPITAL | Age: 24
Discharge: HOME | End: 2024-02-14
Attending: STUDENT IN AN ORGANIZED HEALTH CARE EDUCATION/TRAINING PROGRAM
Payer: MEDICAID

## 2024-02-14 ENCOUNTER — OFFICE VISIT (OUTPATIENT)
Dept: ORTHOPEDICS | Facility: CLINIC | Age: 24
End: 2024-02-14
Payer: MEDICAID

## 2024-02-14 VITALS
DIASTOLIC BLOOD PRESSURE: 82 MMHG | HEIGHT: 70 IN | HEART RATE: 87 BPM | WEIGHT: 293 LBS | BODY MASS INDEX: 41.95 KG/M2 | TEMPERATURE: 98 F | SYSTOLIC BLOOD PRESSURE: 124 MMHG

## 2024-02-14 DIAGNOSIS — G56.22 CUBITAL TUNNEL SYNDROME ON LEFT: ICD-10-CM

## 2024-02-14 DIAGNOSIS — G56.03 BILATERAL CARPAL TUNNEL SYNDROME: ICD-10-CM

## 2024-02-14 DIAGNOSIS — G56.03 BILATERAL CARPAL TUNNEL SYNDROME: Primary | ICD-10-CM

## 2024-02-14 PROCEDURE — 73110 X-RAY EXAM OF WRIST: CPT | Mod: TC,RT

## 2024-02-14 PROCEDURE — 99214 OFFICE O/P EST MOD 30 MIN: CPT | Mod: PBBFAC,25

## 2024-02-14 PROCEDURE — 73110 X-RAY EXAM OF WRIST: CPT | Mod: TC,LT

## 2024-02-14 NOTE — PROGRESS NOTES
"Subjective:    Patient ID: Valente Wilson is a right handed 23 y.o. female  who presented to [facility] for new visit.      Chief Complaint: Pain of the Left Wrist and Pain of the Right Wrist      History of Present Illness:  Valente Wilson with a history of bilateral carpal tunnel syndrome presents to the clinic complaining of acute on chronic numbness and tingling of bilateral hands onset 1-2 years ago. Patient admits to numbness/tingling of the bilateral 1st and 2nd digits, worse at the night and with cooking/cleaning/vacuuming. Admits to weaking of her hands when holding objects. Treatment to date: wrist brace.  Patient has not done a EMG/NCS. Denies prior injuries or surgeries to her wrists. Occupation:  Has worked as a  for many years.  Numbness and tingling is affecting her ADL.    Hand Review of Systems:  Swelling?  no  Instability?  no  Clicking?  no  Limited ROM? no  Fever/Chills? no  Subluxation? no  Dislocation? no  Numbness/Tingling? yes  Weakness? yes       Objective:      Physical Exam:    /82   Pulse 87   Temp 97.6 °F (36.4 °C)   Ht 5' 10" (1.778 m)   Wt (!) 136.4 kg (300 lb 12.8 oz)   BMI 43.16 kg/m²     Ortho/SPM Exam    Appearance:  Soft tissue swelling: Left: no Right: no  Effusion: Left:  Negative Right: Negative  Erythema: Left no Right: no  Ecchymosis: Left: no Right: no  Atrophy: Left: no Right: no    Palpation:  Hand/wrist Tenderness: Left: none  Right: none    Range of motion:  Flexion (0-80): Left:  80 Right: 80  Extension (0-70): Left:  70 Right: 70  Ulnar deviation (0-30): 30 Right: 30  Radial deviation (0-20): 20 Right: 20  Supination (0-90): Left: 90 Right: 90  Pronation (0-90): Left: 90 Right: 90  Able to make a power fist and claw hand: on Both hand(s)  Distal palmar crease-finger tip distance: 0 on Both hand(s)    Strength:  Flexion: Left: 5/5 Pain: no Right: 5/5 Pain: no  Extension: Left: 5/5 Pain: no " Right: 5/5 Pain: no  Supination: Left: 5/5 Pain: no Right: 5/5 Pain: no  Pronation: Left: 5/5 Pain: no Right: 5/5 Pain: no  Ulnar deviation: Left: 5/5 Pain: no Right: 5/5 Pain: no  Radial deviation: Left: 5/5 Pain: no Right: 5/5 Pain: no    Special Tests:  Durkans Test (Carpal Compression test): Left: Positive  Right: Positive  Tinels:  Left: Positive  Right: Positive    Phalens: Left: Positive  Right: Positive    Skip Litler test:  Left: Negative  Right: Negative    UCL laxity: Left: Not performed  Right: Not performed  FDP test: Left: Negative  Right: Negative  FDS test: Left: Negative  Right: Negative  Reverse Phalens: Left: Not performed Right: Not performed  Finkelstein's Test: Left: Negative Right: Negative    Froments: Left: Not performed Right: Not performed  Shuck Test: Left: Not performed Right: Not performed  Positive left tinels at the cubital tunnel and guy[place] canal , and negative cubital tunnel on the right     AIN/PIN/Ulnar nerve: Intact and symmetric    General appearance: NAD  Peripheral pulses: normal bilaterally   Reflexes: Left: Not performed Right: Not performed   Sensation: normal    Labs:  Last A1c: 6.3     Imaging:   Previous images not done.  X-rays ordered and performed today: yes  # of views: 3 Laterality: bilateral  My Interpretation:  Distal Radial ulnar joint space is overall Normal on AP views. Scapholunate interval distance is Normal on bilateral AP views. A DISI/VISI is not suggested on bilateral hand series. Negative ulnar variance is suggested on lbilateral lateral views.  no fracture, dislocation, swelling or degenerative changes noted          Assessment:        Encounter Diagnoses   Code Name Primary?    G56.03 Bilateral carpal tunnel syndrome Yes    G56.22 Cubital tunnel syndrome on left         Plan:     MDM: Prior external referring provider notes reviewed. Prior external referring provider studies reviewed.    Dx: bilateral.  Carpal tunnel syndrome, and left cubital  tunnel syndrome, initial encounter  Treatment Plan: Discussed with patient diagnosis and treatment recommendations.   Patient declined CSI today.  We will treat patient conservatively.  Bilateral wrist brace was given to the patient.  Home exercise program was given to the patient.  Nerve conduction study test was ordered today.  She will follow up with us to review nerve conduction study test.  Consider CSI during her next visit.  Imaging: radiological studies ordered and independently reviewed; discussed with patient; pending radiologist interpretation.   Procedure:  See plan above.  Activity: Activity as tolerated  Therapy:  Home exercise program  Medication: CONTINUE over-the-counter acetaminophen (Tylenol 1000 mg three times per day as needed). Please see your primary care physician for further refills.  RTC:  To review nerve conduction study test results.         Procedures    This note is dictated using the M*Modal Fluency Direct word recognition program. There are word recognition mistakes that are occasionally missed on review.    Harry Byrd D.O.  Sports Medicine Fellow

## 2024-03-05 ENCOUNTER — OFFICE VISIT (OUTPATIENT)
Dept: FAMILY MEDICINE | Facility: CLINIC | Age: 24
End: 2024-03-05
Payer: MEDICAID

## 2024-03-05 VITALS
WEIGHT: 293 LBS | BODY MASS INDEX: 41.95 KG/M2 | SYSTOLIC BLOOD PRESSURE: 128 MMHG | HEART RATE: 85 BPM | TEMPERATURE: 98 F | DIASTOLIC BLOOD PRESSURE: 71 MMHG | OXYGEN SATURATION: 97 % | HEIGHT: 70 IN

## 2024-03-05 DIAGNOSIS — R73.09 ABNORMAL GTT (GLUCOSE TOLERANCE TEST): ICD-10-CM

## 2024-03-05 DIAGNOSIS — F90.0 ATTENTION DEFICIT HYPERACTIVITY DISORDER (ADHD), PREDOMINANTLY INATTENTIVE TYPE: Primary | ICD-10-CM

## 2024-03-05 PROCEDURE — 99213 OFFICE O/P EST LOW 20 MIN: CPT | Mod: PBBFAC

## 2024-03-05 RX ORDER — DEXTROAMPHETAMINE SACCHARATE, AMPHETAMINE ASPARTATE, DEXTROAMPHETAMINE SULFATE AND AMPHETAMINE SULFATE 5; 5; 5; 5 MG/1; MG/1; MG/1; MG/1
1 TABLET ORAL DAILY
Qty: 30 TABLET | Refills: 0 | Status: SHIPPED | OUTPATIENT
Start: 2024-03-05 | End: 2024-04-04

## 2024-03-05 RX ORDER — DEXTROAMPHETAMINE SACCHARATE, AMPHETAMINE ASPARTATE, DEXTROAMPHETAMINE SULFATE AND AMPHETAMINE SULFATE 5; 5; 5; 5 MG/1; MG/1; MG/1; MG/1
1 TABLET ORAL DAILY
Qty: 30 TABLET | Refills: 0 | Status: SHIPPED | OUTPATIENT
Start: 2024-04-05 | End: 2024-05-05

## 2024-03-05 RX ORDER — DEXTROAMPHETAMINE SACCHARATE, AMPHETAMINE ASPARTATE, DEXTROAMPHETAMINE SULFATE AND AMPHETAMINE SULFATE 5; 5; 5; 5 MG/1; MG/1; MG/1; MG/1
1 TABLET ORAL DAILY
Qty: 30 TABLET | Refills: 0 | Status: SHIPPED | OUTPATIENT
Start: 2024-05-06 | End: 2024-06-05

## 2024-03-05 NOTE — PROGRESS NOTES
[facility] FM Clinic Note    CHIEF COMPLAINT:  Chief Complaint   Patient presents with    ADHD       HISTORY OF  PRESENT ILLNESS:  Valente Wilson is a 23 y.o. female w/PMHx of ADHD, who presents to clinic today for medication refill.     Acute issues:  1) Abnormal 2 hour gtt  Pt was seen 6 wks postpartum on 24 for 2 hour fasting gtt in setting of gestational DM of pregnancy   Previous pregnancy  Failed 1 hour and 3 hour GTT during pregnancy   Was on Levemir 18AM and 12HS during pregnancy   2 hr gtt postpartum  Fasting glucose 138  2 hr  (incorrectly logged for 1 Hr GTT on chart by lab)  Patient did not inform PCP at the time that she was not fasting as she ate dinner past midnight the night of her scheduled appt for fasting 2 hr GTT, causing abnormal fasting glucose results  She was started on metformin 500 mg qd with glucose logs to be measured   Today she is supposed to been for her 1 month f/u of her taking her metformin 500 mg qd  She admits today that she did not take her metformin as instructed because she does not believe she has diabetes  She admits her previous A1c was < 6.5  Last A1c 6.3 on 23 per chart review  She denies vision changes, nausea/vomiting, constipation/diarrhea, tingling sensation in lower extremities     Chronic issues:  1) ADHD predominantly inattentive type  Was previously on Adderrall 20 mg qAM  Last controlled-substance contract was signed on 2021 with previous PCP   She states her she has difficulty concentrating and staying on track with her chores; she also admits to getting easily distracted and then forgetting to complete the task at hand  She states her symptoms are now exacerbated due to having to raise her  child       REVIEW OF SYSTEMS:  See HPI      Health Maintenance:  Cervical Cancer Screen: 2022 - NIL  Immunizations: UTD      PMHx:  ADHD - Adderrall 20 mg qAM  Gestational Diabetes   Previously on Levemir 18AM and 12HS during previous  "pregnancy     Past Medical History:   Diagnosis Date    ADD (attention deficit disorder)     Anemia     Bipolar disorder, unspecified     History of prediabetes 2024    Insulin controlled gestational diabetes mellitus (GDM) in third trimester 10/05/2023    PCOS (polycystic ovarian syndrome)     Tobacco use 2022      Past Surgical History:   Procedure Laterality Date    SALPINGECTOMY      WISDOM TOOTH EXTRACTION        Social History     Socioeconomic History    Marital status: Single   Tobacco Use    Smoking status: Every Day     Current packs/day: 0.00     Types: Cigarettes     Last attempt to quit: 2023     Years since quittin.0     Passive exposure: Never    Smokeless tobacco: Never    Tobacco comments:     3 cigarettes per day   Substance and Sexual Activity    Alcohol use: Not Currently    Drug use: Never    Sexual activity: Yes     Partners: Male         Review of Most Recent Wound Care-Related Labs:  Lab Results   Component Value Date/Time    WBC 13.13 (H) 10/11/2023 03:56 AM    RBC 3.44 (L) 10/11/2023 03:56 AM    HGB 7.7 (L) 10/11/2023 03:56 AM    HCT 25.8 (L) 10/11/2023 03:56 AM    MCV 75.0 (L) 10/11/2023 03:56 AM    MCH 22.4 (L) 10/11/2023 03:56 AM    CREATININE 0.72 2023 11:55 AM    HGBA1C 6.3 2023 11:55 AM        PHYSICAL EXAMINATION:  Blood pressure 128/71, pulse 85, temperature 97.7 °F (36.5 °C), temperature source Oral, height 5' 10" (1.778 m), weight (!) 136.4 kg (300 lb 9.6 oz), SpO2 97 %, not currently breastfeeding.    General:  VSS. No acute distress.   Cardiovascular:  RRR, no additional heart sounds heard.  ABD: BS +, soft, nontender, obese    ASSESSMENT/PLAN:    1) Abnormal 2hr GTT  False 2hr GTT result due to patient non-fasting  Patient admits to eating after midnight today prior to clinic arrival  Will defer to fasting glucose test today in clinic  Informed patient to come back to clinic in next f/u visit in the morning for fasting glucose test  Emphasized to " patient to not eat after midnight prior to next clinic appt  Patient voiced understanding  If fasting glucose < 200, no indication to start metformin   If greater > 200, consider repeat A1c and if greater than 6.5; start metformin 500 mg qd     2) ADHD predominantly inattentive type  Pain-contract signed today in clinic  Ordered 3 month supply (30-day each) of Adderrall 20 mg qAM to [facility] ([address])      - RTC in 3-4 wks for fasting glucose test for diabetes testing       Mukesh Otrega MD   [facility] Resident HO-2  03/05/2024

## 2024-03-06 NOTE — PROGRESS NOTES
Discussed with resident at time of encounter 03-05-24.  Hx. of gestational diabetes requiring daily insulin.  Recent postpartum GTT reportedly inaccurate.  ADD; previous use of Adderall effective.    Resident's note reviewed 03-06-24.  Agree with assessment; plan of care appropriate.  Follow-up ordered testing; monitor for potential adverse medication effects.  Professional services provided in an outpatient primary care center affiliated with a teaching institution.

## 2024-03-09 NOTE — PROGRESS NOTES
Faculty Attestation: Valente Wilson  was seen in Sports Medicine Clinic. Discussed with Dr. Byrd at the time of the visit. History of Present Illness, Physical Exam, and Assessment and Plan reviewed. Treatment plan is reasonable and appropriate. Compliance with treatment recommendations is important.  Radiology images independently reviewed and agree with radiologist interpretation.  No procedure was performed.     Jessica Lentz MD  Sports Medicine       Jenny Nuñez Montano

## 2024-05-09 ENCOUNTER — OFFICE VISIT (OUTPATIENT)
Dept: OTOLARYNGOLOGY | Facility: CLINIC | Age: 24
End: 2024-05-09
Payer: MEDICAID

## 2024-05-09 VITALS — SYSTOLIC BLOOD PRESSURE: 102 MMHG | TEMPERATURE: 99 F | HEART RATE: 85 BPM | DIASTOLIC BLOOD PRESSURE: 71 MMHG

## 2024-05-09 DIAGNOSIS — K21.9 GASTROESOPHAGEAL REFLUX DISEASE, UNSPECIFIED WHETHER ESOPHAGITIS PRESENT: ICD-10-CM

## 2024-05-09 DIAGNOSIS — Z87.09 HISTORY OF RECURRENT TONSILLITIS: Primary | ICD-10-CM

## 2024-05-09 DIAGNOSIS — J34.3 NASAL TURBINATE HYPERTROPHY: ICD-10-CM

## 2024-05-09 DIAGNOSIS — G47.30 SLEEP DISORDER BREATHING: ICD-10-CM

## 2024-05-09 DIAGNOSIS — R09.82 PND (POST-NASAL DRIP): ICD-10-CM

## 2024-05-09 DIAGNOSIS — R09.A2 GLOBUS SENSATION: ICD-10-CM

## 2024-05-09 PROCEDURE — 1159F MED LIST DOCD IN RCRD: CPT | Mod: CPTII,,, | Performed by: NURSE PRACTITIONER

## 2024-05-09 PROCEDURE — 99204 OFFICE O/P NEW MOD 45 MIN: CPT | Mod: S$PBB,,, | Performed by: NURSE PRACTITIONER

## 2024-05-09 PROCEDURE — 3078F DIAST BP <80 MM HG: CPT | Mod: CPTII,,, | Performed by: NURSE PRACTITIONER

## 2024-05-09 PROCEDURE — 3074F SYST BP LT 130 MM HG: CPT | Mod: CPTII,,, | Performed by: NURSE PRACTITIONER

## 2024-05-09 PROCEDURE — 99213 OFFICE O/P EST LOW 20 MIN: CPT | Mod: PBBFAC | Performed by: NURSE PRACTITIONER

## 2024-05-09 RX ORDER — FLUTICASONE PROPIONATE 50 MCG
2 SPRAY, SUSPENSION (ML) NASAL 2 TIMES DAILY
Qty: 16 G | Refills: 11 | Status: SHIPPED | OUTPATIENT
Start: 2024-05-09 | End: 2024-06-08

## 2024-05-09 RX ORDER — FAMOTIDINE 40 MG/1
40 TABLET, FILM COATED ORAL NIGHTLY
Qty: 90 TABLET | Refills: 4 | Status: SHIPPED | OUTPATIENT
Start: 2024-05-09 | End: 2025-05-09

## 2024-05-09 NOTE — PROGRESS NOTES
[facility]  Otolaryngology Clinic Note    Valente Wilson  YOB: 2000    Chief Complaint:   Chief Complaint   Patient presents with    referral: Tonsillitis        HPI: 2024: 23 y.o. female referred for tonsil concerns. She reports having recurrent tonsillitis r/t tonsil stones. She does not have recurrent strep tonsillitis. She reports that her tonsils swell and hurt, that she frequently reports to the ED for this. Also endorses chronic halitosis and globus. States she coughs up thick mucous daily upon waking. Tonsil symptoms began when she was in middle school. She took medicine for reflux when she was pregnant but reports no change in her symptoms. She feels her reflux resolved following childbirth. Endorses good oral hygiene but has never used a water pick, stating she is unable to afford it. She is unsure if she snores at night as she sleeps alone, but she wakes frequently and feels tired during the day even if she gets adequate sleep. Reports waking with very dry mouth.     ROS:   10-point review of systems negative except per HPI      Review of patient's allergies indicates:   Allergen Reactions    Fentanyl (bulk) Itching    Oxycodone-acetaminophen Hives     pt broke out in rash  Other reaction(s): RASH       Past Medical History:   Diagnosis Date    ADD (attention deficit disorder)     Anemia     Bipolar disorder, unspecified     History of prediabetes 2024    Insulin controlled gestational diabetes mellitus (GDM) in third trimester 10/05/2023    PCOS (polycystic ovarian syndrome)     Tobacco use 2022       Past Surgical History:   Procedure Laterality Date    SALPINGECTOMY      WISDOM TOOTH EXTRACTION         Social History     Socioeconomic History    Marital status: Legally    Tobacco Use    Smoking status: Every Day     Current packs/day: 0.00     Types: Cigarettes     Last attempt to quit: 2023     Years since quittin.1     Passive  exposure: Never    Smokeless tobacco: Never    Tobacco comments:     3 cigarettes per day   Substance and Sexual Activity    Alcohol use: Not Currently    Drug use: Never    Sexual activity: Yes     Partners: Male       Family History   Problem Relation Name Age of Onset    Hypertension Mother      Diabetes Maternal Grandmother      Bipolar disorder Father      Mental illness Father         Outpatient Encounter Medications as of 5/9/2024   Medication Sig Dispense Refill    dextroamphetamine-amphetamine (ADDERALL) 20 mg tablet Take 1 tablet by mouth once daily. 30 tablet 0    ibuprofen (ADVIL,MOTRIN) 600 MG tablet Take 1 tablet (600 mg total) by mouth every 6 (six) hours as needed for Pain. 30 tablet 0    dextroamphetamine-amphetamine (ADDERALL) 20 mg tablet Take 1 tablet by mouth once daily. 30 tablet 0     No facility-administered encounter medications on file as of 5/9/2024.       Physical Exam:  Vitals:    05/09/24 0908   BP: 102/71   BP Location: Right arm   Patient Position: Sitting   BP Method: Large (Automatic)   Pulse: 85   Temp: 98.6 °F (37 °C)   TempSrc: Oral       Physical Exam   General: NAD, voice normal  Neuro: AAO, CN II - XII grossly intact  Head/ Face: NCAT, symmetric, sensations intact bilaterally  Eyes: EOMI, PERRL  Ears: externally normal with grossly normal hearing  AD: EAC patent, TM intact, no middle ear effusion, no retractions  AS: EAC patent, TM intact, no middle ear effusion, no retractions  Nose: bilateral nares patent, midline septum, no rhinorrhea, no external deformity, +ITH  OC/OP: MMM, no intraoral lesions, FOM/BOT soft, no trismus, dentition is good, no uvular deviation, bilaterally symmetric soft palate elevation, palatoglossus and palatopharyngeal fold wnl; tonsils are symmetric and 2+, cryptic with debris b/l but no obvious tonsilloliths on exam today. There is mucoid PND. Mallampati IV with significant lateral pharyngeal wall collapse upon gag  Indirect laryngoscopy: deferred  due to patient intolerance  Neck: soft, supple, no LAD, normal ROM, no thyromegaly  Respiratory: nonlabored, no wheezing, bilateral chest rise  Cardiovascular: RRR  Gastrointestinal: S NT ND  Skin: warm, no lesions  Musculoskeletal: 5/5 strength  Psych: Appropriate affect/mood     Pertinent Data:  ? LABS:  ? AUDIO:           ? PATH:      Imaging:   I personally reviewed the following images:        Assessment/Plan:  23 y.o. female with recurrent tonsilloliths/tonsillitis, globus/GERD, PND, turbinate hypertrophy, sleep d/o breathing. Will maximize medical therapy prior to surgical consideration. CT maxface 2020 with turbinate hypertrophy.  - Flonase BID  - Pepcid daily  - GERD lifestyle modifications  - Conservative mgmt of tonsilloliths  - PSG  - RTC 3mo on Res template    Sharmila Villalba NP

## 2024-05-28 NOTE — ASSESSMENT & PLAN NOTE
Patient counseled on cessation of tobacco use and avoidance of second hand smoke inhalation for duration of the pregnancy and postpartum period secondary to the associated fetal/ risks that include the following: spontaneous , placental abruption, low birth weight, oligohydramnios, non-reassuring fetal status, and sudden infant death syndrome (SIDS).     28-May-2024 18:41

## 2024-07-19 ENCOUNTER — OFFICE VISIT (OUTPATIENT)
Dept: FAMILY MEDICINE | Facility: CLINIC | Age: 24
End: 2024-07-19
Payer: MEDICAID

## 2024-07-19 VITALS
DIASTOLIC BLOOD PRESSURE: 73 MMHG | WEIGHT: 293 LBS | OXYGEN SATURATION: 97 % | RESPIRATION RATE: 18 BRPM | BODY MASS INDEX: 41.95 KG/M2 | HEART RATE: 88 BPM | TEMPERATURE: 98 F | SYSTOLIC BLOOD PRESSURE: 108 MMHG | HEIGHT: 70 IN

## 2024-07-19 DIAGNOSIS — R73.03 PREDIABETES: ICD-10-CM

## 2024-07-19 DIAGNOSIS — E11.9 TYPE 2 DIABETES MELLITUS WITHOUT COMPLICATION, WITHOUT LONG-TERM CURRENT USE OF INSULIN: Primary | ICD-10-CM

## 2024-07-19 DIAGNOSIS — M70.42 PREPATELLAR BURSITIS OF BOTH KNEES: ICD-10-CM

## 2024-07-19 DIAGNOSIS — F90.0 ATTENTION DEFICIT HYPERACTIVITY DISORDER (ADHD), PREDOMINANTLY INATTENTIVE TYPE: ICD-10-CM

## 2024-07-19 DIAGNOSIS — M70.41 PREPATELLAR BURSITIS OF BOTH KNEES: ICD-10-CM

## 2024-07-19 PROBLEM — Z87.898 HISTORY OF PREDIABETES: Status: RESOLVED | Noted: 2024-02-01 | Resolved: 2024-07-19

## 2024-07-19 LAB
ALBUMIN SERPL-MCNC: 3.7 G/DL (ref 3.5–5)
ALBUMIN/GLOB SERPL: 0.9 RATIO (ref 1.1–2)
ALP SERPL-CCNC: 72 UNIT/L (ref 40–150)
ALT SERPL-CCNC: 27 UNIT/L (ref 0–55)
ANION GAP SERPL CALC-SCNC: 11 MEQ/L
AST SERPL-CCNC: 17 UNIT/L (ref 5–34)
BASOPHILS # BLD AUTO: 0.06 X10(3)/MCL
BASOPHILS NFR BLD AUTO: 0.7 %
BILIRUB SERPL-MCNC: 0.2 MG/DL
BUN SERPL-MCNC: 11.6 MG/DL (ref 7–18.7)
CALCIUM SERPL-MCNC: 9.8 MG/DL (ref 8.4–10.2)
CHLORIDE SERPL-SCNC: 106 MMOL/L (ref 98–107)
CHOLEST SERPL-MCNC: 163 MG/DL
CHOLEST/HDLC SERPL: 5 {RATIO} (ref 0–5)
CO2 SERPL-SCNC: 25 MMOL/L (ref 22–29)
CREAT SERPL-MCNC: 0.87 MG/DL (ref 0.55–1.02)
CREAT/UREA NIT SERPL: 13
EOSINOPHIL # BLD AUTO: 0.28 X10(3)/MCL (ref 0–0.9)
EOSINOPHIL NFR BLD AUTO: 3.2 %
ERYTHROCYTE [DISTWIDTH] IN BLOOD BY AUTOMATED COUNT: 17.3 % (ref 11.5–17)
EST. AVERAGE GLUCOSE BLD GHB EST-MCNC: 194.4 MG/DL
GFR SERPLBLD CREATININE-BSD FMLA CKD-EPI: >60 ML/MIN/1.73/M2
GLOBULIN SER-MCNC: 4 GM/DL (ref 2.4–3.5)
GLUCOSE SERPL-MCNC: 284 MG/DL (ref 74–100)
HBA1C MFR BLD: 8.4 %
HCT VFR BLD AUTO: 36.6 % (ref 37–47)
HDLC SERPL-MCNC: 32 MG/DL (ref 35–60)
HGB BLD-MCNC: 11.3 G/DL (ref 12–16)
IMM GRANULOCYTES # BLD AUTO: 0.02 X10(3)/MCL (ref 0–0.04)
IMM GRANULOCYTES NFR BLD AUTO: 0.2 %
LDLC SERPL CALC-MCNC: 85 MG/DL (ref 50–140)
LYMPHOCYTES # BLD AUTO: 2.9 X10(3)/MCL (ref 0.6–4.6)
LYMPHOCYTES NFR BLD AUTO: 32.7 %
MCH RBC QN AUTO: 24.2 PG (ref 27–31)
MCHC RBC AUTO-ENTMCNC: 30.9 G/DL (ref 33–36)
MCV RBC AUTO: 78.4 FL (ref 80–94)
MONOCYTES # BLD AUTO: 0.48 X10(3)/MCL (ref 0.1–1.3)
MONOCYTES NFR BLD AUTO: 5.4 %
NEUTROPHILS # BLD AUTO: 5.13 X10(3)/MCL (ref 2.1–9.2)
NEUTROPHILS NFR BLD AUTO: 57.8 %
NRBC BLD AUTO-RTO: 0 %
PLATELET # BLD AUTO: 300 X10(3)/MCL (ref 130–400)
PMV BLD AUTO: 12.4 FL (ref 7.4–10.4)
POTASSIUM SERPL-SCNC: 4 MMOL/L (ref 3.5–5.1)
PROT SERPL-MCNC: 7.7 GM/DL (ref 6.4–8.3)
RBC # BLD AUTO: 4.67 X10(6)/MCL (ref 4.2–5.4)
SODIUM SERPL-SCNC: 142 MMOL/L (ref 136–145)
TRIGL SERPL-MCNC: 232 MG/DL (ref 37–140)
TSH SERPL-ACNC: 2.75 UIU/ML (ref 0.35–4.94)
VLDLC SERPL CALC-MCNC: 46 MG/DL
WBC # BLD AUTO: 8.87 X10(3)/MCL (ref 4.5–11.5)

## 2024-07-19 PROCEDURE — 83036 HEMOGLOBIN GLYCOSYLATED A1C: CPT

## 2024-07-19 PROCEDURE — 80061 LIPID PANEL: CPT

## 2024-07-19 PROCEDURE — 85025 COMPLETE CBC W/AUTO DIFF WBC: CPT

## 2024-07-19 PROCEDURE — 99214 OFFICE O/P EST MOD 30 MIN: CPT | Mod: PBBFAC

## 2024-07-19 PROCEDURE — 80053 COMPREHEN METABOLIC PANEL: CPT

## 2024-07-19 PROCEDURE — 84443 ASSAY THYROID STIM HORMONE: CPT

## 2024-07-19 PROCEDURE — 36415 COLL VENOUS BLD VENIPUNCTURE: CPT

## 2024-07-19 RX ORDER — LANCETS
1 EACH MISCELLANEOUS 2 TIMES DAILY WITH MEALS
Qty: 200 EACH | Refills: 4 | Status: SHIPPED | OUTPATIENT
Start: 2024-07-19

## 2024-07-19 RX ORDER — DEXTROAMPHETAMINE SACCHARATE, AMPHETAMINE ASPARTATE, DEXTROAMPHETAMINE SULFATE AND AMPHETAMINE SULFATE 5; 5; 5; 5 MG/1; MG/1; MG/1; MG/1
1 TABLET ORAL DAILY
Qty: 30 TABLET | Refills: 0 | Status: SHIPPED | OUTPATIENT
Start: 2024-07-19 | End: 2024-08-18

## 2024-07-19 RX ORDER — DEXTROAMPHETAMINE SACCHARATE, AMPHETAMINE ASPARTATE, DEXTROAMPHETAMINE SULFATE AND AMPHETAMINE SULFATE 5; 5; 5; 5 MG/1; MG/1; MG/1; MG/1
1 TABLET ORAL DAILY
Qty: 30 TABLET | Refills: 0 | Status: SHIPPED | OUTPATIENT
Start: 2024-08-19 | End: 2024-09-18

## 2024-07-19 RX ORDER — ISOPROPYL ALCOHOL 70 ML/100ML
1 SWAB TOPICAL 2 TIMES DAILY WITH MEALS
Qty: 200 EACH | Refills: 4 | Status: SHIPPED | OUTPATIENT
Start: 2024-07-19

## 2024-07-19 RX ORDER — DEXTROAMPHETAMINE SACCHARATE, AMPHETAMINE ASPARTATE, DEXTROAMPHETAMINE SULFATE AND AMPHETAMINE SULFATE 5; 5; 5; 5 MG/1; MG/1; MG/1; MG/1
1 TABLET ORAL DAILY
Qty: 30 TABLET | Refills: 0 | Status: SHIPPED | OUTPATIENT
Start: 2024-09-19 | End: 2024-10-19

## 2024-07-19 RX ORDER — INSULIN PUMP SYRINGE, 3 ML
EACH MISCELLANEOUS
Qty: 1 EACH | Refills: 0 | Status: SHIPPED | OUTPATIENT
Start: 2024-07-19 | End: 2025-07-19

## 2024-07-19 NOTE — PROGRESS NOTES
[facility] FM Clinic Note    CHIEF COMPLAINT:  Chief Complaint   Patient presents with    Follow-up     Requests referral for Ortho for SIA knees, right ankle; med refills; states felt lumps in right breast about 1 month ago       HISTORY OF  PRESENT ILLNESS:  Valente Wilson is a 23 y.o. female w/PMHx of ADHD and prediabetes, who presents to clinic today for prediabetes follow-up.     Acute issues:  1) Bilateral knee pain   Onset was in middle school   Worse on right than left knee; exacerbated from transition from sitting to standing  States she feels her knee gets out of place  Previously used to see sports medicine in [facility] but stopped seeing them back in 2013  Currently takes nothing for pain but is mostly tolerable   Denies difficulty with ambulation, change in lower extremity sensation/loss of strength     Chronic issues:  1) Prediabetes  Last A1c was 6.3 on 04/20/23 per chart review   Pt was seen for her 6 wk postpartum visit on 02/02/24 for her 2 hr fasting gtt in setting of gestational DM of pregnancy   Previous pregnancy  Failed 1 hour and 3 hour GTT during pregnancy   Was on Levemir 18AM and 12HS during her previous pregnancy   2 hr gtt postpartum on 02/02/24 - Fasting glucose 138; 2 hr  (incorrectly logged for 1 Hr GTT on chart by lab)  Patient did not inform PCP at the time that she was not fasting as she ate dinner past midnight the night of her scheduled appt for fasting 2 hr GTT, causing abnormal fasting glucose results  She was started on metformin 500 mg qd with glucose logs to be measured which she did not start  Only walks about 5-10 mins outside  Diet consists of avoiding fast food and fried food, consisting of home cooked meals of vegetables, meats, carbs    2) ADHD predominantly inattentive type    Currently on Adderrall 20 mg qAM, requesting refills  Last filled 05/13/24  Pain contract singed on 03/05/24 - see media  States medications doing well for her and is able to complete her  chores around the round; help take care of her 9 year old son, Gagandeep.  Denies chest pain, palpitations      REVIEW OF SYSTEMS:  See HPI      Health Maintenance:  Cervical Cancer Screen: 2022 - NIL; repeat due 2025  Immunizations: PCV due - declined at this time  Patient does not drive and relies on public transportation to clinic    Care team:  none    PMHx:  ADHD predominantly inattentive type - Adderrall 20 mg qAM  Prediabetes w/hx of gestational DM   on Levemir 18AM and 12HS during previous pregnancy   Last A1c was 6.3 on 23      Past Medical History:   Diagnosis Date    ADD (attention deficit disorder)     Anemia     Bipolar disorder, unspecified     History of prediabetes 2024    Insulin controlled gestational diabetes mellitus (GDM) in third trimester 10/05/2023    PCOS (polycystic ovarian syndrome)     Tobacco use 2022      Past Surgical History:   Procedure Laterality Date    SALPINGECTOMY      WISDOM TOOTH EXTRACTION        Social History     Socioeconomic History    Marital status: Legally    Tobacco Use    Smoking status: Every Day     Current packs/day: 0.00     Types: Cigarettes     Last attempt to quit: 2023     Years since quittin.3     Passive exposure: Never    Smokeless tobacco: Never    Tobacco comments:     3 cigarettes per day   Substance and Sexual Activity    Alcohol use: Not Currently    Drug use: Never    Sexual activity: Yes     Partners: Male         Review of Most Recent Wound Care-Related Labs:  Lab Results   Component Value Date/Time    WBC 8.87 2024 10:38 AM    RBC 4.67 2024 10:38 AM    HGB 11.3 (L) 2024 10:38 AM    HCT 36.6 (L) 2024 10:38 AM    MCV 78.4 (L) 2024 10:38 AM    MCH 24.2 (L) 2024 10:38 AM    CREATININE 0.87 2024 10:38 AM    HGBA1C 8.4 (H) 2024 10:38 AM        PHYSICAL EXAMINATION:  Blood pressure 108/73, pulse 88, temperature 98.2 °F (36.8 °C), temperature source Oral, resp. rate 18,  "height 5' 10" (1.778 m), weight 135.6 kg (299 lb), SpO2 97%, not currently breastfeeding.    General:  VSS. No acute distress.   Respiratory: clear to ascultation in all lung fields  Cardiovascular:  RRR, no additional heart sounds heard.       ASSESSMENT/PLAN:    1) Bilateral Knee pain  Provided patient to home exercise handouts for patient to do at home  Informed patient to take OTC Tylenol/Motrin for pain relief  Ordered X-ray bilateral knee due to chronicity of knee pain; plan to f/u and inform patient about results    2) T2DM  Ordered A1c today that was 8.4 compared to 6.3 on 04/20/23  Will start metformin 500 mg BID with meals; will prescribe home glucose machine for CBG checks, lancets, strips, and alcohol pads  Patient was not able to reached after clinic hours of results; plan to call back at later time with results and recommendations    3) ADHD  Refilled Adderall 20 mg qAM 3 month supply    - RTC in 3 months for routine f/u and T2DM/A1c repeat check       Mukesh Ortega MD   [facility] Family Medicine Resident HO-1  07/19/2024     "

## 2024-07-22 ENCOUNTER — TELEPHONE (OUTPATIENT)
Dept: FAMILY MEDICINE | Facility: CLINIC | Age: 24
End: 2024-07-22
Payer: MEDICAID

## 2024-07-22 DIAGNOSIS — D50.9 MICROCYTIC ANEMIA: Primary | ICD-10-CM

## 2024-07-22 RX ORDER — FERROUS SULFATE 325(65) MG
325 TABLET ORAL EVERY OTHER DAY
Qty: 45 TABLET | Refills: 1 | Status: SHIPPED | OUTPATIENT
Start: 2024-07-22 | End: 2025-01-18

## 2024-07-22 RX ORDER — METFORMIN HYDROCHLORIDE 500 MG/1
500 TABLET ORAL 2 TIMES DAILY WITH MEALS
Qty: 180 TABLET | Refills: 3 | Status: SHIPPED | OUTPATIENT
Start: 2024-07-22 | End: 2025-07-22

## 2024-07-22 NOTE — TELEPHONE ENCOUNTER
Contacted patient regarding recent A1c changes and diabetic regiment moving forward consisting of Metformin 500 mg BID; informed patient about CBG checks with meals along with dietary changes consisting of low-glycemic index foods plus 30 mins of aerobic exercise daily.   MCV < 80 in CBC; patient used to be on iron supplementation but was stopped.   - will prescribe Iron 65 mg every other day     Mukesh Ortega MD   [facility] Family Medicine Resident HO-1  07/22/2024

## 2024-08-13 NOTE — PROGRESS NOTES
[facility]  Otolaryngology Clinic Note    Valente Wilson  YOB: 2000    Chief Complaint:   Chief Complaint   Patient presents with    referral: Tonsillitis        HPI: 05/09/2024: 23 y.o. female referred for tonsil concerns. She reports having recurrent tonsillitis r/t tonsil stones. She does not have recurrent strep tonsillitis. She reports that her tonsils swell and hurt, that she frequently reports to the ED for this. Also endorses chronic halitosis and globus. States she coughs up thick mucous daily upon waking. Tonsil symptoms began when she was in middle school. She took medicine for reflux when she was pregnant but reports no change in her symptoms. She feels her reflux resolved following childbirth. Endorses good oral hygiene but has never used a water pick, stating she is unable to afford it. She is unsure if she snores at night as she sleeps alone, but she wakes frequently and feels tired during the day even if she gets adequate sleep. Reports waking with very dry mouth.     08/13/2024: Endorses compliance with conservative measures, however, states her symptoms have not improved. Had an episode of strep tonsillitis following last visit. She desires a tonsillectomy.     ROS:   10-point review of systems negative except per HPI      Review of patient's allergies indicates:   Allergen Reactions    Fentanyl (bulk) Itching    Oxycodone-acetaminophen Hives     pt broke out in rash  Other reaction(s): RASH       Past Medical History:   Diagnosis Date    ADD (attention deficit disorder)     Anemia     Bipolar disorder, unspecified     History of prediabetes 02/01/2024    Insulin controlled gestational diabetes mellitus (GDM) in third trimester 10/05/2023    PCOS (polycystic ovarian syndrome)     Tobacco use 05/12/2022       Past Surgical History:   Procedure Laterality Date    SALPINGECTOMY      WISDOM TOOTH EXTRACTION         Social History     Socioeconomic History     Marital status: Legally    Tobacco Use    Smoking status: Every Day     Current packs/day: 0.00     Types: Cigarettes     Last attempt to quit: 2023     Years since quittin.1     Passive exposure: Never    Smokeless tobacco: Never    Tobacco comments:     3 cigarettes per day   Substance and Sexual Activity    Alcohol use: Not Currently    Drug use: Never    Sexual activity: Yes     Partners: Male       Family History   Problem Relation Name Age of Onset    Hypertension Mother      Diabetes Maternal Grandmother      Bipolar disorder Father      Mental illness Father         Outpatient Encounter Medications as of 2024   Medication Sig Dispense Refill    dextroamphetamine-amphetamine (ADDERALL) 20 mg tablet Take 1 tablet by mouth once daily. 30 tablet 0    ibuprofen (ADVIL,MOTRIN) 600 MG tablet Take 1 tablet (600 mg total) by mouth every 6 (six) hours as needed for Pain. 30 tablet 0    dextroamphetamine-amphetamine (ADDERALL) 20 mg tablet Take 1 tablet by mouth once daily. 30 tablet 0     No facility-administered encounter medications on file as of 2024.       Physical Exam:  Vitals:    24 0908   BP: 102/71   BP Location: Right arm   Patient Position: Sitting   BP Method: Large (Automatic)   Pulse: 85   Temp: 98.6 °F (37 °C)   TempSrc: Oral       Physical Exam   General: NAD, voice normal  Neuro: AAO, CN II - XII grossly intact  Head/ Face: NCAT, symmetric, sensations intact bilaterally  Eyes: EOMI, PERRL  Ears: externally normal with grossly normal hearing  AD: EAC patent, TM intact, no middle ear effusion, no retractions  AS: EAC patent, TM intact, no middle ear effusion, no retractions  Nose: bilateral nares patent, midline septum, no rhinorrhea, no external deformity, +ITH  OC/OP: MMM, no intraoral lesions, FOM/BOT soft, no trismus, dentition is good, no uvular deviation, bilaterally symmetric soft palate elevation, palatoglossus and palatopharyngeal fold wnl; tonsils are symmetric  and 2+, cryptic  Indirect laryngoscopy: deferred due to patient intolerance  Neck: soft, supple, no LAD, normal ROM, no thyromegaly  Respiratory: nonlabored, no wheezing, bilateral chest rise  Cardiovascular: RRR  Gastrointestinal: S NT ND  Skin: warm, no lesions  Musculoskeletal: 5/5 strength  Psych: Appropriate affect/mood     Pertinent Data:  ? LABS:  ? AUDIO:           ? PATH:      Imaging:   I personally reviewed the following images:        Assessment/Plan:  24 y.o. female with recurrent tonsilloliths/tonsillitis, globus/GERD, PND, turbinate hypertrophy. CT maxface 2020 with turbinate hypertrophy. D/w Dr. Delacruz.  - Flonase BID  - GERD lifestyle modifications  - OR for tonsillectomy 9/13/24  - RTC 1mo postop    Sahrmila Villalba NP

## 2024-08-13 NOTE — H&P (VIEW-ONLY)
[facility]  Otolaryngology Clinic Note    Valente Wilson  YOB: 2000    Chief Complaint:   Chief Complaint   Patient presents with    referral: Tonsillitis        HPI: 05/09/2024: 23 y.o. female referred for tonsil concerns. She reports having recurrent tonsillitis r/t tonsil stones. She does not have recurrent strep tonsillitis. She reports that her tonsils swell and hurt, that she frequently reports to the ED for this. Also endorses chronic halitosis and globus. States she coughs up thick mucous daily upon waking. Tonsil symptoms began when she was in middle school. She took medicine for reflux when she was pregnant but reports no change in her symptoms. She feels her reflux resolved following childbirth. Endorses good oral hygiene but has never used a water pick, stating she is unable to afford it. She is unsure if she snores at night as she sleeps alone, but she wakes frequently and feels tired during the day even if she gets adequate sleep. Reports waking with very dry mouth.     08/13/2024: Endorses compliance with conservative measures, however, states her symptoms have not improved. Had an episode of strep tonsillitis following last visit. She desires a tonsillectomy.     ROS:   10-point review of systems negative except per HPI      Review of patient's allergies indicates:   Allergen Reactions    Fentanyl (bulk) Itching    Oxycodone-acetaminophen Hives     pt broke out in rash  Other reaction(s): RASH       Past Medical History:   Diagnosis Date    ADD (attention deficit disorder)     Anemia     Bipolar disorder, unspecified     History of prediabetes 02/01/2024    Insulin controlled gestational diabetes mellitus (GDM) in third trimester 10/05/2023    PCOS (polycystic ovarian syndrome)     Tobacco use 05/12/2022       Past Surgical History:   Procedure Laterality Date    SALPINGECTOMY      WISDOM TOOTH EXTRACTION         Social History     Socioeconomic History     Marital status: Legally    Tobacco Use    Smoking status: Every Day     Current packs/day: 0.00     Types: Cigarettes     Last attempt to quit: 2023     Years since quittin.1     Passive exposure: Never    Smokeless tobacco: Never    Tobacco comments:     3 cigarettes per day   Substance and Sexual Activity    Alcohol use: Not Currently    Drug use: Never    Sexual activity: Yes     Partners: Male       Family History   Problem Relation Name Age of Onset    Hypertension Mother      Diabetes Maternal Grandmother      Bipolar disorder Father      Mental illness Father         Outpatient Encounter Medications as of 2024   Medication Sig Dispense Refill    dextroamphetamine-amphetamine (ADDERALL) 20 mg tablet Take 1 tablet by mouth once daily. 30 tablet 0    ibuprofen (ADVIL,MOTRIN) 600 MG tablet Take 1 tablet (600 mg total) by mouth every 6 (six) hours as needed for Pain. 30 tablet 0    dextroamphetamine-amphetamine (ADDERALL) 20 mg tablet Take 1 tablet by mouth once daily. 30 tablet 0     No facility-administered encounter medications on file as of 2024.       Physical Exam:  Vitals:    24 0908   BP: 102/71   BP Location: Right arm   Patient Position: Sitting   BP Method: Large (Automatic)   Pulse: 85   Temp: 98.6 °F (37 °C)   TempSrc: Oral       Physical Exam   General: NAD, voice normal  Neuro: AAO, CN II - XII grossly intact  Head/ Face: NCAT, symmetric, sensations intact bilaterally  Eyes: EOMI, PERRL  Ears: externally normal with grossly normal hearing  AD: EAC patent, TM intact, no middle ear effusion, no retractions  AS: EAC patent, TM intact, no middle ear effusion, no retractions  Nose: bilateral nares patent, midline septum, no rhinorrhea, no external deformity, +ITH  OC/OP: MMM, no intraoral lesions, FOM/BOT soft, no trismus, dentition is good, no uvular deviation, bilaterally symmetric soft palate elevation, palatoglossus and palatopharyngeal fold wnl; tonsils are symmetric  and 2+, cryptic  Indirect laryngoscopy: deferred due to patient intolerance  Neck: soft, supple, no LAD, normal ROM, no thyromegaly  Respiratory: nonlabored, no wheezing, bilateral chest rise  Cardiovascular: RRR  Gastrointestinal: S NT ND  Skin: warm, no lesions  Musculoskeletal: 5/5 strength  Psych: Appropriate affect/mood     Pertinent Data:  ? LABS:  ? AUDIO:           ? PATH:      Imaging:   I personally reviewed the following images:        Assessment/Plan:  24 y.o. female with recurrent tonsilloliths/tonsillitis, globus/GERD, PND, turbinate hypertrophy. CT maxface 2020 with turbinate hypertrophy. D/w Dr. Delacruz.  - Flonase BID  - GERD lifestyle modifications  - OR for tonsillectomy 9/13/24  - RTC 1mo postop    Sharmila Villalba NP

## 2024-08-14 ENCOUNTER — OFFICE VISIT (OUTPATIENT)
Dept: OTOLARYNGOLOGY | Facility: CLINIC | Age: 24
End: 2024-08-14
Payer: MEDICAID

## 2024-08-14 ENCOUNTER — HOSPITAL ENCOUNTER (OUTPATIENT)
Dept: RADIOLOGY | Facility: HOSPITAL | Age: 24
Discharge: HOME | End: 2024-08-14
Payer: MEDICAID

## 2024-08-14 VITALS — HEART RATE: 93 BPM | TEMPERATURE: 98 F | DIASTOLIC BLOOD PRESSURE: 84 MMHG | SYSTOLIC BLOOD PRESSURE: 125 MMHG

## 2024-08-14 DIAGNOSIS — Z87.09 HISTORY OF RECURRENT TONSILLITIS: Primary | ICD-10-CM

## 2024-08-14 DIAGNOSIS — M70.42 PREPATELLAR BURSITIS OF BOTH KNEES: ICD-10-CM

## 2024-08-14 DIAGNOSIS — M70.41 PREPATELLAR BURSITIS OF BOTH KNEES: ICD-10-CM

## 2024-08-14 DIAGNOSIS — J35.8 TONSILLOLITH: ICD-10-CM

## 2024-08-14 DIAGNOSIS — J35.01 CHRONIC TONSILLITIS: ICD-10-CM

## 2024-08-14 PROCEDURE — 3074F SYST BP LT 130 MM HG: CPT | Mod: CPTII,,, | Performed by: NURSE PRACTITIONER

## 2024-08-14 PROCEDURE — 3052F HG A1C>EQUAL 8.0%<EQUAL 9.0%: CPT | Mod: CPTII,,, | Performed by: NURSE PRACTITIONER

## 2024-08-14 PROCEDURE — 1159F MED LIST DOCD IN RCRD: CPT | Mod: CPTII,,, | Performed by: NURSE PRACTITIONER

## 2024-08-14 PROCEDURE — 3079F DIAST BP 80-89 MM HG: CPT | Mod: CPTII,,, | Performed by: NURSE PRACTITIONER

## 2024-08-14 PROCEDURE — 99214 OFFICE O/P EST MOD 30 MIN: CPT | Mod: PBBFAC,25 | Performed by: NURSE PRACTITIONER

## 2024-08-14 PROCEDURE — 73564 X-RAY EXAM KNEE 4 OR MORE: CPT | Mod: TC,50

## 2024-08-14 PROCEDURE — 99214 OFFICE O/P EST MOD 30 MIN: CPT | Mod: S$PBB,,, | Performed by: NURSE PRACTITIONER

## 2024-08-14 RX ORDER — LIDOCAINE HYDROCHLORIDE 10 MG/ML
1 INJECTION, SOLUTION EPIDURAL; INFILTRATION; INTRACAUDAL; PERINEURAL ONCE
OUTPATIENT
Start: 2024-08-14 | End: 2024-08-14

## 2024-08-14 RX ORDER — SODIUM CHLORIDE 0.9 % (FLUSH) 0.9 %
10 SYRINGE (ML) INJECTION
OUTPATIENT
Start: 2024-08-14

## 2024-08-22 ENCOUNTER — TELEPHONE (OUTPATIENT)
Dept: GYNECOLOGY | Facility: CLINIC | Age: 24
End: 2024-08-22

## 2024-08-22 ENCOUNTER — OFFICE VISIT (OUTPATIENT)
Dept: GYNECOLOGY | Facility: CLINIC | Age: 24
End: 2024-08-22
Payer: MEDICAID

## 2024-08-22 ENCOUNTER — LAB VISIT (OUTPATIENT)
Dept: LAB | Facility: HOSPITAL | Age: 24
End: 2024-08-22
Attending: NURSE PRACTITIONER
Payer: MEDICAID

## 2024-08-22 VITALS
OXYGEN SATURATION: 100 % | WEIGHT: 293 LBS | TEMPERATURE: 98 F | HEART RATE: 86 BPM | HEIGHT: 70 IN | DIASTOLIC BLOOD PRESSURE: 77 MMHG | SYSTOLIC BLOOD PRESSURE: 113 MMHG | BODY MASS INDEX: 41.95 KG/M2 | RESPIRATION RATE: 20 BRPM

## 2024-08-22 DIAGNOSIS — Z01.419 ENCOUNTER FOR ANNUAL ROUTINE GYNECOLOGICAL EXAMINATION: Primary | ICD-10-CM

## 2024-08-22 DIAGNOSIS — N64.4 BREAST PAIN: ICD-10-CM

## 2024-08-22 DIAGNOSIS — N76.0 BV (BACTERIAL VAGINOSIS): ICD-10-CM

## 2024-08-22 DIAGNOSIS — B96.89 BV (BACTERIAL VAGINOSIS): ICD-10-CM

## 2024-08-22 DIAGNOSIS — Z11.3 ROUTINE SCREENING FOR STI (SEXUALLY TRANSMITTED INFECTION): ICD-10-CM

## 2024-08-22 LAB
B-HCG UR QL: NEGATIVE
C TRACH DNA SPEC QL NAA+PROBE: NOT DETECTED
CLUE CELLS VAG QL WET PREP: ABNORMAL
CTP QC/QA: YES
HBV SURFACE AG SERPL QL IA: NONREACTIVE
HCV AB SERPL QL IA: NONREACTIVE
HIV 1+2 AB+HIV1 P24 AG SERPL QL IA: NONREACTIVE
N GONORRHOEA DNA SPEC QL NAA+PROBE: NOT DETECTED
SOURCE (OHS): NORMAL
T PALLIDUM AB SER QL: NONREACTIVE
T VAGINALIS VAG QL WET PREP: ABNORMAL
WBC #/AREA VAG WET PREP: ABNORMAL
YEAST SPEC QL WET PREP: ABNORMAL

## 2024-08-22 PROCEDURE — 86803 HEPATITIS C AB TEST: CPT

## 2024-08-22 PROCEDURE — 86780 TREPONEMA PALLIDUM: CPT

## 2024-08-22 PROCEDURE — 87340 HEPATITIS B SURFACE AG IA: CPT

## 2024-08-22 PROCEDURE — 99214 OFFICE O/P EST MOD 30 MIN: CPT | Mod: PBBFAC | Performed by: NURSE PRACTITIONER

## 2024-08-22 PROCEDURE — 87210 SMEAR WET MOUNT SALINE/INK: CPT | Performed by: NURSE PRACTITIONER

## 2024-08-22 PROCEDURE — 81025 URINE PREGNANCY TEST: CPT | Mod: PBBFAC | Performed by: NURSE PRACTITIONER

## 2024-08-22 PROCEDURE — 87389 HIV-1 AG W/HIV-1&-2 AB AG IA: CPT

## 2024-08-22 PROCEDURE — 87591 N.GONORRHOEAE DNA AMP PROB: CPT | Performed by: NURSE PRACTITIONER

## 2024-08-22 PROCEDURE — 87491 CHLMYD TRACH DNA AMP PROBE: CPT | Performed by: NURSE PRACTITIONER

## 2024-08-22 PROCEDURE — 36415 COLL VENOUS BLD VENIPUNCTURE: CPT

## 2024-08-22 RX ORDER — LANCETS 33 GAUGE
EACH MISCELLANEOUS
COMMUNITY
Start: 2024-07-31

## 2024-08-22 RX ORDER — METRONIDAZOLE 500 MG/1
500 TABLET ORAL 2 TIMES DAILY
Qty: 14 TABLET | Refills: 0 | Status: SHIPPED | OUTPATIENT
Start: 2024-08-22 | End: 2024-08-29

## 2024-08-22 RX ORDER — LANCETS 30 GAUGE
EACH MISCELLANEOUS
COMMUNITY
Start: 2024-07-31

## 2024-08-22 NOTE — TELEPHONE ENCOUNTER
----- Message from ZULEMA Peters sent at 8/22/2024  2:31 PM CDT -----  Swab showed clue cells indicating bacterial vaginosis. Not an STD but an infection in the vaginal area when pH is disrupted. Rx sent for Flagyl. No alcohol during and for 48-72 hours after treatment. Use unscented soap and no scented products. More showers than baths. No douching.

## 2024-08-22 NOTE — PROGRESS NOTES
[facility] -  Gynecology / Women's Health Clinic      Subjective:       Patient ID: Valente Wilson is a 24 y.o. female.    Chief Complaint:  Gynecologic Exam    History of Present Illness  The patient  here for annual exam. Pt had last delivery in 10/2023, she is not breastfeeding. She used Depo from 2023-2024. States she has monthly spotting lasting 2-3 days. Denies history of abnormal paps. Last pap -NIL and HPV neg. Denies urinary complaints. Denies pelvic pain, abnormal bleeding. Pt c/o Rt breast pain with palpable lumps. Pt c/o of vaginal discharge and odor. Pt reports chlamydia in the past and desires testing today. Currently not on contraception and declines, would be ok with pregnancy. Denies tobacco use. Dep. screening 0. Denies fly hx of breast, ovarian, uterine or colon cancer.     GYN & OB History  No LMP recorded (lmp unknown).   Date of Last Pap: 2022    Review of patient's allergies indicates:   Allergen Reactions    Fentanyl (bulk) Itching    Oxycodone-acetaminophen Hives     pt broke out in rash  Other reaction(s): RASH     Past Medical History:   Diagnosis Date    ADD (attention deficit disorder)     Anemia     Bipolar disorder, unspecified     History of prediabetes 2024    Insulin controlled gestational diabetes mellitus (GDM) in third trimester 10/05/2023    PCOS (polycystic ovarian syndrome)     Tobacco use 2022     OB History    Para Term  AB Living   4 3 3 0 1 3   SAB IAB Ectopic Multiple Live Births   0 0 1 0 3      # Outcome Date GA Lbr Maykel/2nd Weight Sex Type Anes PTL Lv   4 Term 10/10/23 37w2d 16:36 / 00:02 2.948 kg (6 lb 8 oz) F Vag-Spont None N FLACO   3 Term 10/18/22 37w6d / 00:16 3.05 kg (6 lb 11.6 oz) F Vag-Spont EPI N FLACO   2 Ectopic 18 7w0d    ECTOPIC   FD   1 Term 06/29/15 40w0d  4.111 kg (9 lb 1 oz) M Vag-Spont EPI N FLACO        Review of Systems  Review of Systems    Negative except for  "pertinent findings for positives per HPI     Objective:    Physical Exam    /77 (BP Location: Left arm, Patient Position: Sitting, BP Method: Medium (Automatic))   Pulse 86   Temp 98.1 °F (36.7 °C) (Oral)   Resp 20   Ht 5' 10" (1.778 m)   Wt (!) 136.4 kg (300 lb 9.6 oz)   LMP  (LMP Unknown)   SpO2 100%   BMI 43.13 kg/m²   GENERAL: Well-developed female. No acute distress.    SKIN: Normal to inspection, warm and intact.  BREASTS: No rashes or erythema. No masses, lumps, discharge. Tenderness to Rt breast near axillary area  VULVA: General appearance normal; external genitalia with no lesions or erythema  VAGINA: Mucosa/vaginal vault pink, no abnormal discharge or lesions.  CERVIX: Pink, parous appearing os, no erythema or abnormal discharge.  BIMANUAL EXAM: Limited exam d/t body habitus. The uterus is non tender. Shashi adnexa reveal no tenderness.  PSYCHIATRIC: Patient is oriented to person, place, and time. Mood and affect are normal.    Assessment:         ICD-10-CM ICD-9-CM   1. Encounter for annual routine gynecological examination  Z01.419 V72.31   2. Routine screening for STI (sexually transmitted infection)  Z11.3 V74.5   3. Breast pain  N64.4 611.71     Plan:   Valente was seen today for gynecologic exam.    Diagnoses and all orders for this visit:    Encounter for annual routine gynecological examination    Routine screening for STI (sexually transmitted infection)  -     Chlamydia/GC, PCR  -     Wet Prep, Genital  -     HIV 1/2 Ag/Ab (4th Gen); Future  -     Hepatitis C Antibody; Future  -     Hepatitis B Surface Antigen; Future  -     SYPHILIS ANTIBODY (WITH REFLEX RPR); Future    Breast pain  -     US Breast Right Limited; Future    Pelvic today, pap utd per ACOG  STI screen  Breast ultrasound for Rt breast pain  Follow up in about 1 year (around 8/22/2025) for annual exam.    "

## 2024-08-22 NOTE — TELEPHONE ENCOUNTER
Called patient to inform of results and medication sent to the pharmacy on file. Educated patient on possible hygiene changes. Patient verbalized understanding.

## 2024-08-28 ENCOUNTER — ANESTHESIA EVENT (OUTPATIENT)
Dept: SURGERY | Facility: HOSPITAL | Age: 24
End: 2024-08-28
Payer: MEDICAID

## 2024-08-28 NOTE — ANESTHESIA PREPROCEDURE EVALUATION
"Valente Wilson is a 24 y.o. female PRESENTING FOR TONSILLECTOMY (Bilateral: Throat) with a history of   -RECURRENT TONSILLITIS (LASTLY 5/2024), TONSILLITH       Vitals:    09/13/24 0627 09/13/24 0647 09/13/24 0840 09/13/24 0845   BP:  120/78 (!) 118/53 118/68   BP Location:   Left arm    Patient Position:   Lying    Pulse:  88 91 84   Resp:  20 (!) 22 19   Temp:  36.3 °C (97.3 °F) 36 °C (96.8 °F)    TempSrc:  Temporal     SpO2:  100% 99% 100%   Weight: 134.4 kg (296 lb 6.4 oz)      Height: 5' 10" (1.778 m)          -ADD/BIPOLAR D/O  -PCOS  -ANEMIA  -T2DM (A1C 8.4% 7/19/24); AM  @ 0600  -SMOKER  -GERD  -MORBID OBESITY, BMI 43    BETA-BLOCKER: NONE    New Orders for Anesthesia: DM PROTOCOL, UPT NEG DOS     Patient Active Problem List   Diagnosis    Attention deficit hyperactivity disorder (ADHD)    Bipolar II disorder, most recent episode major depressive    Generalized anxiety disorder    Gastroesophageal reflux disease without esophagitis    Type 2 diabetes mellitus without complication, without long-term current use of insulin    Allergic rhinitis       Past Surgical History:   Procedure Laterality Date    SALPINGECTOMY      WISDOM TOOTH EXTRACTION         Lab Results   Component Value Date    WBC 8.87 07/19/2024    HGB 11.3 (L) 07/19/2024    HCT 36.6 (L) 07/19/2024     07/19/2024       CMP  Sodium   Date Value Ref Range Status   07/19/2024 142 136 - 145 mmol/L Final     Potassium   Date Value Ref Range Status   07/19/2024 4.0 3.5 - 5.1 mmol/L Final     Chloride   Date Value Ref Range Status   07/19/2024 106 98 - 107 mmol/L Final     CO2   Date Value Ref Range Status   07/19/2024 25 22 - 29 mmol/L Final     Blood Urea Nitrogen   Date Value Ref Range Status   07/19/2024 11.6 7.0 - 18.7 mg/dL Final     Creatinine   Date Value Ref Range Status   07/19/2024 0.87 0.55 - 1.02 mg/dL Final     Calcium   Date Value Ref Range Status   07/19/2024 9.8 8.4 - 10.2 mg/dL Final     Albumin   Date Value Ref Range " Status   2024 3.7 3.5 - 5.0 g/dL Final     Bilirubin Total   Date Value Ref Range Status   2024 0.2 <=1.5 mg/dL Final     ALP   Date Value Ref Range Status   2024 72 40 - 150 unit/L Final     AST   Date Value Ref Range Status   2024 17 5 - 34 unit/L Final     ALT   Date Value Ref Range Status   2024 27 0 - 55 unit/L Final     eGFR   Date Value Ref Range Status   2024 >60 mL/min/1.73/m2 Final       Lab Results   Component Value Date    INR 0.9 2021           Current Outpatient Medications   Medication Instructions    alcohol swabs (ALCOHOL PREP PADS) PadM 1 each, Topical (Top), 2 times daily with meals    blood sugar diagnostic Strp 1 each, Misc.(Non-Drug; Combo Route), 2 times daily with meals    blood-glucose meter kit Use as instructed    dextroamphetamine-amphetamine (ADDERALL) 20 mg tablet 1 tablet, Oral, Daily    [START ON 2024] dextroamphetamine-amphetamine (ADDERALL) 20 mg tablet 1 tablet, Oral, Daily    famotidine (PEPCID) 40 mg, Oral, Nightly    ferrous sulfate (IRON) 325 mg, Oral, Every other day    ibuprofen (ADVIL,MOTRIN) 600 mg, Oral, Every 6 hours PRN    lancets (LANCETS,THIN) Misc 1 each, Misc.(Non-Drug; Combo Route), 2 times daily with meals    metFORMIN (GLUCOPHAGE) 500 mg, Oral, 2 times daily with meals    metroNIDAZOLE (FLAGYL) 500 mg, Oral, 2 times daily, Do not drink alcohol during treatment and for 3 days after completion.    ONETOUCH DELICA PLUS LANCET 33 gauge Misc Topical (Top)    ONETOUCH ULTRA2 METER Misc SMARTSI Each Via Meter As Directed       Past anesthesia records:     Pre-op Assessment    I have reviewed the Patient Summary Reports.     I have reviewed the Nursing Notes. I have reviewed the NPO Status.   I have reviewed the Medications.     Review of Systems  Anesthesia Hx:  No problems with previous Anesthesia   History of prior surgery of interest to airway management or planning:          Denies Family Hx of Anesthesia  complications.    Denies Personal Hx of Anesthesia complications.                    Hematology/Oncology:  Hematology Normal   Oncology Normal                                   EENT/Dental:  EENT/Dental Normal           Cardiovascular:  Cardiovascular Normal                                            Pulmonary:  Pulmonary Normal                       Renal/:  Renal/ Normal                 Hepatic/GI:  Hepatic/GI Normal                 Musculoskeletal:  Musculoskeletal Normal                Neurological:  Neurology Normal                                      Endocrine:  Endocrine Normal            Dermatological:  Skin Normal    Psych:  Psychiatric Normal                    Physical Exam  General: Well nourished, Cooperative, Alert and Oriented    Airway:  Mallampati: I / I  Mouth Opening: Normal  TM Distance: Normal  Tongue: Normal  Neck ROM: Normal ROM    Dental:  Intact        Anesthesia Plan  Type of Anesthesia, risks & benefits discussed:    Anesthesia Type: Gen ETT  Intra-op Monitoring Plan: Standard ASA Monitors  Post Op Pain Control Plan: multimodal analgesia and IV/PO Opioids PRN  Induction:  IV  Airway Plan: Direct  Informed Consent: Informed consent signed with the Patient and all parties understand the risks and agree with anesthesia plan.  All questions answered. Patient consented to blood products? No  ASA Score: 3  Day of Surgery Review of History & Physical: H&P Update referred to the surgeon/provider.    Ready For Surgery From Anesthesia Perspective.     .

## 2024-09-13 ENCOUNTER — ANESTHESIA (OUTPATIENT)
Dept: SURGERY | Facility: HOSPITAL | Age: 24
End: 2024-09-13
Payer: MEDICAID

## 2024-09-13 ENCOUNTER — HOSPITAL ENCOUNTER (OUTPATIENT)
Facility: HOSPITAL | Age: 24
Discharge: HOME | End: 2024-09-13
Attending: OTOLARYNGOLOGY | Admitting: OTOLARYNGOLOGY
Payer: MEDICAID

## 2024-09-13 DIAGNOSIS — Z90.89 S/P TONSILLECTOMY: Primary | ICD-10-CM

## 2024-09-13 DIAGNOSIS — Z87.09 HISTORY OF RECURRENT TONSILLITIS: ICD-10-CM

## 2024-09-13 DIAGNOSIS — J35.01 CHRONIC TONSILLITIS: ICD-10-CM

## 2024-09-13 DIAGNOSIS — J35.8 TONSILLOLITH: ICD-10-CM

## 2024-09-13 LAB
B-HCG UR QL: NEGATIVE
CTP QC/QA: YES
POCT GLUCOSE: 173 MG/DL (ref 70–110)
POCT GLUCOSE: 251 MG/DL (ref 70–110)

## 2024-09-13 PROCEDURE — D9220A PRA ANESTHESIA: Mod: CRNA,,, | Performed by: NURSE ANESTHETIST, CERTIFIED REGISTERED

## 2024-09-13 PROCEDURE — 71000015 HC POSTOP RECOV 1ST HR: Performed by: OTOLARYNGOLOGY

## 2024-09-13 PROCEDURE — D9220A PRA ANESTHESIA: Mod: ANES,,, | Performed by: SPECIALIST

## 2024-09-13 PROCEDURE — 36000707: Performed by: OTOLARYNGOLOGY

## 2024-09-13 PROCEDURE — 37000009 HC ANESTHESIA EA ADD 15 MINS: Performed by: OTOLARYNGOLOGY

## 2024-09-13 PROCEDURE — 81025 URINE PREGNANCY TEST: CPT | Performed by: NURSE PRACTITIONER

## 2024-09-13 PROCEDURE — 25000003 PHARM REV CODE 250: Performed by: OTOLARYNGOLOGY

## 2024-09-13 PROCEDURE — 63600175 PHARM REV CODE 636 W HCPCS: Performed by: NURSE ANESTHETIST, CERTIFIED REGISTERED

## 2024-09-13 PROCEDURE — 82962 GLUCOSE BLOOD TEST: CPT | Performed by: OTOLARYNGOLOGY

## 2024-09-13 PROCEDURE — 37000008 HC ANESTHESIA 1ST 15 MINUTES: Performed by: OTOLARYNGOLOGY

## 2024-09-13 PROCEDURE — 63600175 PHARM REV CODE 636 W HCPCS: Performed by: SPECIALIST

## 2024-09-13 PROCEDURE — 88304 TISSUE EXAM BY PATHOLOGIST: CPT | Mod: TC | Performed by: OTOLARYNGOLOGY

## 2024-09-13 PROCEDURE — 71000016 HC POSTOP RECOV ADDL HR: Performed by: OTOLARYNGOLOGY

## 2024-09-13 PROCEDURE — 71000033 HC RECOVERY, INTIAL HOUR: Performed by: OTOLARYNGOLOGY

## 2024-09-13 PROCEDURE — 36000706: Performed by: OTOLARYNGOLOGY

## 2024-09-13 PROCEDURE — 25000003 PHARM REV CODE 250: Performed by: NURSE ANESTHETIST, CERTIFIED REGISTERED

## 2024-09-13 RX ORDER — ROCURONIUM BROMIDE 10 MG/ML
INJECTION, SOLUTION INTRAVENOUS
Status: DISCONTINUED | OUTPATIENT
Start: 2024-09-13 | End: 2024-09-13

## 2024-09-13 RX ORDER — HYDROMORPHONE HYDROCHLORIDE 1 MG/ML
INJECTION, SOLUTION INTRAMUSCULAR; INTRAVENOUS; SUBCUTANEOUS
Status: DISCONTINUED | OUTPATIENT
Start: 2024-09-13 | End: 2024-09-13

## 2024-09-13 RX ORDER — DEXMEDETOMIDINE HYDROCHLORIDE 100 UG/ML
INJECTION, SOLUTION INTRAVENOUS
Status: DISCONTINUED | OUTPATIENT
Start: 2024-09-13 | End: 2024-09-13

## 2024-09-13 RX ORDER — HYDROMORPHONE HYDROCHLORIDE 1 MG/ML
0.5 INJECTION, SOLUTION INTRAMUSCULAR; INTRAVENOUS; SUBCUTANEOUS EVERY 5 MIN PRN
Status: DISCONTINUED | OUTPATIENT
Start: 2024-09-13 | End: 2024-09-16 | Stop reason: HOSPADM

## 2024-09-13 RX ORDER — GLUCAGON 1 MG
1 KIT INJECTION
Status: DISCONTINUED | OUTPATIENT
Start: 2024-09-13 | End: 2024-09-16 | Stop reason: HOSPADM

## 2024-09-13 RX ORDER — TRIPROLIDINE/PSEUDOEPHEDRINE 2.5MG-60MG
800 TABLET ORAL EVERY 6 HOURS PRN
Qty: 750 ML | Refills: 0 | Status: SHIPPED | OUTPATIENT
Start: 2024-09-13 | End: 2024-09-27

## 2024-09-13 RX ORDER — DIPHENHYDRAMINE HYDROCHLORIDE 50 MG/ML
25 INJECTION INTRAMUSCULAR; INTRAVENOUS ONCE AS NEEDED
Status: DISCONTINUED | OUTPATIENT
Start: 2024-09-13 | End: 2024-09-16 | Stop reason: HOSPADM

## 2024-09-13 RX ORDER — LIDOCAINE HYDROCHLORIDE 20 MG/ML
INJECTION, SOLUTION EPIDURAL; INFILTRATION; INTRACAUDAL; PERINEURAL
Status: DISCONTINUED | OUTPATIENT
Start: 2024-09-13 | End: 2024-09-13

## 2024-09-13 RX ORDER — INSULIN ASPART 100 [IU]/ML
0-5 INJECTION, SOLUTION INTRAVENOUS; SUBCUTANEOUS ONCE AS NEEDED
Status: DISCONTINUED | OUTPATIENT
Start: 2024-09-13 | End: 2024-09-13

## 2024-09-13 RX ORDER — OXYMETAZOLINE HCL 0.05 %
SPRAY, NON-AEROSOL (ML) NASAL
Status: DISCONTINUED | OUTPATIENT
Start: 2024-09-13 | End: 2024-09-13 | Stop reason: HOSPADM

## 2024-09-13 RX ORDER — ONDANSETRON HYDROCHLORIDE 2 MG/ML
INJECTION, SOLUTION INTRAMUSCULAR; INTRAVENOUS
Status: DISCONTINUED | OUTPATIENT
Start: 2024-09-13 | End: 2024-09-13

## 2024-09-13 RX ORDER — KETOROLAC TROMETHAMINE 30 MG/ML
INJECTION, SOLUTION INTRAMUSCULAR; INTRAVENOUS
Status: DISCONTINUED | OUTPATIENT
Start: 2024-09-13 | End: 2024-09-13

## 2024-09-13 RX ORDER — HYDROCODONE BITARTRATE AND ACETAMINOPHEN 10; 325 MG/1; MG/1
1 TABLET ORAL EVERY 6 HOURS PRN
Qty: 30 TABLET | Refills: 0 | Status: SHIPPED | OUTPATIENT
Start: 2024-09-13 | End: 2024-09-20

## 2024-09-13 RX ORDER — ONDANSETRON HYDROCHLORIDE 2 MG/ML
4 INJECTION, SOLUTION INTRAVENOUS ONCE
Status: DISCONTINUED | OUTPATIENT
Start: 2024-09-13 | End: 2024-09-16 | Stop reason: HOSPADM

## 2024-09-13 RX ORDER — HYDROMORPHONE HYDROCHLORIDE 1 MG/ML
INJECTION, SOLUTION INTRAMUSCULAR; INTRAVENOUS; SUBCUTANEOUS
Status: DISCONTINUED
Start: 2024-09-13 | End: 2024-09-13 | Stop reason: HOSPADM

## 2024-09-13 RX ORDER — GLUCAGON 1 MG
1 KIT INJECTION
Status: DISCONTINUED | OUTPATIENT
Start: 2024-09-13 | End: 2024-09-13

## 2024-09-13 RX ORDER — MEPERIDINE HYDROCHLORIDE 25 MG/ML
12.5 INJECTION INTRAMUSCULAR; INTRAVENOUS; SUBCUTANEOUS ONCE
Status: DISCONTINUED | OUTPATIENT
Start: 2024-09-13 | End: 2024-09-14 | Stop reason: HOSPADM

## 2024-09-13 RX ORDER — HYDROMORPHONE HYDROCHLORIDE 1 MG/ML
0.2 INJECTION, SOLUTION INTRAMUSCULAR; INTRAVENOUS; SUBCUTANEOUS EVERY 5 MIN PRN
Status: DISCONTINUED | OUTPATIENT
Start: 2024-09-13 | End: 2024-09-16 | Stop reason: HOSPADM

## 2024-09-13 RX ORDER — PROPOFOL 10 MG/ML
VIAL (ML) INTRAVENOUS
Status: DISCONTINUED | OUTPATIENT
Start: 2024-09-13 | End: 2024-09-13

## 2024-09-13 RX ORDER — LIDOCAINE HYDROCHLORIDE 10 MG/ML
1 INJECTION, SOLUTION EPIDURAL; INFILTRATION; INTRACAUDAL; PERINEURAL ONCE
Status: DISCONTINUED | OUTPATIENT
Start: 2024-09-13 | End: 2024-09-16 | Stop reason: HOSPADM

## 2024-09-13 RX ORDER — DEXAMETHASONE SODIUM PHOSPHATE 4 MG/ML
INJECTION, SOLUTION INTRA-ARTICULAR; INTRALESIONAL; INTRAMUSCULAR; INTRAVENOUS; SOFT TISSUE
Status: DISCONTINUED | OUTPATIENT
Start: 2024-09-13 | End: 2024-09-13

## 2024-09-13 RX ORDER — PHENYLEPHRINE HYDROCHLORIDE 10 MG/ML
INJECTION INTRAVENOUS
Status: DISCONTINUED | OUTPATIENT
Start: 2024-09-13 | End: 2024-09-13

## 2024-09-13 RX ORDER — MIDAZOLAM HYDROCHLORIDE 1 MG/ML
2 INJECTION INTRAMUSCULAR; INTRAVENOUS ONCE
Status: COMPLETED | OUTPATIENT
Start: 2024-09-13 | End: 2024-09-13

## 2024-09-13 RX ORDER — IPRATROPIUM BROMIDE AND ALBUTEROL SULFATE 2.5; .5 MG/3ML; MG/3ML
3 SOLUTION RESPIRATORY (INHALATION) ONCE AS NEEDED
Status: DISCONTINUED | OUTPATIENT
Start: 2024-09-13 | End: 2024-09-16 | Stop reason: HOSPADM

## 2024-09-13 RX ORDER — PROCHLORPERAZINE EDISYLATE 5 MG/ML
5 INJECTION INTRAMUSCULAR; INTRAVENOUS ONCE AS NEEDED
Status: DISCONTINUED | OUTPATIENT
Start: 2024-09-13 | End: 2024-09-16 | Stop reason: HOSPADM

## 2024-09-13 RX ORDER — DEXAMETHASONE 4 MG/1
4 TABLET ORAL EVERY 12 HOURS PRN
Qty: 3 TABLET | Refills: 0 | Status: SHIPPED | OUTPATIENT
Start: 2024-09-13 | End: 2024-09-15

## 2024-09-13 RX ORDER — SUCCINYLCHOLINE CHLORIDE 20 MG/ML
INJECTION INTRAMUSCULAR; INTRAVENOUS
Status: DISCONTINUED | OUTPATIENT
Start: 2024-09-13 | End: 2024-09-13

## 2024-09-13 RX ORDER — SODIUM CHLORIDE 0.9 % (FLUSH) 0.9 %
10 SYRINGE (ML) INJECTION
Status: DISCONTINUED | OUTPATIENT
Start: 2024-09-13 | End: 2024-09-16 | Stop reason: HOSPADM

## 2024-09-13 RX ORDER — SODIUM CHLORIDE, SODIUM LACTATE, POTASSIUM CHLORIDE, CALCIUM CHLORIDE 600; 310; 30; 20 MG/100ML; MG/100ML; MG/100ML; MG/100ML
INJECTION, SOLUTION INTRAVENOUS CONTINUOUS
Status: DISCONTINUED | OUTPATIENT
Start: 2024-09-13 | End: 2024-09-16 | Stop reason: HOSPADM

## 2024-09-13 RX ADMIN — PROPOFOL 200 MG: 10 INJECTION, EMULSION INTRAVENOUS at 07:09

## 2024-09-13 RX ADMIN — HYDROMORPHONE HYDROCHLORIDE 0.5 MCG: 1 INJECTION, SOLUTION INTRAMUSCULAR; INTRAVENOUS; SUBCUTANEOUS at 08:09

## 2024-09-13 RX ADMIN — SUCCINYLCHOLINE CHLORIDE 120 MG: 20 INJECTION, SOLUTION INTRAMUSCULAR; INTRAVENOUS; PARENTERAL at 07:09

## 2024-09-13 RX ADMIN — LIDOCAINE HYDROCHLORIDE 50 MG: 20 INJECTION, SOLUTION EPIDURAL; INFILTRATION; INTRACAUDAL; PERINEURAL at 07:09

## 2024-09-13 RX ADMIN — PHENYLEPHRINE HYDROCHLORIDE 100 MCG: 10 INJECTION INTRAVENOUS at 07:09

## 2024-09-13 RX ADMIN — MIDAZOLAM HYDROCHLORIDE 2 MG: 1 INJECTION, SOLUTION INTRAMUSCULAR; INTRAVENOUS at 06:09

## 2024-09-13 RX ADMIN — KETOROLAC TROMETHAMINE 30 MG: 30 INJECTION, SOLUTION INTRAMUSCULAR at 07:09

## 2024-09-13 RX ADMIN — SODIUM CHLORIDE, POTASSIUM CHLORIDE, SODIUM LACTATE AND CALCIUM CHLORIDE: 600; 310; 30; 20 INJECTION, SOLUTION INTRAVENOUS at 06:09

## 2024-09-13 RX ADMIN — HYDROMORPHONE HYDROCHLORIDE 0.5 MCG: 1 INJECTION, SOLUTION INTRAMUSCULAR; INTRAVENOUS; SUBCUTANEOUS at 07:09

## 2024-09-13 RX ADMIN — SUGAMMADEX 400 MG: 100 INJECTION, SOLUTION INTRAVENOUS at 08:09

## 2024-09-13 RX ADMIN — ROCURONIUM BROMIDE 40 MG: 10 INJECTION INTRAVENOUS at 07:09

## 2024-09-13 RX ADMIN — DEXAMETHASONE SODIUM PHOSPHATE 8 MG: 4 INJECTION, SOLUTION INTRA-ARTICULAR; INTRALESIONAL; INTRAMUSCULAR; INTRAVENOUS; SOFT TISSUE at 07:09

## 2024-09-13 RX ADMIN — HYDROMORPHONE HYDROCHLORIDE 0.5 MG: 1 INJECTION, SOLUTION INTRAMUSCULAR; INTRAVENOUS; SUBCUTANEOUS at 09:09

## 2024-09-13 RX ADMIN — DEXMEDETOMIDINE 20 MCG: 200 INJECTION, SOLUTION INTRAVENOUS at 07:09

## 2024-09-13 RX ADMIN — ONDANSETRON 4 MG: 2 INJECTION INTRAMUSCULAR; INTRAVENOUS at 07:09

## 2024-09-13 NOTE — TRANSFER OF CARE
"Anesthesia Transfer of Care Note    Patient: Valente Wilson    Procedure(s) Performed: Procedure(s) (LRB):  TONSILLECTOMY (Bilateral)    Patient location: PACU    Anesthesia Type: general    Transport from OR: Transported from OR on room air with adequate spontaneous ventilation    Post pain: adequate analgesia    Post assessment: no apparent anesthetic complications    Post vital signs: stable    Level of consciousness: awake    Nausea/Vomiting: no nausea/vomiting    Complications: none    Transfer of care protocol was followed      Last vitals: Visit Vitals  BP (!) (P) 118/53 (BP Location: Left arm, Patient Position: Lying)   Pulse 91   Temp 36 °C (96.8 °F)   Resp (!) 22   Ht 5' 10" (1.778 m)   Wt 134.4 kg (296 lb 6.4 oz)   SpO2 99%   Breastfeeding No   BMI 42.53 kg/m²     "

## 2024-09-13 NOTE — OP NOTE
TONSILLECTOMY  [facility] Otolaryngology-Head & Neck Surgery  Operative Report    Patient: Valente Wilson  : 2000 24 y.o.  MRN: [id number]  Date: 2024    Pre-op Diagnosis:   * recurrent tonsillitis*       Post-op Diagnosis:   Post-Op Diagnosis Codes:     * History of recurrent tonsillitis [Z87.09]     * Tonsillolith [J35.8]    Procedures:  Procedure(s):  TONSILLECTOMY    Surgeon(s):  Lemoine, John, MD Dakin, Kim L., MD    Resident Surgeon: BISI Kathleen MD    Attending Surgeon: Anibal Bolaños MD    Indication: Patient with recurrent acute tonsillitis and tonsilliths     Anesthesia: General    Staff:   Circulator: Ibis Covington RN  Relief Circulator: Torsten Gonzalez RN  Scrub Person: Samantha Valadez    Estimated Blood Loss:  50mL                 Specimens:   Specimens (From admission, onward)       Start     Ordered    24  Specimen to Pathology  RELEASE UPON ORDERING        References:    Click here for ordering Quick Tip   Question:  Release to patient  Answer:  Immediate    24                           Drains: none      Specimens:   Specimen (24h ago, onward)       Start     Ordered    24  Specimen to Pathology  RELEASE UPON ORDERING        References:    Click here for ordering Quick Tip   Question:  Release to patient  Answer:  Immediate    24                            Condition: Stable    Disposition: PACU - hemodynamically stable.      Findings:  Enlarged tonsils bilaterally with deep crypts and pockets of purulence       Procedure Narrative:  The risks, benefits, and alternatives of the procedure were discussed and informed written consent was obtained. The patient was brought to the operating room and placed on the table in the supine position. General endotracheal anesthesia was induced. A proper time-out was performed and confirmed to be correct.     The patient was draped in the standard fashion for tonsillectomy and placed in suspension  with a Belia-Walker mouth gag. The right tonsil was grasped with a straight blank and retracted medially. Bovie cautery was used to remove the tonsil from the tonsillar fossa. A vessel of decent size was encountered and controled with suction Bovie cautery. Once the tonsil was removed it was passed for the field and hemostasis was achieved in the tonsillar fossa with suction Bovie cautery. Attention was then turned to the left tonsil which was grasped with a straight blank and retracted medially. It was then dissected out using Bovie cautery. Hemostasis was achieved with suction Bovie cautery. The mouth gag was let down for a minute and then she was resuspended and hemostasis was confirmed. An orogastric tube was passed and used to suction out the stomach.     Dr. Dakin was present and scrubbed for all key portions of the procedure.     Discharge Note    OUTCOME: Patient tolerated treatment/procedure well without complication and is now ready for discharge.    DISPOSITION: Home or Self Care    FINAL DIAGNOSIS:  S/P tonsillectomy    FOLLOWUP: In clinic    DISCHARGE INSTRUCTIONS:    Discharge Procedure Orders   Diet Adult Regular     Weight bearing restrictions (specify):   Order Comments: No heavy lifting or strenuous activity for 2 weeks       BISI Kathleen MD  [facility], -[facility]

## 2024-09-13 NOTE — ANESTHESIA PROCEDURE NOTES
Intubation    Date/Time: 9/13/2024 7:10 AM    Performed by: Teo Gallagher CRNA  Authorized by: Kaila Salazar MD    Intubation:     Induction:  Rapid sequence induction    Intubated:  Postinduction    Mask Ventilation:  Easy mask    Attempts:  1    Attempted By:  CRNA    Method of Intubation:  Direct    Blade:  Gris 4    Laryngeal View Grade: Grade I - full view of cords      Difficult Airway Encountered?: No      Complications:  None    Airway Device:  Oral endotracheal tube    Airway Device Size:  7.5    Style/Cuff Inflation:  Cuffed    Inflation Amount (mL):  5    Tube secured:  22    Secured at:  The lips    Placement Verified By:  Capnometry    Complicating Factors:  None    Findings Post-Intubation:  BS equal bilateral

## 2024-09-13 NOTE — ANESTHESIA POSTPROCEDURE EVALUATION
"Anesthesia Post Evaluation    Patient: Valente Wilson    Procedure(s) Performed: Procedure(s) (LRB):  TONSILLECTOMY (Bilateral)    [facility] Anesthesia Post Op Evaluation        Vitals:    09/13/24 0627 09/13/24 0647 09/13/24 0840 09/13/24 0845   BP:  120/78 (!) 118/53 118/68   BP Location:   Left arm    Patient Position:   Lying    Pulse:  88 91 84   Resp:  20 (!) 22 19   Temp:  36.3 °C (97.3 °F) 36 °C (96.8 °F)    TempSrc:  Temporal     SpO2:  100% 99% 100%   Weight: 134.4 kg (296 lb 6.4 oz)      Height: 5' 10" (1.778 m)          Vitals  Taken Time   BP  09/13/24 0900   Temp  09/13/24 0900   Pulse  09/13/24 0900   Resp  09/13/24 0900   SpO2  09/13/24 0900         No case tracking events are documented in the log.      Pain/Amauri Score: Amauri Score: 5 (9/13/2024  8:40 AM)          "

## 2024-09-13 NOTE — DISCHARGE INSTRUCTIONS
Tonsillectomy & Adenoidectomy Post-Operative Instructions    Activity: No heavy lifting, straining, or bending for 2 weeks. No heavy exercise for two weeks. You may shower after your surgery.    Diet: After surgery, the most important thing is to drink as many fluids as you can to stay hydrated. Water, sports drinks, and juice are all good options. We would like for you to stay on a soft diet for two weeks. This includes yogurt, jello, smoothies, protein shakes, mashed potatoes, mac & cheese, pasta, and rice. Please avoid hard, crunchy foods as these may irritate the back of the throat. Some things to avoid include cookies, crackers, chips, bread, and fried chicken. You may also want to avoid acidic or spicy foods, as these could irritate the back of the throat.     Medications: After surgery, it will be important to stay ahead of your pain. There are a few different type of medications to help you do this. First, we recommend taking Tylenol 650mg and Ibuprofen (Motrin, Advil) 600mg on a scheduled basis. You can take these every 6 hours and can alternate them so that you are getting something for pain every 3 hours. An example schedule might look like this:     7:00am - Ibuprofen  10:00am - Tylenol  1:00pm - Ibuprofen  4:00pm - Tylenol  7:00pm - Ibuprofen  10:00pm - Tylenol    We have prescribed some additional stronger pain medications for you after this surgery in case you need them. Please take these as described. If you were prescribed narcotics (e.g. Norco, oxycodone), then do not take these while driving or operating heavy machinery. Please also note that some medications like Norco have Tylenol already in it, so do not take Tylenol and Norco at the same time. Do not take more than 4,000mg of Tylenol in a single 24 hour period.    Finally, we have prescribed a dose of steroids in case you need it to take on the 3rd or 4th day after surgery. These are usually the most painful days. You may take one dose/tablet  on the 3rd day, if you need it. If you are still in pain, you can take the second dose 24 hours later.    What To Expect: Your pain may last for up to 2 weeks. After the surgery, you will have scabs in the back of your throat that will look white, yellow, or gray. Do not be alarmed, as this is normal and not a sign of infection. It is normal to have neck pain or even ear pain, as the pain from the throat can be felt in both of these areas. You may also have bad breath as the scabs fall off in a couple of days.     What to Watch For: The major thing to watch for is bleeding after the surgery. This is uncommon, but does sometimes happen. If you notice bleeding from your throat that is more than a tablespoon or does not stop, then please call the office or the on-call physician (see the numbers below). Sometimes, this is something we can manage over the phone. Sometimes, this would require you to come into the hospital. If you are unsure, you can always call. If there is uncontrolled bleeding that does not stop, please go to the nearest emergency room.     Follow Up: Please follow up with the ENT Team in approximately 4 weeks. Your appointment will be at [facility] ([address] [place] [address], [place] [address]). The ENT Clinic will call you with an appointment date and time. If you have any questions or concerns in the meantime, you may call the clinic at [phone]. If you have any questions or concerns after hours, call [phone] and you will be connected to an on-call ENT physician.

## 2024-09-14 NOTE — ANESTHESIA POSTPROCEDURE EVALUATION
Anesthesia Post Evaluation    Patient: Valente Wilson    Procedure(s) Performed: Procedure(s) (LRB):  TONSILLECTOMY (Bilateral)    Final Anesthesia Type: general      Patient location during evaluation: PACU  Patient participation: Yes- Able to Participate  Level of consciousness: awake and responds to stimulation  Post-procedure vital signs: reviewed and stable  Pain management: adequate  Airway patency: patent    PONV status at discharge: No PONV  Anesthetic complications: no      Cardiovascular status: blood pressure returned to baseline  Respiratory status: unassisted  Hydration status: euvolemic  Follow-up not needed.              Vitals Value Taken Time   /71 09/13/24 1011   Temp 36.6 09/14/24 0842   Pulse 94 09/13/24 1019   Resp 18 09/14/24 0842   SpO2 94 % 09/13/24 1019   Vitals shown include unfiled device data.      Event Time   Out of Recovery 09:10:00         Pain/Amauri Score: Pain Rating Prior to Med Admin: 10 (9/13/2024  9:00 AM)  Amauri Score: 9 (9/13/2024  9:10 AM)  Modified Amauri Score: 20 (9/13/2024 10:55 AM)

## 2024-09-16 VITALS
HEIGHT: 70 IN | DIASTOLIC BLOOD PRESSURE: 71 MMHG | WEIGHT: 293 LBS | TEMPERATURE: 97 F | BODY MASS INDEX: 41.95 KG/M2 | SYSTOLIC BLOOD PRESSURE: 117 MMHG | HEART RATE: 93 BPM | RESPIRATION RATE: 20 BRPM | OXYGEN SATURATION: 94 %

## 2024-09-16 LAB
ESTROGEN SERPL-MCNC: NORMAL PG/ML
INSULIN SERPL-ACNC: NORMAL U[IU]/ML
LAB AP CLINICAL INFORMATION: NORMAL
LAB AP GROSS DESCRIPTION: NORMAL
LAB AP REPORT FOOTNOTES: NORMAL
POCT GLUCOSE: 186 MG/DL (ref 70–110)
T3RU NFR SERPL: NORMAL %

## 2024-10-17 ENCOUNTER — OFFICE VISIT (OUTPATIENT)
Dept: OTOLARYNGOLOGY | Facility: CLINIC | Age: 24
End: 2024-10-17
Payer: MEDICAID

## 2024-10-17 VITALS
HEIGHT: 70 IN | HEART RATE: 97 BPM | WEIGHT: 293 LBS | DIASTOLIC BLOOD PRESSURE: 74 MMHG | BODY MASS INDEX: 41.95 KG/M2 | SYSTOLIC BLOOD PRESSURE: 114 MMHG | OXYGEN SATURATION: 99 % | TEMPERATURE: 99 F | RESPIRATION RATE: 20 BRPM

## 2024-10-17 DIAGNOSIS — J35.01 CHRONIC TONSILLITIS: ICD-10-CM

## 2024-10-17 DIAGNOSIS — Z87.09 HISTORY OF RECURRENT TONSILLITIS: Primary | ICD-10-CM

## 2024-10-17 PROCEDURE — 99214 OFFICE O/P EST MOD 30 MIN: CPT | Mod: PBBFAC | Performed by: STUDENT IN AN ORGANIZED HEALTH CARE EDUCATION/TRAINING PROGRAM

## 2024-10-17 NOTE — PROGRESS NOTES
[facility]  Otolaryngology Clinic Note    Valente Wilson  YOB: 2000    Chief Complaint:   Chief Complaint   Patient presents with    referral: Tonsillitis        HPI: 05/09/2024: 23 y.o. female referred for tonsil concerns. She reports having recurrent tonsillitis r/t tonsil stones. She does not have recurrent strep tonsillitis. She reports that her tonsils swell and hurt, that she frequently reports to the ED for this. Also endorses chronic halitosis and globus. States she coughs up thick mucous daily upon waking. Tonsil symptoms began when she was in middle school. She took medicine for reflux when she was pregnant but reports no change in her symptoms. She feels her reflux resolved following childbirth. Endorses good oral hygiene but has never used a water pick, stating she is unable to afford it. She is unsure if she snores at night as she sleeps alone, but she wakes frequently and feels tired during the day even if she gets adequate sleep. Reports waking with very dry mouth.     : Endorses compliance with conservative measures, however, states her symptoms have not improved. Had an episode of strep tonsillitis following last visit. She desires a tonsillectomy    10/17/24:   Post-op tonsillectomy. Reports doing well. No bleeding. Pain controlled. Some snoring post-op.     ROS:   10-point review of systems negative except per HPI      Review of patient's allergies indicates:   Allergen Reactions    Fentanyl (bulk) Itching    Oxycodone-acetaminophen Hives     pt broke out in rash  Other reaction(s): RASH       Past Medical History:   Diagnosis Date    ADD (attention deficit disorder)     Anemia     Bipolar disorder, unspecified     History of prediabetes 02/01/2024    Insulin controlled gestational diabetes mellitus (GDM) in third trimester 10/05/2023    PCOS (polycystic ovarian syndrome)     Tobacco use 05/12/2022       Past Surgical History:   Procedure Laterality Date     SALPINGECTOMY      WISDOM TOOTH EXTRACTION         Social History     Socioeconomic History    Marital status: Legally    Tobacco Use    Smoking status: Every Day     Current packs/day: 0.00     Types: Cigarettes     Last attempt to quit: 2023     Years since quittin.1     Passive exposure: Never    Smokeless tobacco: Never    Tobacco comments:     3 cigarettes per day   Substance and Sexual Activity    Alcohol use: Not Currently    Drug use: Never    Sexual activity: Yes     Partners: Male       Family History   Problem Relation Name Age of Onset    Hypertension Mother      Diabetes Maternal Grandmother      Bipolar disorder Father      Mental illness Father         Outpatient Encounter Medications as of 2024   Medication Sig Dispense Refill    dextroamphetamine-amphetamine (ADDERALL) 20 mg tablet Take 1 tablet by mouth once daily. 30 tablet 0    ibuprofen (ADVIL,MOTRIN) 600 MG tablet Take 1 tablet (600 mg total) by mouth every 6 (six) hours as needed for Pain. 30 tablet 0    dextroamphetamine-amphetamine (ADDERALL) 20 mg tablet Take 1 tablet by mouth once daily. 30 tablet 0     No facility-administered encounter medications on file as of 2024.       Physical Exam:  Vitals:    10/17/24 1401   BP: 114/74   Pulse: 97   Resp: 20   Temp: 98.5 °F (36.9 °C)     Physical Exam   General: NAD, voice normal  Neuro: AAO, CN II - XII grossly intact  Head/ Face: NCAT, symmetric, sensations intact bilaterally  Eyes: EOMI, PERRL  Ears: externally normal with grossly normal hearing  AD: EAC patent, TM intact, no middle ear effusion, no retractions  AS: EAC patent, TM intact, no middle ear effusion, no retractions  Nose: bilateral nares patent, midline septum, no rhinorrhea, no external deformity, +ITH    OC/OP: MMM, no intraoral lesions, FOM/BOT soft, no trismus, dentition is good, no uvular deviation, bilaterally symmetric soft palate elevation, palatoglossus and palatopharyngeal fold wnl; tonsils  surgically absent, healed appropriately.  Indirect laryngoscopy: deferred due to patient intolerance  Neck: soft, supple, no LAD, normal ROM, no thyromegaly  Respiratory: nonlabored, no wheezing, bilateral chest rise    Pertinent Data:  ? LABS:  ? AUDIO:           ? PATH:      Imaging:   I personally reviewed the following images:        Assessment/Plan:  24 y.o. female with recurrent tonsilloliths/tonsillitis, globus/GERD, PND, turbinate hypertrophy. CT maxface 2020 with turbinate hypertrophy.     Underwent tonsillectomy on 9/13/24. Doing well    - PRN    Harley Fatima MD  [employer] [facility], PGY-4

## 2024-11-05 ENCOUNTER — OFFICE VISIT (OUTPATIENT)
Dept: FAMILY MEDICINE | Facility: CLINIC | Age: 24
End: 2024-11-05
Payer: MEDICAID

## 2024-11-05 VITALS
SYSTOLIC BLOOD PRESSURE: 106 MMHG | WEIGHT: 293 LBS | BODY MASS INDEX: 41.95 KG/M2 | OXYGEN SATURATION: 99 % | TEMPERATURE: 99 F | HEIGHT: 70 IN | RESPIRATION RATE: 20 BRPM | DIASTOLIC BLOOD PRESSURE: 66 MMHG | HEART RATE: 83 BPM

## 2024-11-05 DIAGNOSIS — F90.0 ATTENTION DEFICIT HYPERACTIVITY DISORDER (ADHD), PREDOMINANTLY INATTENTIVE TYPE: ICD-10-CM

## 2024-11-05 DIAGNOSIS — D50.9 MICROCYTIC ANEMIA: ICD-10-CM

## 2024-11-05 DIAGNOSIS — E11.9 TYPE 2 DIABETES MELLITUS WITHOUT COMPLICATION, WITHOUT LONG-TERM CURRENT USE OF INSULIN: Primary | ICD-10-CM

## 2024-11-05 DIAGNOSIS — Z23 NEED FOR VACCINATION: ICD-10-CM

## 2024-11-05 LAB
AMPHET UR QL SCN: NEGATIVE
BARBITURATE SCN PRESENT UR: NEGATIVE
BENZODIAZ UR QL SCN: NEGATIVE
CANNABINOIDS UR QL SCN: NEGATIVE
COCAINE UR QL SCN: NEGATIVE
CREAT UR-MCNC: 187.4 MG/DL (ref 45–106)
FENTANYL UR QL SCN: NEGATIVE
HBA1C MFR BLD: 7.6 %
MDMA UR QL SCN: NEGATIVE
MICROALBUMIN UR-MCNC: 11.9 UG/ML
MICROALBUMIN/CREAT RATIO PNL UR: 6.4 MG/GM CR (ref 0–30)
OPIATES UR QL SCN: NEGATIVE
PCP UR QL: NEGATIVE
PH UR: 6 [PH] (ref 3–11)
SPECIFIC GRAVITY, URINE AUTO (.000) (OHS): 1.02 (ref 1–1.03)

## 2024-11-05 PROCEDURE — 90472 IMMUNIZATION ADMIN EACH ADD: CPT | Mod: PBBFAC

## 2024-11-05 PROCEDURE — 99214 OFFICE O/P EST MOD 30 MIN: CPT | Mod: PBBFAC

## 2024-11-05 PROCEDURE — 83036 HEMOGLOBIN GLYCOSYLATED A1C: CPT | Mod: PBBFAC

## 2024-11-05 PROCEDURE — 82043 UR ALBUMIN QUANTITATIVE: CPT

## 2024-11-05 PROCEDURE — 80307 DRUG TEST PRSMV CHEM ANLYZR: CPT

## 2024-11-05 PROCEDURE — 90656 IIV3 VACC NO PRSV 0.5 ML IM: CPT | Mod: PBBFAC

## 2024-11-05 PROCEDURE — 90677 PCV20 VACCINE IM: CPT | Mod: PBBFAC

## 2024-11-05 PROCEDURE — 90471 IMMUNIZATION ADMIN: CPT | Mod: PBBFAC

## 2024-11-05 RX ORDER — DEXTROAMPHETAMINE SACCHARATE, AMPHETAMINE ASPARTATE, DEXTROAMPHETAMINE SULFATE AND AMPHETAMINE SULFATE 5; 5; 5; 5 MG/1; MG/1; MG/1; MG/1
1 TABLET ORAL DAILY
Qty: 30 TABLET | Refills: 0 | Status: SHIPPED | OUTPATIENT
Start: 2024-11-05 | End: 2024-12-05

## 2024-11-05 RX ORDER — SEMAGLUTIDE 0.68 MG/ML
0.5 INJECTION, SOLUTION SUBCUTANEOUS
Qty: 3 ML | Refills: 1 | Status: SHIPPED | OUTPATIENT
Start: 2024-11-05 | End: 2024-12-25

## 2024-11-05 RX ORDER — DEXTROAMPHETAMINE SACCHARATE, AMPHETAMINE ASPARTATE, DEXTROAMPHETAMINE SULFATE AND AMPHETAMINE SULFATE 5; 5; 5; 5 MG/1; MG/1; MG/1; MG/1
1 TABLET ORAL DAILY
Qty: 30 TABLET | Refills: 0 | Status: SHIPPED | OUTPATIENT
Start: 2024-12-06 | End: 2025-01-05

## 2024-11-05 RX ORDER — DEXTROAMPHETAMINE SACCHARATE, AMPHETAMINE ASPARTATE, DEXTROAMPHETAMINE SULFATE AND AMPHETAMINE SULFATE 5; 5; 5; 5 MG/1; MG/1; MG/1; MG/1
1 TABLET ORAL DAILY
Qty: 30 TABLET | Refills: 0 | Status: SHIPPED | OUTPATIENT
Start: 2025-01-06 | End: 2025-02-05

## 2024-11-05 RX ADMIN — PNEUMOCOCCAL 20-VALENT CONJUGATE VACCINE 0.5 ML
2.2; 2.2; 2.2; 2.2; 2.2; 2.2; 2.2; 2.2; 2.2; 2.2; 2.2; 2.2; 2.2; 2.2; 2.2; 2.2; 4.4; 2.2; 2.2; 2.2 INJECTION, SUSPENSION INTRAMUSCULAR at 04:11

## 2024-11-05 RX ADMIN — INFLUENZA VIRUS VACCINE 0.5 ML: 15; 15; 15 SUSPENSION INTRAMUSCULAR at 04:11

## 2024-11-05 NOTE — PROGRESS NOTES
[facility] Clinic Note    CHIEF COMPLAINT:  Chief Complaint   Patient presents with    Diabetes     Type 2    Medication Refill     All meds       HISTORY OF  PRESENT ILLNESS:  Valente Wilson is a 24 y.o. female w/PMHx of ADHD and prediabetes, who presents to clinic today for DM and ADHD follow-up.     - DM, hx of GDM requiring insulin while pregnant.   2 hr GTT PP elevated but was not fasting.   Last A1c 8.4 on 24.   Previously on metformin for PCOS and previous pre-diabetes, self-discontinued for GI issues. Restarted metformin 500 bid 2024, even with ensuing diarrhea.   - Denies PMH or FH of thyroid caner of MEN syndrome. Denies hx of pancreatitis. Grandmother with pancreatic cancer  - not measuring BG at home    - PTSD/KRISTINA/ADD/bipolar (previously on Latuda): restarted on her Adderrall 20 mg qAM 3/2024. States taking daily, working well for her. Able to keep on task and focus with med.   Used to see Ms Becker, last seen 3 years ago. Was supposed to follow with Fidel but never happened.     - Microcytic anemia: appears chronic dating as far back as . Hb 7.7 post-partum 10/2023. S/S screen neg. Last ferritin 10 back in 2023. Was started on PO Fe 2024, adherent. Asymptomatic, denies fatigue, palpitations, dyspnea, lightheadedness.     Problem List:  - ADHD- adderall 20 qAM, Pain contract signed with [facility] 3/5/24 (Dr. Ortega).   - T2DM with hx of GDM requiring insulin while pregnant- metformin 500 BID  - Carpal tunnel, bilateral: seen by [facility], declined CSI. NCT pending.  - Recurrent tonsilloliths/tonsillitis, s/p tonsillectomy 2024  - GERD: following with ENT. Flonase BID, Pepcid qd. ENT ordered sleep study, not scheduled yet  - PCOS: previously on metformin    PSHx:  ectopic pregnancy 2018 s/p salpingectomy    OB/Gyn: . Recent  at 37^2 WGA on 10/10/23. Previously on Depo-provera, follows with Gyn. Currently on no contraception, OK with pregnancy  Hx of STI: chlam 2017 pt and partner both  "treated    HM:  ·Cervical Cancer Screen: 04/12/2022 - NIL, with Gyn  ·Immunizations: due for PCV20, Flu    ROS: see HPI      Past Medical History:   Diagnosis Date    ADD (attention deficit disorder)     Anemia     Bipolar disorder, unspecified     History of prediabetes 02/01/2024    Insulin controlled gestational diabetes mellitus (GDM) in third trimester 10/05/2023    PCOS (polycystic ovarian syndrome)     Tobacco use 05/12/2022      Past Surgical History:   Procedure Laterality Date    SALPINGECTOMY      TONSILLECTOMY Bilateral 9/13/2024    Procedure: TONSILLECTOMY;  Surgeon: Dakin, Kim L., MD;  Location: [facility];  Service: ENT;  Laterality: Bilateral;    WISDOM TOOTH EXTRACTION        Social History     Socioeconomic History    Marital status: Legally    Tobacco Use    Smoking status: Every Day     Current packs/day: 0.50     Average packs/day: 0.5 packs/day for 6.2 years (3.1 ttl pk-yrs)     Types: Cigarettes     Start date: 9/6/2018     Passive exposure: Never    Smokeless tobacco: Never    Tobacco comments:     3 cigarettes per day   Substance and Sexual Activity    Alcohol use: Not Currently     Comment: occ    Drug use: Never    Sexual activity: Yes     Partners: Male         Review of Most Recent Wound Care-Related Labs:  Lab Results   Component Value Date/Time    WBC 8.87 07/19/2024 10:38 AM    RBC 4.67 07/19/2024 10:38 AM    HGB 11.3 (L) 07/19/2024 10:38 AM    HCT 36.6 (L) 07/19/2024 10:38 AM    MCV 78.4 (L) 07/19/2024 10:38 AM    MCH 24.2 (L) 07/19/2024 10:38 AM    CREATININE 0.87 07/19/2024 10:38 AM    HGBA1C 8.4 (H) 07/19/2024 10:38 AM        PHYSICAL EXAMINATION:  Vitals:    11/05/24 1538   BP: 106/66   BP Location: Right arm   Patient Position: Sitting   Pulse: 83   Resp: 20   Temp: 98.6 °F (37 °C)   TempSrc: Oral   SpO2: 99%   Weight: (!) 136.5 kg (301 lb)   Height: 5' 10" (1.778 m)     General: appears well, in no acute distress. Obese.   HENT: MMM  Neck: supple, acanthosis nigricans " noted posterior neck  Respiratory: clear to auscultation bilaterally, nonlabored respirations   Cardiovascular: regular rate and rhythm without murmurs or gallops, no edema in bilateral lower extremities   Gastrointestinal: soft, non-tender, non-distended, bowel sounds present   Genitourinary: no suprapubic tenderness   Musculoskeletal: no gross deformities observed   Integumentary: no acute rashes or skin lesions observed    Neuro: No focal lesions observed     ASSESSMENT/PLAN:  1. Type 2 diabetes mellitus without complication, without long-term current use of insulin (Primary)  - POCT HEMOGLOBIN A1C  - Urine Microalbumin/Cr ratio  - semaglutide (OZEMPIC) 0.25 mg or 0.5 mg (2 mg/3 mL) pen injector; Inject 0.5 mg into the skin every 7 days. for 8 doses  Dispense: 3 mL; Refill: 1  - start Ozempic today. Failed metformin d/t GI issues (diarrhea) over the course of several separate prescriptions for PCOS and also previous pre-diabetes. Risks and benefits discussed and understood. Titrate up as tolerated.   - needs DM Eye exam, foot exam.     2. Attention deficit hyperactivity disorder (ADHD), predominantly inattentive type  PDMP reviewed and demonstrated no abnormal filling patterns.  Refill submitted as requested.   - Drug Screen, Urine  - dextroamphetamine-amphetamine (ADDERALL) 20 mg tablet; Take 1 tablet by mouth once daily.  Dispense: 30 tablet; Refill: 0  - dextroamphetamine-amphetamine (ADDERALL) 20 mg tablet; Take 1 tablet by mouth once daily.  Dispense: 30 tablet; Refill: 0  - dextroamphetamine-amphetamine (ADDERALL) 20 mg tablet; Take 1 tablet by mouth once daily.  Dispense: 30 tablet; Refill: 0  - patient will schedule with Ms. Becker for her bipolar/PTSD/anxiety    3. Iron deficiency anemia  - continue PO Fe every other day. Ok to take without orange juice if heartburn. Rec OTC Fe gummies if unable to tolerate tablets.     4. Need for vaccination  - pneumoc 20-izabela conj-dip cr(PF) (PREVNAR-20 (PF))  injection Syrg 0.5 mL  - influenza (Flulaval, Fluzone, Fluarix) 45 mcg/0.5 mL IM vaccine (> or = 6 mo) 0.5 mL    RTC in 1 month for Ozempic titration. DM eye and foot exam due

## 2024-11-05 NOTE — PROGRESS NOTES
I have reviewed the Resident's history and physical, assessment, plan, and progress note. I agree with the findings.       Alan Cardoza MD  [facility]

## 2024-11-05 NOTE — LETTER
November 5, 2024      [facility]  [facility] [facility] [address]  Phone: [phone]       Patient: Valente Wilson   YOB: 2000  Date of Visit: 11/05/2024    To Whom It May Concern:    Rachael Wilson  was at [facility] on 11/05/2024. The patient may return to work/school on 11/6/24 with no restrictions. If you have any questions or concerns, or if I can be of further assistance, please do not hesitate to contact me.    Sincerely,    Fly Smallwood LPN

## 2024-12-03 ENCOUNTER — OFFICE VISIT (OUTPATIENT)
Dept: FAMILY MEDICINE | Facility: CLINIC | Age: 24
End: 2024-12-03
Payer: MEDICAID

## 2024-12-03 VITALS
SYSTOLIC BLOOD PRESSURE: 116 MMHG | WEIGHT: 293 LBS | HEIGHT: 70 IN | BODY MASS INDEX: 41.95 KG/M2 | OXYGEN SATURATION: 99 % | DIASTOLIC BLOOD PRESSURE: 72 MMHG | HEART RATE: 58 BPM | TEMPERATURE: 99 F

## 2024-12-03 DIAGNOSIS — J06.9 UPPER RESPIRATORY TRACT INFECTION, UNSPECIFIED TYPE: Primary | ICD-10-CM

## 2024-12-03 LAB
FLUAV AG UPPER RESP QL IA.RAPID: NOT DETECTED
FLUBV AG UPPER RESP QL IA.RAPID: NOT DETECTED
RSV A 5' UTR RNA NPH QL NAA+PROBE: DETECTED
SARS-COV-2 RNA RESP QL NAA+PROBE: NOT DETECTED

## 2024-12-03 PROCEDURE — 0241U COVID/RSV/FLU A&B PCR: CPT

## 2024-12-03 PROCEDURE — 99213 OFFICE O/P EST LOW 20 MIN: CPT | Mod: PBBFAC

## 2024-12-03 NOTE — PROGRESS NOTES
"[facility] Clinic Note  12/03/2024   CHIEF COMPLAINT:  Chief Complaint   Patient presents with    URI       HISTORY OF  PRESENT ILLNESS:  Valente Wilson is a 24 y.o. female, who presents to clinic today for cough, sore throat, nasal drainage.     Complaints:  1) Cough, sore throat, nasal drainage  Symptom onset was 3 days ago consistent with dry cough that progressed with productive cough, consisting of green mucus sputum, along with nasal congestion and drainage  Patient admits that her 2 youngest daughters' were recently diagnosed with RSV and adenovirus last week from a ED visit after having symptoms similar to hers for the past 2 weeks  Patient states that her daughters' symptoms have slowly abated and her daughters are getting better, tolerating more PO diet  Besides her daughters, she states she had not had any recent sick contacts/travel history in the past 3 months; did received her flu vaccine  Patient states she has been taking over-the-counter Tylenol 650 mg every 6 hours as needed for generalized pain mainly located on the lateral chest wall worse with coughing and NyQuil which she takes at night and has improvement of her pain and overall congestion with a medication   Patient states that she has been having decreased p.o. intake due to her feeling overall unwell  Denies fevers, chills, but admits to productive cough, nasal drainage/congestion, sore throat        REVIEW OF SYSTEMS:  See HPI    PHYSICAL EXAMINATION:  Blood pressure 116/72, pulse (!) 58, temperature 98.7 °F (37.1 °C), temperature source Oral, height 5' 10" (1.778 m), weight (!) 136.5 kg (301 lb), SpO2 99%, not currently breastfeeding.    General:  VSS. No acute distress. Intermittently coughing inbetween sentences.  Head/Face: NC, AT  Eyes: PEERLA, EOMI, normal conjunctiva  Ears: Hears well at normal conversation volume  AD: external nontender, ear canal patent/nonerythematous, TM intact, no middle ear fluid  AS: external nontender, ear " canal patent/nonerythematous, TM intact, no middle ear fluid  Nose: septum midline, no inferior turbinate hypertrophy, no polyps, mild nasal drainage present in bilateral nostrils  Mouth: tonsils absent, dentition intact, posterior pharyngeal erythema  Neck: soft, tender right submandibular lymph node palpable  Respiratory: clear to ascultation in all lung fields, no crackles/wheezing present  Cardiovascular:  bradycardic, Regular Rhythm, no additional heart sounds heard.        ASSESSMENT/PLAN:    1) Upper respiratory infection   Ordered COVID/Flu/RSV; will continue to monitor and treat based on results  Informed patient about proper hand hygiene  Informed patient to continue to take Tylenol/Motrin every 4-6 hrs prn for fever and generalized muscle ache  precautions advised to patient to contact in case of persistent/worsening fever, cough, shortness of breath        - RTC in 1-2 months with PCP upon resolution of viral symptoms       Mukesh Ortega MD   [facility] Family Medicine Resident  12/03/2024

## 2024-12-04 ENCOUNTER — TELEPHONE (OUTPATIENT)
Dept: FAMILY MEDICINE | Facility: CLINIC | Age: 24
End: 2024-12-04
Payer: MEDICAID

## 2024-12-04 NOTE — PROGRESS NOTES
I reviewed History, PE, A/P and chart was reviewed.  Services provided in the outpatient department of  a teaching facility, I was immediately available.  I agree with resident, care reasonable and necessary with any exceptions stated below.  Management discussed with resident at time of visit.    RSV (+)    REVIEW fertility status      Estelle Redd MD  [facility] Residency - [facility] [place]  [facility]

## 2024-12-05 NOTE — TELEPHONE ENCOUNTER
Contacted pt regarding positive RSV results; informed patient to encourage fluids and diet; take Tylenol/Motrin as needed in case of fever, which pt voiced understanding    Mukesh Ortega MD   [facility] Family Medicine Resident  12/05/2024

## 2025-01-08 ENCOUNTER — TELEPHONE (OUTPATIENT)
Dept: FAMILY MEDICINE | Facility: CLINIC | Age: 25
End: 2025-01-08
Payer: MEDICAID

## 2025-01-08 DIAGNOSIS — D50.9 MICROCYTIC ANEMIA: ICD-10-CM

## 2025-01-08 RX ORDER — FERROUS SULFATE 325(65) MG
325 TABLET ORAL EVERY OTHER DAY
Qty: 45 TABLET | Refills: 1 | Status: SHIPPED | OUTPATIENT
Start: 2025-01-08 | End: 2025-07-07

## 2025-01-08 RX ORDER — SEMAGLUTIDE 0.68 MG/ML
0.5 INJECTION, SOLUTION SUBCUTANEOUS
Qty: 3 ML | Refills: 5 | Status: SHIPPED | OUTPATIENT
Start: 2025-01-08

## 2025-01-09 ENCOUNTER — TELEPHONE (OUTPATIENT)
Dept: FAMILY MEDICINE | Facility: CLINIC | Age: 25
End: 2025-01-09
Payer: MEDICAID

## 2025-02-21 ENCOUNTER — OFFICE VISIT (OUTPATIENT)
Dept: FAMILY MEDICINE | Facility: CLINIC | Age: 25
End: 2025-02-21
Payer: MEDICAID

## 2025-02-21 VITALS
TEMPERATURE: 99 F | OXYGEN SATURATION: 100 % | SYSTOLIC BLOOD PRESSURE: 117 MMHG | DIASTOLIC BLOOD PRESSURE: 76 MMHG | HEART RATE: 98 BPM | WEIGHT: 293 LBS | HEIGHT: 70 IN | BODY MASS INDEX: 41.95 KG/M2

## 2025-02-21 DIAGNOSIS — F90.0 ATTENTION DEFICIT HYPERACTIVITY DISORDER (ADHD), PREDOMINANTLY INATTENTIVE TYPE: ICD-10-CM

## 2025-02-21 DIAGNOSIS — E11.9 TYPE 2 DIABETES MELLITUS WITHOUT COMPLICATION, WITHOUT LONG-TERM CURRENT USE OF INSULIN: ICD-10-CM

## 2025-02-21 DIAGNOSIS — Z32.01 POSITIVE PREGNANCY TEST: Primary | ICD-10-CM

## 2025-02-21 DIAGNOSIS — D50.9 IRON DEFICIENCY ANEMIA, UNSPECIFIED IRON DEFICIENCY ANEMIA TYPE: ICD-10-CM

## 2025-02-21 LAB
ALBUMIN SERPL-MCNC: 3.9 G/DL (ref 3.5–5)
ALBUMIN/GLOB SERPL: 1 RATIO (ref 1.1–2)
ALP SERPL-CCNC: 76 UNIT/L (ref 40–150)
ALT SERPL-CCNC: 15 UNIT/L (ref 0–55)
ANION GAP SERPL CALC-SCNC: 5 MEQ/L
AST SERPL-CCNC: 13 UNIT/L (ref 5–34)
B-HCG FREE SERPL-ACNC: <2.42 MIU/ML
B-HCG UR QL: NEGATIVE
BASOPHILS # BLD AUTO: 0.06 X10(3)/MCL
BASOPHILS NFR BLD AUTO: 0.7 %
BILIRUB SERPL-MCNC: 0.2 MG/DL
BUN SERPL-MCNC: 9.7 MG/DL (ref 7–18.7)
CALCIUM SERPL-MCNC: 9.3 MG/DL (ref 8.4–10.2)
CHLORIDE SERPL-SCNC: 111 MMOL/L (ref 98–107)
CO2 SERPL-SCNC: 26 MMOL/L (ref 22–29)
CREAT SERPL-MCNC: 0.87 MG/DL (ref 0.55–1.02)
CREAT/UREA NIT SERPL: 11
CTP QC/QA: YES
EOSINOPHIL # BLD AUTO: 0.27 X10(3)/MCL (ref 0–0.9)
EOSINOPHIL NFR BLD AUTO: 3 %
ERYTHROCYTE [DISTWIDTH] IN BLOOD BY AUTOMATED COUNT: 16.4 % (ref 11.5–17)
EST. AVERAGE GLUCOSE BLD GHB EST-MCNC: 137 MG/DL
FERRITIN SERPL-MCNC: 51.86 NG/ML (ref 4.63–204)
GFR SERPLBLD CREATININE-BSD FMLA CKD-EPI: >60 ML/MIN/1.73/M2
GLOBULIN SER-MCNC: 4 GM/DL (ref 2.4–3.5)
GLUCOSE SERPL-MCNC: 104 MG/DL (ref 74–100)
HBA1C MFR BLD: 6.4 %
HCT VFR BLD AUTO: 39.6 % (ref 37–47)
HGB BLD-MCNC: 12.1 G/DL (ref 12–16)
IMM GRANULOCYTES # BLD AUTO: 0.03 X10(3)/MCL (ref 0–0.04)
IMM GRANULOCYTES NFR BLD AUTO: 0.3 %
IRON SATN MFR SERPL: 17 % (ref 20–50)
IRON SERPL-MCNC: 55 UG/DL (ref 50–170)
LYMPHOCYTES # BLD AUTO: 2.62 X10(3)/MCL (ref 0.6–4.6)
LYMPHOCYTES NFR BLD AUTO: 28.8 %
MCH RBC QN AUTO: 24.8 PG (ref 27–31)
MCHC RBC AUTO-ENTMCNC: 30.6 G/DL (ref 33–36)
MCV RBC AUTO: 81.1 FL (ref 80–94)
MONOCYTES # BLD AUTO: 0.57 X10(3)/MCL (ref 0.1–1.3)
MONOCYTES NFR BLD AUTO: 6.3 %
NEUTROPHILS # BLD AUTO: 5.55 X10(3)/MCL (ref 2.1–9.2)
NEUTROPHILS NFR BLD AUTO: 60.9 %
NRBC BLD AUTO-RTO: 0 %
PLATELET # BLD AUTO: 375 X10(3)/MCL (ref 130–400)
PMV BLD AUTO: 11.2 FL (ref 7.4–10.4)
POTASSIUM SERPL-SCNC: 4 MMOL/L (ref 3.5–5.1)
PROT SERPL-MCNC: 7.9 GM/DL (ref 6.4–8.3)
RBC # BLD AUTO: 4.88 X10(6)/MCL (ref 4.2–5.4)
SODIUM SERPL-SCNC: 142 MMOL/L (ref 136–145)
TIBC SERPL-MCNC: 274 UG/DL (ref 70–310)
TIBC SERPL-MCNC: 329 UG/DL (ref 250–450)
TRANSFERRIN SERPL-MCNC: 287 MG/DL (ref 180–382)
WBC # BLD AUTO: 9.1 X10(3)/MCL (ref 4.5–11.5)

## 2025-02-21 PROCEDURE — 82728 ASSAY OF FERRITIN: CPT

## 2025-02-21 PROCEDURE — 99214 OFFICE O/P EST MOD 30 MIN: CPT | Mod: PBBFAC

## 2025-02-21 PROCEDURE — 83550 IRON BINDING TEST: CPT

## 2025-02-21 PROCEDURE — 83036 HEMOGLOBIN GLYCOSYLATED A1C: CPT

## 2025-02-21 PROCEDURE — 84702 CHORIONIC GONADOTROPIN TEST: CPT

## 2025-02-21 PROCEDURE — 80053 COMPREHEN METABOLIC PANEL: CPT

## 2025-02-21 PROCEDURE — 85025 COMPLETE CBC W/AUTO DIFF WBC: CPT

## 2025-02-21 PROCEDURE — 36415 COLL VENOUS BLD VENIPUNCTURE: CPT

## 2025-02-21 RX ORDER — SEMAGLUTIDE 0.68 MG/ML
0.25 INJECTION, SOLUTION SUBCUTANEOUS
Qty: 3 ML | Refills: 0 | Status: SHIPPED | OUTPATIENT
Start: 2025-02-21

## 2025-02-21 RX ORDER — FERROUS SULFATE 325(65) MG
325 TABLET ORAL EVERY OTHER DAY
Qty: 45 TABLET | Refills: 1 | Status: SHIPPED | OUTPATIENT
Start: 2025-02-21 | End: 2025-08-20

## 2025-02-21 RX ORDER — LANCETS 33 GAUGE
1 EACH MISCELLANEOUS 2 TIMES DAILY WITH MEALS
Qty: 100 EACH | Refills: 1 | Status: SHIPPED | OUTPATIENT
Start: 2025-02-21

## 2025-02-21 RX ORDER — DEXTROAMPHETAMINE SACCHARATE, AMPHETAMINE ASPARTATE, DEXTROAMPHETAMINE SULFATE AND AMPHETAMINE SULFATE 5; 5; 5; 5 MG/1; MG/1; MG/1; MG/1
1 TABLET ORAL DAILY
Qty: 30 TABLET | Refills: 0 | Status: SHIPPED | OUTPATIENT
Start: 2025-02-21 | End: 2025-03-23

## 2025-02-22 NOTE — PROGRESS NOTES
Family Medicine Clinic Note     Subjective     Patient ID: Valente Wilson is a 24 y.o. female.    Chief Complaint: ROUTINE F/U     HPI  Diabetes  Without medication x 2 months  Eats 1 meal per day typically dinner with sweat  Reports recurrent yeast infections, most recent 25 treated with otc medication  Reports plyuria and polydipsia    ADHD   Without medication x1 month  Reports decreased concentration at work  Can feel the difference from not taking medication    reviewed inaccuracy in data, last fill date 2024, refills sent 2024, will verify correct reporting data for pt.    Positive home upt  FDLMP , lasted 4-5 days. Reports spotting Mid January  Positive home test 25 with faint line,   Reports breast tenderness similar to early signs of previous pregnancy    ELVIA  Not taking iron supplement  Denies fatigue    PMHx:    Current Outpatient Medications   Medication Instructions    alcohol swabs (ALCOHOL PREP PADS) PadM 1 each, Topical (Top), 2 times daily with meals    blood sugar diagnostic Strp 1 each, Misc.(Non-Drug; Combo Route), 2 times daily with meals    blood-glucose meter kit Use as instructed    dextroamphetamine-amphetamine (ADDERALL) 20 mg tablet 1 tablet, Oral, Daily    ferrous sulfate (IRON) 325 mg, Oral, Every other day    lancets (LANCETS,THIN) Misc 1 each, Misc.(Non-Drug; Combo Route), 2 times daily with meals    ONETOUCH DELICA PLUS LANCET 33 gauge Misc Topical (Top)    ONETOUCH ULTRA2 METER Misc SMARTSI Each Via Meter As Directed    OZEMPIC 0.5 mg, Subcutaneous, Every 7 days     Review of Systems   Constitutional:  Negative for activity change, appetite change, diaphoresis and fever.   HENT:  Negative for congestion, ear pain, rhinorrhea and sore throat.    Respiratory:  Negative for cough and shortness of breath.    Gastrointestinal:  Negative for diarrhea and vomiting.   Genitourinary:  Negative for decreased urine volume.        Objective     Vitals:     "02/21/25 1443   BP: 117/76   Pulse: 98   Temp: 98.6 °F (37 °C)   TempSrc: Oral   SpO2: 100%   Weight: (!) 136.9 kg (301 lb 12.8 oz)   Height: 5' 10" (1.778 m)        Physical Exam  Vitals reviewed.   Constitutional:       Appearance: Normal appearance.   HENT:      Head: Normocephalic and atraumatic.   Eyes:      Extraocular Movements: Extraocular movements intact.      Conjunctiva/sclera: Conjunctivae normal.   Cardiovascular:      Rate and Rhythm: Normal rate and regular rhythm.      Heart sounds: Normal heart sounds.   Pulmonary:      Breath sounds: Normal breath sounds.   Skin:     General: Skin is warm and dry.   Neurological:      General: No focal deficit present.      Mental Status: She is alert.   Psychiatric:         Mood and Affect: Mood normal.          Assessment and Plan    Positive pregnancy test (Primary)  Upt here and serum Beta HCG negative  Will refill medication for 1 month  Discussed importance of contraception while taking current medication to prevent pregnancy    ADHD  Medication refill sent for 1 month  UDS at next visit    Type 2 diabetes mellitus without complication, without long-term current use of insulin  A1c 6.4  Will resume ozempic, starting at 0.25 mg as pt has not been taking medication for the past 2 months  Discussed importance of diet and exercise for management of diabetes, weight loss encouraged    Iron deficiency anemia, unspecified iron deficiency anemia type  Anemia improved  Iron saturation low  Resume iron supplement every other day          Follow up in about 18 days (around 3/11/2025).      Liang Moody DO  [facility] Family Medicine Resident, [facility]    "

## 2025-02-22 NOTE — PROGRESS NOTES
I reviewed History, PE, A/P and chart was reviewed.  Services provided in the outpatient department of  a teaching facility, I was immediately available.  I agree with resident, care reasonable and necessary with any exceptions stated below.  Management discussed with resident at time of visit.      Dispense data on med rec notes filling adderall near monthly  Agree  may be incorrect    Preg test (-)    Review fertility status    Estelle Redd MD  [facility] Family Medicine Residency - [place] [place]

## 2025-02-25 ENCOUNTER — TELEPHONE (OUTPATIENT)
Dept: GYNECOLOGY | Facility: CLINIC | Age: 25
End: 2025-02-25
Payer: MEDICAID

## 2025-02-25 NOTE — TELEPHONE ENCOUNTER
----- Message from Patricia sent at 2/25/2025 12:46 PM CST -----  Regarding: Pt Concerns  Pt called and said she was seen at [facility] ER today. They did a transvag US and discovered a 4cm cyst on her R ovary. The ER advised her to see her GYN. Pt stated that she is experiencing a sharp, stabbing pain that is 8/10 and is not taking anything to alleviate the pain. She also stated that she has been experiencing an irregular cycle for the past two months, saying she will spot for days and then it will stop and then come back on. Please advise.Thank you!

## 2025-02-25 NOTE — TELEPHONE ENCOUNTER
Pt notified to take tylenol and Ibuprofen for her pain (per Clarita). Pt also notified we will refer her to the GYN doctors. Pt voiced understanding. Please schedule pt for an appt with GYN for ovarian cyst. Thank you.

## 2025-03-11 ENCOUNTER — OFFICE VISIT (OUTPATIENT)
Dept: FAMILY MEDICINE | Facility: CLINIC | Age: 25
End: 2025-03-11
Payer: MEDICAID

## 2025-03-11 VITALS
HEART RATE: 85 BPM | WEIGHT: 293 LBS | HEIGHT: 70 IN | DIASTOLIC BLOOD PRESSURE: 75 MMHG | OXYGEN SATURATION: 98 % | BODY MASS INDEX: 41.95 KG/M2 | TEMPERATURE: 98 F | SYSTOLIC BLOOD PRESSURE: 113 MMHG

## 2025-03-11 DIAGNOSIS — E11.9 TYPE 2 DIABETES MELLITUS WITHOUT COMPLICATION, WITHOUT LONG-TERM CURRENT USE OF INSULIN: ICD-10-CM

## 2025-03-11 DIAGNOSIS — F31.9 BIPOLAR AFFECTIVE DISORDER, REMISSION STATUS UNSPECIFIED: Primary | ICD-10-CM

## 2025-03-11 DIAGNOSIS — F90.0 ATTENTION DEFICIT HYPERACTIVITY DISORDER (ADHD), PREDOMINANTLY INATTENTIVE TYPE: ICD-10-CM

## 2025-03-11 PROBLEM — Z90.79 H/O UNILATERAL SALPINGECTOMY: Status: ACTIVE | Noted: 2018-01-01

## 2025-03-11 LAB
B-HCG UR QL: NEGATIVE
CTP QC/QA: YES

## 2025-03-11 PROCEDURE — 81025 URINE PREGNANCY TEST: CPT | Mod: PBBFAC

## 2025-03-11 PROCEDURE — 99214 OFFICE O/P EST MOD 30 MIN: CPT | Mod: PBBFAC

## 2025-03-11 RX ORDER — CETIRIZINE HYDROCHLORIDE 10 MG/1
10 TABLET ORAL DAILY PRN
COMMUNITY
Start: 2025-03-06 | End: 2025-03-16

## 2025-03-11 RX ORDER — OLANZAPINE 5 MG/1
5 TABLET ORAL NIGHTLY
Qty: 30 TABLET | Refills: 0 | Status: SHIPPED | OUTPATIENT
Start: 2025-03-11 | End: 2025-04-10

## 2025-03-11 RX ORDER — DEXTROAMPHETAMINE SACCHARATE, AMPHETAMINE ASPARTATE, DEXTROAMPHETAMINE SULFATE AND AMPHETAMINE SULFATE 5; 5; 5; 5 MG/1; MG/1; MG/1; MG/1
1 TABLET ORAL DAILY
Qty: 30 TABLET | Refills: 0 | Status: SHIPPED | OUTPATIENT
Start: 2025-03-11 | End: 2025-04-10

## 2025-03-11 NOTE — LETTER
March 11, 2025      [facility] [address] [address]  Phone: [phone]       Patient: Valente Wilsno   YOB: 2000  Date of Visit: 03/11/2025    To Whom It May Concern:    Rachael Wilson  was at [facility] on 03/11/2025. The patient may return to work on 3/12/25. If you have any questions or concerns, or if I can be of further assistance, please do not hesitate to contact me.    Sincerely,    Mukesh Ortega MD

## 2025-03-11 NOTE — PROGRESS NOTES
[facility] Clinic Note  03/11/2025   CHIEF COMPLAINT:  Chief Complaint   Patient presents with    Depression    ADHD         HISTORY OF  PRESENT ILLNESS:  Valente Wilson is a 24 y.o. female w/PMHx of ADHD, Bipolar disorder, and prediabetes, who presents to clinic today for ED follow-up.     Complaints:  1) ED follow-up  Presented to the ED on 03/06/25 for bodyaches, cough, sinus congestion, left upper quadrant abdominal pain and left flank pain worse with urination since 03/03/25   UPT, COVID/Flu/RSV were negative  UA was unremarkable for UTI  She was discharged from ED same day with Motrin 600 mg q6hrs prn, Zyrtec 10 mg qd  She states that she no longer has any type of body aches, cough, sinus congestion or any type of abdominal pain since being discharged from the ED on 03/06/2025  Currently has no complaints   Denies fever, chills, chest pain, shortness of breath, palpitations, nausea, vomiting      Interval History:  1) T2DM  Currently diet controlled   Last A1c 6.4 on 02/21/25 compared to 8.4 on 07/19/24  Was previously seen in clinic on 02/21/25 - was started on Ozempic 0.25 mg q weekly on 02/21/25 but pt stated she never picked up medication; thus has not taken medication  Last time she took Ozempic was 12/2024 only for 1 month    2) ADHD - inattentive type  Was previously on Adderall 20 mg qd but was stopped on 06/2024 due to running out of refills  Patient states she would like to resume back on her Adderrall 20 mg daily   She states her she has difficulty concentrating and staying on track with her chores and work as she is a  at a store; she also admits to getting easily distracted and then forgetting to complete the task at hand     3) Contraceptive use  Currently not on any contraception, currently sexually active with 1 male partner w/o barrier use  LMP was about 2 wks ago, regular, lasting 5 days  Patient's last menstrual period was 02/24/2025 (exact date).  She used Depo from October  -2024 but no longer wants to resume Depo due to loss of her menstrual periods    4) Bipolar disorder  Patient's previous regimen was olanzapine 5 mg daily and Lexapro 10 mg daily   Patient stated that while she was taking her Lexapro 10 mg daily was causing too much sedation and prefer not to be on any type similar medication   Patient states that she would like to be placed back on her olanzapine 5 mg daily (patient was previously switched to Latuda while in her last pregnancy in late  of )    REVIEW OF SYSTEMS:  See HPI      Health Maintenance:  Cervical Cancer Screen: 2022 - NIL, with Gyn   Immunizations: UTD    Care team:  [facility] GYN  ENT    PMHx:  ADHD- adderall 20 qAM (resumed again on 25)  Bipolar disorder - olanzapine 5 mg qhs (resumed on 25)  Hx T2DM with hx of GDM requiring insulin while pregnant - diet controlled   Intolerant to metformin in setting of GI distress mainly diarrhea  Carpal tunnel, bilateral: seen by [facility], declined CSI.   Recurrent tonsilloliths/tonsillitis, s/p tonsillectomy 2024  GERD: following with ENT. Flonase BID, Pepcid qd. ENT ordered sleep study, not scheduled yet  PCOS: previously on metformin    PSHx: ectopic pregnancy 2018 s/p right salpingectomy     OB/Gyn: . Recent  at 37^2 WGA on 10/10/23. Previously on Depo-provera, follows with Gyn. Currently on no contraception, OK with pregnancy  Hx of STI: chlam 2017 pt and partner both treated    Past Medical History:   Diagnosis Date    ADD (attention deficit disorder)     Anemia     Bipolar disorder, unspecified     History of prediabetes 2024    Insulin controlled gestational diabetes mellitus (GDM) in third trimester 10/05/2023    PCOS (polycystic ovarian syndrome)     Tobacco use 2022      Past Surgical History:   Procedure Laterality Date    SALPINGECTOMY      TONSILLECTOMY Bilateral 2024    Procedure: TONSILLECTOMY;  Surgeon: Dakin, Kim L., MD;  Location: [facility];   "Service: ENT;  Laterality: Bilateral;    WISDOM TOOTH EXTRACTION        Social History     Socioeconomic History    Marital status: Legally    Tobacco Use    Smoking status: Every Day     Current packs/day: 0.50     Average packs/day: 0.5 packs/day for 6.5 years (3.3 ttl pk-yrs)     Types: Cigarettes     Start date: 2018     Passive exposure: Never    Smokeless tobacco: Never    Tobacco comments:     3 cigarettes per day   Substance and Sexual Activity    Alcohol use: Not Currently     Comment: occ    Drug use: Never    Sexual activity: Yes     Partners: Male       Current Outpatient Medications   Medication Instructions    alcohol swabs (ALCOHOL PREP PADS) PadM 1 each, Topical (Top), 2 times daily with meals    blood sugar diagnostic Strp 1 each, Misc.(Non-Drug; Combo Route), 2 times daily with meals    blood-glucose meter kit Use as instructed    cetirizine (ZYRTEC) 10 mg, Daily PRN    dextroamphetamine-amphetamine (ADDERALL) 20 mg tablet 1 tablet, Oral, Daily    ferrous sulfate (IRON) 325 mg, Oral, Every other day    lancets (LANCETS,THIN) Misc 1 each, Misc.(Non-Drug; Combo Route), 2 times daily with meals    ONETOUCH DELICA PLUS LANCET 33 gauge Misc 1 lancet , Topical (Top), 2 times daily with meals    ONETOUCH ULTRA2 METER Misc SMARTSI Each Via Meter As Directed          PHYSICAL EXAMINATION:  Blood pressure 113/75, pulse 85, temperature 97.9 °F (36.6 °C), temperature source Oral, height 5' 10" (1.778 m), weight (!) 139.5 kg (307 lb 9.6 oz), last menstrual period 2025, SpO2 98%, not currently breastfeeding.    General:  VSS. No acute distress.   Respiratory: clear to ascultation in all lung fields  Cardiovascular:  RRR, no additional heart sounds heard.  Psychiatric:  Calm, cooperative. Mood and effect normal.  Responses appropriate.        ASSESSMENT/PLAN:    1) ADHD - predominantly inattentive type  We will represcribe Adderall 20 mg daily 30 days  Plan to follow up with patient in 1 " month to monitor to see Adderall 20 mg is efficacious for her ADHD  Plan to continue to prescribe 3 month supply if improvement of her inattentiveness at work at next clinic visit    2) T2DM  Plan to recheck A1c in 3 months  Patient is actively trying to lose weight with lifestyle change in consisting of daily exercise and dietary changes  We will consider resuming Ozempic after stabilization of her bipolar disorder    3) Bipolar disorder  UPT was negative in clinic today  We will plan to resume Zyprexa 5 mg q.h.s.  Inform patient to contact clinic in case of any missed menstrual period and to stop taking medication following missed menstrual period  Inform patient to use condoms consistently as patient is on decide on what type of contraceptive she would like to be on at this time despite discussing all the various options available to her  Inform patient to contact [facility] to reestablish care for continuing medication        - RTC in 1 month for f/u for Bipolar and ADHD; plan to repeat pap smear at that visit       Mukesh Ortega MD   [facility] Family Medicine Resident  03/11/2025

## 2025-03-12 NOTE — PROGRESS NOTES
D/w Dr. Alcantara and patient may hold Plavix up to 5 days prior to colonoscopy. Notified patient and he verbalized understanding and had no further questions.    I reviewed History, PE, A/P and chart was reviewed.  Services provided in the outpatient department of  a teaching facility, I was immediately available.  I agree with resident, care reasonable and necessary with any exceptions stated below.  Management discussed with resident at time of visit.    Estelle Redd MD  [facility] Medicine Residency - [facility]  [facility]

## 2025-05-13 ENCOUNTER — ON-DEMAND VIRTUAL (OUTPATIENT)
Dept: URGENT CARE | Facility: CLINIC | Age: 25
End: 2025-05-13
Payer: MEDICAID

## 2025-05-13 DIAGNOSIS — B37.9 YEAST INFECTION: Primary | ICD-10-CM

## 2025-05-13 PROCEDURE — 98005 SYNCH AUDIO-VIDEO EST LOW 20: CPT | Mod: 95,,, | Performed by: PHYSICIAN ASSISTANT

## 2025-05-13 RX ORDER — FLUCONAZOLE 150 MG/1
150 TABLET ORAL DAILY
Qty: 2 TABLET | Refills: 0 | Status: SHIPPED | OUTPATIENT
Start: 2025-05-13 | End: 2025-05-15

## 2025-05-13 NOTE — PROGRESS NOTES
Subjective:      Patient ID: Valente Wilson is a 24 y.o. female.    Vitals:  vitals were not taken for this visit.     Chief Complaint: Vaginitis      Visit Type: TELE AUDIOVISUAL    Patient Location: Home     Present with the patient at the time of consultation: TELEMED PRESENT WITH PATIENT: None    Past Medical History:   Diagnosis Date    ADD (attention deficit disorder)     Anemia     Bipolar disorder, unspecified     History of prediabetes 02/01/2024    Insulin controlled gestational diabetes mellitus (GDM) in third trimester 10/05/2023    PCOS (polycystic ovarian syndrome)     Tobacco use 05/12/2022     Past Surgical History:   Procedure Laterality Date    SALPINGECTOMY      TONSILLECTOMY Bilateral 9/13/2024    Procedure: TONSILLECTOMY;  Surgeon: Dakin, Kim L., MD;  [facility];  Service: ENT;  Laterality: Bilateral;    WISDOM TOOTH EXTRACTION       Review of patient's allergies indicates:   Allergen Reactions    Fentanyl (bulk) Itching    Oxycodone-acetaminophen Hives     pt broke out in rash  Other reaction(s): RASH     Medications Ordered Prior to Encounter[1]  Family History   Problem Relation Name Age of Onset    Hypertension Mother      Diabetes Maternal Grandmother      Bipolar disorder Father      Mental illness Father         Medications Ordered                [facility] - [place] - [address]   [address] [place] [address]    Telephone: [phone]   Fax: [phone]   Hours: Not open 24 hours                         E-Prescribed (1 of 1)              fluconazole (DIFLUCAN) 150 MG Tab    Sig: Take 1 tablet (150 mg total) by mouth once daily. for 2 days       Start: 5/13/25     Quantity: 2 tablet Refills: 0                           Ohs Peq Odvv Intake    5/13/2025 10:57 AM CDT - Filed by Patient   What is your current physical address in the event of a medical emergency? [address] [place] [address]   Are you able to take your vital signs? No    Please attach any relevant images or files    Is your employer contracted with [facility]? No         Yeast infection with itching and thick white discharge. Has yeast infection about every 2-3 mos. Tried monistat        Genitourinary:  Positive for vaginal pain and vaginal discharge. Negative for vaginal bleeding, vaginal odor and genital sore.        Objective:   The physical exam was conducted virtually.  Physical Exam   Abdominal: Normal appearance.   Neurological: She is alert.       Assessment:     1. Yeast infection        Plan:       Yeast infection    Other orders  -     fluconazole (DIFLUCAN) 150 MG Tab; Take 1 tablet (150 mg total) by mouth once daily. for 2 days  Dispense: 2 tablet; Refill: 0                          [1]   Current Outpatient Medications on File Prior to Visit   Medication Sig Dispense Refill    alcohol swabs (ALCOHOL PREP PADS) PadM Apply 1 each topically 2 (two) times daily with meals. 200 each 4    blood sugar diagnostic Strp 1 each by Misc.(Non-Drug; Combo Route) route 2 (two) times daily with meals. 200 each 4    blood-glucose meter kit Use as instructed 1 each 0    dextroamphetamine-amphetamine (ADDERALL) 20 mg tablet Take 1 tablet by mouth once daily. 30 tablet 0    ferrous sulfate (IRON) 325 mg (65 mg iron) Tab tablet Take 1 tablet (325 mg total) by mouth every other day. 45 tablet 1    lancets (LANCETS,THIN) Misc 1 each by Misc.(Non-Drug; Combo Route) route 2 (two) times daily with meals. 200 each 4    OLANZapine (ZYPREXA) 5 MG tablet Take 1 tablet (5 mg total) by mouth every evening. 30 tablet 0    ONETOUCH DELICA PLUS LANCET 33 gauge Misc Apply 1 lancet  topically 2 (two) times daily with meals. 100 each 1    ONETOUCH ULTRA2 METER Misc SMARTSI Each Via Meter As Directed       No current facility-administered medications on file prior to visit.

## 2025-05-13 NOTE — PATIENT INSTRUCTIONS
Thank you for choosing [facility]!    Our goal in the [facility] to always provide outstanding medical care. You may receive a survey by mail or e-mail in the next week regarding your experience today. We would greatly appreciate you completing and returning the survey. Your feedback provides us with a way to recognize our staff who provide very good care, and it helps us learn how to improve when your experience was below our aspiration of excellence.         We appreciate you trusting us with your medical care. We hope you feel better soon. We will be happy to take care of you for all of your future medical needs.    You must understand that you've received Virtual  treatment only and that you may be released before all your medical problems are known or treated. You, the patient, will arrange for follow up care as instructed.    Follow up with your PCP or specialty clinic as directed in the next 1-2 weeks if not improved or as needed.  You can call [phone] to schedule an appointment with the appropriate provider.    If your condition worsens we recommend that you receive another evaluation in person, with your primary care provider, urgent care or at the emergency room immediately or contact your primary medical clinics after hours call service to discuss your concerns.

## 2025-05-22 ENCOUNTER — OFFICE VISIT (OUTPATIENT)
Dept: FAMILY MEDICINE | Facility: CLINIC | Age: 25
End: 2025-05-22
Payer: MEDICAID

## 2025-05-22 VITALS
RESPIRATION RATE: 20 BRPM | TEMPERATURE: 99 F | DIASTOLIC BLOOD PRESSURE: 77 MMHG | HEIGHT: 70 IN | WEIGHT: 293 LBS | BODY MASS INDEX: 41.95 KG/M2 | OXYGEN SATURATION: 99 % | SYSTOLIC BLOOD PRESSURE: 116 MMHG | HEART RATE: 82 BPM

## 2025-05-22 DIAGNOSIS — N93.9 ABNORMAL UTERINE BLEEDING (AUB): Primary | ICD-10-CM

## 2025-05-22 DIAGNOSIS — F90.0 ATTENTION DEFICIT HYPERACTIVITY DISORDER (ADHD), PREDOMINANTLY INATTENTIVE TYPE: ICD-10-CM

## 2025-05-22 DIAGNOSIS — F31.81 BIPOLAR II DISORDER, MOST RECENT EPISODE MAJOR DEPRESSIVE: ICD-10-CM

## 2025-05-22 LAB
B-HCG UR QL: NEGATIVE
BASOPHILS # BLD AUTO: 0.05 X10(3)/MCL
BASOPHILS NFR BLD AUTO: 0.5 %
CTP QC/QA: YES
EOSINOPHIL # BLD AUTO: 0.23 X10(3)/MCL (ref 0–0.9)
EOSINOPHIL NFR BLD AUTO: 2.4 %
ERYTHROCYTE [DISTWIDTH] IN BLOOD BY AUTOMATED COUNT: 14.7 % (ref 11.5–17)
HCT VFR BLD AUTO: 38.8 % (ref 37–47)
HGB BLD-MCNC: 12.2 G/DL (ref 12–16)
IMM GRANULOCYTES # BLD AUTO: 0.04 X10(3)/MCL (ref 0–0.04)
IMM GRANULOCYTES NFR BLD AUTO: 0.4 %
LYMPHOCYTES # BLD AUTO: 2.81 X10(3)/MCL (ref 0.6–4.6)
LYMPHOCYTES NFR BLD AUTO: 29.8 %
MCH RBC QN AUTO: 25.1 PG (ref 27–31)
MCHC RBC AUTO-ENTMCNC: 31.4 G/DL (ref 33–36)
MCV RBC AUTO: 79.7 FL (ref 80–94)
MONOCYTES # BLD AUTO: 0.59 X10(3)/MCL (ref 0.1–1.3)
MONOCYTES NFR BLD AUTO: 6.3 %
NEUTROPHILS # BLD AUTO: 5.7 X10(3)/MCL (ref 2.1–9.2)
NEUTROPHILS NFR BLD AUTO: 60.6 %
NRBC BLD AUTO-RTO: 0 %
PLATELET # BLD AUTO: 295 X10(3)/MCL (ref 130–400)
PMV BLD AUTO: 13 FL (ref 7.4–10.4)
RBC # BLD AUTO: 4.87 X10(6)/MCL (ref 4.2–5.4)
WBC # BLD AUTO: 9.42 X10(3)/MCL (ref 4.5–11.5)

## 2025-05-22 PROCEDURE — 85025 COMPLETE CBC W/AUTO DIFF WBC: CPT

## 2025-05-22 PROCEDURE — 99214 OFFICE O/P EST MOD 30 MIN: CPT | Mod: PBBFAC

## 2025-05-22 RX ORDER — DEXTROAMPHETAMINE SACCHARATE, AMPHETAMINE ASPARTATE, DEXTROAMPHETAMINE SULFATE AND AMPHETAMINE SULFATE 5; 5; 5; 5 MG/1; MG/1; MG/1; MG/1
1 TABLET ORAL DAILY
Qty: 30 TABLET | Refills: 0 | Status: SHIPPED | OUTPATIENT
Start: 2025-06-20 | End: 2025-07-20

## 2025-05-22 RX ORDER — OLANZAPINE 5 MG/1
5 TABLET, FILM COATED ORAL NIGHTLY
Qty: 90 TABLET | Refills: 1 | Status: SHIPPED | OUTPATIENT
Start: 2025-05-22 | End: 2025-11-18

## 2025-05-22 RX ORDER — DEXTROAMPHETAMINE SACCHARATE, AMPHETAMINE ASPARTATE, DEXTROAMPHETAMINE SULFATE AND AMPHETAMINE SULFATE 5; 5; 5; 5 MG/1; MG/1; MG/1; MG/1
1 TABLET ORAL DAILY
Qty: 30 TABLET | Refills: 0 | Status: SHIPPED | OUTPATIENT
Start: 2025-07-21 | End: 2025-08-20

## 2025-05-22 RX ORDER — DEXTROAMPHETAMINE SACCHARATE, AMPHETAMINE ASPARTATE, DEXTROAMPHETAMINE SULFATE AND AMPHETAMINE SULFATE 5; 5; 5; 5 MG/1; MG/1; MG/1; MG/1
1 TABLET ORAL DAILY
Qty: 30 TABLET | Refills: 0 | Status: SHIPPED | OUTPATIENT
Start: 2025-05-22 | End: 2025-06-21

## 2025-05-22 NOTE — LETTER
May 22, 2025      [facility] [address] [address]  Phone: [phone]       Patient: Valente Wilson   YOB: 2000  Date of Visit: 05/22/2025    To Whom It May Concern:    Rachael Wilson  was at [facility] on 05/22/2025. The patient may return to work on 5/23/25 with no restrictions. If you have any questions or concerns, or if I can be of further assistance, please do not hesitate to contact me.    Sincerely,    Fly Smallwood LPN

## 2025-05-22 NOTE — PROGRESS NOTES
[facility] FM Clinic Note  05/22/2025   CHIEF COMPLAINT:  Chief Complaint   Patient presents with    Follow-up    Contraception    Menstrual Problem    Medication Refill     adderall       HISTORY OF  PRESENT ILLNESS:  Valente Wilson is a 24 y.o. female w/PMHx of  ADHD, Bipolar disorder, and Type 2 diabetes, who presents to clinic today for continued management of her predominantly dominant ADHD and abnormal menstrual periods.     Complaints:  1) abnormal mestural periods  She states her normal cycle lasts 3-5 day but for the last month her cycle has been 7-8 days  Her last normal cycle was beginning of 3/2025  She states that she is not sexually active and can not remember the last time she was sexually active  Initial LMP this month was 04/28/25 - 05/05/25; the next MP started 05/18/25 - present  No cramps/pain prior to menstural periods  She uses 4-5x tampons daily compared 1-2 daily for the past 4 weeks after her menstrual periods has been abnormal   She states that her appetite has not changed and denies any dysmenorrhea with her menstrual periods this past 4 weeks    Interval History:  1) ADHD - predominantly inattentive type   Patient was previously seen and started on Adderall 20 mg daily since 03/11/2025   He states since restarting her Adderall 20 mg qAM she has seen overall improvement of her attentiveness at work, which lasted about 8 hours   Patient presents to clinic stating that she would like to continue taking her Adderall 20 mg daily   She still admits to great appetite  Denies chest pain, palpitation or shortness of the breath, unintentional weight loss    2) Bipolar II disorder  Currently on olanzapine 5 mg daily   Patient states that she is overall doing well and denies any type of mood swings, person energy   She presents to clinic today requesting refills   Denies any type of manic or hypomanic manic episodes    REVIEW OF SYSTEMS:  See HPI      Health Maintenance:  Cervical Cancer Screen:  "04/12/2022 - NIL, with Gyn   Immunizations: UTD     Care team:  [facility] GYN  ENT     PMHx:  ADHD- adderall 20 qAM (resumed again on 03/11/25)  Bipolar disorder - olanzapine 5 mg qhs (resumed on 03/11/25)  Hx T2DM with hx of GDM requiring insulin while pregnant - diet controlled   Intolerant to metformin in setting of GI distress mainly diarrhea  Carpal tunnel, bilateral: seen by [facility], declined CSI.   Recurrent tonsilloliths/tonsillitis, s/p tonsillectomy 9/2024  GERD: following with ENT. Flonase BID, Pepcid qd. ENT ordered sleep study, not scheduled yet  PCOS: previously on metformin    Past Surgical History:   Procedure Laterality Date    SALPINGECTOMY      TONSILLECTOMY Bilateral 9/13/2024    Procedure: TONSILLECTOMY;  Surgeon: Dakin, Kim L., MD;  Location: [facility];  Service: ENT;  Laterality: Bilateral;    WISDOM TOOTH EXTRACTION        Social History     Socioeconomic History    Marital status: Legally    Tobacco Use    Smoking status: Every Day     Current packs/day: 0.50     Average packs/day: 0.5 packs/day for 6.7 years (3.4 ttl pk-yrs)     Types: Cigarettes     Start date: 9/6/2018     Passive exposure: Never    Smokeless tobacco: Never    Tobacco comments:     3 cigarettes per day   Substance and Sexual Activity    Alcohol use: Not Currently     Comment: occ    Drug use: Never    Sexual activity: Yes     Partners: Male         PHYSICAL EXAMINATION:  Blood pressure 116/77, pulse 82, temperature 99 °F (37.2 °C), temperature source Oral, resp. rate 20, height 5' 10" (1.778 m), weight (!) 136.3 kg (300 lb 6.4 oz), SpO2 99%, not currently breastfeeding.    General:  VSS. No acute distress.   ABD: BS +, soft, nontender  : declined    Psychiatric:  Calm, cooperative. Mood and effect normal.  Responses appropriate.        ASSESSMENT/PLAN:    1) AUB  Ordered transvaginal ultrasound to assess for endometrial thickness and any type of structural abnormalities if present   Patient has gyn follow up on 06/13/2025 "   Inform patient to complete transvaginal ultrasound prior to proceeding gyn  Order CBC to evaluate for anemia; H&H was within normal limits; microcytic anemia present   Inform patient to continue to take iron tablets every other day    2) ADHD - predominantly inattentive type    reviewed - appears that Adderall 20 mg was not filled since 05/13/24, despite the patient saying that it was filled 1-2 months ago   Refilled Adderall 20 mg qAM for 3 months    3) Bipolar II disorder  UPT in clinic was negative   Refilled Zyprexa 5 mg nightly       - return to clinic in 1-2 months for follow up       Mukesh Ortega MD   [facility] Family Medicine Resident  05/22/2025

## 2025-05-29 ENCOUNTER — HOSPITAL ENCOUNTER (OUTPATIENT)
Dept: RADIOLOGY | Facility: HOSPITAL | Age: 25
Discharge: HOME | End: 2025-05-29
Payer: MEDICAID

## 2025-05-29 DIAGNOSIS — N93.9 ABNORMAL UTERINE BLEEDING (AUB): ICD-10-CM

## 2025-05-29 PROCEDURE — 76856 US EXAM PELVIC COMPLETE: CPT | Mod: TC

## 2025-05-30 ENCOUNTER — TELEPHONE (OUTPATIENT)
Dept: FAMILY MEDICINE | Facility: CLINIC | Age: 25
End: 2025-05-30
Payer: MEDICAID

## 2025-05-30 NOTE — TELEPHONE ENCOUNTER
Spoke with the patient over the phone regarding ultrasound results; explained ultrasound results with the patient patient voiced understanding   Patient states that she might be having another yeast infection and would like another appointment to the clinic for further testing/evaluation   Inform patient to call back on Monday morning for 06/04/25 appointment in the AM; patient voiced understanding and stated that she will call the clinic on Monday for an appointment with me on 06/04/2025 for STD testing.      Mukesh Ortega MD   [facility] Family Medicine Resident  05/30/2025

## 2025-06-04 ENCOUNTER — OFFICE VISIT (OUTPATIENT)
Dept: FAMILY MEDICINE | Facility: CLINIC | Age: 25
End: 2025-06-04
Payer: MEDICAID

## 2025-06-04 VITALS
DIASTOLIC BLOOD PRESSURE: 73 MMHG | BODY MASS INDEX: 41.95 KG/M2 | SYSTOLIC BLOOD PRESSURE: 101 MMHG | HEIGHT: 70 IN | TEMPERATURE: 98 F | WEIGHT: 293 LBS | HEART RATE: 75 BPM | OXYGEN SATURATION: 98 %

## 2025-06-04 DIAGNOSIS — N89.8 VAGINAL DISCHARGE: Primary | ICD-10-CM

## 2025-06-04 DIAGNOSIS — E11.9 TYPE 2 DIABETES MELLITUS WITHOUT COMPLICATION, WITHOUT LONG-TERM CURRENT USE OF INSULIN: ICD-10-CM

## 2025-06-04 LAB
C TRACH DNA SPEC QL NAA+PROBE: NOT DETECTED
CLUE CELLS VAG QL WET PREP: NORMAL
EST. AVERAGE GLUCOSE BLD GHB EST-MCNC: 314.9 MG/DL
HBA1C MFR BLD: 12.6 %
N GONORRHOEA DNA SPEC QL NAA+PROBE: NOT DETECTED
SPECIMEN SOURCE: NORMAL
T VAGINALIS VAG QL WET PREP: NORMAL
WBC #/AREA VAG WET PREP: NORMAL
YEAST SPEC QL WET PREP: NORMAL

## 2025-06-04 PROCEDURE — 99214 OFFICE O/P EST MOD 30 MIN: CPT | Mod: PBBFAC

## 2025-06-04 PROCEDURE — 83036 HEMOGLOBIN GLYCOSYLATED A1C: CPT

## 2025-06-04 PROCEDURE — 87210 SMEAR WET MOUNT SALINE/INK: CPT

## 2025-06-04 PROCEDURE — 87491 CHLMYD TRACH DNA AMP PROBE: CPT

## 2025-06-04 PROCEDURE — 88174 CYTOPATH C/V AUTO IN FLUID: CPT

## 2025-06-04 RX ORDER — FLUCONAZOLE 150 MG/1
150 TABLET ORAL DAILY
Qty: 1 TABLET | Refills: 0 | Status: SHIPPED | OUTPATIENT
Start: 2025-06-04 | End: 2025-06-05

## 2025-06-04 RX ORDER — SEMAGLUTIDE 0.68 MG/ML
0.25 INJECTION, SOLUTION SUBCUTANEOUS
Qty: 1.5 ML | Refills: 0 | Status: SHIPPED | OUTPATIENT
Start: 2025-06-04 | End: 2025-07-02

## 2025-06-04 RX ORDER — METFORMIN HYDROCHLORIDE 1000 MG/1
1000 TABLET ORAL 2 TIMES DAILY WITH MEALS
Qty: 180 TABLET | Refills: 1 | Status: SHIPPED | OUTPATIENT
Start: 2025-06-04 | End: 2025-12-01

## 2025-06-04 RX ORDER — METRONIDAZOLE 500 MG/1
500 TABLET ORAL EVERY 12 HOURS
Qty: 14 TABLET | Refills: 0 | Status: SHIPPED | OUTPATIENT
Start: 2025-06-04 | End: 2025-06-11

## 2025-06-04 RX ORDER — LANCETS
1 EACH MISCELLANEOUS 2 TIMES DAILY WITH MEALS
Qty: 200 EACH | Refills: 4 | Status: SHIPPED | OUTPATIENT
Start: 2025-06-04

## 2025-06-05 ENCOUNTER — TELEPHONE (OUTPATIENT)
Dept: FAMILY MEDICINE | Facility: CLINIC | Age: 25
End: 2025-06-05
Payer: MEDICAID

## 2025-06-05 LAB — PSYCHE PATHOLOGY RESULT: NORMAL

## 2025-06-20 ENCOUNTER — OFFICE VISIT (OUTPATIENT)
Dept: FAMILY MEDICINE | Facility: CLINIC | Age: 25
End: 2025-06-20
Payer: MEDICAID

## 2025-06-20 ENCOUNTER — TELEPHONE (OUTPATIENT)
Dept: FAMILY MEDICINE | Facility: CLINIC | Age: 25
End: 2025-06-20

## 2025-06-20 VITALS
HEART RATE: 80 BPM | DIASTOLIC BLOOD PRESSURE: 62 MMHG | RESPIRATION RATE: 20 BRPM | SYSTOLIC BLOOD PRESSURE: 98 MMHG | BODY MASS INDEX: 41.54 KG/M2 | TEMPERATURE: 98 F | WEIGHT: 290.19 LBS | OXYGEN SATURATION: 98 % | HEIGHT: 70 IN

## 2025-06-20 DIAGNOSIS — N92.6 MENSTRUAL PERIOD LATE: ICD-10-CM

## 2025-06-20 DIAGNOSIS — E11.9 TYPE 2 DIABETES MELLITUS WITHOUT COMPLICATION, WITHOUT LONG-TERM CURRENT USE OF INSULIN: Primary | ICD-10-CM

## 2025-06-20 LAB
B-HCG UR QL: NEGATIVE
CTP QC/QA: YES
GLUCOSE SERPL-MCNC: 279 MG/DL (ref 70–110)

## 2025-06-20 PROCEDURE — 99214 OFFICE O/P EST MOD 30 MIN: CPT | Mod: PBBFAC

## 2025-06-20 RX ORDER — SEMAGLUTIDE 0.68 MG/ML
0.5 INJECTION, SOLUTION SUBCUTANEOUS
Qty: 3 ML | Refills: 0 | Status: SHIPPED | OUTPATIENT
Start: 2025-06-20 | End: 2025-07-18

## 2025-06-20 RX ORDER — BLOOD-GLUCOSE SENSOR
1 EACH MISCELLANEOUS
Qty: 5 EACH | Refills: 6 | Status: SHIPPED | OUTPATIENT
Start: 2025-06-20 | End: 2026-06-20

## 2025-06-20 NOTE — TELEPHONE ENCOUNTER
PA submitted for Dexcom G7 sensors on [email or web address], key #[id number], results pending     PA denied, pt must be on insulin for approval.

## 2025-06-20 NOTE — PROGRESS NOTES
[facility] FM Clinic Note  06/20/2025   CHIEF COMPLAINT:  Chief Complaint   Patient presents with    Diabetes     Type 2    Follow-up       HISTORY OF  PRESENT ILLNESS:  Valente Wilson is a 24 y.o. female w/PMHx of uncontrolled T2DM, PCOS, obesity , who presents to clinic today for continued management of T2DM.     Complaints:  none    Interval History:  1) uncontrolled type 2 diabetes  Current regimen includes metformin 1000 mg twice a day and Ozempic 0.25 mg weekly  Patient was previously seen in clinic on 06/04/25 and 1 started on the regimen listed above   Patient states that she has been intermittently checking her fasting CBGS which has been ranging in the 200s   She still admits to polydipsia   Patient states that she has been compliant with her medication has 1 more week of her Ozempic to 0.5 mg weekly before having to go up to 0.5 mg weekly  Patient stated that she had really bad reflux with the Ozempic causing her to go to the emergency department on 06/18/2025; she was found to have a low magnesium level of 1.7 and was given oral replacement.  CT abdomen/pelvis showed no acute intra-abdominal changes.  She was discharged in the setting of emesis secondary to Ozempic.    Patient states that her nausea/vomiting has improved since hospital discharge and is willing to go up on her Ozempic from 0.25 mg 0.5 mg weekly  Denies abdominal pain, nausea, vomiting, vision changes, but admits to increase in thirst    2) Late menstrual period  Patient states her menstrual period is 5 days late   Patient states that she is currently sexually active with 1 male partner without any type of barrier protection or birth control; last intercourse she states was yesterday  She is not interested in seeking any forms of birth control or barrier use at this time   She presents to clinic today requesting for UPT testing in case she has positive pregnant     3) Vaginal discharge  She was previously seen in clinic on 06/04/2025; at  that time gonorrhea/chlamydia, wet prep was ordered that was negative for clue cells, yeast, Trichomonas, gonorrhea, chlamydia   She was treated empirically with Flagyl 500 mg b.i.d. for 7 days along with 1 time fluconazole 100 mg dose due to patient being symptomatic and/patient was treated empirically.  Today in clinic, She states that that her vaginal discharge has resolved and currently has no complaints   Patient denies fever, chills, burning with urination, abdominal pain, suprapubic pain    REVIEW OF SYSTEMS:  See HPI      Health Maintenance:  Cervical Cancer Screen: 06/04/2025 - NIL  Immunizations: UTD     Care team:  [facility] GYN  ENT     PMHx:  ADHD- adderall 20 qAM (resumed again on 03/11/25)  Bipolar disorder - olanzapine 5 mg qhs (resumed on 03/11/25)  Uncontrolled T2DM - metformin 1000 mg twice a day and Ozempic (started on 06/04/25)  A1c 12.6 on 06/04/25  Carpal tunnel, bilateral: seen by [facility], declined CSI.   Recurrent tonsilloliths/tonsillitis, s/p tonsillectomy 9/2024  GERD: following with ENT. Flonase BID, Pepcid qd  PCOS    Past Surgical History:   Procedure Laterality Date    SALPINGECTOMY      TONSILLECTOMY Bilateral 9/13/2024    Procedure: TONSILLECTOMY;  Surgeon: Dakin, Kim L., MD;  Location: [facility];  Service: ENT;  Laterality: Bilateral;    WISDOM TOOTH EXTRACTION        Social History     Socioeconomic History    Marital status: Legally    Tobacco Use    Smoking status: Every Day     Current packs/day: 0.50     Average packs/day: 0.5 packs/day for 6.8 years (3.4 ttl pk-yrs)     Types: Cigarettes     Start date: 9/6/2018     Passive exposure: Never    Smokeless tobacco: Never    Tobacco comments:     3 cigarettes per day   Substance and Sexual Activity    Alcohol use: Not Currently     Comment: occ    Drug use: Never    Sexual activity: Yes     Partners: Male         PHYSICAL EXAMINATION:  Blood pressure 98/62, pulse 80, temperature 97.9 °F (36.6 °C), temperature source Oral, resp. rate  "20, height 5' 10" (1.778 m), weight 131.6 kg (290 lb 3.2 oz), last menstrual period 05/18/2025, SpO2 98%, not currently breastfeeding.    General:  VSS. No acute distress.   Respiratory: clear to ascultation in all lung fields  Cardiovascular:  RRR, no additional heart sounds heard.  ABD: BS +, soft, nontender       ASSESSMENT/PLAN:    1) uncontrolled type 2 diabetes  Accu-Chek in clinic was 279; inform patient her controlled sugar should be a goal of less than 180  Inform patient about possibly starting insulin regimen therapy due to uncontrolled type 2 diabetes but after shared decision-making patient stated that she would like to just continue with Ozempic and metformin therapy without insulin.    Inform patient that at repeat A1c testing in 3 months, if A1c is persistently uncontrolled that she will have to initiate insulin therapy, which patient voiced understanding  We will plan to increase Ozempic 0.25 mg weekly to 0.5 mg weekly starting next week when she completes her last week dose of 0.25 mg  We will have patient continue with metformin 1000 mg twice a day   Inform patient about ED precautions in case of worsening abdominal pain that radiates to the back, unable to tolerate p.o. diet with nausea and vomiting.  We will send prescription for Dexcom G7 as patient dislikes having to fasting Accu-Cheks every morning   Inform patient that she may have to pay out-of-pocket for them and she states that she will see if it is financially affordable.  If not, patient will refer to back to just checking her sugars by finger prick daily  Inform patient to either bring glucose logs to next clinic visit or to present glucose logs via her phone via Dexcom    2) late menstrual period  UPT in clinic was negative  Inform patient to abstain from any intercourse and recheck her UPT in 2 weeks if she wants to verify if she actually is not pregnant   Patient states that she is not concerned after initial UPT in clinic was " negative   Inform patient that she may be pregnant due to her recent intercourse; patient voiced understanding   She states that she is interested in birth control in the future but not at this time    3) Vaginal discharge  Resolved   Inform patient about safe protective intercourse practices   Patient voiced understanding        - return to clinic in 4 weeks for continued type 2 diabetes management with glucose logs; we will plan to increase Ozempic from 0.5 mg to 1 mg weekly at next clinic visit per Ozempic dosing schedule         Mukesh Ortega MD   [facility] Family Medicine Resident  06/20/2025

## 2025-06-24 DIAGNOSIS — E11.9 TYPE 2 DIABETES MELLITUS WITHOUT COMPLICATION, WITHOUT LONG-TERM CURRENT USE OF INSULIN: Primary | ICD-10-CM

## 2025-06-24 RX ORDER — BLOOD-GLUCOSE SENSOR
1 EACH MISCELLANEOUS
Qty: 5 EACH | Refills: 6 | Status: SHIPPED | OUTPATIENT
Start: 2025-06-24 | End: 2026-06-24

## 2025-07-09 ENCOUNTER — OFFICE VISIT (OUTPATIENT)
Dept: GYNECOLOGY | Facility: CLINIC | Age: 25
End: 2025-07-09
Payer: MEDICAID

## 2025-07-09 VITALS
HEIGHT: 70 IN | RESPIRATION RATE: 18 BRPM | DIASTOLIC BLOOD PRESSURE: 78 MMHG | TEMPERATURE: 98 F | BODY MASS INDEX: 41.95 KG/M2 | SYSTOLIC BLOOD PRESSURE: 115 MMHG | HEART RATE: 77 BPM | OXYGEN SATURATION: 99 % | WEIGHT: 293 LBS

## 2025-07-09 DIAGNOSIS — N89.8 VAGINAL DISCHARGE: Primary | ICD-10-CM

## 2025-07-09 DIAGNOSIS — E28.2 PCOS (POLYCYSTIC OVARIAN SYNDROME): ICD-10-CM

## 2025-07-09 DIAGNOSIS — N93.9 ABNORMAL UTERINE BLEEDING: ICD-10-CM

## 2025-07-09 LAB
CLUE CELLS VAG QL WET PREP: NORMAL
T VAGINALIS VAG QL WET PREP: NORMAL
WBC #/AREA VAG WET PREP: NORMAL
YEAST SPEC QL WET PREP: NORMAL

## 2025-07-09 PROCEDURE — 99214 OFFICE O/P EST MOD 30 MIN: CPT | Mod: PBBFAC

## 2025-07-09 PROCEDURE — 87210 SMEAR WET MOUNT SALINE/INK: CPT

## 2025-07-09 NOTE — PROGRESS NOTES
[facility] [place] [facility] CLINIC NOTE  [facility]  [address] [facility] [address]  [place] [address]  Phone: [phone]  Fax: [phone]    Subjective:     Valente Wilson is a 24 y.o.  who presents for ED follow-up of 1cm follicular cyst. She states that her cycles have become more prolonged, now more frequent and lasting 7-8d with passage of clots over the past year. She does report intermenstrual spotting. She does report hirsutism on chin that requires plucking. She reports difficulty with weight loss and recently gained 8lb. She is currently on Ozempic for management of weight and T2DM. Denies heat/cold intolerance or excessive fatigue or hair loss. Denies abnormal vaginal discharge or dysuria.  She is not currently on contraception or other medical management for AUB.       Allergies: Fentanyl (hives), norco (hives)   OBHx:; h/o right ectopic requiring salpingectomy   GynHx:   Menarche @ 10/, Regular cyclic menses,   LMP: 23  Denies Hx of STIs and abnormal paps  Contraception: none, compliant.    MedHx:   Past Medical History:   Diagnosis Date    ADD (attention deficit disorder)     Anemia     Bipolar disorder, unspecified     History of prediabetes 2024    Insulin controlled gestational diabetes mellitus (GDM) in third trimester 10/05/2023    PCOS (polycystic ovarian syndrome)     Tobacco use 2022       SurgHx:   Past Surgical History:   Procedure Laterality Date    SALPINGECTOMY      TONSILLECTOMY Bilateral 2024    Procedure: TONSILLECTOMY;  Surgeon: Dakin, Kim L., MD;  Location: [facility];  Service: ENT;  Laterality: Bilateral;    WISDOM TOOTH EXTRACTION         Medications:     Current Outpatient Medications:     alcohol swabs (ALCOHOL PREP PADS) PadM, Apply 1 each topically 2 (two) times daily with meals., Disp: 200 each, Rfl: 4    blood sugar diagnostic Strp, 1 each by Misc.(Non-Drug; Combo Route) route 2 (two) times daily with meals., Disp: 200 each, Rfl: 4    blood-glucose meter kit, Use  as instructed, Disp: 1 each, Rfl: 0    blood-glucose sensor (DEXCOM G7 SENSOR) Elidia, 1 each by Misc.(Non-Drug; Combo Route) route every 10 days., Disp: 5 each, Rfl: 6    [START ON 2025] dextroamphetamine-amphetamine (ADDERALL) 20 mg tablet, Take 1 tablet by mouth once daily., Disp: 30 tablet, Rfl: 0    dextroamphetamine-amphetamine (ADDERALL) 20 mg tablet, Take 1 tablet by mouth once daily., Disp: 30 tablet, Rfl: 0    ferrous sulfate (IRON) 325 mg (65 mg iron) Tab tablet, Take 1 tablet (325 mg total) by mouth every other day., Disp: 45 tablet, Rfl: 1    lancets (LANCETS,THIN) Misc, 1 each by Misc.(Non-Drug; Combo Route) route 2 (two) times daily with meals., Disp: 200 each, Rfl: 4    metFORMIN (GLUCOPHAGE) 1000 MG tablet, Take 1 tablet (1,000 mg total) by mouth 2 (two) times daily with meals., Disp: 180 tablet, Rfl: 1    OLANZapine (ZYPREXA) 5 MG tablet, Take 1 tablet (5 mg total) by mouth every evening., Disp: 90 tablet, Rfl: 1    ONETOUCH DELICA PLUS LANCET 33 gauge Misc, Apply 1 lancet  topically 2 (two) times daily with meals., Disp: 100 each, Rfl: 1    ONETOUCH ULTRA2 METER Misc, SMARTSI Each Via Meter As Directed, Disp: , Rfl:     semaglutide (OZEMPIC) 0.25 mg or 0.5 mg (2 mg/3 mL) pen injector, Inject 0.5 mg into the skin every 7 days., Disp: 3 mL, Rfl: 0      FM Hx: Denies hx of ovarian, uterine, endometrial, or colon cancer. Denies history of bleeding or coagulation disorders.  Family History   Problem Relation Name Age of Onset    Hypertension Mother      Diabetes Maternal Grandmother      Bipolar disorder Father      Mental illness Father     History of panceratic    Social Hx: Denies alcohol and illicit drug usage.  Social History     Socioeconomic History    Marital status: Legally    Tobacco Use    Smoking status: Every Day     Current packs/day: 0.50     Average packs/day: 0.5 packs/day for 6.8 years (3.4 ttl pk-yrs)     Types: Cigarettes     Start date: 2018     Passive exposure:  "Never    Smokeless tobacco: Never    Tobacco comments:     3 cigarettes per day   Substance and Sexual Activity    Alcohol use: Not Currently     Comment: occ    Drug use: Never    Sexual activity: Yes     Partners: Male       Review of Systems  Denies fevers, chills, headache, blurry vision, nausea, vomiting, dizziness, or syncope.   Denies chest pain, shortness of breath, RUQ pain, or calf pain.    Objective:     Vitals:    07/09/25 1354   BP: 115/78   BP Location: Left arm   Patient Position: Sitting   Pulse: 77   Resp: 18   Temp: 98.2 °F (36.8 °C)   TempSrc: Oral   SpO2: 99%   Weight: 135.2 kg (298 lb)   Height: 5' 10" (1.778 m)     Body mass index is 42.76 kg/m².    Physical Exam:     General: alert and oriented, in no acute distress  Lungs: clear to auscultation bilaterally, no conversational dyspnea  Heart: regular rate and rhythm  Abdomen: Soft, non-distended, non tender to palpation, no involuntary guarding, no rebound tenderness  Extremities: Normal, atraumatic, non-edematous, No cords or calf tenderness, No significant calf/ankle edema  External genitalia: Normal female genitalia without lesion, discharge or tenderness. Normal appearing urethral meatus. Normal appearing external anus.  Bimanual Exam: No pelvic lymphadenopathy noted bilaterally. Vagina with adequate capacity. Uterus 9 cm in size, retroverted, mobile. no cervical motion tenderness. Smooth in contour, no masses. Good descent, mobile. . No adnexal fullness/tenderness. Normal urethra. Normal bladder  Speculum Exam: Vaginal mucosa normal in appearance. Pink. No masses/lesions. Cervix well visualized, smooth in contour no masses or lesions. Os normal in appearance, no blood or discharge coming from the os    Note: RN chaperone present for entirety of exam.     Imaging  TVUS (5/2025) independent interpretation: Uterus approximately 9-10cm in size, myometrium appears heterogenous. Polycystic ovaries with LOV >10. Adnexa otherwise unremarkable. " Cervix unremarkable  Assessment/Plan:    Valente Wilson is a 24 y.o.  with AUB-O/A.     AUB-O/A  TVUS reviewed; polcystic ovaries and LOV >10, c/w PCOS. Also suspect component of adenomyosis given heterogeneity of endometrium. No other abnormalities  CBC 12.238  TSH wnl   Suspect PCOS vs adenomyosis as most likely etiology  Recommend EMB due to RF (T2DM, obesity, oligomenorrhea) with concurrent Mirena IUD insertion.    Health maintenance  Pap 2025 NILM   RTC 2 weeks for EMB/IUD insertion.    Future Appointments   Date Time Provider Department Center   7/15/2025  1:20 PM Martinez, Trenise R., PA-C [facility]   2025  8:45 AM [facility], [facility] [place]    2025  1:20 PM Charlotte Collins MD [facility] [place]    6/10/2026 10:30 AM Clarita Chase, ANP [facility] [place]        Patient and plan were discussed with Dr. Perales.     Caren Mensah MD   PGY3, Obstetrics & Gynecology   [facility]

## 2025-08-25 ENCOUNTER — OFFICE VISIT (OUTPATIENT)
Dept: FAMILY MEDICINE | Facility: CLINIC | Age: 25
End: 2025-08-25
Payer: MEDICAID

## 2025-08-25 VITALS
DIASTOLIC BLOOD PRESSURE: 74 MMHG | OXYGEN SATURATION: 95 % | HEIGHT: 70 IN | WEIGHT: 293 LBS | BODY MASS INDEX: 41.95 KG/M2 | HEART RATE: 67 BPM | SYSTOLIC BLOOD PRESSURE: 116 MMHG | TEMPERATURE: 99 F

## 2025-08-25 DIAGNOSIS — K04.7 DENTAL ABSCESS: ICD-10-CM

## 2025-08-25 DIAGNOSIS — Z11.3 SCREENING EXAMINATION FOR STD (SEXUALLY TRANSMITTED DISEASE): ICD-10-CM

## 2025-08-25 DIAGNOSIS — E11.9 TYPE 2 DIABETES MELLITUS WITHOUT COMPLICATION, WITHOUT LONG-TERM CURRENT USE OF INSULIN: Primary | ICD-10-CM

## 2025-08-25 DIAGNOSIS — F90.0 ATTENTION DEFICIT HYPERACTIVITY DISORDER (ADHD), PREDOMINANTLY INATTENTIVE TYPE: ICD-10-CM

## 2025-08-25 DIAGNOSIS — Z30.9 ENCOUNTER FOR CONTRACEPTIVE MANAGEMENT, UNSPECIFIED TYPE: ICD-10-CM

## 2025-08-25 LAB
ANION GAP SERPL CALC-SCNC: 5 MEQ/L
BUN SERPL-MCNC: 10.7 MG/DL (ref 7–18.7)
CALCIUM SERPL-MCNC: 9.2 MG/DL (ref 8.4–10.2)
CHLORIDE SERPL-SCNC: 111 MMOL/L (ref 98–107)
CHOLEST SERPL-MCNC: 151 MG/DL
CHOLEST/HDLC SERPL: 5 {RATIO} (ref 0–5)
CO2 SERPL-SCNC: 25 MMOL/L (ref 22–29)
CREAT SERPL-MCNC: 0.76 MG/DL (ref 0.55–1.02)
CREAT/UREA NIT SERPL: 14
EST. AVERAGE GLUCOSE BLD GHB EST-MCNC: 185.8 MG/DL
GFR SERPLBLD CREATININE-BSD FMLA CKD-EPI: >60 ML/MIN/1.73/M2
GLUCOSE SERPL-MCNC: 99 MG/DL (ref 74–100)
HAV IGM SERPL QL IA: NONREACTIVE
HBA1C MFR BLD: 8.1 %
HBV CORE IGM SERPL QL IA: NONREACTIVE
HBV SURFACE AG SERPL QL IA: NONREACTIVE
HCV AB SERPL QL IA: NONREACTIVE
HDLC SERPL-MCNC: 30 MG/DL (ref 35–60)
HIV 1+2 AB+HIV1 P24 AG SERPL QL IA: NONREACTIVE
LDLC SERPL CALC-MCNC: 91 MG/DL (ref 50–140)
POTASSIUM SERPL-SCNC: 3.9 MMOL/L (ref 3.5–5.1)
SODIUM SERPL-SCNC: 141 MMOL/L (ref 136–145)
T PALLIDUM AB SER QL: NONREACTIVE
TRIGL SERPL-MCNC: 151 MG/DL (ref 37–140)
VLDLC SERPL CALC-MCNC: 30 MG/DL

## 2025-08-25 PROCEDURE — 80048 BASIC METABOLIC PNL TOTAL CA: CPT

## 2025-08-25 PROCEDURE — 99213 OFFICE O/P EST LOW 20 MIN: CPT | Mod: PBBFAC

## 2025-08-25 PROCEDURE — 87389 HIV-1 AG W/HIV-1&-2 AB AG IA: CPT

## 2025-08-25 PROCEDURE — 80061 LIPID PANEL: CPT

## 2025-08-25 PROCEDURE — 83036 HEMOGLOBIN GLYCOSYLATED A1C: CPT

## 2025-08-25 PROCEDURE — 86780 TREPONEMA PALLIDUM: CPT

## 2025-08-25 PROCEDURE — 80074 ACUTE HEPATITIS PANEL: CPT

## 2025-08-25 RX ORDER — IBUPROFEN 800 MG/1
800 TABLET, FILM COATED ORAL 3 TIMES DAILY PRN
Qty: 30 TABLET | Refills: 0 | Status: SHIPPED | OUTPATIENT
Start: 2025-08-25

## 2025-08-25 RX ORDER — OLANZAPINE 5 MG/1
5 TABLET, FILM COATED ORAL NIGHTLY
Qty: 90 TABLET | Refills: 1 | Status: SHIPPED | OUTPATIENT
Start: 2025-08-25 | End: 2026-02-21

## 2025-08-25 RX ORDER — DEXTROAMPHETAMINE SACCHARATE, AMPHETAMINE ASPARTATE, DEXTROAMPHETAMINE SULFATE AND AMPHETAMINE SULFATE 5; 5; 5; 5 MG/1; MG/1; MG/1; MG/1
1 TABLET ORAL DAILY
Qty: 30 TABLET | Refills: 0 | Status: SHIPPED | OUTPATIENT
Start: 2025-08-25 | End: 2025-09-24

## 2025-08-26 RX ORDER — METFORMIN HYDROCHLORIDE 500 MG/1
500 TABLET ORAL 2 TIMES DAILY WITH MEALS
Qty: 180 TABLET | Refills: 3 | Status: SHIPPED | OUTPATIENT
Start: 2025-08-26 | End: 2026-08-26

## 2025-08-26 RX ORDER — CLINDAMYCIN HYDROCHLORIDE 300 MG/1
300 CAPSULE ORAL 3 TIMES DAILY
Qty: 30 CAPSULE | Refills: 0 | Status: SHIPPED | OUTPATIENT
Start: 2025-08-26 | End: 2025-09-05

## 2025-09-02 ENCOUNTER — TELEPHONE (OUTPATIENT)
Dept: FAMILY MEDICINE | Facility: CLINIC | Age: 25
End: 2025-09-02
Payer: MEDICAID

## (undated) DEVICE — ELECTRODE BLADE INSULATED 1 IN

## (undated) DEVICE — SUCTION COAGULATOR 10FR 6IN

## (undated) DEVICE — MATTRESS HOVERMAT TRNSF 39X78

## (undated) DEVICE — KIT SURGICAL TURNOVER

## (undated) DEVICE — Device

## (undated) DEVICE — GLOVE SENSICARE PI GRN 7.5

## (undated) DEVICE — SPONGE TONSIL LARGE

## (undated) DEVICE — CATH ALL PUR URTHL RR 10FR

## (undated) DEVICE — MANIFOLD 4 PORT

## (undated) DEVICE — KIT ANTIFOG W/SPONG & FLUID

## (undated) DEVICE — SOL NACL IRR 1000ML BTL